# Patient Record
Sex: FEMALE | Race: BLACK OR AFRICAN AMERICAN | Employment: OTHER | URBAN - METROPOLITAN AREA
[De-identification: names, ages, dates, MRNs, and addresses within clinical notes are randomized per-mention and may not be internally consistent; named-entity substitution may affect disease eponyms.]

---

## 2017-01-09 ENCOUNTER — OFFICE VISIT (OUTPATIENT)
Dept: FAMILY MEDICINE | Facility: CLINIC | Age: 82
End: 2017-01-09
Payer: COMMERCIAL

## 2017-01-09 VITALS
RESPIRATION RATE: 20 BRPM | DIASTOLIC BLOOD PRESSURE: 67 MMHG | HEART RATE: 110 BPM | SYSTOLIC BLOOD PRESSURE: 154 MMHG | TEMPERATURE: 97.8 F | OXYGEN SATURATION: 97 %

## 2017-01-09 DIAGNOSIS — I10 HTN, GOAL BELOW 150/90: ICD-10-CM

## 2017-01-09 DIAGNOSIS — J45.40 MODERATE PERSISTENT ASTHMA WITHOUT COMPLICATION: Primary | ICD-10-CM

## 2017-01-09 PROCEDURE — 99214 OFFICE O/P EST MOD 30 MIN: CPT | Performed by: FAMILY MEDICINE

## 2017-01-09 ASSESSMENT — ASTHMA QUESTIONNAIRES
ACT_TOTALSCORE: 13
QUESTION_4 LAST FOUR WEEKS HOW OFTEN HAVE YOU USED YOUR RESCUE INHALER OR NEBULIZER MEDICATION (SUCH AS ALBUTEROL): ONE OR TWO TIMES PER DAY
QUESTION_2 LAST FOUR WEEKS HOW OFTEN HAVE YOU HAD SHORTNESS OF BREATH: MORE THAN ONCE A DAY
QUESTION_1 LAST FOUR WEEKS HOW MUCH OF THE TIME DID YOUR ASTHMA KEEP YOU FROM GETTING AS MUCH DONE AT WORK, SCHOOL OR AT HOME: A LITTLE OF THE TIME
QUESTION_3 LAST FOUR WEEKS HOW OFTEN DID YOUR ASTHMA SYMPTOMS (WHEEZING, COUGHING, SHORTNESS OF BREATH, CHEST TIGHTNESS OR PAIN) WAKE YOU UP AT NIGHT OR EARLIER THAN USUAL IN THE MORNING: ONCE A WEEK
QUESTION_5 LAST FOUR WEEKS HOW WOULD YOU RATE YOUR ASTHMA CONTROL: SOMEWHAT CONTROLLED

## 2017-01-09 NOTE — PROGRESS NOTES
SUBJECTIVE:                                                    Staci Watt is a 81 year old female who presents to clinic today for the following health issues:      Asthma Follow-Up    Was ACT completed today?    Yes    ACT Total Scores 1/9/2017   ACT TOTAL SCORE -   ASTHMA ER VISITS -   ASTHMA HOSPITALIZATIONS -   ACT TOTAL SCORE (Goal Greater than or Equal to 20) 13   In the past 12 months, how many times did you visit the emergency room for your asthma without being admitted to the hospital? 0   In the past 12 months, how many times were you hospitalized overnight because of your asthma? 0       Medication side effects: none    Medication Followup of Budesonide,  Ayazvana    Taking Medication as prescribed: Yes    Side Effects:  None    Medication Helping Symptoms:  yes     Asthma - Due to hardship medica was paying for her nebulizers. Over the last three months pt has been being billed. She has been taking her two nebulizer medications from twice to once daily. Nebulizers are Pulmicort and Brovana. She is also taking Flovent 250 mcg BID. Pt notes that her symptoms are well controlled.        Problem list and histories reviewed & adjusted, as indicated.  Additional history: as documented    Problem list, Medication list, Allergies, and Medical/Social/Surgical histories reviewed in [facility] and updated as appropriate.    ROS:  Constitutional, HEENT, cardiovascular, pulmonary, gi and gu systems are negative, except as otherwise noted.    OBJECTIVE:                                                    /67 mmHg  Pulse 110  Temp(Src) 97.8  F (36.6  C) (Oral)  Resp 20  Wt   SpO2 97%  There is no weight on file to calculate BMI.  GENERAL: healthy, alert and no distress  RESP: lungs clear to auscultation - no rales, rhonchi or wheezes  CV: regular rate and rhythm, normal S1 S2, no S3 or S4    Diagnostic Test Results:  none      ASSESSMENT/PLAN:                                                      1. Moderate  persistent asthma without complication  Pt is currently taking nebulizers are Pulmicort and Brovana. She is also taking Flovent 250 mcg BID. Pt notes that her symptoms are well controlled. ACT score 13.   As nebulizer are not covered, I will switch pt to Advair. We discussed discontinuing Pulmicort, Brovana and Flovent and starting Advair.   Fluticasone-salmeterol (ADVAIR) 500-50 MCG/DOSE diskus inhaler; Inhale 1 puff into the lungs 2 times daily  Dispense: 1 Inhaler; Refill: 1  Repeat ACT in one month.     2. HTN, goal below 150/90  B/P not at goal, I forgot to address it during the visit. Continue to monitor.     Destin Wolf MD  [facility] [place]

## 2017-01-09 NOTE — NURSING NOTE
"Chief Complaint   Patient presents with     Asthma     Recheck Medication     nebulizer       Initial /67 mmHg  Pulse 110  Temp(Src) 97.8  F (36.6  C) (Oral)  Resp 20  Wt   SpO2 97% Estimated body mass index is 32.61 kg/(m^2) as calculated from the following:    Height as of 10/11/16: 5' 2\" (1.575 m).    Weight as of 3/18/16: 178 lb 5 oz (80.882 kg).  BP completed using cuff size: christiano Carballo CMA      "

## 2017-01-10 ASSESSMENT — ASTHMA QUESTIONNAIRES: ACT_TOTALSCORE: 13

## 2017-01-26 DIAGNOSIS — E78.5 HYPERLIPIDEMIA LDL GOAL <100: ICD-10-CM

## 2017-01-26 DIAGNOSIS — E55.9 VITAMIN D DEFICIENCY: Primary | ICD-10-CM

## 2017-01-26 RX ORDER — ATORVASTATIN CALCIUM 80 MG/1
40 TABLET, FILM COATED ORAL DAILY
Qty: 45 TABLET | Refills: 3 | Status: SHIPPED | OUTPATIENT
Start: 2017-01-26 | End: 2017-05-02

## 2017-01-26 NOTE — TELEPHONE ENCOUNTER
"Routing refill request to provider for review/approval because:  Rx on file states \"no print out\" from October 2016. Please review accuracy of Rx and send new order to pharmacy. Thank you.       Maranda Geiger, Pharm.D.  on behalf of [facility] Pharmacist   [email or web address]                "

## 2017-01-26 NOTE — TELEPHONE ENCOUNTER
Lipitor 80mg     Last Written Prescription Date: 10/11/16  Last Fill Quantity: 45, # refills: 3  Last Office Visit with G, P or [facility] prescribing provider: 1/9/17       CHOL      181   10/11/2016  HDL       97   10/11/2016  LDL       67   10/11/2016  TRIG       86   10/11/2016  CHOLHDLRATIO      1.9   8/31/2015      Michelle Dennison CPhT  [facility]    On behalf of [facility]

## 2017-03-02 ENCOUNTER — OFFICE VISIT (OUTPATIENT)
Dept: ORTHOPEDICS | Facility: CLINIC | Age: 82
End: 2017-03-02

## 2017-03-02 VITALS — HEIGHT: 61 IN

## 2017-03-02 DIAGNOSIS — M25.561 RIGHT KNEE PAIN, UNSPECIFIED CHRONICITY: Primary | ICD-10-CM

## 2017-03-02 DIAGNOSIS — M17.0 PRIMARY OSTEOARTHRITIS OF BOTH KNEES: Primary | ICD-10-CM

## 2017-03-02 RX ORDER — TRIAMCINOLONE ACETONIDE 40 MG/ML
40 INJECTION, SUSPENSION INTRA-ARTICULAR; INTRAMUSCULAR ONCE
Qty: 2 ML | Refills: 0 | OUTPATIENT
Start: 2017-03-02 | End: 2017-03-02

## 2017-03-02 NOTE — NURSING NOTE
[address] [address]  Dept: [phone]  ______________________________________________________________________________    Patient: Staci Watt   : 1935   MRN: [id number]   2017    INVASIVE PROCEDURE SAFETY CHECKLIST    Date: 3/2/2017   Procedure: Bilateral knee CSI with Kenalog   Patient Name: Staci Watt  MRN: [id number]  YOB: 1935    Action: Complete sections as appropriate. Any discrepancy results in a HARD COPY until resolved.     PRE PROCEDURE:  Patient ID verified with 2 identifiers (name and  or MRN): Yes  Procedure and site verified with patient/designee (when able): Yes  Accurate consent documentation in medical record: Yes  H&P (or appropriate assessment) documented in medical record: Yes  H&P must be up to 20 days prior to procedure and updates within 24 hours of procedure as applicable: NA  Relevant diagnostic and radiology test results appropriately labeled and displayed as applicable: Yes  Procedure site(s) marked with provider initials: NA    TIMEOUT:  Time-Out performed immediately prior to starting procedure, including verbal and active participation of all team members addressing the following:Yes  * Correct patient identify  * Confirmed that the correct side and site are marked  * An accurate procedure consent form  * Agreement on the procedure to be done  * Correct patient position  * Relevant images and results are properly labeled and appropriately displayed  * The need to administer antibiotics or fluids for irrigation purposes during the procedure as applicable   * Safety precautions based on patient history or medication use    DURING PROCEDURE: Verification of correct person, site, and procedures any time the responsibility for care of the patient is transferred to another member of the care team.

## 2017-03-02 NOTE — MR AVS SNAPSHOT
"              After Visit Summary   3/2/2017    Staci Watt    MRN: [id number]           Patient Information     Date Of Birth          1935        Visit Information        Provider Department      3/2/2017 4:00 PM Diego Gupta MD [facility]        Today's Diagnoses     Primary osteoarthritis of both knees    -  1       Follow-ups after your visit        Who to contact     Please call your clinic at [phone] to:    Ask questions about your health    Make or cancel appointments    Discuss your medicines    Learn about your test results    Speak to your doctor   If you have compliments or concerns about an experience at your clinic, or if you wish to file a complaint, please contact [facility] Patient Relations at [phone] or email us at [email or web address]         Additional Information About Your Visit        MyChart Information     [employer] is an electronic gateway that provides easy, online access to your medical records. With [employer], you can request a clinic appointment, read your test results, renew a prescription or communicate with your care team.     To sign up for [employer] visit the website at [email or web address]   You will be asked to enter the access code listed below, as well as some personal information. Please follow the directions to create your username and password.     Your access code is: [email or web address]  Expires: 2017 11:17 AM     Your access code will  in 90 days. If you need help or a new code, please contact your [facility] or call [phone] for assistance.        Care EveryWhere ID     This is your Care EveryWhere ID. This could be used by other organizations to access your [place] medical records  [id number]        Your Vitals Were     Height                   5' 1\" (1.549 m)            Blood Pressure from Last 3 Encounters:   17 154/67   10/11/16 142/70 "   03/18/16 147/74    Weight from Last 3 Encounters:   03/18/16 178 lb 5 oz (80.9 kg)   02/12/16 177 lb (80.3 kg)   01/07/16 179 lb (81.2 kg)              We Performed the Following     Large Joint/Bursa injection and/or drainage - Bilateral (Shoulder, Knee) [20773] [50 Mod]     TRIAMCINOLONE ACET INJ NOS          Today's Medication Changes          These changes are accurate as of: 3/2/17 11:59 PM.  If you have any questions, ask your nurse or doctor.               Start taking these medicines.        Dose/Directions    triamcinolone acetonide 40 MG/ML injection   Commonly known as:  KENALOG   Used for:  Primary osteoarthritis of both knees   Started by:  Diego Gupta MD        Dose:  40 mg   1 mL (40 mg) by INTRA-ARTICULAR route once for 1 dose   Quantity:  2 mL   Refills:  0         These medicines have changed or have updated prescriptions.        Dose/Directions    * vitamin D 2000 UNITS tablet   This may have changed:  additional instructions   Used for:  Vitamin D deficiency   Changed by:  Heidi Velasquez MD        Dose:  2000 Units   Take 2,000 Units by mouth daily   Quantity:  100 tablet   Refills:  3       * vitamin D 2000 UNITS tablet   This may have changed:  Another medication with the same name was changed. Make sure you understand how and when to take each.   Used for:  Vitamin D deficiency   Changed by:  Heidi Velasquez MD        Dose:  2000 Units   Take 2,000 Units by mouth daily   Quantity:  100 tablet   Refills:  3       * Notice:  This list has 2 medication(s) that are the same as other medications prescribed for you. Read the directions carefully, and ask your doctor or other care provider to review them with you.      Stop taking these medicines if you haven't already. Please contact your care team if you have questions.     nebulizer nebulization   Stopped by:  Diego Gupta MD                Where to get your medicines      Some of these will need a paper  prescription and others can be bought over the counter.  Ask your nurse if you have questions.     You don't need a prescription for these medications     triamcinolone acetonide 40 MG/ML injection                Primary Care Provider Office Phone # Fax #    Heidi Velasquez [phone]       [facility] [address]  [facility] [address]        Thank you!     Thank you for choosing [facility]  for your care. Our goal is always to provide you with excellent care. Hearing back from our patients is one way we can continue to improve our services. Please take a few minutes to complete the written survey that you may receive in the mail after your visit with us. Thank you!             Your Updated Medication List - Protect others around you: Learn how to safely use, store and throw away your medicines at www.disposemymeds.org.          This list is accurate as of: 3/2/17 11:59 PM.  Always use your most recent med list.                   Brand Name Dispense Instructions for use    acetaminophen 500 MG tablet    TYLENOL     Take 500-1,000 mg by mouth every 6 hours as needed.       albuterol 108 (90 BASE) MCG/ACT Inhaler    albuterol    3 Inhaler    Inhale 2 puffs into the lungs every 4 hours as needed for shortness of breath / dyspnea or wheezing       ASPIRIN CHILDRENS 81 MG chewable tablet   Generic drug:  aspirin     0    1 TABLET DAILY       atorvastatin 80 MG tablet    LIPITOR    45 tablet    Take 0.5 tablets (40 mg) by mouth daily For high cholesterol.       B-12 1000 MCG Tbcr     100 tablet    Take 1,000 mcg by mouth daily       clonazePAM 0.5 MG tablet    klonoPIN    4 tablet    Take 1 tablet 30 minutes prior to knee injection, do not drive while taking this medication.       * ferrous gluconate 324 (38 FE) MG tablet    FERGON    60 tablet    Take 1 tablet by mouth 2 times daily.       * ferrous gluconate 324 (38 FE) MG tablet    FERGON    100 tablet    Take 1  tablet (324 mg) by mouth daily (with breakfast)       FLUoxetine 20 MG capsule    PROzac    90 capsule    Take 1 capsule (20 mg) by mouth daily       fluticasone-salmeterol 500-50 MCG/DOSE diskus inhaler    ADVAIR    1 Inhaler    Inhale 1 puff into the lungs 2 times daily       hydrochlorothiazide 50 MG tablet    HYDRODIURIL    90 tablet    Take 1 tablet (50 mg) by mouth daily       lisinopril 40 MG tablet    PRINIVIL/ZESTRIL    90 tablet    Take 1 tablet (40 mg) by mouth daily       senna-docusate 8.6-50 MG per tablet    DANIEL-COLACE    100 tablet    Take 2 tablets by mouth daily       triamcinolone acetonide 40 MG/ML injection    KENALOG    2 mL    1 mL (40 mg) by INTRA-ARTICULAR route once for 1 dose       * vitamin D 2000 UNITS tablet     100 tablet    Take 2,000 Units by mouth daily       * vitamin D 2000 UNITS tablet     100 tablet    Take 2,000 Units by mouth daily       * Notice:  This list has 4 medication(s) that are the same as other medications prescribed for you. Read the directions carefully, and ask your doctor or other care provider to review them with you.
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
## 2017-05-02 ENCOUNTER — OFFICE VISIT (OUTPATIENT)
Dept: FAMILY MEDICINE | Facility: CLINIC | Age: 82
End: 2017-05-02
Payer: COMMERCIAL

## 2017-05-02 VITALS
OXYGEN SATURATION: 99 % | SYSTOLIC BLOOD PRESSURE: 128 MMHG | TEMPERATURE: 98.8 F | RESPIRATION RATE: 18 BRPM | HEART RATE: 116 BPM | DIASTOLIC BLOOD PRESSURE: 64 MMHG

## 2017-05-02 DIAGNOSIS — E55.9 VITAMIN D DEFICIENCY: ICD-10-CM

## 2017-05-02 DIAGNOSIS — M17.0 PRIMARY OSTEOARTHRITIS OF BOTH KNEES: Primary | ICD-10-CM

## 2017-05-02 DIAGNOSIS — Z13.820 SCREENING FOR OSTEOPOROSIS: ICD-10-CM

## 2017-05-02 DIAGNOSIS — D64.9 ANEMIA, UNSPECIFIED TYPE: ICD-10-CM

## 2017-05-02 DIAGNOSIS — Z23 NEED FOR TDAP VACCINATION: ICD-10-CM

## 2017-05-02 DIAGNOSIS — J45.40 MODERATE PERSISTENT ASTHMA WITHOUT COMPLICATION: ICD-10-CM

## 2017-05-02 DIAGNOSIS — E78.5 HYPERLIPIDEMIA LDL GOAL <100: ICD-10-CM

## 2017-05-02 DIAGNOSIS — F33.1 MAJOR DEPRESSIVE DISORDER, RECURRENT EPISODE, MODERATE (H): ICD-10-CM

## 2017-05-02 DIAGNOSIS — D50.9 IRON DEFICIENCY ANEMIA, UNSPECIFIED IRON DEFICIENCY ANEMIA TYPE: ICD-10-CM

## 2017-05-02 DIAGNOSIS — N18.30 CKD (CHRONIC KIDNEY DISEASE) STAGE 3, GFR 30-59 ML/MIN (H): ICD-10-CM

## 2017-05-02 DIAGNOSIS — I10 HTN, GOAL BELOW 150/90: ICD-10-CM

## 2017-05-02 LAB
BASOPHILS # BLD AUTO: 0 10E9/L (ref 0–0.2)
BASOPHILS NFR BLD AUTO: 0.3 %
DIFFERENTIAL METHOD BLD: ABNORMAL
EOSINOPHIL # BLD AUTO: 0.1 10E9/L (ref 0–0.7)
EOSINOPHIL NFR BLD AUTO: 1.3 %
ERYTHROCYTE [DISTWIDTH] IN BLOOD BY AUTOMATED COUNT: 13.8 % (ref 10–15)
HCT VFR BLD AUTO: 34.2 % (ref 35–47)
HGB BLD-MCNC: 10.9 G/DL (ref 11.7–15.7)
LYMPHOCYTES # BLD AUTO: 2.7 10E9/L (ref 0.8–5.3)
LYMPHOCYTES NFR BLD AUTO: 42.2 %
MCH RBC QN AUTO: 31.1 PG (ref 26.5–33)
MCHC RBC AUTO-ENTMCNC: 31.9 G/DL (ref 31.5–36.5)
MCV RBC AUTO: 97 FL (ref 78–100)
MONOCYTES # BLD AUTO: 0.5 10E9/L (ref 0–1.3)
MONOCYTES NFR BLD AUTO: 7.9 %
NEUTROPHILS # BLD AUTO: 3.1 10E9/L (ref 1.6–8.3)
NEUTROPHILS NFR BLD AUTO: 48.3 %
PLATELET # BLD AUTO: 368 10E9/L (ref 150–450)
RBC # BLD AUTO: 3.51 10E12/L (ref 3.8–5.2)
WBC # BLD AUTO: 6.3 10E9/L (ref 4–11)

## 2017-05-02 PROCEDURE — 90715 TDAP VACCINE 7 YRS/> IM: CPT | Performed by: FAMILY MEDICINE

## 2017-05-02 PROCEDURE — 83550 IRON BINDING TEST: CPT | Performed by: FAMILY MEDICINE

## 2017-05-02 PROCEDURE — 80053 COMPREHEN METABOLIC PANEL: CPT | Performed by: FAMILY MEDICINE

## 2017-05-02 PROCEDURE — 83540 ASSAY OF IRON: CPT | Performed by: FAMILY MEDICINE

## 2017-05-02 PROCEDURE — 80061 LIPID PANEL: CPT | Performed by: FAMILY MEDICINE

## 2017-05-02 PROCEDURE — 82043 UR ALBUMIN QUANTITATIVE: CPT | Performed by: FAMILY MEDICINE

## 2017-05-02 PROCEDURE — 85025 COMPLETE CBC W/AUTO DIFF WBC: CPT | Performed by: FAMILY MEDICINE

## 2017-05-02 PROCEDURE — 36415 COLL VENOUS BLD VENIPUNCTURE: CPT | Performed by: FAMILY MEDICINE

## 2017-05-02 PROCEDURE — 90471 IMMUNIZATION ADMIN: CPT | Performed by: FAMILY MEDICINE

## 2017-05-02 PROCEDURE — 82728 ASSAY OF FERRITIN: CPT | Performed by: FAMILY MEDICINE

## 2017-05-02 PROCEDURE — 99214 OFFICE O/P EST MOD 30 MIN: CPT | Mod: 25 | Performed by: FAMILY MEDICINE

## 2017-05-02 PROCEDURE — 82306 VITAMIN D 25 HYDROXY: CPT | Performed by: FAMILY MEDICINE

## 2017-05-02 RX ORDER — HYDROCHLOROTHIAZIDE 50 MG/1
50 TABLET ORAL DAILY
Qty: 90 TABLET | Refills: 3 | Status: SHIPPED | OUTPATIENT
Start: 2017-05-02 | End: 2018-05-14

## 2017-05-02 RX ORDER — LISINOPRIL 40 MG/1
40 TABLET ORAL DAILY
Qty: 90 TABLET | Refills: 3 | Status: SHIPPED | OUTPATIENT
Start: 2017-05-02 | End: 2018-05-14

## 2017-05-02 RX ORDER — ATORVASTATIN CALCIUM 80 MG/1
40 TABLET, FILM COATED ORAL DAILY
Qty: 45 TABLET | Refills: 3 | Status: SHIPPED | OUTPATIENT
Start: 2017-05-02 | End: 2018-05-14

## 2017-05-02 NOTE — NURSING NOTE
"Chief Complaint   Patient presents with     RECHECK     Depression, Asthma, Hyperlipidemia and CKD       Initial /64 (BP Location: Right arm, Patient Position: Chair, Cuff Size: Adult Large)  Pulse 116  Temp 98.8  F (37.1  C) (Oral)  Resp 18  SpO2 99% Estimated body mass index is 32.61 kg/(m^2) as calculated from the following:    Height as of 3/18/16: 5' 2\" (1.575 m).    Weight as of 3/18/16: 178 lb 5 oz (80.9 kg).  Medication Reconciliation: toma Ramos CMA      "

## 2017-05-02 NOTE — LETTER
My Depression Action Plan  Name: Staci Watt   Date of Birth 1935  Date: 5/2/2017    My doctor: Heidi Velasquez   My clinic: [address] [address] [phone]          GREEN    ZONE   Good Control    What it looks like:     Things are going generally well. You have normal up s and down s. You may even feel depressed from time to time, but bad moods usually last less than a day.   What you need to do:  1. Continue to care for yourself (see self care plan)  2. Check your depression survival kit and update it as needed  3. Follow your physician s recommendations including any medication.  4. Do not stop taking medication unless you consult with your physician first.           YELLOW         ZONE Getting Worse    What it looks like:     Depression is starting to interfere with your life.     It may be hard to get out of bed; you may be starting to isolate yourself from others.    Symptoms of depression are starting to last most all day and this has happened for several days.     You may have suicidal thoughts but they are not constant.   What you need to do:     1. Call your care team, your response to treatment will improve if you keep your care team informed of your progress. Yellow periods are signs an adjustment may need to be made.     2. Continue your self-care, even if you have to fake it!    3. Talk to someone in your support network    4. Open up your depression survival kit           RED    ZONE Medical Alert - Get Help    What it looks like:     Depression is seriously interfering with your life.     You may experience these or other symptoms: You can t get out of bed most days, can t work or engage in other necessary activities, you have trouble taking care of basic hygiene, or basic responsibilities, thoughts of suicide or death that will not go away, self-injurious behavior.     What you need to do:  1. Call your care  team and request a same-day appointment. If they are not available (weekends or after hours) call your local crisis line, emergency room or 911.      Electronically signed by: Gill Lua, May 2, 2017    Depression Self Care Plan / Survival Kit    Self-Care for Depression  Here s the deal. Your body and mind are really not as separate as most people think.  What you do and think affects how you feel and how you feel influences what you do and think. This means if you do things that people who feel good do, it will help you feel better.  Sometimes this is all it takes.  There is also a place for medication and therapy depending on how severe your depression is, so be sure to consult with your medical provider and/ or Behavioral Health Consultant if your symptoms are worsening or not improving.     In order to better manage my stress, I will:    Exercise  Get some form of exercise, every day. This will help reduce pain and release endorphins, the  feel good  chemicals in your brain. This is almost as good as taking antidepressants!  This is not the same as joining a gym and then never going! (they count on that by the way ) It can be as simple as just going for a walk or doing some gardening, anything that will get you moving.      Hygiene   Maintain good hygiene (Get out of bed in the morning, Make your bed, Brush your teeth, Take a shower, and Get dressed like you were going to work, even if you are unemployed).  If your clothes don't fit try to get ones that do.    Diet  I will strive to eat foods that are good for me, drink plenty of water, and avoid excessive sugar, caffeine, alcohol, and other mood-altering substances.  Some foods that are helpful in depression are: complex carbohydrates, B vitamins, flaxseed, fish or fish oil, fresh fruits and vegetables.    Psychotherapy  I agree to participate in Individual Therapy (if recommended).    Medication  If prescribed medications, I agree to take them.  Missing  doses can result in serious side effects.  I understand that drinking alcohol, or other illicit drug use, may cause potential side effects.  I will not stop my medication abruptly without first discussing it with my provider.    Staying Connected With Others  I will stay in touch with my friends, family members, and my primary care provider/team.    Use your imagination  Be creative.  We all have a creative side; it doesn t matter if it s oil painting, sand castles, or mud pies! This will also kick up the endorphins.    Witness Beauty  (AKA stop and smell the roses) Take a look outside, even in mid-winter. Notice colors, textures. Watch the squirrels and birds.     Service to others  Be of service to others.  There is always someone else in need.  By helping others we can  get out of ourselves  and remember the really important things.  This also provides opportunities for practicing all the other parts of the program.    Humor  Laugh and be silly!  Adjust your TV habits for less news and crime-drama and more comedy.    Control your stress  Try breathing deep, massage therapy, biofeedback, and meditation. Find time to relax each day.     My support system    Clinic Contact:  Phone number:    Contact 1:  Phone number:    Contact 2:  Phone number:    Nondenominational/:  Phone number:    Therapist:  Phone number:    Local crisis center:    Phone number:    Other community support:  Phone number:

## 2017-05-02 NOTE — LETTER
[facility]   [address] [place]   [address]  [phone]    May 19, 2017      Staci KATIE Shukri  [address] [facility] [address]          Dear [place]!  It was a pleasure to see you in clinic!  Thank you for getting labs done. Everything looks pretty good. You do not have an iron deficiency or a vitamin D Deficiency.    You do still continue to have low hemoglobin (anemia)., which has been present for 5 years, and has not changed a lot.  You could consider getting a colonoscopy since one cause of persistent anemia is colon cancer.  I'll go ahead and put a referral in, but please come in to discuss this with me if you have any questions.    Please call [facility]: Adult Colorectal Surgery   [phone] to discuss getting this done.    Everything else looks great. Your microalbumen is normal, which is great news. This means you do not have protein in the urine, and that your kidneys are currently functioning well. Keeping blood pressure and blood fats low is the best way to preserve your kidney function over your lifetime. The testing of your blood sugar, kidney function, liver function and electrolytes was normal. Good news, your LDL (or bad cholesterol) is at goal. Your medication is working well. Please continue to take your cholesterol lowering medication.    If you have any questions, please contact the clinic or schedule an appointment with me, thank you!      Results for orders placed or performed in visit on 05/02/17   Comprehensive metabolic panel   Result Value Ref Range    Sodium 140 133 - 144 mmol/L    Potassium 4.0 3.4 - 5.3 mmol/L    Chloride 106 94 - 109 mmol/L    Carbon Dioxide 26 20 - 32 mmol/L    Anion Gap 8 3 - 14 mmol/L    Glucose 84 70 - 99 mg/dL    Urea Nitrogen 12 7 - 30 mg/dL    Creatinine 0.91 0.52 - 1.04 mg/dL    GFR Estimate 59 (L) >60 mL/min/1.7m2    GFR Estimate If Black 72 >60 mL/min/1.7m2    Calcium 9.5 8.5 - 10.1 mg/dL     Bilirubin Total 0.4 0.2 - 1.3 mg/dL    Albumin 4.0 3.4 - 5.0 g/dL    Protein Total 8.0 6.8 - 8.8 g/dL    Alkaline Phosphatase 78 40 - 150 U/L    ALT 17 0 - 50 U/L    AST 12 0 - 45 U/L   Lipid panel reflex to direct LDL   Result Value Ref Range    Cholesterol 205 (H) <200 mg/dL    Triglycerides 79 <150 mg/dL    HDL Cholesterol 117 >49 mg/dL    LDL Cholesterol Calculated 72 <100 mg/dL    Non HDL Cholesterol 88 <130 mg/dL   Albumin Random Urine Quantitative   Result Value Ref Range    Creatinine Urine 107 mg/dL    Albumin Urine mg/L 10 mg/L    Albumin Urine mg/g Cr 9.05 0 - 25 mg/g Cr   CBC with platelets differential   Result Value Ref Range    WBC 6.3 4.0 - 11.0 10e9/L    RBC Count 3.51 (L) 3.8 - 5.2 10e12/L    Hemoglobin 10.9 (L) 11.7 - 15.7 g/dL    Hematocrit 34.2 (L) 35.0 - 47.0 %    MCV 97 78 - 100 fl    MCH 31.1 26.5 - 33.0 pg    MCHC 31.9 31.5 - 36.5 g/dL    RDW 13.8 10.0 - 15.0 %    Platelet Count 368 150 - 450 10e9/L    Diff Method Automated Method     % Neutrophils 48.3 %    % Lymphocytes 42.2 %    % Monocytes 7.9 %    % Eosinophils 1.3 %    % Basophils 0.3 %    Absolute Neutrophil 3.1 1.6 - 8.3 10e9/L    Absolute Lymphocytes 2.7 0.8 - 5.3 10e9/L    Absolute Monocytes 0.5 0.0 - 1.3 10e9/L    Absolute Eosinophils 0.1 0.0 - 0.7 10e9/L    Absolute Basophils 0.0 0.0 - 0.2 10e9/L   Ferritin   Result Value Ref Range    Ferritin 136 8 - 252 ng/mL   Vitamin D Deficiency   Result Value Ref Range    Vitamin D Deficiency screening 40 20 - 75 ug/L   Iron and iron binding capacity   Result Value Ref Range    Iron 50 35 - 180 ug/dL    Iron Binding Cap 336 240 - 430 ug/dL    Iron Saturation Index 15 15 - 46 %         Sincerely,    Heidi Velasquez MD/nr

## 2017-05-02 NOTE — PROGRESS NOTES
SUBJECTIVE:                                                    Staci Watt is a 81 year old female who presents to clinic today for the following health issues:  Bilateral knee pain  She reports severe bilateral knee pain, left worse than right, for more than 15 years, but getting acutely worse. She can't really walk at all and has significant trouble with stairs. She does have stairs at her house, with upstairs bedroom, and very tall sanya bed that's difficult for her to get in and out of. She recently had a knee injection, but it only worked for a week. She is slowly beginning to consider a knee replacement. She really doesn't want to spend any time in a tcu.    Hyperlipidemia Follow-Up      Rate your low fat/cholesterol diet?: good    Taking statin?  Yes, possible muscle aches from statin    Other lipid medications/supplements?:  No     Hypertension Follow-up       Outpatient blood pressures are not being checked.    Low Salt Diet: no added salt  BP Readings from Last 3 Encounters:   05/02/17 128/64   01/09/17 154/67   10/11/16 142/70           Depression Followup    Status since last visit: Stable      See PHQ-9 for current symptoms.  Other associated symptoms: None    Complicating factors:   Significant life event:  Yes-  Personal, family issues   Current substance abuse:  None  Anxiety or Panic symptoms:  No    PHQ-9  English PHQ-9   Any Language          Asthma Follow-Up    Was ACT completed today?    Yes    ACT Total Scores 5/2/2017   ACT TOTAL SCORE (Goal Greater than or Equal to 20) 23   In the past 12 months, how many times did you visit the emergency room for your asthma without being admitted to the hospital? 0   In the past 12 months, how many times were you hospitalized overnight because of your asthma? 0       Recent asthma triggers that patient is dealing with: None      Chronic Kidney Disease Follow-up      Current NSAID use?  Yes:    and ibuprofen (Motrin)  Frequency: daily      Amount of  exercise or physical activity: up and down stairs at home    Problems taking medications regularly: No    Medication side effects: muscle aches    Diet: low salt and low fat/cholesterol    HIstory of vitamin D def  Will recheck today.    Problem list and histories reviewed & adjusted, as indicated.  Additional history: as documented    Patient Active Problem List   Diagnosis     Allergic rhinitis     Moderate Persistent Asthma     Gout     Hyperlipidemia LDL goal <100     CKD (chronic kidney disease) stage 3, GFR 30-59 ml/min     Obese     OA (osteoarthritis) of knee     Vitamin D deficiency     Microalbuminuria     Ganglion cyst     HTN, goal below 150/90     Major depressive disorder, recurrent episode, moderate (H)     Moderate persistent asthma without complication     Primary osteoarthritis of both knees     Iron deficiency anemia, unspecified iron deficiency anemia type     Past Surgical History:   Procedure Laterality Date     C NONSPECIFIC PROCEDURE      (B) detatched retina     C NONSPECIFIC PROCEDURE      LASIK surgery     C NONSPECIFIC PROCEDURE      NISSA/BSO     C NONSPECIFIC PROCEDURE      Hernia     C NONSPECIFIC PROCEDURE      Tonsillectomy     C NONSPECIFIC PROCEDURE      Arthroscopic knee surgery       Social History   Substance Use Topics     Smoking status: Passive Smoke Exposure - Never Smoker     Smokeless tobacco: Never Used      Comment: [facility] [employer]     Alcohol use Yes      Comment: has a drink every night before she goes to bed     Family History   Problem Relation Age of Onset     Hypertension Mother      Respiratory Mother      Breast Cancer Maternal Aunt      Respiratory Maternal Aunt      Respiratory Maternal Aunt          Current Outpatient Prescriptions   Medication Sig Dispense Refill     atorvastatin (LIPITOR) 80 MG tablet Take 0.5 tablets (40 mg) by mouth daily For high cholesterol. 45 tablet 3     lisinopril (PRINIVIL/ZESTRIL) 40 MG tablet Take 1 tablet (40  mg) by mouth daily 90 tablet 3     FLUoxetine (PROZAC) 20 MG capsule Take 1 capsule (20 mg) by mouth daily Please profile 90 capsule 3     hydrochlorothiazide (HYDRODIURIL) 50 MG tablet Take 1 tablet (50 mg) by mouth daily 90 tablet 3     fluticasone-salmeterol (ADVAIR DISKUS) 500-50 MCG/DOSE diskus inhaler Inhale 1 puff into the lungs 2 times daily 3 Inhaler 3     senna-docusate (DANIEL-COLACE) 8.6-50 MG per tablet Take 2 tablets by mouth daily 100 tablet 3     Cyanocobalamin (B-12) 1000 MCG TBCR Take 1,000 mcg by mouth daily 100 tablet 1     Cholecalciferol (VITAMIN D) 2000 UNITS tablet Take 2,000 Units by mouth daily 100 tablet 3     ferrous gluconate (FERGON) 324 (38 FE) MG tablet Take 1 tablet (324 mg) by mouth daily (with breakfast) 100 tablet 3     albuterol (ALBUTEROL) 108 (90 BASE) MCG/ACT inhaler Inhale 2 puffs into the lungs every 4 hours as needed for shortness of breath / dyspnea or wheezing 3 Inhaler 3     ASPIRIN CHILDRENS 81 MG OR CHEW 1 TABLET DAILY 0 11     acetaminophen (TYLENOL) 500 MG tablet Take 500-1,000 mg by mouth every 6 hours as needed Reported on 5/2/2017       Allergies   Allergen Reactions     Contrast Dye      Seasonal Allergies      Recent Labs   Lab Test  05/02/17   1533  10/11/16   1510   01/07/16   1620   03/05/13   1500  04/24/12   1408   03/07/11   1402  06/03/10   0354   05/28/09   0906   A1C   --    --    --    --    --    --    --    --   5.7  5.6   --   5.6   LDL  72  67   --   65   < >  80  85   < >  148*  86   < >  134*   HDL  117  97   --   90   < >  77  84   < >  61  69   < >  83   TRIG  79  86   --   75   < >  79  83   < >  87  63   < >  73   ALT  17  21   --   23   < >  35  7   < >  16   --    < >  <6   CR  0.91  0.93   < >  0.89   < >  0.83  0.89   < >  1.01  1.20*   < >  1.01   GFRESTIMATED  59*  58*   < >  61   < >  67  62   < >  54*  44*   < >  54*   GFRESTBLACK  72  70   < >  74   < >  81  75   < >  65  53*   < >  65   POTASSIUM  4.0  3.9   < >  4.5   < >  4.2  4.4    < >  4.3  4.4   < >  4.1   TSH   --    --    --    --    --   1.31  1.29   --   0.94   --    < >  1.61    < > = values in this interval not displayed.      BP Readings from Last 3 Encounters:   05/02/17 128/64   01/09/17 154/67   10/11/16 142/70    Wt Readings from Last 3 Encounters:   03/18/16 178 lb 5 oz (80.9 kg)   02/12/16 177 lb (80.3 kg)   01/07/16 179 lb (81.2 kg)                    Reviewed and updated as needed this visit by clinical staff  Tobacco  Allergies  Meds  Med Hx  Surg Hx  Fam Hx  Soc Hx      Reviewed and updated as needed this visit by Provider         ROS:  Constitutional, HEENT, cardiovascular, pulmonary, GI, , musculoskeletal, neuro, skin, endocrine and psych systems are negative, except as otherwise noted.    OBJECTIVE:                                                    /64 (BP Location: Right arm, Patient Position: Chair, Cuff Size: Adult Large)  Pulse 116  Temp 98.8  F (37.1  C) (Oral)  Resp 18  SpO2 99%  There is no height or weight on file to calculate BMI.   Morbidly obese.  GENERAL: healthy, alert and no distress  EYES: Eyes grossly normal to inspection, PERRL and conjunctivae and sclerae normal  NECK: no adenopathy, no asymmetry, masses, or scars and thyroid normal to palpation  RESP: lungs clear to auscultation - no rales, rhonchi or wheezes  CV: regular rate and rhythm, normal S1 S2, no S3 or S4, no murmur, click or rub, no peripheral edema and peripheral pulses strong  ABDOMEN: soft,s nontender, no hepatosplenomegaly, no masses and bowel sounds normal  MS: significant crepitus of both knees with flexion/extension. Difficult rising from seated, from slow, antalgic gait, needs significant assistance ascending and descending exam table stop. No effusion or joint pain noted of joint line on palpation.  NEURO: sensory exam grossly normal, mentation intact, cranial nerves 2-12 intact and DTR's normal and symmetric (biceps, patellar)  PSYCH: mentation appears normal,  judgement and insight intact, appearance well groomed and appears somewhat sad    Diagnostic Test Results:  Results for orders placed or performed in visit on 05/02/17   Comprehensive metabolic panel   Result Value Ref Range    Sodium 140 133 - 144 mmol/L    Potassium 4.0 3.4 - 5.3 mmol/L    Chloride 106 94 - 109 mmol/L    Carbon Dioxide 26 20 - 32 mmol/L    Anion Gap 8 3 - 14 mmol/L    Glucose 84 70 - 99 mg/dL    Urea Nitrogen 12 7 - 30 mg/dL    Creatinine 0.91 0.52 - 1.04 mg/dL    GFR Estimate 59 (L) >60 mL/min/1.7m2    GFR Estimate If Black 72 >60 mL/min/1.7m2    Calcium 9.5 8.5 - 10.1 mg/dL    Bilirubin Total 0.4 0.2 - 1.3 mg/dL    Albumin 4.0 3.4 - 5.0 g/dL    Protein Total 8.0 6.8 - 8.8 g/dL    Alkaline Phosphatase 78 40 - 150 U/L    ALT 17 0 - 50 U/L    AST 12 0 - 45 U/L   Lipid panel reflex to direct LDL   Result Value Ref Range    Cholesterol 205 (H) <200 mg/dL    Triglycerides 79 <150 mg/dL    HDL Cholesterol 117 >49 mg/dL    LDL Cholesterol Calculated 72 <100 mg/dL    Non HDL Cholesterol 88 <130 mg/dL   Albumin Random Urine Quantitative   Result Value Ref Range    Creatinine Urine 107 mg/dL    Albumin Urine mg/L 10 mg/L    Albumin Urine mg/g Cr 9.05 0 - 25 mg/g Cr   CBC with platelets differential   Result Value Ref Range    WBC 6.3 4.0 - 11.0 10e9/L    RBC Count 3.51 (L) 3.8 - 5.2 10e12/L    Hemoglobin 10.9 (L) 11.7 - 15.7 g/dL    Hematocrit 34.2 (L) 35.0 - 47.0 %    MCV 97 78 - 100 fl    MCH 31.1 26.5 - 33.0 pg    MCHC 31.9 31.5 - 36.5 g/dL    RDW 13.8 10.0 - 15.0 %    Platelet Count 368 150 - 450 10e9/L    Diff Method Automated Method     % Neutrophils 48.3 %    % Lymphocytes 42.2 %    % Monocytes 7.9 %    % Eosinophils 1.3 %    % Basophils 0.3 %    Absolute Neutrophil 3.1 1.6 - 8.3 10e9/L    Absolute Lymphocytes 2.7 0.8 - 5.3 10e9/L    Absolute Monocytes 0.5 0.0 - 1.3 10e9/L    Absolute Eosinophils 0.1 0.0 - 0.7 10e9/L    Absolute Basophils 0.0 0.0 - 0.2 10e9/L   Ferritin   Result Value Ref Range     Ferritin 136 8 - 252 ng/mL   Vitamin D Deficiency   Result Value Ref Range    Vitamin D Deficiency screening 40 20 - 75 ug/L   Iron and iron binding capacity   Result Value Ref Range    Iron 50 35 - 180 ug/dL    Iron Binding Cap 336 240 - 430 ug/dL    Iron Saturation Index 15 15 - 46 %        ASSESSMENT/PLAN:                                                    1. Primary osteoarthritis of both knees  Severe, significantly limiting activity, encouraged follow up with ortho, I do feel she would benefit from surgery    2. Hyperlipidemia LDL goal <100  LDL Cholesterol Calculated   Date Value Ref Range Status   05/02/2017 72 <100 mg/dL Final     Comment:     Desirable:       <100 mg/dl   ] at goal, The current medical regimen is effective;  continue present plan and medications.   - Lipid panel reflex to direct LDL  - atorvastatin (LIPITOR) 80 MG tablet; Take 0.5 tablets (40 mg) by mouth daily For high cholesterol.  Dispense: 45 tablet; Refill: 3    3. HTN, goal below 150/90  BP Readings from Last 3 Encounters:   05/02/17 128/64   01/09/17 154/67   10/11/16 142/70    at goal, The current medical regimen is effective;  continue present plan and medications.   - lisinopril (PRINIVIL/ZESTRIL) 40 MG tablet; Take 1 tablet (40 mg) by mouth daily  Dispense: 90 tablet; Refill: 3  - hydrochlorothiazide (HYDRODIURIL) 50 MG tablet; Take 1 tablet (50 mg) by mouth daily  Dispense: 90 tablet; Refill: 3    4. CKD (chronic kidney disease) stage 3, GFR 30-59 ml/min  .  stable  GFR Estimate If Black   Date Value Ref Range Status   05/02/2017 72 >60 mL/min/1.7m2 Final     Comment:      GFR Calc   10/11/2016 70 >60 mL/min/1.7m2 Final     Comment:      GFR Calc   02/12/2016 69 >60 mL/min/1.7m2 Final     Comment:      GFR Calc      - Comprehensive metabolic panel  - Albumin Random Urine Quantitative    5. Moderate persistent asthma without complication  At goal  - Asthma Action Plan (AAP)   -  fluticasone-salmeterol (ADVAIR DISKUS) 500-50 MCG/DOSE diskus inhaler; Inhale 1 puff into the lungs 2 times daily  Dispense: 3 Inhaler; Refill: 3    6. Iron deficiency anemia, unspecified iron deficiency anemia type  Hemoglobin   Date Value Ref Range Status   05/02/2017 10.9 (L) 11.7 - 15.7 g/dL Final   10/11/2016 11.0 (L) 11.7 - 15.7 g/dL Final   08/31/2015 11.2 (L) 11.7 - 15.7 g/dL Final   04/22/2015 11.5 (L) 11.7 - 15.7 g/dL Final   based on today's labs, persistent anemia without iron deficiency.  Chronic disease vs CKD vs possible GI malignancy, will discuss with patient further workup  - CBC with platelets differential  - Ferritin  - Iron and iron binding capacity    7. Major depressive disorder, recurrent episode, moderate (H)  PHQ-9 SCORE 1/7/2016 8/2/2016 5/2/2017   Total Score - - -   Total Score 4 3 4    stable, at goal, although patient's affect depressed appearing, possibly related to chronic knee pain  - DEPRESSION ACTION PLAN (DAP)  - FLUoxetine (PROZAC) 20 MG capsule; Take 1 capsule (20 mg) by mouth daily Please profile  Dispense: 90 capsule; Refill: 3    8. Vitamin D deficiency  Normal, will remove from diagnosis list  - Vitamin D Deficiency    9. Screening for osteoporosis  Consider dexa scan    10. Need for Tdap vaccination  Done today  - TDAP VACCINE (ADACEL)  - ADMIN 1st VACCINE    Heidi Velasquez MD  [facility] [place]

## 2017-05-02 NOTE — MR AVS SNAPSHOT
After Visit Summary   5/2/2017    Miss Staci Watt    MRN: [id number]           Patient Information     Date Of Birth          1935        Visit Information        Provider Department      5/2/2017 2:20 PM Heidi Velasquez MD [facility]        Today's Diagnoses     Primary osteoarthritis of both knees    -  1    Vitamin D deficiency        CKD (chronic kidney disease) stage 3, GFR 30-59 ml/min        Moderate persistent asthma without complication        Hyperlipidemia LDL goal <100        Iron deficiency anemia, unspecified iron deficiency anemia type        Major depressive disorder, recurrent episode, moderate (H)        Symptomatic menopausal or female climacteric states        Screening for osteoporosis        Essential hypertension with goal blood pressure less than 140/90        HTN, goal below 150/90          Care Instructions    It is time for your dexa!  Please call our clinic to schedule this test. You do not need to fast prior to the test, although it is recommended that you NOT take calcium on the day of the test.    If you have any questions, please contact the clinic or schedule an appointment with me, thank you!     [facility]  [address] [place] [address]     Phone: [phone]  Fax: [phone]                Follow-ups after your visit        Who to contact     If you have questions or need follow up information about today's clinic visit or your schedule please contact [facility] directly at [phone].  Normal or non-critical lab and imaging results will be communicated to you by MyChart, letter or phone within 4 business days after the clinic has received the results. If you do not hear from us within 7 days, please contact the clinic through MyChart or phone. If you have a critical or abnormal lab result, we will notify you by phone as soon as possible.  Submit refill requests through [employer] or call your  "pharmacy and they will forward the refill request to us. Please allow 3 business days for your refill to be completed.          Additional Information About Your Visit        [employer] Information     [employer] lets you send messages to your doctor, view your test results, renew your prescriptions, schedule appointments and more. To sign up, go to [email or web address] . Click on \"Log in\" on the left side of the screen, which will take you to the Welcome page. Then click on \"Sign up Now\" on the right side of the page.     You will be asked to enter the access code listed below, as well as some personal information. Please follow the directions to create your username and password.     Your access code is: [email or web address]  Expires: 2017 12:17 PM     Your access code will  in 90 days. If you need help or a new code, please call your [facility] clinic or [phone].        Care EveryWhere ID     This is your Care EveryWhere ID. This could be used by other organizations to access your [facility] medical records  [id number]        Your Vitals Were     Pulse Temperature Respirations Pulse Oximetry          116 98.8  F (37.1  C) (Oral) 18 99%         Blood Pressure from Last 3 Encounters:   17 128/64   17 154/67   10/11/16 142/70    Weight from Last 3 Encounters:   16 178 lb 5 oz (80.9 kg)   16 177 lb (80.3 kg)   16 179 lb (81.2 kg)              We Performed the Following     Albumin Random Urine Quantitative     Asthma Action Plan (AAP)     CBC with platelets differential     Comprehensive metabolic panel     DEPRESSION ACTION PLAN (DAP)     Ferritin     Iron and iron binding capacity     Lipid panel reflex to direct LDL     Vitamin D Deficiency          Today's Medication Changes          These changes are accurate as of: 17  3:12 PM.  If you have any questions, ask your nurse or doctor.               These medicines have changed or have updated prescriptions.        Dose/Directions "    ferrous gluconate 324 (38 FE) MG tablet   Commonly known as:  FERGON   This may have changed:  Another medication with the same name was removed. Continue taking this medication, and follow the directions you see here.   Used for:  Iron deficiency anemia, unspecified iron deficiency anemia type   Changed by:  Heidi Velasquez MD        Dose:  324 mg   Take 1 tablet (324 mg) by mouth daily (with breakfast)   Quantity:  100 tablet   Refills:  3       FLUoxetine 20 MG capsule   Commonly known as:  PROzac   This may have changed:  additional instructions   Used for:  Major depressive disorder, recurrent episode, moderate (H)   Changed by:  Heidi Velasquez MD        Dose:  20 mg   Take 1 capsule (20 mg) by mouth daily Please profile   Quantity:  90 capsule   Refills:  3       fluticasone-salmeterol 500-50 MCG/DOSE diskus inhaler   Commonly known as:  ADVAIR DISKUS   This may have changed:  See the new instructions.   Used for:  Moderate persistent asthma without complication   Changed by:  Heidi Velasquez MD        Dose:  1 puff   Inhale 1 puff into the lungs 2 times daily   Quantity:  3 Inhaler   Refills:  3            Where to get your medicines      These medications were sent to [facility] [address]  [address] [facility] [address]     Phone:  [phone]     atorvastatin 80 MG tablet    FLUoxetine 20 MG capsule    fluticasone-salmeterol 500-50 MCG/DOSE diskus inhaler    hydrochlorothiazide 50 MG tablet    lisinopril 40 MG tablet                Primary Care Provider Office Phone # Fax #    Heidi Velasquez [phone]       [facility] [address]  [facility] [address]        Thank you!     Thank you for choosing [facility]  for your care. Our goal is always to provide you with excellent care. Hearing back from our patients is one way we can continue to improve our services. Please take a  few minutes to complete the written survey that you may receive in the mail after your visit with us. Thank you!             Your Updated Medication List - Protect others around you: Learn how to safely use, store and throw away your medicines at www.disposemymeds.org.          This list is accurate as of: 5/2/17  3:12 PM.  Always use your most recent med list.                   Brand Name Dispense Instructions for use    acetaminophen 500 MG tablet    TYLENOL     Take 500-1,000 mg by mouth every 6 hours as needed Reported on 5/2/2017       albuterol 108 (90 BASE) MCG/ACT Inhaler    albuterol    3 Inhaler    Inhale 2 puffs into the lungs every 4 hours as needed for shortness of breath / dyspnea or wheezing       ASPIRIN CHILDRENS 81 MG chewable tablet   Generic drug:  aspirin     0    1 TABLET DAILY       atorvastatin 80 MG tablet    LIPITOR    45 tablet    Take 0.5 tablets (40 mg) by mouth daily For high cholesterol.       B-12 1000 MCG Tbcr     100 tablet    Take 1,000 mcg by mouth daily       ferrous gluconate 324 (38 FE) MG tablet    FERGON    100 tablet    Take 1 tablet (324 mg) by mouth daily (with breakfast)       FLUoxetine 20 MG capsule    PROzac    90 capsule    Take 1 capsule (20 mg) by mouth daily Please profile       fluticasone-salmeterol 500-50 MCG/DOSE diskus inhaler    ADVAIR DISKUS    3 Inhaler    Inhale 1 puff into the lungs 2 times daily       hydrochlorothiazide 50 MG tablet    HYDRODIURIL    90 tablet    Take 1 tablet (50 mg) by mouth daily       lisinopril 40 MG tablet    PRINIVIL/ZESTRIL    90 tablet    Take 1 tablet (40 mg) by mouth daily       senna-docusate 8.6-50 MG per tablet    DANIEL-COLACE    100 tablet    Take 2 tablets by mouth daily       vitamin D 2000 UNITS tablet     100 tablet    Take 2,000 Units by mouth daily

## 2017-05-02 NOTE — NURSING NOTE
Screening Questionnaire for Adult Immunization    Are you sick today?   No   Do you have allergies to medications, food, a vaccine component or latex?   Yes-contrast dye   Have you ever had a serious reaction after receiving a vaccination?   No   Do you have a long-term health problem with heart disease, lung disease, asthma, kidney disease, metabolic disease (e.g. diabetes), anemia, or other blood disorder?   Yes   Do you have cancer, leukemia, HIV/AIDS, or any other immune system problem?   No   In the past 3 months, have you taken medications that affect  your immune system, such as prednisone, other steroids, or anticancer drugs; drugs for the treatment of rheumatoid arthritis, Crohn s disease, or psoriasis; or have you had radiation treatments?   No   Have you had a seizure, or a brain or other nervous system problem?   No   During the past year, have you received a transfusion of blood or blood     products, or been given immune (gamma) globulin or antiviral drug?   No   For women: Are you pregnant or is there a chance you could become        pregnant during the next month?   No   Have you received any vaccinations in the past 4 weeks?   No     Immunization questionnaire was positive for at least one answer.  Notified Dr. Velasquez.      MNVFC doesn't apply on this patient    Per orders of Dr. Velasquez, injection of Tdap given by Yue Ramos. Patient instructed to remain in clinic for 20 minutes afterwards, and to report any adverse reaction to me immediately.       Screening performed by Yue Ramos on 5/2/2017 at 3:23 PM.

## 2017-05-03 PROBLEM — D64.9 ANEMIA, UNSPECIFIED TYPE: Status: ACTIVE | Noted: 2017-05-03

## 2017-05-03 PROBLEM — D50.9 IRON DEFICIENCY ANEMIA, UNSPECIFIED IRON DEFICIENCY ANEMIA TYPE: Status: RESOLVED | Noted: 2017-05-02 | Resolved: 2017-05-03

## 2017-05-03 LAB
ALBUMIN SERPL-MCNC: 4 G/DL (ref 3.4–5)
ALP SERPL-CCNC: 78 U/L (ref 40–150)
ALT SERPL W P-5'-P-CCNC: 17 U/L (ref 0–50)
ANION GAP SERPL CALCULATED.3IONS-SCNC: 8 MMOL/L (ref 3–14)
AST SERPL W P-5'-P-CCNC: 12 U/L (ref 0–45)
BILIRUB SERPL-MCNC: 0.4 MG/DL (ref 0.2–1.3)
BUN SERPL-MCNC: 12 MG/DL (ref 7–30)
CALCIUM SERPL-MCNC: 9.5 MG/DL (ref 8.5–10.1)
CHLORIDE SERPL-SCNC: 106 MMOL/L (ref 94–109)
CHOLEST SERPL-MCNC: 205 MG/DL
CO2 SERPL-SCNC: 26 MMOL/L (ref 20–32)
CREAT SERPL-MCNC: 0.91 MG/DL (ref 0.52–1.04)
CREAT UR-MCNC: 107 MG/DL
DEPRECATED CALCIDIOL+CALCIFEROL SERPL-MC: 40 UG/L (ref 20–75)
FERRITIN SERPL-MCNC: 136 NG/ML (ref 8–252)
GFR SERPL CREATININE-BSD FRML MDRD: 59 ML/MIN/1.7M2
GLUCOSE SERPL-MCNC: 84 MG/DL (ref 70–99)
HDLC SERPL-MCNC: 117 MG/DL
IRON SATN MFR SERPL: 15 % (ref 15–46)
IRON SERPL-MCNC: 50 UG/DL (ref 35–180)
LDLC SERPL CALC-MCNC: 72 MG/DL
MICROALBUMIN UR-MCNC: 10 MG/L
MICROALBUMIN/CREAT UR: 9.05 MG/G CR (ref 0–25)
NONHDLC SERPL-MCNC: 88 MG/DL
POTASSIUM SERPL-SCNC: 4 MMOL/L (ref 3.4–5.3)
PROT SERPL-MCNC: 8 G/DL (ref 6.8–8.8)
SODIUM SERPL-SCNC: 140 MMOL/L (ref 133–144)
TIBC SERPL-MCNC: 336 UG/DL (ref 240–430)
TRIGL SERPL-MCNC: 79 MG/DL

## 2017-05-03 ASSESSMENT — ASTHMA QUESTIONNAIRES: ACT_TOTALSCORE: 23

## 2017-05-03 ASSESSMENT — PATIENT HEALTH QUESTIONNAIRE - PHQ9: SUM OF ALL RESPONSES TO PHQ QUESTIONS 1-9: 4

## 2017-05-15 ENCOUNTER — RADIANT APPOINTMENT (OUTPATIENT)
Dept: GENERAL RADIOLOGY | Facility: CLINIC | Age: 82
End: 2017-05-15
Attending: FAMILY MEDICINE
Payer: COMMERCIAL

## 2017-05-15 ENCOUNTER — OFFICE VISIT (OUTPATIENT)
Dept: FAMILY MEDICINE | Facility: CLINIC | Age: 82
End: 2017-05-15
Payer: COMMERCIAL

## 2017-05-15 VITALS
TEMPERATURE: 98.2 F | HEART RATE: 99 BPM | OXYGEN SATURATION: 98 % | SYSTOLIC BLOOD PRESSURE: 136 MMHG | RESPIRATION RATE: 12 BRPM | DIASTOLIC BLOOD PRESSURE: 70 MMHG

## 2017-05-15 DIAGNOSIS — M79.89 PAIN AND SWELLING OF TOE OF LEFT FOOT: Primary | ICD-10-CM

## 2017-05-15 DIAGNOSIS — M79.89 PAIN AND SWELLING OF TOE OF LEFT FOOT: ICD-10-CM

## 2017-05-15 DIAGNOSIS — M79.675 PAIN AND SWELLING OF TOE OF LEFT FOOT: Primary | ICD-10-CM

## 2017-05-15 DIAGNOSIS — M79.675 PAIN AND SWELLING OF TOE OF LEFT FOOT: ICD-10-CM

## 2017-05-15 PROCEDURE — 36415 COLL VENOUS BLD VENIPUNCTURE: CPT | Performed by: FAMILY MEDICINE

## 2017-05-15 PROCEDURE — 73630 X-RAY EXAM OF FOOT: CPT | Mod: LT

## 2017-05-15 PROCEDURE — 84550 ASSAY OF BLOOD/URIC ACID: CPT | Performed by: FAMILY MEDICINE

## 2017-05-15 PROCEDURE — 99214 OFFICE O/P EST MOD 30 MIN: CPT | Performed by: FAMILY MEDICINE

## 2017-05-15 NOTE — NURSING NOTE
"Chief Complaint   Patient presents with     Musculoskeletal Problem     left foot swollen        Initial /70 (BP Location: Left arm, Patient Position: Chair, Cuff Size: Adult Regular)  Pulse 99  Temp 98.2  F (36.8  C) (Tympanic)  Resp 12  SpO2 98% Estimated body mass index is 32.61 kg/(m^2) as calculated from the following:    Height as of 3/18/16: 5' 2\" (1.575 m).    Weight as of 3/18/16: 178 lb 5 oz (80.9 kg).  Medication Reconciliation: complete       Kayden Sol MA      "

## 2017-05-15 NOTE — MR AVS SNAPSHOT
"              After Visit Summary   5/15/2017    Miss Staci Watt    MRN: [id number]           Patient Information     Date Of Birth          1935        Visit Information        Provider Department      5/15/2017 11:20 AM Destin Wolf MD [facility]        Today's Diagnoses     Pain and swelling of toe of left foot    -  1      Care Instructions    Please call [phone] to schedule imaging.         Follow-ups after your visit        Future tests that were ordered for you today     Open Future Orders        Priority Expected Expires Ordered    US Lower Extremity Venous Duplex Left Routine  5/15/2018 5/15/2017            Who to contact     If you have questions or need follow up information about today's clinic visit or your schedule please contact [facility] directly at [phone].  Normal or non-critical lab and imaging results will be communicated to you by MyChart, letter or phone within 4 business days after the clinic has received the results. If you do not hear from us within 7 days, please contact the clinic through MyChart or phone. If you have a critical or abnormal lab result, we will notify you by phone as soon as possible.  Submit refill requests through [employer] or call your pharmacy and they will forward the refill request to us. Please allow 3 business days for your refill to be completed.          Additional Information About Your Visit        ODINhart Information     [employer] lets you send messages to your doctor, view your test results, renew your prescriptions, schedule appointments and more. To sign up, go to [email or web address] . Click on \"Log in\" on the left side of the screen, which will take you to the Welcome page. Then click on \"Sign up Now\" on the right side of the page.     You will be asked to enter the access code listed below, as well as some personal information. Please follow the directions to create your username and password.   "   Your access code is: [email or web address]  Expires: 2017 12:17 PM     Your access code will  in 90 days. If you need help or a new code, please call your [facility] clinic or [phone].        Care EveryWhere ID     This is your Care EveryWhere ID. This could be used by other organizations to access your [facility] medical records  [id number]        Your Vitals Were     Pulse Temperature Respirations Pulse Oximetry          99 98.2  F (36.8  C) (Tympanic) 12 98%         Blood Pressure from Last 3 Encounters:   05/15/17 136/70   17 128/64   17 154/67    Weight from Last 3 Encounters:   16 178 lb 5 oz (80.9 kg)   16 177 lb (80.3 kg)   16 179 lb (81.2 kg)              We Performed the Following     Uric acid        Primary Care Provider Office Phone # Fax #    Heidi Velasquez [phone]       [facility] [address] [facility] [address]        Thank you!     Thank you for choosing [facility]  for your care. Our goal is always to provide you with excellent care. Hearing back from our patients is one way we can continue to improve our services. Please take a few minutes to complete the written survey that you may receive in the mail after your visit with us. Thank you!             Your Updated Medication List - Protect others around you: Learn how to safely use, store and throw away your medicines at www.disposemymeds.org.          This list is accurate as of: 5/15/17 12:21 PM.  Always use your most recent med list.                   Brand Name Dispense Instructions for use    acetaminophen 500 MG tablet    TYLENOL     Take 500-1,000 mg by mouth every 6 hours as needed Reported on 2017       albuterol 108 (90 BASE) MCG/ACT Inhaler    albuterol    3 Inhaler    Inhale 2 puffs into the lungs every 4 hours as needed for shortness of breath / dyspnea or wheezing       ASPIRIN CHILDRENS 81 MG chewable tablet   Generic drug:  aspirin      0    1 TABLET DAILY       atorvastatin 80 MG tablet    LIPITOR    45 tablet    Take 0.5 tablets (40 mg) by mouth daily For high cholesterol.       B-12 1000 MCG Tbcr     100 tablet    Take 1,000 mcg by mouth daily       ferrous gluconate 324 (38 FE) MG tablet    FERGON    100 tablet    Take 1 tablet (324 mg) by mouth daily (with breakfast)       FLUoxetine 20 MG capsule    PROzac    90 capsule    Take 1 capsule (20 mg) by mouth daily Please profile       fluticasone-salmeterol 500-50 MCG/DOSE diskus inhaler    ADVAIR DISKUS    3 Inhaler    Inhale 1 puff into the lungs 2 times daily       hydrochlorothiazide 50 MG tablet    HYDRODIURIL    90 tablet    Take 1 tablet (50 mg) by mouth daily       lisinopril 40 MG tablet    PRINIVIL/ZESTRIL    90 tablet    Take 1 tablet (40 mg) by mouth daily       senna-docusate 8.6-50 MG per tablet    DANIEL-COLACE    100 tablet    Take 2 tablets by mouth daily       vitamin D 2000 UNITS tablet     100 tablet    Take 2,000 Units by mouth daily

## 2017-05-15 NOTE — PROGRESS NOTES
SUBJECTIVE:                                                    Staci Watt is a 81 year old female who presents to clinic today for the following health issues:      Left foot swelling for one week. Swelling has mildly improved. A/s with calf swelling. Pain present when she is walking. No recent trauma. No leg warmth/refdness, chest pain or shortness of breath. No history of DVT.     Problem list and histories reviewed & adjusted, as indicated.  Additional history: as documented    Labs reviewed in EPIC    Reviewed and updated as needed this visit by clinical staff  Tobacco  Allergies  Med Hx  Surg Hx  Fam Hx  Soc Hx      Reviewed and updated as needed this visit by Provider         ROS:  Constitutional, HEENT, cardiovascular, pulmonary, gi and gu systems are negative, except as otherwise noted.    OBJECTIVE:                                                    /70 (BP Location: Left arm, Patient Position: Chair, Cuff Size: Adult Regular)  Pulse 99  Temp 98.2  F (36.8  C) (Tympanic)  Resp 12  SpO2 98%  There is no height or weight on file to calculate BMI.  GENERAL: healthy, alert and no distress  EYES: Eyes grossly normal to inspection  HENT: nose and mouth without ulcers or lesions  MS: left lower extremity with 1+ edema, left foot with 2-3 + edema   SKIN: no suspicious lesions or rashes    Diagnostic Test Results:  See below      ASSESSMENT/PLAN:                                                      ## Pain and swelling of toe of left foot  - D/d include fracture vs gout vs DVT   - per my view xray was normal  - pending uric acid and ultrasound; will tx as indicated   - XR Foot Left G/E 3 Views; Future  - Uric acid  - US Lower Extremity Venous Duplex Left; Future  - recommended to elevate foot above heart level while at home       Destin Wolf MD  [facility]

## 2017-05-16 LAB — URATE SERPL-MCNC: 7.6 MG/DL (ref 2.6–6)

## 2017-05-17 ENCOUNTER — TELEPHONE (OUTPATIENT)
Dept: FAMILY MEDICINE | Facility: CLINIC | Age: 82
End: 2017-05-17

## 2017-05-17 DIAGNOSIS — M10.9 ACUTE GOUT OF LEFT FOOT, UNSPECIFIED CAUSE: Primary | ICD-10-CM

## 2017-05-17 RX ORDER — INDOMETHACIN 25 MG/1
25-50 CAPSULE ORAL
Qty: 42 CAPSULE | Refills: 0 | Status: ON HOLD | OUTPATIENT
Start: 2017-05-17 | End: 2019-01-05

## 2017-05-17 NOTE — TELEPHONE ENCOUNTER
Called patient and informed her of message per below from Dr. Wolf.    Patient verbalized understanding and in agreement with plan.    Per patient:  1. No pain but swelling present.  Will take indomethacin and will pick it up tomorrow  2. Can she take ibuprofen?    Writer recommended patient avoid all NSAIDs while taking indomethacin, but could take Tylenol PRN.    Dr. Wolf informed of no foot pain, but edema present.    JUNG PalN, RN

## 2017-05-17 NOTE — TELEPHONE ENCOUNTER
RN, can you please let pt know that foot pain was likely due to gout. Her uric acid was elevated. Xray showed previous surgery or fracture. As she has no injury, I'll talk to the radiologist about if it is a current fracture or old fracture. US DVT was normal. I have prescribed Indomethacin 25-50 mg TID with meals, she can continue it until 2 days after pain subsides. Please take it with food as it upsets your stomach. Do not take Advil or Aleve while taking Indomethacin.   DM

## 2017-05-18 NOTE — PROGRESS NOTES
Hello!  It was a pleasure to see you in clinic!  Thank you for getting labs done. Everything looks pretty good. You do not have an iron deficiency or a vitamin D Deficiency.    You do still continue to have low hemoglobin (anemia)., which has been present for 5 years, and has not changed a lot.  You could consider getting a colonoscopy since one cause of persistent anemia is colon cancer.  I'll go ahead and put a referral in, but please come in to discuss this with me if you have any questions.    Please call [facility]: Adult Colorectal Surgery   [phone] to discuss getting this done.    Everything else looks great. Your microalbumen is normal, which is great news. This means you do not have protein in the urine, and that your kidneys are currently functioning well. Keeping blood pressure and blood fats low is the best way to preserve your kidney function over your lifetime. The testing of your blood sugar, kidney function, liver function and electrolytes was normal. Good news, your LDL (or bad cholesterol) is at goal. Your medication is working well. Please continue to take your cholesterol lowering medication.    If you have any questions, please contact the clinic or schedule an appointment with me, thank you!    Sincerely,  Dr. Heidi Velasquez MD  5/18/2017

## 2017-06-05 ENCOUNTER — OFFICE VISIT (OUTPATIENT)
Dept: FAMILY MEDICINE | Facility: CLINIC | Age: 82
End: 2017-06-05
Payer: COMMERCIAL

## 2017-06-05 VITALS
OXYGEN SATURATION: 99 % | RESPIRATION RATE: 16 BRPM | SYSTOLIC BLOOD PRESSURE: 155 MMHG | HEART RATE: 103 BPM | DIASTOLIC BLOOD PRESSURE: 74 MMHG | TEMPERATURE: 97.6 F

## 2017-06-05 DIAGNOSIS — M17.0 OSTEOARTHRITIS OF BOTH KNEES, UNSPECIFIED OSTEOARTHRITIS TYPE: Primary | ICD-10-CM

## 2017-06-05 PROCEDURE — 99207 ZZC CDG-PROCEDURE CHARGE ONLY: CPT | Performed by: FAMILY MEDICINE

## 2017-06-05 PROCEDURE — 20610 DRAIN/INJ JOINT/BURSA W/O US: CPT | Mod: LT | Performed by: FAMILY MEDICINE

## 2017-06-05 RX ORDER — LIDOCAINE HYDROCHLORIDE 10 MG/ML
4 INJECTION, SOLUTION INFILTRATION; PERINEURAL ONCE
Qty: 4 ML | Refills: 0 | OUTPATIENT
Start: 2017-06-05 | End: 2017-06-05

## 2017-06-05 NOTE — NURSING NOTE
"Chief Complaint   Patient presents with     Imm/Inj     knee steriod injection       Initial /74 (BP Location: Right arm, Patient Position: Chair, Cuff Size: Adult Large)  Pulse 103  Temp 97.6  F (36.4  C) (Tympanic)  Resp 16  SpO2 99% Estimated body mass index is 32.61 kg/(m^2) as calculated from the following:    Height as of 3/18/16: 5' 2\" (1.575 m).    Weight as of 3/18/16: 178 lb 5 oz (80.9 kg).  Medication Reconciliation: complete     Kayden Sol MA        "

## 2017-06-05 NOTE — MR AVS SNAPSHOT
"              After Visit Summary   2017    Staci Watt    MRN: [id number]           Patient Information     Date Of Birth          1935        Visit Information        Provider Department      2017 4:00 PM Destin Wolf MD [facility]        Today's Diagnoses     Osteoarthritis of both knees, unspecified osteoarthritis type    -  1       Follow-ups after your visit        Who to contact     If you have questions or need follow up information about today's clinic visit or your schedule please contact [facility] directly at [phone].  Normal or non-critical lab and imaging results will be communicated to you by [employer], letter or phone within 4 business days after the clinic has received the results. If you do not hear from us within 7 days, please contact the clinic through [employer] or phone. If you have a critical or abnormal lab result, we will notify you by phone as soon as possible.  Submit refill requests through [employer] or call your pharmacy and they will forward the refill request to us. Please allow 3 business days for your refill to be completed.          Additional Information About Your Visit        MyChart Information     [employer] lets you send messages to your doctor, view your test results, renew your prescriptions, schedule appointments and more. To sign up, go to [email or web address] . Click on \"Log in\" on the left side of the screen, which will take you to the Welcome page. Then click on \"Sign up Now\" on the right side of the page.     You will be asked to enter the access code listed below, as well as some personal information. Please follow the directions to create your username and password.     Your access code is: [email or web address]  Expires: 9/3/2017  5:55 PM     Your access code will  in 90 days. If you need help or a new code, please call your [facility] or [phone].        Care EveryWhere ID     This is your Care " EveryWhere ID. This could be used by other organizations to access your [facility] medical records  [id number]        Your Vitals Were     Pulse Temperature Respirations Pulse Oximetry          103 97.6  F (36.4  C) (Tympanic) 16 99%         Blood Pressure from Last 3 Encounters:   06/05/17 155/74   05/15/17 136/70   05/02/17 128/64    Weight from Last 3 Encounters:   03/18/16 178 lb 5 oz (80.9 kg)   02/12/16 177 lb (80.3 kg)   01/07/16 179 lb (81.2 kg)              We Performed the Following     Kenalog 40 MG  []     Kenalog 40 MG  []     Large Joint/Bursa injection and/or drainage (Shoulder, Knee)          Today's Medication Changes          These changes are accurate as of: 6/5/17  5:55 PM.  If you have any questions, ask your nurse or doctor.               Start taking these medicines.        Dose/Directions    * lidocaine 1 % injection   Used for:  Osteoarthritis of both knees, unspecified osteoarthritis type   Started by:  Destin Wolf MD        Dose:  4 mL   4 mLs by INTRA-ARTICULAR route once for 1 dose   Quantity:  4 mL   Refills:  0       * lidocaine 1 % injection   Used for:  Osteoarthritis of both knees, unspecified osteoarthritis type   Started by:  Destin Wolf MD        Dose:  4 mL   4 mLs by INTRA-ARTICULAR route once for 1 dose   Quantity:  4 mL   Refills:  0       * Notice:  This list has 2 medication(s) that are the same as other medications prescribed for you. Read the directions carefully, and ask your doctor or other care provider to review them with you.         Where to get your medicines      Some of these will need a paper prescription and others can be bought over the counter.  Ask your nurse if you have questions.     You don't need a prescription for these medications     lidocaine 1 % injection    lidocaine 1 % injection                Primary Care Provider Office Phone # Fax #    Heidi Velasquez [phone]       [facility]  CLINIC [address]  [facility] [address]        Thank you!     Thank you for choosing [facility]  for your care. Our goal is always to provide you with excellent care. Hearing back from our patients is one way we can continue to improve our services. Please take a few minutes to complete the written survey that you may receive in the mail after your visit with us. Thank you!             Your Updated Medication List - Protect others around you: Learn how to safely use, store and throw away your medicines at www.disposemymeds.org.          This list is accurate as of: 6/5/17  5:55 PM.  Always use your most recent med list.                   Brand Name Dispense Instructions for use    acetaminophen 500 MG tablet    TYLENOL     Take 500-1,000 mg by mouth every 6 hours as needed Reported on 5/2/2017       albuterol 108 (90 BASE) MCG/ACT Inhaler    albuterol    3 Inhaler    Inhale 2 puffs into the lungs every 4 hours as needed for shortness of breath / dyspnea or wheezing       ASPIRIN CHILDRENS 81 MG chewable tablet   Generic drug:  aspirin     0    1 TABLET DAILY       atorvastatin 80 MG tablet    LIPITOR    45 tablet    Take 0.5 tablets (40 mg) by mouth daily For high cholesterol.       B-12 1000 MCG Tbcr     100 tablet    Take 1,000 mcg by mouth daily       ferrous gluconate 324 (38 FE) MG tablet    FERGON    100 tablet    Take 1 tablet (324 mg) by mouth daily (with breakfast)       FLUoxetine 20 MG capsule    PROzac    90 capsule    Take 1 capsule (20 mg) by mouth daily Please profile       fluticasone-salmeterol 500-50 MCG/DOSE diskus inhaler    ADVAIR DISKUS    3 Inhaler    Inhale 1 puff into the lungs 2 times daily       hydrochlorothiazide 50 MG tablet    HYDRODIURIL    90 tablet    Take 1 tablet (50 mg) by mouth daily       indomethacin 25 MG capsule    INDOCIN    42 capsule    Take 1-2 capsules (25-50 mg) by mouth 3 times daily (with meals)       * lidocaine 1 % injection     4 mL    4 mLs by  INTRA-ARTICULAR route once for 1 dose       * lidocaine 1 % injection     4 mL    4 mLs by INTRA-ARTICULAR route once for 1 dose       lisinopril 40 MG tablet    PRINIVIL/ZESTRIL    90 tablet    Take 1 tablet (40 mg) by mouth daily       senna-docusate 8.6-50 MG per tablet    DANIEL-COLACE    100 tablet    Take 2 tablets by mouth daily       vitamin D 2000 UNITS tablet     100 tablet    Take 2,000 Units by mouth daily       * Notice:  This list has 2 medication(s) that are the same as other medications prescribed for you. Read the directions carefully, and ask your doctor or other care provider to review them with you.

## 2017-06-05 NOTE — PROGRESS NOTES
Pt is here for bilateral knee injection. After previous injection, she had pain relief for one month.     Pt would like injection today. We discussed complications with current procedure including the risks of infection of the joint, bleeding in joint, pain flare up, and not being effective.     RT knee corticosteroid injection  After risks, benefits and complications of steroid injection were discussed with the patient he elected to proceed. Using sterile technique, the area was first prepped with alcohol swab and betadinex 3. Topical ethyl chloride was used as local. A 22 gauge, 1 1/2 inch needle was used to inject 40 mg kenalong(1ml) and 4cc of 1% lidocaine each into the knee joint using an anterolmedial joint line approach on the RT side. Pt. tolerated the procedure well without complications. Post injection instructions given.     Left knee corticosteroid injection  After risks, benefits and complications of steroid injection were discussed with the patient he elected to proceed. Using sterile technique, the area was first prepped with alcohol swab and betadinex 3. Topical ethyl chloride was used as local. A 22 gauge, 1 1/2 inch needle was used to inject 40 mg kenalong(1ml) and 4cc of 1% lidocaine each into the knee joint using an anteromedial joint line approach on the Left side. Pt. tolerated the procedure well without complications. Post injection instructions given.     NDC # 6346-5252-95      After visit, pts B/P was noted to be mildly elevated. Will have her f/u for MA blood pressure check.    Dr. Wolf

## 2017-06-06 ENCOUNTER — TELEPHONE (OUTPATIENT)
Dept: FAMILY MEDICINE | Facility: CLINIC | Age: 82
End: 2017-06-06

## 2017-06-06 NOTE — TELEPHONE ENCOUNTER
Called pt to schedule MA only visit for BP check per Dr. Wolf and pt stated she will have to call back to schedule.    Kayden Sol MA

## 2017-06-23 ENCOUNTER — ALLIED HEALTH/NURSE VISIT (OUTPATIENT)
Dept: NURSING | Facility: CLINIC | Age: 82
End: 2017-06-23
Payer: COMMERCIAL

## 2017-06-23 VITALS — SYSTOLIC BLOOD PRESSURE: 137 MMHG | OXYGEN SATURATION: 98 % | DIASTOLIC BLOOD PRESSURE: 76 MMHG | HEART RATE: 102 BPM

## 2017-06-23 DIAGNOSIS — I10 HTN, GOAL BELOW 150/90: ICD-10-CM

## 2017-06-23 DIAGNOSIS — F33.1 MAJOR DEPRESSIVE DISORDER, RECURRENT EPISODE, MODERATE (H): Primary | ICD-10-CM

## 2017-06-23 PROCEDURE — 99207 ZZC NO CHARGE NURSE ONLY: CPT

## 2017-07-11 ENCOUNTER — OFFICE VISIT (OUTPATIENT)
Dept: FAMILY MEDICINE | Facility: CLINIC | Age: 82
End: 2017-07-11
Payer: COMMERCIAL

## 2017-07-11 VITALS
HEART RATE: 86 BPM | SYSTOLIC BLOOD PRESSURE: 138 MMHG | TEMPERATURE: 98.5 F | DIASTOLIC BLOOD PRESSURE: 64 MMHG | OXYGEN SATURATION: 98 %

## 2017-07-11 DIAGNOSIS — N18.30 CKD (CHRONIC KIDNEY DISEASE) STAGE 3, GFR 30-59 ML/MIN (H): ICD-10-CM

## 2017-07-11 DIAGNOSIS — D64.9 ANEMIA, UNSPECIFIED TYPE: Primary | ICD-10-CM

## 2017-07-11 DIAGNOSIS — E78.5 HYPERLIPIDEMIA LDL GOAL <100: ICD-10-CM

## 2017-07-11 DIAGNOSIS — I10 HTN, GOAL BELOW 150/90: ICD-10-CM

## 2017-07-11 LAB
BASOPHILS # BLD AUTO: 0 10E9/L (ref 0–0.2)
BASOPHILS NFR BLD AUTO: 0.3 %
DIFFERENTIAL METHOD BLD: ABNORMAL
EOSINOPHIL # BLD AUTO: 0.1 10E9/L (ref 0–0.7)
EOSINOPHIL NFR BLD AUTO: 1 %
ERYTHROCYTE [DISTWIDTH] IN BLOOD BY AUTOMATED COUNT: 13.9 % (ref 10–15)
HCT VFR BLD AUTO: 36.2 % (ref 35–47)
HGB BLD-MCNC: 11.8 G/DL (ref 11.7–15.7)
LYMPHOCYTES # BLD AUTO: 3 10E9/L (ref 0.8–5.3)
LYMPHOCYTES NFR BLD AUTO: 49.1 %
MCH RBC QN AUTO: 31.9 PG (ref 26.5–33)
MCHC RBC AUTO-ENTMCNC: 32.6 G/DL (ref 31.5–36.5)
MCV RBC AUTO: 98 FL (ref 78–100)
MONOCYTES # BLD AUTO: 0.4 10E9/L (ref 0–1.3)
MONOCYTES NFR BLD AUTO: 7 %
NEUTROPHILS # BLD AUTO: 2.6 10E9/L (ref 1.6–8.3)
NEUTROPHILS NFR BLD AUTO: 42.6 %
PLATELET # BLD AUTO: 378 10E9/L (ref 150–450)
RBC # BLD AUTO: 3.7 10E12/L (ref 3.8–5.2)
RETICS # AUTO: 39.1 10E9/L (ref 25–95)
RETICS/RBC NFR AUTO: 1 % (ref 0.5–2)
WBC # BLD AUTO: 6.1 10E9/L (ref 4–11)

## 2017-07-11 PROCEDURE — 85045 AUTOMATED RETICULOCYTE COUNT: CPT | Performed by: FAMILY MEDICINE

## 2017-07-11 PROCEDURE — 99214 OFFICE O/P EST MOD 30 MIN: CPT | Performed by: FAMILY MEDICINE

## 2017-07-11 PROCEDURE — 36415 COLL VENOUS BLD VENIPUNCTURE: CPT | Performed by: FAMILY MEDICINE

## 2017-07-11 PROCEDURE — 85025 COMPLETE CBC W/AUTO DIFF WBC: CPT | Performed by: FAMILY MEDICINE

## 2017-07-11 PROCEDURE — 85060 BLOOD SMEAR INTERPRETATION: CPT | Performed by: FAMILY MEDICINE

## 2017-07-11 NOTE — MR AVS SNAPSHOT
"              After Visit Summary   2017    Staci Watt    MRN: [id number]           Patient Information     Date Of Birth          1935        Visit Information        Provider Department      2017 2:40 PM Heidi Velasquez MD [facility]        Today's Diagnoses     Anemia, unspecified type    -  1       Follow-ups after your visit        Who to contact     If you have questions or need follow up information about today's clinic visit or your schedule please contact [facility] directly at [phone].  Normal or non-critical lab and imaging results will be communicated to you by [employer], letter or phone within 4 business days after the clinic has received the results. If you do not hear from us within 7 days, please contact the clinic through [employer] or phone. If you have a critical or abnormal lab result, we will notify you by phone as soon as possible.  Submit refill requests through [employer] or call your pharmacy and they will forward the refill request to us. Please allow 3 business days for your refill to be completed.          Additional Information About Your Visit        MyChart Information     [employer] lets you send messages to your doctor, view your test results, renew your prescriptions, schedule appointments and more. To sign up, go to [email or web address] . Click on \"Log in\" on the left side of the screen, which will take you to the Welcome page. Then click on \"Sign up Now\" on the right side of the page.     You will be asked to enter the access code listed below, as well as some personal information. Please follow the directions to create your username and password.     Your access code is: [email or web address]  Expires: 9/3/2017  5:55 PM     Your access code will  in 90 days. If you need help or a new code, please call your [facility] or [phone].        Care EveryWhere ID     This is your Care EveryWhere ID. This could be used by other " organizations to access your [place] medical records  [id number]        Your Vitals Were     Pulse Temperature Pulse Oximetry             86 98.5  F (36.9  C) (Tympanic) 98%          Blood Pressure from Last 3 Encounters:   07/11/17 138/64   06/23/17 137/76   06/05/17 155/74    Weight from Last 3 Encounters:   03/18/16 178 lb 5 oz (80.9 kg)   02/12/16 177 lb (80.3 kg)   01/07/16 179 lb (81.2 kg)              We Performed the Following     Blood Morphology Pathologist Review     CBC with platelets differential     Reticulocyte Count        Primary Care Provider Office Phone # Fax #    Heidi Velasquez [phone]       [facility] [address]  [facility] [address]        Equal Access to Services     MIKE SIFUENTES : Hadii jordy douglas hadasho Soomaali, waaxda luqadaha, qaybta kaalmada adeegyada, waxguevara smith . So [facility] [phone].    ATENCIÓN: Si habla español, tiene a corrales disposición servicios gratuitos de asistencia lingüística. Liam al [phone].    We comply with applicable federal civil rights laws and [place] laws. We do not discriminate on the basis of race, color, national origin, age, disability sex, sexual orientation or gender identity.            Thank you!     Thank you for choosing [facility]  for your care. Our goal is always to provide you with excellent care. Hearing back from our patients is one way we can continue to improve our services. Please take a few minutes to complete the written survey that you may receive in the mail after your visit with us. Thank you!             Your Updated Medication List - Protect others around you: Learn how to safely use, store and throw away your medicines at www.disposemymeds.org.          This list is accurate as of: 7/11/17  3:25 PM.  Always use your most recent med list.                   Brand Name Dispense Instructions for use Diagnosis    acetaminophen 500 MG tablet     TYLENOL     Take 500-1,000 mg by mouth every 6 hours as needed Reported on 5/2/2017    OA (osteoarthritis) of knee       albuterol 108 (90 BASE) MCG/ACT Inhaler    albuterol    3 Inhaler    Inhale 2 puffs into the lungs every 4 hours as needed for shortness of breath / dyspnea or wheezing    Moderate persistent asthma       ASPIRIN CHILDRENS 81 MG chewable tablet   Generic drug:  aspirin     0    1 TABLET DAILY    Type II or unspecified type diabetes mellitus without mention of complication, not stated as uncontrolled       atorvastatin 80 MG tablet    LIPITOR    45 tablet    Take 0.5 tablets (40 mg) by mouth daily For high cholesterol.    Hyperlipidemia LDL goal <100       B-12 1000 MCG Tbcr     100 tablet    Take 1,000 mcg by mouth daily    Iron deficiency anemia, unspecified iron deficiency anemia type       ferrous gluconate 324 (38 FE) MG tablet    FERGON    100 tablet    Take 1 tablet (324 mg) by mouth daily (with breakfast)    Iron deficiency anemia, unspecified iron deficiency anemia type       FLUoxetine 20 MG capsule    PROzac    90 capsule    Take 1 capsule (20 mg) by mouth daily Please profile    Major depressive disorder, recurrent episode, moderate (H)       fluticasone-salmeterol 500-50 MCG/DOSE diskus inhaler    ADVAIR DISKUS    3 Inhaler    Inhale 1 puff into the lungs 2 times daily    Moderate persistent asthma without complication       hydrochlorothiazide 50 MG tablet    HYDRODIURIL    90 tablet    Take 1 tablet (50 mg) by mouth daily    HTN, goal below 150/90       indomethacin 25 MG capsule    INDOCIN    42 capsule    Take 1-2 capsules (25-50 mg) by mouth 3 times daily (with meals)    Acute gout of left foot, unspecified cause       lisinopril 40 MG tablet    PRINIVIL/ZESTRIL    90 tablet    Take 1 tablet (40 mg) by mouth daily    HTN, goal below 150/90       senna-docusate 8.6-50 MG per tablet    DANIEL-COLACE    100 tablet    Take 2 tablets by mouth daily    Slow transit constipation        vitamin D 2000 UNITS tablet     100 tablet    Take 2,000 Units by mouth daily    Vitamin D deficiency

## 2017-07-11 NOTE — PROGRESS NOTES
SUBJECTIVE:                                                    Staci Watt is a 82 year old female who presents to clinic today for the following health issues:      Hyperlipidemia Follow-Up      Rate your low fat/cholesterol diet?: good    Taking statin?  Yes, no muscle aches from statin    Other lipid medications/supplements?:  none    Hypertension Follow-up      Outpatient blood pressures are not being checked.    Low Salt Diet: low salt    Chronic Kidney Disease Follow-up      Current NSAID use?  Yes:   ibuprofen (Motrin)  Frequency: Once a day      Amount of exercise or physical activity: Tries to get some walking in but isn't able to get a lot in    Problems taking medications regularly: No    Medication side effects: none    Diet: regular (no restrictions)    Anemia  She has been taking B12 and iron for several years for anemia.  She known celiac disease, history of gastric bypass or other known causes of B12 deficiency identified. No blood loss noted (no blood in stools, coughing up blood, or other issues). She feels she eats a regular diet, including meat and other sources of iron. She was noted to have low iron saturation with normal iron, ferritin and Hg levels on 4.2.2010 while being evaluated for fatigue, and started on iron at that time. She does not take a PPI.      Problem list and histories reviewed & adjusted, as indicated.  Additional history: as documented    Patient Active Problem List   Diagnosis     Allergic rhinitis     Moderate Persistent Asthma     Gout     Hyperlipidemia LDL goal <100     CKD (chronic kidney disease) stage 3, GFR 30-59 ml/min     Obese     OA (osteoarthritis) of knee     Microalbuminuria     Ganglion cyst     HTN, goal below 150/90     Major depressive disorder, recurrent episode, moderate (H)     Moderate persistent asthma without complication     Primary osteoarthritis of both knees     Anemia, unspecified type     Past Surgical History:   Procedure Laterality  Date     C NONSPECIFIC PROCEDURE      (B) detatched retina     C NONSPECIFIC PROCEDURE      LASIK surgery     C NONSPECIFIC PROCEDURE      NISSA/BSO     C NONSPECIFIC PROCEDURE      Hernia     C NONSPECIFIC PROCEDURE      Tonsillectomy     C NONSPECIFIC PROCEDURE      Arthroscopic knee surgery       Social History   Substance Use Topics     Smoking status: Passive Smoke Exposure - Never Smoker     Smokeless tobacco: Never Used      Comment: Once in awhile when goes to casino.     Alcohol use Yes      Comment: has a drink every night before she goes to bed     Family History   Problem Relation Age of Onset     Hypertension Mother      Respiratory Mother      Breast Cancer Maternal Aunt      Respiratory Maternal Aunt      Respiratory Maternal Aunt          Allergies   Allergen Reactions     Contrast Dye      Seasonal Allergies      BP Readings from Last 3 Encounters:   07/11/17 138/64   06/23/17 137/76   06/05/17 155/74    Wt Readings from Last 3 Encounters:   03/18/16 178 lb 5 oz (80.9 kg)   02/12/16 177 lb (80.3 kg)   01/07/16 179 lb (81.2 kg)                    Reviewed and updated as needed this visit by clinical staff  Tobacco  Allergies  Meds  Problems  Med Hx  Surg Hx  Fam Hx  Soc Hx        Reviewed and updated as needed this visit by Provider  Tobacco  Problems         ROS:  Constitutional, HEENT, cardiovascular, pulmonary, GI, , musculoskeletal, neuro, skin, endocrine and psych systems are negative, except as otherwise noted.    OBJECTIVE:     /64  Pulse 86  Temp 98.5  F (36.9  C) (Tympanic)  SpO2 98%  There is no height or weight on file to calculate BMI.  GENERAL: healthy, alert and no distress  NECK: no adenopathy, no asymmetry, masses, or scars and thyroid normal to palpation  RESP: lungs clear to auscultation - no rales, rhonchi or wheezes  CV: regular rate and rhythm, normal S1 S2, no S3 or S4, no murmur, click or rub, no peripheral edema and peripheral pulses strong  ABDOMEN: soft,  nontender, no hepatosplenomegaly, no masses and bowel sounds normal  MS: no gross musculoskeletal defects noted, no edema  SKIN: no suspicious lesions or rashes and no unusual pallor noted  NEURO: Normal strength and tone, mentation intact and speech normal  PSYCH: mentation appears normal, affect normal/bright    ASSESSMENT/PLAN:   ASSESSMENT / PLAN:  (D64.9) Anemia, unspecified type  (primary encounter diagnosis)  Comment: screen for iron and vitamin B deficiencies, treat as indicated, will recommend stopping supplements that aren't necessary  Plan: CBC with platelets differential, Reticulocyte         Count, Blood Morphology Pathologist Review            (I10) HTN, goal below 150/90  Comment:   BP Readings from Last 3 Encounters:   07/11/17 138/64   06/23/17 137/76   06/05/17 155/74      Plan: at goal    (E78.5) Hyperlipidemia LDL goal <100  Comment:   LDL Cholesterol Calculated   Date Value Ref Range Status   05/02/2017 72 <100 mg/dL Final     Comment:     Desirable:       <100 mg/dl   ]   Plan: at goal    (N18.3) CKD (chronic kidney disease) stage 3, GFR 30-59 ml/min  Comment:   stable  GFR Estimate If Black   Date Value Ref Range Status   05/02/2017 72 >60 mL/min/1.7m2 Final     Comment:      GFR Calc   10/11/2016 70 >60 mL/min/1.7m2 Final     Comment:      GFR Calc   02/12/2016 69 >60 mL/min/1.7m2 Final     Comment:      GFR Calc       Plan: no changes    Health Maintenance Due   Topic Date Due     DEXA SCAN SCREENING (SYSTEM ASSIGNED)  07/04/2001     ADVANCE DIRECTIVE PLANNING Q5 YRS  01/06/2017          There are no Patient Instructions on file for this visit.      Dr. Heidi Velasquez MD  7/11/2017

## 2017-07-11 NOTE — NURSING NOTE
"Chief Complaint   Patient presents with     Hypertension     Hyperlipidemia     Chronic Disease Management       Initial /82  Pulse 86  Temp 98.5  F (36.9  C) (Tympanic)  SpO2 98% Estimated body mass index is 32.61 kg/(m^2) as calculated from the following:    Height as of 3/18/16: 5' 2\" (1.575 m).    Weight as of 3/18/16: 178 lb 5 oz (80.9 kg).  Medication Reconciliation: complete     Gill Lua MA    "

## 2017-07-12 LAB — COPATH REPORT: NORMAL

## 2017-07-13 NOTE — PROGRESS NOTES
Hello!  It was a pleasure to see you in clinic!  Next time I promise to have update pictures of Cathy!    Thank you for getting labs done. Great news, you are not currently anemic, and your further blood tests all show normal red blood cells. I think it's reasonable to keep taking the B12 supplement if it's working for you.    If you have any questions, please contact the clinic or schedule an appointment with me, thank you!    Sincerely,  Dr. Heidi Velasquez MD  7/12/2017

## 2017-09-13 ENCOUNTER — OFFICE VISIT (OUTPATIENT)
Dept: FAMILY MEDICINE | Facility: CLINIC | Age: 82
End: 2017-09-13
Payer: COMMERCIAL

## 2017-09-13 VITALS
SYSTOLIC BLOOD PRESSURE: 139 MMHG | HEART RATE: 88 BPM | RESPIRATION RATE: 16 BRPM | DIASTOLIC BLOOD PRESSURE: 73 MMHG | OXYGEN SATURATION: 97 %

## 2017-09-13 DIAGNOSIS — M17.0 PRIMARY OSTEOARTHRITIS OF BOTH KNEES: Primary | ICD-10-CM

## 2017-09-13 DIAGNOSIS — J45.40 MODERATE PERSISTENT ASTHMA WITHOUT COMPLICATION: ICD-10-CM

## 2017-09-13 PROCEDURE — 20610 DRAIN/INJ JOINT/BURSA W/O US: CPT | Mod: 50 | Performed by: FAMILY MEDICINE

## 2017-09-13 RX ORDER — LIDOCAINE HYDROCHLORIDE 10 MG/ML
4 INJECTION, SOLUTION INFILTRATION; PERINEURAL ONCE
Qty: 4 ML | Refills: 0 | OUTPATIENT
Start: 2017-09-13 | End: 2017-09-13

## 2017-09-13 NOTE — PROGRESS NOTES
Pt is here for bilateral knee injection.      Pt would like injection today. We discussed complications with current procedure including the risks of infection of the joint, bleeding in joint, pain flare up, and not being effective.      RT knee corticosteroid injection  After risks, benefits and complications of steroid injection were discussed with the patient he elected to proceed. Using sterile technique, the area was first prepped with alcohol swab and betadinex 3. Topical ethyl chloride was used as local. A 22 gauge, 1 1/2 inch needle was used to inject 40 mg kenalong(1ml) and 4cc of 1% lidocaine each into the knee joint using an anterolmedial joint line approach on the RT side. Pt. tolerated the procedure well without complications. Post injection instructions given.      Left knee corticosteroid injection  After risks, benefits and complications of steroid injection were discussed with the patient he elected to proceed. Using sterile technique, the area was first prepped with alcohol swab and betadinex 3. Topical ethyl chloride was used as local. A 22 gauge, 1 1/2 inch needle was used to inject 40 mg kenalong(1ml) and 4cc of 1% lidocaine each into the knee joint using an anteromedial joint line approach on the Left side. Pt. tolerated the procedure well without complications. Post injection instructions given.      NDC # 3674-3254-81           Dr. Wolf

## 2017-09-13 NOTE — MR AVS SNAPSHOT
"              After Visit Summary   9/13/2017    Staci Watt    MRN: [id number]           Patient Information     Date Of Birth          1935        Visit Information        Provider Department      9/13/2017 11:20 AM Destin Wolf MD [facility]        Today's Diagnoses     Primary osteoarthritis of both knees    -  1    Moderate persistent asthma without complication           Follow-ups after your visit        Your next 10 appointments already scheduled     Sep 19, 2017  1:40 PM CDT   SHORT with Destin Wolf MD   [facility]    [address] [place] [id number] [phone]              Who to contact     If you have questions or need follow up information about today's clinic visit or your schedule please contact [facility] directly at [phone].  Normal or non-critical lab and imaging results will be communicated to you by MyChart, letter or phone within 4 business days after the clinic has received the results. If you do not hear from us within 7 days, please contact the clinic through MyChart or phone. If you have a critical or abnormal lab result, we will notify you by phone as soon as possible.  Submit refill requests through [employer] or call your pharmacy and they will forward the refill request to us. Please allow 3 business days for your refill to be completed.          Additional Information About Your Visit        MyChart Information     [employer] lets you send messages to your doctor, view your test results, renew your prescriptions, schedule appointments and more. To sign up, go to [email or web address] . Click on \"Log in\" on the left side of the screen, which will take you to the Welcome page. Then click on \"Sign up Now\" on the right side of the page.     You will be asked to enter the access code listed below, as well as some personal information. Please follow the directions to " create your username and password.     Your access code is: [email or web address]  Expires: 2017  1:24 PM     Your access code will  in 90 days. If you need help or a new code, please call your [place] clinic or [phone].        Care EveryWhere ID     This is your Care EveryWhere ID. This could be used by other organizations to access your [place] medical records  [id number]        Your Vitals Were     Pulse Respirations Pulse Oximetry             88 16 97%          Blood Pressure from Last 3 Encounters:   17 139/73   17 138/64   17 137/76    Weight from Last 3 Encounters:   16 178 lb 5 oz (80.9 kg)   16 177 lb (80.3 kg)   16 179 lb (81.2 kg)              We Performed the Following     Kenalog 40 MG  []     Kenalog 40 MG  []     Large Joint/Bursa injection and/or drainage (Shoulder, Knee)          Today's Medication Changes          These changes are accurate as of: 17  1:24 PM.  If you have any questions, ask your nurse or doctor.               Start taking these medicines.        Dose/Directions    * lidocaine 1 % injection   Used for:  Primary osteoarthritis of both knees   Started by:  Destin Wolf MD        Dose:  4 mL   4 mLs by INTRA-ARTICULAR route once for 1 dose   Quantity:  4 mL   Refills:  0       * lidocaine 1 % injection   Used for:  Primary osteoarthritis of both knees   Started by:  Destin Wolf MD        Dose:  4 mL   4 mLs by INTRA-ARTICULAR route once for 1 dose   Quantity:  4 mL   Refills:  0       * Notice:  This list has 2 medication(s) that are the same as other medications prescribed for you. Read the directions carefully, and ask your doctor or other care provider to review them with you.         Where to get your medicines      These medications were sent to [facility] [place], [place] - [address]  [address], [facility] [address]     Phone:  [phone]     fluticasone-salmeterol  500-50 MCG/DOSE diskus inhaler         Some of these will need a paper prescription and others can be bought over the counter.  Ask your nurse if you have questions.     You don't need a prescription for these medications     lidocaine 1 % injection    lidocaine 1 % injection                Primary Care Provider Office Phone # Fax #    Heidi Velasquez [phone]       [address]  [facility] [address]        Equal Access to Services     MIKE SIFUENTES : Hadii aad ku hadasho Soomaali, waaxda luqadaha, qaybta kaalmada adeegyada, waxay idiin hayaan adeeg kharash ladustin . So [facility] [phone].    ATENCIÓN: Si dante olivarez, tiene a corrales disposición servicios gratuitos de asistencia lingüística. Llame al [phone].    We comply with applicable federal civil rights laws and [place] laws. We do not discriminate on the basis of race, color, national origin, age, disability sex, sexual orientation or gender identity.            Thank you!     Thank you for choosing [facility]  for your care. Our goal is always to provide you with excellent care. Hearing back from our patients is one way we can continue to improve our services. Please take a few minutes to complete the written survey that you may receive in the mail after your visit with us. Thank you!             Your Updated Medication List - Protect others around you: Learn how to safely use, store and throw away your medicines at www.disposemymeds.org.          This list is accurate as of: 9/13/17  1:24 PM.  Always use your most recent med list.                   Brand Name Dispense Instructions for use Diagnosis    acetaminophen 500 MG tablet    TYLENOL     Take 500-1,000 mg by mouth every 6 hours as needed Reported on 5/2/2017    OA (osteoarthritis) of knee       albuterol 108 (90 BASE) MCG/ACT Inhaler    PROAIR HFA    3 Inhaler    Inhale 2 puffs into the lungs every 4 hours as needed for shortness of breath / dyspnea or  wheezing    Moderate persistent asthma       ASPIRIN CHILDRENS 81 MG chewable tablet   Generic drug:  aspirin     0    1 TABLET DAILY    Type II or unspecified type diabetes mellitus without mention of complication, not stated as uncontrolled       atorvastatin 80 MG tablet    LIPITOR    45 tablet    Take 0.5 tablets (40 mg) by mouth daily For high cholesterol.    Hyperlipidemia LDL goal <100       B-12 1000 MCG Tbcr     100 tablet    Take 1,000 mcg by mouth daily    Iron deficiency anemia, unspecified iron deficiency anemia type       ferrous gluconate 324 (38 FE) MG tablet    FERGON    100 tablet    Take 1 tablet (324 mg) by mouth daily (with breakfast)    Iron deficiency anemia, unspecified iron deficiency anemia type       FLUoxetine 20 MG capsule    PROzac    90 capsule    Take 1 capsule (20 mg) by mouth daily Please profile    Major depressive disorder, recurrent episode, moderate (H)       fluticasone-salmeterol 500-50 MCG/DOSE diskus inhaler    ADVAIR DISKUS    3 Inhaler    Inhale 1 puff into the lungs 2 times daily    Moderate persistent asthma without complication       hydrochlorothiazide 50 MG tablet    HYDRODIURIL    90 tablet    Take 1 tablet (50 mg) by mouth daily    HTN, goal below 150/90       indomethacin 25 MG capsule    INDOCIN    42 capsule    Take 1-2 capsules (25-50 mg) by mouth 3 times daily (with meals)    Acute gout of left foot, unspecified cause       * lidocaine 1 % injection     4 mL    4 mLs by INTRA-ARTICULAR route once for 1 dose    Primary osteoarthritis of both knees       * lidocaine 1 % injection     4 mL    4 mLs by INTRA-ARTICULAR route once for 1 dose    Primary osteoarthritis of both knees       lisinopril 40 MG tablet    PRINIVIL/ZESTRIL    90 tablet    Take 1 tablet (40 mg) by mouth daily    HTN, goal below 150/90       senna-docusate 8.6-50 MG per tablet    DANIEL-COLACE    100 tablet    Take 2 tablets by mouth daily    Slow transit constipation       vitamin D 2000 UNITS  tablet     100 tablet    Take 2,000 Units by mouth daily    Vitamin D deficiency       * Notice:  This list has 2 medication(s) that are the same as other medications prescribed for you. Read the directions carefully, and ask your doctor or other care provider to review them with you.

## 2017-09-13 NOTE — NURSING NOTE
"Chief Complaint   Patient presents with     Other     Knee Injection       Initial /73 (BP Location: Left arm, Patient Position: Chair, Cuff Size: Adult Regular)  Pulse 88  Resp 16  SpO2 97% Estimated body mass index is 32.61 kg/(m^2) as calculated from the following:    Height as of 3/18/16: 5' 2\" (1.575 m).    Weight as of 3/18/16: 178 lb 5 oz (80.9 kg).  Medication Reconciliation: complete     Rachael Mix MA      "

## 2017-09-19 ENCOUNTER — OFFICE VISIT (OUTPATIENT)
Dept: FAMILY MEDICINE | Facility: CLINIC | Age: 82
End: 2017-09-19
Payer: COMMERCIAL

## 2017-09-19 VITALS
HEART RATE: 99 BPM | RESPIRATION RATE: 20 BRPM | DIASTOLIC BLOOD PRESSURE: 63 MMHG | SYSTOLIC BLOOD PRESSURE: 148 MMHG | OXYGEN SATURATION: 98 %

## 2017-09-19 DIAGNOSIS — F33.1 MAJOR DEPRESSIVE DISORDER, RECURRENT EPISODE, MODERATE (H): ICD-10-CM

## 2017-09-19 DIAGNOSIS — I10 HTN, GOAL BELOW 150/90: ICD-10-CM

## 2017-09-19 DIAGNOSIS — Z78.0 ASYMPTOMATIC POSTMENOPAUSAL STATUS: ICD-10-CM

## 2017-09-19 DIAGNOSIS — Z23 NEED FOR PROPHYLACTIC VACCINATION AND INOCULATION AGAINST INFLUENZA: ICD-10-CM

## 2017-09-19 DIAGNOSIS — R53.83 OTHER FATIGUE: ICD-10-CM

## 2017-09-19 DIAGNOSIS — N18.30 CKD (CHRONIC KIDNEY DISEASE) STAGE 3, GFR 30-59 ML/MIN (H): ICD-10-CM

## 2017-09-19 DIAGNOSIS — M19.90 OSTEOARTHRITIS, UNSPECIFIED OSTEOARTHRITIS TYPE, UNSPECIFIED SITE: ICD-10-CM

## 2017-09-19 PROCEDURE — 99214 OFFICE O/P EST MOD 30 MIN: CPT | Mod: 25 | Performed by: FAMILY MEDICINE

## 2017-09-19 PROCEDURE — 80048 BASIC METABOLIC PNL TOTAL CA: CPT | Performed by: FAMILY MEDICINE

## 2017-09-19 PROCEDURE — 90688 IIV4 VACCINE SPLT 0.5 ML IM: CPT | Performed by: FAMILY MEDICINE

## 2017-09-19 PROCEDURE — G0008 ADMIN INFLUENZA VIRUS VAC: HCPCS | Performed by: FAMILY MEDICINE

## 2017-09-19 PROCEDURE — 36415 COLL VENOUS BLD VENIPUNCTURE: CPT | Performed by: FAMILY MEDICINE

## 2017-09-19 RX ORDER — TRAMADOL HYDROCHLORIDE 50 MG/1
25-50 TABLET ORAL 2 TIMES DAILY PRN
Qty: 28 TABLET | Refills: 0 | Status: SHIPPED | OUTPATIENT
Start: 2017-09-19 | End: 2018-06-01

## 2017-09-19 ASSESSMENT — PATIENT HEALTH QUESTIONNAIRE - PHQ9: SUM OF ALL RESPONSES TO PHQ QUESTIONS 1-9: 6

## 2017-09-19 NOTE — MR AVS SNAPSHOT
"              After Visit Summary   9/19/2017    Staci Watt    MRN: [id number]           Patient Information     Date Of Birth          1935        Visit Information        Provider Department      9/19/2017 1:40 PM Destin Wolf MD [facility] [place]        Today's Diagnoses     Osteoarthritis, unspecified osteoarthritis type, unspecified site    -  1      Care Instructions    Arthritis   Take Tylenol 1, 000 mg every 12 hours   If you need to please take Tramadol 25 to 50 mg every 12 hours, please do not take with alcohol  Follow up with Dr. Velasquez on 10/03/2017 at 1:20 pm           Follow-ups after your visit        Who to contact     If you have questions or need follow up information about today's clinic visit or your schedule please contact [facility] directly at [phone].  Normal or non-critical lab and imaging results will be communicated to you by Microlaunchershart, letter or phone within 4 business days after the clinic has received the results. If you do not hear from us within 7 days, please contact the clinic through Microlaunchershart or phone. If you have a critical or abnormal lab result, we will notify you by phone as soon as possible.  Submit refill requests through Outski or call your pharmacy and they will forward the refill request to us. Please allow 3 business days for your refill to be completed.          Additional Information About Your Visit        MyChart Information     Outski lets you send messages to your doctor, view your test results, renew your prescriptions, schedule appointments and more. To sign up, go to [email or web address] . Click on \"Log in\" on the left side of the screen, which will take you to the Welcome page. Then click on \"Sign up Now\" on the right side of the page.     You will be asked to enter the access code listed below, as well as some personal information. Please follow the directions to create your username and password.     Your " access code is: [email or web address]  Expires: 2017  1:24 PM     Your access code will  in 90 days. If you need help or a new code, please call your [facility] clinic or [phone].        Care EveryWhere ID     This is your Care EveryWhere ID. This could be used by other organizations to access your [facility] medical records  [id number]        Your Vitals Were     Pulse Respirations Pulse Oximetry             99 20 98%          Blood Pressure from Last 3 Encounters:   17 159/71   17 139/73   17 138/64    Weight from Last 3 Encounters:   16 178 lb 5 oz (80.9 kg)   16 177 lb (80.3 kg)   16 179 lb (81.2 kg)              Today, you had the following     No orders found for display         Today's Medication Changes          These changes are accurate as of: 17  2:09 PM.  If you have any questions, ask your nurse or doctor.               Start taking these medicines.        Dose/Directions    traMADol 50 MG tablet   Commonly known as:  ULTRAM   Used for:  Osteoarthritis, unspecified osteoarthritis type, unspecified site   Started by:  Destin Wolf MD        Dose:  25-50 mg   Take 0.5-1 tablets (25-50 mg) by mouth 2 times daily as needed for pain maximum 2 tablet(s) per day   Quantity:  28 tablet   Refills:  0            Where to get your medicines      Some of these will need a paper prescription and others can be bought over the counter.  Ask your nurse if you have questions.     Bring a paper prescription for each of these medications     traMADol 50 MG tablet                Primary Care Provider Office Phone # Fax #    Heidi Velasquez [phone] [address] [facility] [address]        Equal Access to Services     JOI SIFUENTES : Pamela Starr, kurtis manuel, bob sanchez. So [facility] [phone].    ATENCIÓN: Si habla español, tiene a corrales disposición servicios  maggie de asistencia lingüística. Liam morales [phone].    We comply with applicable federal civil rights laws and [place] laws. We do not discriminate on the basis of race, color, national origin, age, disability sex, sexual orientation or gender identity.            Thank you!     Thank you for choosing [facility][note 3]  for your care. Our goal is always to provide you with excellent care. Hearing back from our patients is one way we can continue to improve our services. Please take a few minutes to complete the written survey that you may receive in the mail after your visit with us. Thank you!             Your Updated Medication List - Protect others around you: Learn how to safely use, store and throw away your medicines at www.disposemymeds.org.          This list is accurate as of: 9/19/17  2:09 PM.  Always use your most recent med list.                   Brand Name Dispense Instructions for use Diagnosis    acetaminophen 500 MG tablet    TYLENOL     Take 500-1,000 mg by mouth every 6 hours as needed Reported on 5/2/2017    OA (osteoarthritis) of knee       albuterol 108 (90 BASE) MCG/ACT Inhaler    PROAIR HFA    3 Inhaler    Inhale 2 puffs into the lungs every 4 hours as needed for shortness of breath / dyspnea or wheezing    Moderate persistent asthma       ASPIRIN CHILDRENS 81 MG chewable tablet   Generic drug:  aspirin     0    1 TABLET DAILY    Type II or unspecified type diabetes mellitus without mention of complication, not stated as uncontrolled       atorvastatin 80 MG tablet    LIPITOR    45 tablet    Take 0.5 tablets (40 mg) by mouth daily For high cholesterol.    Hyperlipidemia LDL goal <100       B-12 1000 MCG Tbcr     100 tablet    Take 1,000 mcg by mouth daily    Iron deficiency anemia, unspecified iron deficiency anemia type       ferrous gluconate 324 (38 FE) MG tablet    FERGON    100 tablet    Take 1 tablet (324 mg) by mouth daily (with breakfast)    Iron deficiency anemia,  unspecified iron deficiency anemia type       FLUoxetine 20 MG capsule    PROzac    90 capsule    Take 1 capsule (20 mg) by mouth daily Please profile    Major depressive disorder, recurrent episode, moderate (H)       fluticasone-salmeterol 500-50 MCG/DOSE diskus inhaler    ADVAIR DISKUS    3 Inhaler    Inhale 1 puff into the lungs 2 times daily    Moderate persistent asthma without complication       hydrochlorothiazide 50 MG tablet    HYDRODIURIL    90 tablet    Take 1 tablet (50 mg) by mouth daily    HTN, goal below 150/90       indomethacin 25 MG capsule    INDOCIN    42 capsule    Take 1-2 capsules (25-50 mg) by mouth 3 times daily (with meals)    Acute gout of left foot, unspecified cause       lisinopril 40 MG tablet    PRINIVIL/ZESTRIL    90 tablet    Take 1 tablet (40 mg) by mouth daily    HTN, goal below 150/90       senna-docusate 8.6-50 MG per tablet    DANIEL-COLACE    100 tablet    Take 2 tablets by mouth daily    Slow transit constipation       traMADol 50 MG tablet    ULTRAM    28 tablet    Take 0.5-1 tablets (25-50 mg) by mouth 2 times daily as needed for pain maximum 2 tablet(s) per day    Osteoarthritis, unspecified osteoarthritis type, unspecified site       vitamin D 2000 UNITS tablet     100 tablet    Take 2,000 Units by mouth daily    Vitamin D deficiency

## 2017-09-19 NOTE — PATIENT INSTRUCTIONS
Arthritis   Take Tylenol 1, 000 mg every 12 hours   If you need to please take Tramadol 25 to 50 mg every 12 hours, please do not take with alcohol  Follow up with Dr. Velasquez on 10/03/2017 at 1:20 pm

## 2017-09-19 NOTE — LETTER
September 26, 2017      Staci Watt  [address]  [place] [facility] [address]        Dear ,    We are writing to inform you of your test results.    Here are your results for your recent labs. Your kidney functions have mildly decreased and I would recommend rechecking your lab during your upcoming appointment. Please continue to stay hydrated. Please continue the current medication dose. Please call or message me if you have questions or concerns.     Resulted Orders   Basic metabolic panel   Result Value Ref Range    Sodium 138 133 - 144 mmol/L    Potassium 4.1 3.4 - 5.3 mmol/L    Chloride 106 94 - 109 mmol/L    Carbon Dioxide 24 20 - 32 mmol/L    Anion Gap 8 3 - 14 mmol/L    Glucose 81 70 - 99 mg/dL      Comment:      Non Fasting    Urea Nitrogen 28 7 - 30 mg/dL    Creatinine 1.23 (H) 0.52 - 1.04 mg/dL    GFR Estimate 42 (L) >60 mL/min/1.7m2      Comment:      Non  GFR Calc    GFR Estimate If Black 51 (L) >60 mL/min/1.7m2      Comment:       GFR Calc    Calcium 9.4 8.5 - 10.1 mg/dL       If you have any questions or concerns, please call the clinic at the number listed above.     Sincerely,  Destin Wolf MD/nr

## 2017-09-19 NOTE — PROGRESS NOTES
SUBJECTIVE:   Staci Watt is a 82 year old female who presents to clinic today for the following health issues:    Arthritis - Pt is having pain in the shoulders, back, back and ankles. She is using Ibuprofen 800 mg BID PRN, which doesn't control the pain. She tried extra strength tylenol twice which didn't work. Now that knee pain has improved, she is walking in the house. Pt is able to go up and down the stairs. Pt tried icy hot which didn't work.   Bilateral knee pain - Feels good since the last visit.   Depression - Worsening due to recent stressors. She prays a lot.   Fatigue - Pt sleeps around 6 hours/night. She wakes up once or twice to go to the bathroom, takes her longer to fall back asleep. Pt doesn't take naps during the day. Unsure if she snores. Pt is not on strict diet. No hematuria or hematochezia. She is taking iron supplement once daily. She also takes vitamin D supplement and vitamin B12.      Problem list and histories reviewed & adjusted, as indicated.  Additional history: as documented    Labs reviewed in EPIC    Reviewed and updated as needed this visit by clinical staffTobacco  Allergies  Meds  Med Hx  Surg Hx  Fam Hx  Soc Hx      Reviewed and updated as needed this visit by Provider         ROS:  Constitutional, HEENT, cardiovascular, pulmonary, gi and gu systems are negative, except as otherwise noted.      OBJECTIVE:   /63  Pulse 99  Resp 20  SpO2 98%  There is no height or weight on file to calculate BMI.  GENERAL: healthy, alert and no distress  EYES: Eyes grossly normal to inspection  HENT: nose and mouth without ulcers or lesions  PSYCH: mentation appears normal, affect normal/bright    Diagnostic Test Results:  none     ASSESSMENT/PLAN:     1. Osteoarthritis, unspecified osteoarthritis type, unspecified site  - Bilateral knee arthritis improved after knee injection. Pt continues to have diffuse arthritis pain and we discussed transitioning from Prozac to Cymbalta  to better help control the pain. Pt is hesitant but would like to do trial of Tramadol to help with pain. Discussed side effects.   - traMADol (ULTRAM) 50 MG tablet; Take 0.5-1 tablets (25-50 mg) by mouth 2 times daily as needed for pain maximum 2 tablet(s) per day  Dispense: 28 tablet; Refill: 0; do not drive or take it with alcohol  - Follow up with Dr. Velasquez on 10/03/17    2. Major depressive disorder, recurrent episode, moderate (H)  PHQ-9 SCORE 8/2/2016 5/2/2017 9/19/2017   Total Score - - -   Total Score 3 4 6   - no change, continue to monitor     3. CKD (chronic kidney disease) stage 3, GFR 30-59 ml/min  - recommended to use Ibuprofen PRN due to CKD  - Basic metabolic panel    4. HTN, goal below 150/90  - Basic metabolic panel    5. Asymptomatic postmenopausal status  - DX Hip/Pelvis/Spine; Future    6. Other fatigue  - likely due to chronic pain and frequent awakenings at night due to urination, for now will do trial of Tramadol to help with pain, continue to monitor and will require further work up if not improving     7. Need for prophylactic vaccination and inoculation against influenza  - FLU VACCINE, 3 YRS +, IM (QUADRIVALENT W/PRESERVATIVES/MULTI-DOSE) [43354]  - Vaccine Administration, Initial [80189]            Destin Wolf MD  [facility]

## 2017-09-19 NOTE — NURSING NOTE
"Chief Complaint   Patient presents with     RECHECK     Knee Injection       Initial /71 (BP Location: Right arm, Patient Position: Chair, Cuff Size: Adult Regular)  Pulse 99  Resp 20  SpO2 98% Estimated body mass index is 32.61 kg/(m^2) as calculated from the following:    Height as of 3/18/16: 5' 2\" (1.575 m).    Weight as of 3/18/16: 178 lb 5 oz (80.9 kg).  Medication Reconciliation: complete     Rachael Mix MA      "

## 2017-09-19 NOTE — PROGRESS NOTES
Injectable Influenza Immunization Documentation    1.  Is the person to be vaccinated sick today?   No    2. Does the person to be vaccinated have an allergy to a component   of the vaccine?   No    3. Has the person to be vaccinated ever had a serious reaction   to influenza vaccine in the past?   No    4. Has the person to be vaccinated ever had Guillain-Barré syndrome?   No    Form completed by Rachael Mix MA

## 2017-09-20 LAB
ANION GAP SERPL CALCULATED.3IONS-SCNC: 8 MMOL/L (ref 3–14)
BUN SERPL-MCNC: 28 MG/DL (ref 7–30)
CALCIUM SERPL-MCNC: 9.4 MG/DL (ref 8.5–10.1)
CHLORIDE SERPL-SCNC: 106 MMOL/L (ref 94–109)
CO2 SERPL-SCNC: 24 MMOL/L (ref 20–32)
CREAT SERPL-MCNC: 1.23 MG/DL (ref 0.52–1.04)
GFR SERPL CREATININE-BSD FRML MDRD: 42 ML/MIN/1.7M2
GLUCOSE SERPL-MCNC: 81 MG/DL (ref 70–99)
POTASSIUM SERPL-SCNC: 4.1 MMOL/L (ref 3.4–5.3)
SODIUM SERPL-SCNC: 138 MMOL/L (ref 133–144)

## 2017-09-26 NOTE — PROGRESS NOTES
Please send the letter to the patient with the following.         Here are your results for your recent labs. Your kidney functions have mildly decreased and I would recommend rechecking your lab during your upcoming appointment. Please continue to stay hydrated. Please continue the current medication dose. Please call or message me if you have questions or concerns.

## 2017-10-03 ENCOUNTER — OFFICE VISIT (OUTPATIENT)
Dept: FAMILY MEDICINE | Facility: CLINIC | Age: 82
End: 2017-10-03
Payer: COMMERCIAL

## 2017-10-03 VITALS
HEART RATE: 97 BPM | RESPIRATION RATE: 14 BRPM | DIASTOLIC BLOOD PRESSURE: 67 MMHG | SYSTOLIC BLOOD PRESSURE: 140 MMHG | TEMPERATURE: 97.8 F | OXYGEN SATURATION: 97 %

## 2017-10-03 DIAGNOSIS — M75.102 ROTATOR CUFF SYNDROME, LEFT: Primary | ICD-10-CM

## 2017-10-03 PROCEDURE — 99213 OFFICE O/P EST LOW 20 MIN: CPT | Performed by: FAMILY MEDICINE

## 2017-10-03 NOTE — MR AVS SNAPSHOT
After Visit Summary   10/3/2017    Staci Watt    MRN: [id number]           Patient Information     Date Of Birth          1935        Visit Information        Provider Department      10/3/2017 1:20 PM Heidi Velasquez MD [facility]        Today's Diagnoses     Rotator cuff syndrome, left    -  1      Care Instructions        Understanding Rotator Cuff Tendonitis    Tendons are tough tissues that connect muscles to bone. A group of 4 muscles and their tendons form a  cuff  around the head of the upper arm bone. This is called the rotator cuff. It connects the upper arm to the shoulder blade. It gives the shoulder joint stability and strength.  If tendons are injured or strained, they may become irritated and swollen (inflamed). This is called tendonitis. Rotator cuff tendonitis may cause shoulder pain and loss of function.  What causes rotator cuff tendonitis?  Tendonitis results when the rotator cuff tendons are injured or overworked. The most common cause of injury is repetitive overhead activities. These can be work-related activities such as reaching, pushing, or lifting. Or they can be sports-related activities such as throwing, swimming, or lifting weights.  Symptoms of rotator cuff tendonitis  Pain on the side of the upper arm is the most common symptom. Pain may get worse with overhead movements or when you raise the arm above shoulder level. It may also hurt to lie on the shoulder at night.  Treatment for rotator cuff tendonitis  Treatment may include the following:    Active rest. This lets the rotator cuff heal. Active rest means using your arm and shoulder, but avoiding activities that cause pain, such as reaching overhead or sleeping on the shoulder.    Cold packs. Putting ice packs on the shoulder helps reduce swelling and relieve pain.    Pain medicines. Prescription or over-the-counter pain medicines can help relieve pain and swelling.    Arm and  shoulder exercises. These help keep the shoulder joint mobile as it heals. They also help improve the strength of muscles around the joint.  Possible complications  It might be tempting to stop using your shoulder completely to avoid pain. But doing so may lead to a condition called  frozen shoulder.  To help prevent this, following instructions you are given for active rest and for doing exercises to help your shoulder heal.  When to call your healthcare provider  Call your healthcare provider right away if you have any of these:    Fever of 100.4 F (38 C) or higher, or as directed    Symptoms that don t get better, or get worse    New symptoms   Date Last Reviewed: 3/10/2016    3247-5099 [employer]. [address] [place] [address], [place] [address]. All rights reserved. This information is not intended as a substitute for professional medical care. Always follow your healthcare professional's instructions.        Exercises at Your Workstation: Shoulders  Tight shoulders? Aching back? A few easy moves can help your shoulders and back feel better. Take a few minutes during your day to do these exercises, right at your desk. They'll loosen up your muscles, keep you more alert, and make a big difference in how you work and feel.  Breathe deeply as you do your exercises. Inhale through your nose, and exhale through your mouth.        Shoulder raise Back press   For your shoulders  Warm-up    Drop your head gently to your chest. While breathing in, slowly roll your head up to your left shoulder. While breathing out, slowly roll your head back to center. Repeat to the right.    Repeat 3 times on each side.  Shoulder raise    Slowly raise your shoulders toward your ears. Hold for a few seconds.    Slowly bring your shoulders down and relax.    Repeat 3 times.  Back press    Put your hands up, forearms raised.    Push your arms back, squeezing your shoulder blades. Hold for a few seconds, then relax.    Repeat 3  times.  If you feel pain while doing these stretching exercises, please stop and consult your doctor.   Date Last Reviewed: 12/28/2015 2000-2017 [employer]. [address] [place] [address], [place] [address]. All rights reserved. This information is not intended as a substitute for professional medical care. Always follow your healthcare professional's instructions.                Follow-ups after your visit        Additional Services     [facility] PT, HAND, AND CHIROPRACTIC REFERRAL       **This order will print in the [facility] Scheduling Office**    Physical Therapy, Hand Therapy and Chiropractic Care are available through:    *[facility]  *[facility]  *[facility]    Call one number to schedule at any of the above locations: [phone].    Your provider has referred you to: Physical Therapy at [facility] or [facility]    Indication/Reason for Referral: left shoulder pain  Onset of Illness:   Therapy Orders: Evaluate and Treat  Special Programs:   Special Request:     Nicole Rivera      Additional Comments for the Therapist or Chiropractor:     Please be aware that coverage of these services is subject to the terms and limitations of your health insurance plan.  Call member services at your health plan with any benefit or coverage questions.      Please bring the following to your appointment:    *Your personal calendar for scheduling future appointments  *Comfortable clothing                  Who to contact     If you have questions or need follow up information about today's clinic visit or your schedule please contact [facility] directly at [phone].  Normal or non-critical lab and imaging results will be communicated to you by MyChart, letter or phone within 4 business days after the clinic has received the results. If you do not hear from us within 7 days, please contact the clinic through MyChart or phone. If you have a critical or abnormal lab  "result, we will notify you by phone as soon as possible.  Submit refill requests through [employer] or call your pharmacy and they will forward the refill request to us. Please allow 3 business days for your refill to be completed.          Additional Information About Your Visit        [employer] Information     [employer] lets you send messages to your doctor, view your test results, renew your prescriptions, schedule appointments and more. To sign up, go to [email or web address] . Click on \"Log in\" on the left side of the screen, which will take you to the Welcome page. Then click on \"Sign up Now\" on the right side of the page.     You will be asked to enter the access code listed below, as well as some personal information. Please follow the directions to create your username and password.     Your access code is: [email or web address]  Expires: 2017  1:24 PM     Your access code will  in 90 days. If you need help or a new code, please call your [place] clinic or [phone].        Care EveryWhere ID     This is your Care EveryWhere ID. This could be used by other organizations to access your [place] medical records  [id number]        Your Vitals Were     Pulse Temperature Respirations Pulse Oximetry          97 97.8  F (36.6  C) (Oral) 14 97%         Blood Pressure from Last 3 Encounters:   10/03/17 140/67   17 148/63   17 139/73    Weight from Last 3 Encounters:   16 178 lb 5 oz (80.9 kg)   16 177 lb (80.3 kg)   16 179 lb (81.2 kg)              We Performed the Following     [facility] PT, HAND, AND CHIROPRACTIC REFERRAL        Primary Care Provider Office Phone # Fax #    Heidi Velasquez [phone] [address] [facility] [address]        Equal Access to Services     MIKE SIFUENTES : Pamela Starr, wajayjay manuel, qaybta bob mcdaniels . [facility] [phone].    ATENCIÓN: Si dante olivarez, " tiene a corrales disposición servicios gratuitos de asistencia lingüística. Liam morales [phone].    We comply with applicable federal civil rights laws and [place] laws. We do not discriminate on the basis of race, color, national origin, age, disability, sex, sexual orientation, or gender identity.            Thank you!     Thank you for choosing [facility]  for your care. Our goal is always to provide you with excellent care. Hearing back from our patients is one way we can continue to improve our services. Please take a few minutes to complete the written survey that you may receive in the mail after your visit with us. Thank you!             Your Updated Medication List - Protect others around you: Learn how to safely use, store and throw away your medicines at www.disposemymeds.org.          This list is accurate as of: 10/3/17  1:49 PM.  Always use your most recent med list.                   Brand Name Dispense Instructions for use Diagnosis    acetaminophen 500 MG tablet    TYLENOL     Take 500-1,000 mg by mouth every 6 hours as needed Reported on 5/2/2017    OA (osteoarthritis) of knee       albuterol 108 (90 BASE) MCG/ACT Inhaler    PROAIR HFA    3 Inhaler    Inhale 2 puffs into the lungs every 4 hours as needed for shortness of breath / dyspnea or wheezing    Moderate persistent asthma       ASPIRIN CHILDRENS 81 MG chewable tablet   Generic drug:  aspirin     0    1 TABLET DAILY    Type II or unspecified type diabetes mellitus without mention of complication, not stated as uncontrolled       atorvastatin 80 MG tablet    LIPITOR    45 tablet    Take 0.5 tablets (40 mg) by mouth daily For high cholesterol.    Hyperlipidemia LDL goal <100       B-12 1000 MCG Tbcr     100 tablet    Take 1,000 mcg by mouth daily    Iron deficiency anemia, unspecified iron deficiency anemia type       ferrous gluconate 324 (38 FE) MG tablet    FERGON    100 tablet    Take 1 tablet (324 mg) by mouth daily (with  breakfast)    Iron deficiency anemia, unspecified iron deficiency anemia type       FLUoxetine 20 MG capsule    PROzac    90 capsule    Take 1 capsule (20 mg) by mouth daily Please profile    Major depressive disorder, recurrent episode, moderate (H)       fluticasone-salmeterol 500-50 MCG/DOSE diskus inhaler    ADVAIR DISKUS    3 Inhaler    Inhale 1 puff into the lungs 2 times daily    Moderate persistent asthma without complication       hydrochlorothiazide 50 MG tablet    HYDRODIURIL    90 tablet    Take 1 tablet (50 mg) by mouth daily    HTN, goal below 150/90       indomethacin 25 MG capsule    INDOCIN    42 capsule    Take 1-2 capsules (25-50 mg) by mouth 3 times daily (with meals)    Acute gout of left foot, unspecified cause       lisinopril 40 MG tablet    PRINIVIL/ZESTRIL    90 tablet    Take 1 tablet (40 mg) by mouth daily    HTN, goal below 150/90       senna-docusate 8.6-50 MG per tablet    DANIEL-COLACE    100 tablet    Take 2 tablets by mouth daily    Slow transit constipation       traMADol 50 MG tablet    ULTRAM    28 tablet    Take 0.5-1 tablets (25-50 mg) by mouth 2 times daily as needed for pain maximum 2 tablet(s) per day    Osteoarthritis, unspecified osteoarthritis type, unspecified site       vitamin D 2000 UNITS tablet     100 tablet    Take 2,000 Units by mouth daily    Vitamin D deficiency

## 2017-10-03 NOTE — PATIENT INSTRUCTIONS
Understanding Rotator Cuff Tendonitis    Tendons are tough tissues that connect muscles to bone. A group of 4 muscles and their tendons form a  cuff  around the head of the upper arm bone. This is called the rotator cuff. It connects the upper arm to the shoulder blade. It gives the shoulder joint stability and strength.  If tendons are injured or strained, they may become irritated and swollen (inflamed). This is called tendonitis. Rotator cuff tendonitis may cause shoulder pain and loss of function.  What causes rotator cuff tendonitis?  Tendonitis results when the rotator cuff tendons are injured or overworked. The most common cause of injury is repetitive overhead activities. These can be work-related activities such as reaching, pushing, or lifting. Or they can be sports-related activities such as throwing, swimming, or lifting weights.  Symptoms of rotator cuff tendonitis  Pain on the side of the upper arm is the most common symptom. Pain may get worse with overhead movements or when you raise the arm above shoulder level. It may also hurt to lie on the shoulder at night.  Treatment for rotator cuff tendonitis  Treatment may include the following:    Active rest. This lets the rotator cuff heal. Active rest means using your arm and shoulder, but avoiding activities that cause pain, such as reaching overhead or sleeping on the shoulder.    Cold packs. Putting ice packs on the shoulder helps reduce swelling and relieve pain.    Pain medicines. Prescription or over-the-counter pain medicines can help relieve pain and swelling.    Arm and shoulder exercises. These help keep the shoulder joint mobile as it heals. They also help improve the strength of muscles around the joint.  Possible complications  It might be tempting to stop using your shoulder completely to avoid pain. But doing so may lead to a condition called  frozen shoulder.  To help prevent this, following instructions you are given for active rest  and for doing exercises to help your shoulder heal.  When to call your healthcare provider  Call your healthcare provider right away if you have any of these:    Fever of 100.4 F (38 C) or higher, or as directed    Symptoms that don t get better, or get worse    New symptoms   Date Last Reviewed: 3/10/2016    [id number] [employer]. [address] [place] [address]. All rights reserved. This information is not intended as a substitute for professional medical care. Always follow your healthcare professional's instructions.        Exercises at Your Workstation: Shoulders  Tight shoulders? Aching back? A few easy moves can help your shoulders and back feel better. Take a few minutes during your day to do these exercises, right at your desk. They'll loosen up your muscles, keep you more alert, and make a big difference in how you work and feel.  Breathe deeply as you do your exercises. Inhale through your nose, and exhale through your mouth.        Shoulder raise Back press   For your shoulders  Warm-up    Drop your head gently to your chest. While breathing in, slowly roll your head up to your left shoulder. While breathing out, slowly roll your head back to center. Repeat to the right.    Repeat 3 times on each side.  Shoulder raise    Slowly raise your shoulders toward your ears. Hold for a few seconds.    Slowly bring your shoulders down and relax.    Repeat 3 times.  Back press    Put your hands up, forearms raised.    Push your arms back, squeezing your shoulder blades. Hold for a few seconds, then relax.    Repeat 3 times.  If you feel pain while doing these stretching exercises, please stop and consult your doctor.   Date Last Reviewed: 12/28/2015 2000-2017 [employer]. [address] [place] [address]. All rights reserved. This information is not intended as a substitute for professional medical care. Always follow your healthcare professional's  instructions.

## 2017-10-03 NOTE — NURSING NOTE
"Chief Complaint   Patient presents with     RECHECK       Initial /67  Pulse 97  Temp 97.8  F (36.6  C) (Oral)  Resp 14  SpO2 97% Estimated body mass index is 32.61 kg/(m^2) as calculated from the following:    Height as of 3/18/16: 5' 2\" (1.575 m).    Weight as of 3/18/16: 178 lb 5 oz (80.9 kg).  Medication Reconciliation: complete     Gill Lua MA    "

## 2017-10-03 NOTE — PROGRESS NOTES
SUBJECTIVE:   Staci Watt is a 82 year old female who presents to clinic today for the following health issues:      Pt comes in clinic today for a follow up and to discuss left shoulder pain.

## 2017-10-05 ASSESSMENT — ENCOUNTER SYMPTOMS
CONSTITUTIONAL NEGATIVE: 1
RESPIRATORY NEGATIVE: 1
FATIGUE: 0
CARDIOVASCULAR NEGATIVE: 1

## 2017-10-05 NOTE — PROGRESS NOTES
Musculoskeletal Problem   This is a chronic (left shoulder pain) problem. The current episode started more than 1 month ago. The problem occurs constantly. The problem has been gradually worsening. Pertinent negatives include no fatigue or rash. The symptoms are aggravated by exertion. She has tried acetaminophen and rest for the symptoms. The treatment provided mild relief.         Review of Systems   Constitutional: Negative.  Negative for fatigue.   Respiratory: Negative.    Cardiovascular: Negative.    Skin: Negative for rash.       /67  Pulse 97  Temp 97.8  F (36.6  C) (Oral)  Resp 14  SpO2 97%   Physical Exam   Constitutional: She is well-developed, well-nourished, and in no distress.   Pulmonary/Chest: No respiratory distress.   Musculoskeletal:        Left shoulder: She exhibits decreased range of motion and pain. She exhibits no bony tenderness, no deformity, no spasm, normal pulse and normal strength.   Pain with abduction > 90 degrees and with internal rotation left shoulder. Diffuse shoulder pain noted on palpation.   Neurological: She is alert.   Skin: Skin is warm and dry.   Psychiatric: Mood and affect normal.   Nursing note and vitals reviewed.      ASSESSMENT / PLAN:  (M75.102) Rotator cuff syndrome, left  (primary encounter diagnosis)  Comment: start with home cares, physical therapy, see orders  If symptoms don't improve, refer to Ortho  Plan: [facility] PT, HAND, AND CHIROPRACTIC REFERRAL

## 2018-01-02 ENCOUNTER — OFFICE VISIT (OUTPATIENT)
Dept: FAMILY MEDICINE | Facility: CLINIC | Age: 83
End: 2018-01-02
Payer: COMMERCIAL

## 2018-01-02 ENCOUNTER — RADIANT APPOINTMENT (OUTPATIENT)
Dept: BONE DENSITY | Facility: CLINIC | Age: 83
End: 2018-01-02
Attending: FAMILY MEDICINE
Payer: COMMERCIAL

## 2018-01-02 VITALS
RESPIRATION RATE: 18 BRPM | TEMPERATURE: 97.9 F | DIASTOLIC BLOOD PRESSURE: 61 MMHG | SYSTOLIC BLOOD PRESSURE: 149 MMHG | HEART RATE: 97 BPM | OXYGEN SATURATION: 100 %

## 2018-01-02 DIAGNOSIS — Z78.0 ASYMPTOMATIC POSTMENOPAUSAL STATUS: ICD-10-CM

## 2018-01-02 DIAGNOSIS — N18.30 CKD (CHRONIC KIDNEY DISEASE) STAGE 3, GFR 30-59 ML/MIN (H): ICD-10-CM

## 2018-01-02 DIAGNOSIS — M17.0 PRIMARY OSTEOARTHRITIS OF BOTH KNEES: ICD-10-CM

## 2018-01-02 DIAGNOSIS — E78.5 HYPERLIPIDEMIA LDL GOAL <100: ICD-10-CM

## 2018-01-02 PROCEDURE — 80061 LIPID PANEL: CPT | Performed by: FAMILY MEDICINE

## 2018-01-02 PROCEDURE — 77085 DXA BONE DENSITY AXL VRT FX: CPT | Performed by: INTERNAL MEDICINE

## 2018-01-02 PROCEDURE — 20610 DRAIN/INJ JOINT/BURSA W/O US: CPT | Mod: 50 | Performed by: FAMILY MEDICINE

## 2018-01-02 PROCEDURE — 36415 COLL VENOUS BLD VENIPUNCTURE: CPT | Performed by: FAMILY MEDICINE

## 2018-01-02 PROCEDURE — 80048 BASIC METABOLIC PNL TOTAL CA: CPT | Performed by: FAMILY MEDICINE

## 2018-01-02 RX ORDER — LIDOCAINE HYDROCHLORIDE 10 MG/ML
4 INJECTION, SOLUTION INFILTRATION; PERINEURAL ONCE
Qty: 4 ML | Refills: 0 | OUTPATIENT
Start: 2018-01-02 | End: 2018-01-02

## 2018-01-02 ASSESSMENT — ASTHMA QUESTIONNAIRES
QUESTION_1 LAST FOUR WEEKS HOW MUCH OF THE TIME DID YOUR ASTHMA KEEP YOU FROM GETTING AS MUCH DONE AT WORK, SCHOOL OR AT HOME: NONE OF THE TIME
ACT_TOTALSCORE: 25
QUESTION_5 LAST FOUR WEEKS HOW WOULD YOU RATE YOUR ASTHMA CONTROL: COMPLETELY CONTROLLED
QUESTION_2 LAST FOUR WEEKS HOW OFTEN HAVE YOU HAD SHORTNESS OF BREATH: NOT AT ALL
QUESTION_3 LAST FOUR WEEKS HOW OFTEN DID YOUR ASTHMA SYMPTOMS (WHEEZING, COUGHING, SHORTNESS OF BREATH, CHEST TIGHTNESS OR PAIN) WAKE YOU UP AT NIGHT OR EARLIER THAN USUAL IN THE MORNING: NOT AT ALL
QUESTION_4 LAST FOUR WEEKS HOW OFTEN HAVE YOU USED YOUR RESCUE INHALER OR NEBULIZER MEDICATION (SUCH AS ALBUTEROL): NOT AT ALL

## 2018-01-02 NOTE — MR AVS SNAPSHOT
"              After Visit Summary   1/2/2018    Staci Watt    MRN: [id number]           Patient Information     Date Of Birth          1935        Visit Information        Provider Department      1/2/2018 1:20 PM Destin Wolf MD [facility]        Today's Diagnoses     Primary osteoarthritis of both knees        Hyperlipidemia LDL goal <100        CKD (chronic kidney disease) stage 3, GFR 30-59 ml/min           Follow-ups after your visit        Your next 10 appointments already scheduled     Jan 02, 2018  2:30 PM CST   DX HIP/PELVIS/SPINE with HWDX1   [facility] ([facility]    [address] [place] [id number] [phone]           Please do not take any of the following 24 hours prior to the day of your exam: vitamins, calcium tablets, antacids.  If possible, please wear clothes without metal (snaps, zippers). A sweatsuit works well.              Who to contact     If you have questions or need follow up information about today's clinic visit or your schedule please contact [facility] directly at [phone].  Normal or non-critical lab and imaging results will be communicated to you by MyChart, letter or phone within 4 business days after the clinic has received the results. If you do not hear from us within 7 days, please contact the clinic through [employer] or phone. If you have a critical or abnormal lab result, we will notify you by phone as soon as possible.  Submit refill requests through [employer] or call your pharmacy and they will forward the refill request to us. Please allow 3 business days for your refill to be completed.          Additional Information About Your Visit        MyChart Information     [employer] lets you send messages to your doctor, view your test results, renew your prescriptions, schedule appointments and more. To sign up, go to [email or web address] . Click on \"Log in\" on the left side " "of the screen, which will take you to the Welcome page. Then click on \"Sign up Now\" on the right side of the page.     You will be asked to enter the access code listed below, as well as some personal information. Please follow the directions to create your username and password.     Your access code is: [email or web address]  Expires: 2018  2:27 PM     Your access code will  in 90 days. If you need help or a new code, please call your [facility] clinic or [phone].        Care EveryWhere ID     This is your Care EveryWhere ID. This could be used by other organizations to access your [facility] medical records  [id number]        Your Vitals Were     Pulse Temperature Respirations Pulse Oximetry          97 97.9  F (36.6  C) (Tympanic) 18 100%         Blood Pressure from Last 3 Encounters:   18 149/61   10/03/17 140/67   17 148/63    Weight from Last 3 Encounters:   16 178 lb 5 oz (80.9 kg)   16 177 lb (80.3 kg)   16 179 lb (81.2 kg)              We Performed the Following     Basic metabolic panel     Kenalog 40 MG  []     Kenalog 40 MG  []     Large Joint/Bursa injection and/or drainage (Shoulder, Knee)     Lipid Profile (Chol, Trig, HDL, LDL calc)          Today's Medication Changes          These changes are accurate as of: 18  2:27 PM.  If you have any questions, ask your nurse or doctor.               Start taking these medicines.        Dose/Directions    * lidocaine 1 % injection   Used for:  Primary osteoarthritis of both knees   Started by:  Destin Wolf MD        Dose:  4 mL   4 mLs by INTRA-ARTICULAR route once for 1 dose   Quantity:  4 mL   Refills:  0       * lidocaine 1 % injection   Used for:  Primary osteoarthritis of both knees   Started by:  Destin Wolf MD        Dose:  4 mL   4 mLs by INTRA-ARTICULAR route once for 1 dose   Quantity:  4 mL   Refills:  0       * Notice:  This list has 2 medication(s) that are the same as other " medications prescribed for you. Read the directions carefully, and ask your doctor or other care provider to review them with you.         Where to get your medicines      Some of these will need a paper prescription and others can be bought over the counter.  Ask your nurse if you have questions.     You don't need a prescription for these medications     lidocaine 1 % injection    lidocaine 1 % injection                Primary Care Provider Office Phone # Fax #    Heidi Velasquez [phone]       [address] [facility] [address]        Equal Access to Services     MIKE SIFUENTES : Hadii aad ku hadasho Soomaali, waaxda luqadaha, qaybta kaalmada adeegyada, waxay tarain haywaleskan basilio smith . So [facility] [phone].    ATENCIÓN: Si habla español, tiene a corrales disposición servicios gratuitos de asistencia lingüística. Ll[facility] [place] [phone].    We comply with applicable federal civil rights laws and [place] laws. We do not discriminate on the basis of race, color, national origin, age, disability, sex, sexual orientation, or gender identity.            Thank you!     Thank you for choosing [facility]  for your care. Our goal is always to provide you with excellent care. Hearing back from our patients is one way we can continue to improve our services. Please take a few minutes to complete the written survey that you may receive in the mail after your visit with us. Thank you!             Your Updated Medication List - Protect others around you: Learn how to safely use, store and throw away your medicines at www.disposemymeds.org.          This list is accurate as of: 1/2/18  2:27 PM.  Always use your most recent med list.                   Brand Name Dispense Instructions for use Diagnosis    acetaminophen 500 MG tablet    TYLENOL     Take 500-1,000 mg by mouth every 6 hours as needed Reported on 5/2/2017    OA (osteoarthritis) of knee       albuterol 108 (90 BASE)  MCG/ACT Inhaler    PROAIR HFA    3 Inhaler    Inhale 2 puffs into the lungs every 4 hours as needed for shortness of breath / dyspnea or wheezing    Moderate persistent asthma       ASPIRIN CHILDRENS 81 MG chewable tablet   Generic drug:  aspirin     0    1 TABLET DAILY    Type II or unspecified type diabetes mellitus without mention of complication, not stated as uncontrolled       atorvastatin 80 MG tablet    LIPITOR    45 tablet    Take 0.5 tablets (40 mg) by mouth daily For high cholesterol.    Hyperlipidemia LDL goal <100       B-12 1000 MCG Tbcr     100 tablet    Take 1,000 mcg by mouth daily    Iron deficiency anemia, unspecified iron deficiency anemia type       ferrous gluconate 324 (38 FE) MG tablet    FERGON    100 tablet    Take 1 tablet (324 mg) by mouth daily (with breakfast)    Iron deficiency anemia, unspecified iron deficiency anemia type       FLUoxetine 20 MG capsule    PROzac    90 capsule    Take 1 capsule (20 mg) by mouth daily Please profile    Major depressive disorder, recurrent episode, moderate (H)       fluticasone-salmeterol 500-50 MCG/DOSE diskus inhaler    ADVAIR DISKUS    3 Inhaler    Inhale 1 puff into the lungs 2 times daily    Moderate persistent asthma without complication       hydrochlorothiazide 50 MG tablet    HYDRODIURIL    90 tablet    Take 1 tablet (50 mg) by mouth daily    HTN, goal below 150/90       indomethacin 25 MG capsule    INDOCIN    42 capsule    Take 1-2 capsules (25-50 mg) by mouth 3 times daily (with meals)    Acute gout of left foot, unspecified cause       * lidocaine 1 % injection     4 mL    4 mLs by INTRA-ARTICULAR route once for 1 dose    Primary osteoarthritis of both knees       * lidocaine 1 % injection     4 mL    4 mLs by INTRA-ARTICULAR route once for 1 dose    Primary osteoarthritis of both knees       lisinopril 40 MG tablet    PRINIVIL/ZESTRIL    90 tablet    Take 1 tablet (40 mg) by mouth daily    HTN, goal below 150/90       senna-docusate  8.6-50 MG per tablet    DANIEL-COLACE    100 tablet    Take 2 tablets by mouth daily    Slow transit constipation       traMADol 50 MG tablet    ULTRAM    28 tablet    Take 0.5-1 tablets (25-50 mg) by mouth 2 times daily as needed for pain maximum 2 tablet(s) per day    Osteoarthritis, unspecified osteoarthritis type, unspecified site       vitamin D 2000 UNITS tablet     100 tablet    Take 2,000 Units by mouth daily    Vitamin D deficiency       * Notice:  This list has 2 medication(s) that are the same as other medications prescribed for you. Read the directions carefully, and ask your doctor or other care provider to review them with you.

## 2018-01-02 NOTE — PROGRESS NOTES
SUBJECTIVE:   Staci Watt is a 82 year old female who presents to clinic today for the following health issues:    Pt is here for bilateral knee injection.           RT knee corticosteroid injection  After risks, benefits and complications of steroid injection were discussed with the patient he elected to proceed. Using sterile technique, the area was first prepped with alcohol swab and betadinex 3. A 22 gauge, 1 1/2 inch needle was used to inject 40 mg kenalong(1ml) and 4cc of 1% lidocaine each into the knee joint using an anterolmedial joint line approach on the RT side. Pt. tolerated the procedure well without complications. Post injection instructions given.       Left knee corticosteroid injection  After risks, benefits and complications of steroid injection were discussed with the patient he elected to proceed. Using sterile technique, the area was first prepped with alcohol swab and betadinex 3. A 22 gauge, 1 1/2 inch needle was used to inject 40 mg kenalong(1ml) and 4cc of 1% lidocaine each into the knee joint using an anteromedial joint line approach on the Left side. Pt. tolerated the procedure well without complications. Post injection instructions given.       NDC # 0869-6643-72              Dr. Wolf

## 2018-01-02 NOTE — LETTER
January 4, 2018      Staci Watt  [address] [facility] [address]        Dear ,    We are writing to inform you of your test results.    Here are your results for your recent labs. They are reassuring and continue the current medication dose. Please call or message me if you have questions or concerns.     Resulted Orders   Basic metabolic panel   Result Value Ref Range    Sodium 140 133 - 144 mmol/L    Potassium 3.5 3.4 - 5.3 mmol/L    Chloride 108 94 - 109 mmol/L    Carbon Dioxide 24 20 - 32 mmol/L    Anion Gap 8 3 - 14 mmol/L    Glucose 68 (L) 70 - 99 mg/dL      Comment:      Non Fasting    Urea Nitrogen 22 7 - 30 mg/dL    Creatinine 0.97 0.52 - 1.04 mg/dL    GFR Estimate 55 (L) >60 mL/min/1.7m2      Comment:      Non  GFR Calc    GFR Estimate If Black 66 >60 mL/min/1.7m2      Comment:       GFR Calc    Calcium 8.8 8.5 - 10.1 mg/dL   Lipid Profile (Chol, Trig, HDL, LDL calc)   Result Value Ref Range    Cholesterol 192 <200 mg/dL    Triglycerides 96 <150 mg/dL      Comment:      Non Fasting    HDL Cholesterol 101 >49 mg/dL    LDL Cholesterol Calculated 72 <100 mg/dL      Comment:      Desirable:       <100 mg/dl    Non HDL Cholesterol 91 <130 mg/dL       If you have any questions or concerns, please call the clinic at the number listed above.       Sincerely,        Destin Wolf MD/nr

## 2018-01-02 NOTE — LETTER
January 10, 2018      [facility] Watt  [address] [facility] [address] [address]        Dear ,    We are writing to inform you of your test results.    Thank you for getting your Dexa scan done!  It shows that you have osteopenia, a condition less advanced that osteoporosis.  You should be getting 1500 mg of calcium daily between diet and supplements, and 800 international units of Vitamin D between fortified dairy products and supplements daily. If you are not getting this in your diet, please start taking a supplement.    Weight bearing exercises 2-3 times per week (lifting weights, even walking!) can help you maintain bone mass as well, and works better than medication!    If you have any questions, please contact the clinic or schedule an appointment with me, thank you!       Resulted Orders   DX Hip/Pelvis/Spine w Lat Fraction Anum    Narrative    BONE DENSITOMETRY  [facility]  [address] [place] [id number]  2018      PATIENT: Staci Watt  CHART: [id number]   :  1935  AGE:  82 year old  SEX:  female   REFERRING PROVIDER:  Destin Wolf MD        PROCEDURE:  Bone density scanning was performed using DXA technology of   the lumbar spine and hip.  Scanning was performed on a Lunar Prodigy   scanner.  Reporting is completed in the form of a T-score.  The T-score   represents the standard deviation from peak bone mass based on a young   healthy adult.     REFERENCE T-SCORES:       Normal                -1.0 and greater                                 Osteopenia         Between -1.0 and -2.5                                             Osteoporosis     -2.5 and less                                       RISK FACTORS:  Post-menopausal, Height loss of 2 inches   CURRENT TREATMENT:  Vitamin D     FINDINGS:               Lumbar Spine (L1-L4)      T-score:  -0.2     Significant   degenerative and/or osteosclerotic changes are present, falsely improving   result.  "               Left Femoral Neck            T-score:  -1.9               Right Femoral Neck          T-score:  -1.7                             Lumbar (L1-L4) BMD: 1.168            Previous:   1.092                                              Total Hip Mean BMD: 0.896            Previous:   0.951     Comparison is made to another DXA performed on the same Lunar Prodigy    machine on 2/24/2004.      LATERAL VERTEBRAL ASSESSMENT  Procedure:  Vertebral fracture assessment was performed in the lateral   decubitus position using a Lunar Prodigy  densitometer.  Indications for VFA: T-score of -1.0 or worse, age (female>69) and height   loss > 1.5\"  Confounding factors for VFA: Arthritis/degenerative disc disease and rib   shadows.  The LVA scan is interpretable only from approximately T12 to L3.    VFA Findings: Using the semi-quantitative analysis of Haley there was   evidence of no spinal deformity  VFA Impression: Staci Watt has no vertebral fractures identified on   this limited VFA.  Further evaluation may be warranted due to presence of   confounding factors     IMPRESSION  Osteopenia (low bone mass)  Recommendations include ensuring adequate daily Calcium and Vitamin D   intake    Compared to previous bone densitometry performed on this patient, there is   the suggestion of a possible trend towards improvement of the lumbar   spine, and a possible trend towards worsening of the (total) hip.    Current NOF guidelines recommend treatment for patients with the   following:  - Prior hip or vertebral fracture  - T-score -2.5 or below  - A 10 year risk of any major osteoporotic fracture >20% or 10 year risk   of hip fracture >3%, as calculated using the FRAX calculator   (www.shef.ac.uk/FRAX).      This patient's risks with the use of FRAX (based on available information)   are 6.8 % for major osteoporotic fracture and 1.9 % for hip fracture.     Based on these guidelines, treatment (in addition to calcium and " vitamin   D) is not recommended for this patient.  While this is meant as an aid to   clinical decision-making, clinical judgment must still be used.     Raman Ramos MD         If you have any questions or concerns, please call the clinic at the number listed above.       Sincerely,    Destin Wolf MD/nr

## 2018-01-03 LAB
ANION GAP SERPL CALCULATED.3IONS-SCNC: 8 MMOL/L (ref 3–14)
BUN SERPL-MCNC: 22 MG/DL (ref 7–30)
CALCIUM SERPL-MCNC: 8.8 MG/DL (ref 8.5–10.1)
CHLORIDE SERPL-SCNC: 108 MMOL/L (ref 94–109)
CHOLEST SERPL-MCNC: 192 MG/DL
CO2 SERPL-SCNC: 24 MMOL/L (ref 20–32)
CREAT SERPL-MCNC: 0.97 MG/DL (ref 0.52–1.04)
GFR SERPL CREATININE-BSD FRML MDRD: 55 ML/MIN/1.7M2
GLUCOSE SERPL-MCNC: 68 MG/DL (ref 70–99)
HDLC SERPL-MCNC: 101 MG/DL
LDLC SERPL CALC-MCNC: 72 MG/DL
NONHDLC SERPL-MCNC: 91 MG/DL
POTASSIUM SERPL-SCNC: 3.5 MMOL/L (ref 3.4–5.3)
SODIUM SERPL-SCNC: 140 MMOL/L (ref 133–144)
TRIGL SERPL-MCNC: 96 MG/DL

## 2018-01-03 ASSESSMENT — ASTHMA QUESTIONNAIRES: ACT_TOTALSCORE: 25

## 2018-01-03 NOTE — PROGRESS NOTES
Please send the letter to the patient with the following.         Here are your results for your recent labs. They are reassuring and continue the current medication dose. Please call or message me if you have questions or concerns.

## 2018-01-09 NOTE — PROGRESS NOTES
Please send the letter to the patient with the following.     Thank you for getting your Dexa scan done!  It shows that you have osteopenia, a condition less advanced that osteoporosis.  You should be getting 1500 mg of calcium daily between diet and supplements, and 800 international units of Vitamin D between fortified dairy products and supplements daily. If you are not getting this in your diet, please start taking a supplement.    Weight bearing exercises 2-3 times per week (lifting weights, even walking!) can help you maintain bone mass as well, and works better than medication!    If you have any questions, please contact the clinic or schedule an appointment with me, thank you!    Sincerely,  Destin Wolf MD

## 2018-04-02 ENCOUNTER — OFFICE VISIT (OUTPATIENT)
Dept: FAMILY MEDICINE | Facility: CLINIC | Age: 83
End: 2018-04-02
Payer: COMMERCIAL

## 2018-04-02 VITALS
RESPIRATION RATE: 12 BRPM | SYSTOLIC BLOOD PRESSURE: 120 MMHG | OXYGEN SATURATION: 100 % | TEMPERATURE: 98.2 F | HEART RATE: 105 BPM | DIASTOLIC BLOOD PRESSURE: 58 MMHG

## 2018-04-02 DIAGNOSIS — M17.0 PRIMARY OSTEOARTHRITIS OF BOTH KNEES: Primary | ICD-10-CM

## 2018-04-02 DIAGNOSIS — J45.40 MODERATE PERSISTENT ASTHMA WITHOUT COMPLICATION: ICD-10-CM

## 2018-04-02 PROCEDURE — 99207 ZZC CDG-PROCEDURE CHARGE ONLY: CPT | Performed by: FAMILY MEDICINE

## 2018-04-02 PROCEDURE — 20610 DRAIN/INJ JOINT/BURSA W/O US: CPT | Mod: 50 | Performed by: FAMILY MEDICINE

## 2018-04-02 RX ORDER — ALBUTEROL SULFATE 90 UG/1
2 AEROSOL, METERED RESPIRATORY (INHALATION) EVERY 4 HOURS PRN
Qty: 3 INHALER | Refills: 3 | Status: SHIPPED | OUTPATIENT
Start: 2018-04-02 | End: 2019-01-03

## 2018-04-02 NOTE — MR AVS SNAPSHOT
"              After Visit Summary   2018    Staci Watt    MRN: [id number]           Patient Information     Date Of Birth          1935        Visit Information        Provider Department      2018 3:40 PM Destin Wolf MD [facility]        Today's Diagnoses     Primary osteoarthritis of both knees    -  1    Moderate persistent asthma without complication           Follow-ups after your visit        Who to contact     If you have questions or need follow up information about today's clinic visit or your schedule please contact [facility] directly at [phone].  Normal or non-critical lab and imaging results will be communicated to you by MyChart, letter or phone within 4 business days after the clinic has received the results. If you do not hear from us within 7 days, please contact the clinic through [facility] or phone. If you have a critical or abnormal lab result, we will notify you by phone as soon as possible.  Submit refill requests through [employer] or call your pharmacy and they will forward the refill request to us. Please allow 3 business days for your refill to be completed.          Additional Information About Your Visit        MyChart Information     [employer] lets you send messages to your doctor, view your test results, renew your prescriptions, schedule appointments and more. To sign up, go to [email or web address] . Click on \"Log in\" on the left side of the screen, which will take you to the Welcome page. Then click on \"Sign up Now\" on the right side of the page.     You will be asked to enter the access code listed below, as well as some personal information. Please follow the directions to create your username and password.     Your access code is: [email or web address]  Expires: 2018  6:48 PM     Your access code will  in 90 days. If you need help or a new code, please call your [facility] or [phone].        Care EveryWhere ID  "    This is your Care EveryWhere ID. This could be used by other organizations to access your [facility] medical records  [id number]        Your Vitals Were     Pulse Temperature Respirations Pulse Oximetry          105 98.2  F (36.8  C) (Oral) 12 100%         Blood Pressure from Last 3 Encounters:   04/02/18 120/58   01/02/18 149/61   10/03/17 140/67    Weight from Last 3 Encounters:   03/18/16 178 lb 5 oz (80.9 kg)   02/12/16 177 lb (80.3 kg)   01/07/16 179 lb (81.2 kg)              We Performed the Following     Kenalog 40 MG  []     Kenalog 40 MG  []          Today's Medication Changes          These changes are accurate as of 4/2/18 11:59 PM.  If you have any questions, ask your nurse or doctor.               Start taking these medicines.        Dose/Directions    * triamcinolone acetonide 40 MG/ML injection   Commonly known as:  KENALOG   Used for:  Primary osteoarthritis of both knees   Started by:  Destin Wolf MD        Dose:  40 mg   1 mL (40 mg) by INTRA-ARTICULAR route once for 1 dose   Quantity:  1 mL   Refills:  0       * triamcinolone acetonide 40 MG/ML injection   Commonly known as:  KENALOG   Used for:  Primary osteoarthritis of both knees   Started by:  Destin Wolf MD        Dose:  40 mg   1 mL (40 mg) by INTRA-ARTICULAR route once for 1 dose   Quantity:  1 mL   Refills:  0       * Notice:  This list has 2 medication(s) that are the same as other medications prescribed for you. Read the directions carefully, and ask your doctor or other care provider to review them with you.         Where to get your medicines      These medications were sent to [facility] [place], [place] - [address]  [address], [facility] [address]     Phone:  [phone]     albuterol 108 (90 Base) MCG/ACT Inhaler         Some of these will need a paper prescription and others can be bought over the counter.  Ask your nurse if you have questions.     You don't need a  prescription for these medications     triamcinolone acetonide 40 MG/ML injection    triamcinolone acetonide 40 MG/ML injection                Primary Care Provider Office Phone # Fax #    Heidi Velasquez [phone] [address] [facility] [address]        Equal Access to Services     MIKE SIFUENTES : Hadii aad ku hadasho Soomaali, waaxda luqadaha, qaybta kaalmada adeegyada, waxay tarain haywaleskan adeavery phillamcy schneider. So [facility] [phone].    ATENCIÓN: Si habla español, tiene a corrales disposición servicios gratuitos de asistencia lingüística. [facility] [phone].    We comply with applicable federal civil rights laws and [place] laws. We do not discriminate on the basis of race, color, national origin, age, disability, sex, sexual orientation, or gender identity.            Thank you!     Thank you for choosing [facility]  for your care. Our goal is always to provide you with excellent care. Hearing back from our patients is one way we can continue to improve our services. Please take a few minutes to complete the written survey that you may receive in the mail after your visit with us. Thank you!             Your Updated Medication List - Protect others around you: Learn how to safely use, store and throw away your medicines at www.disposemymeds.org.          This list is accurate as of 4/2/18 11:59 PM.  Always use your most recent med list.                   Brand Name Dispense Instructions for use Diagnosis    acetaminophen 500 MG tablet    TYLENOL     Take 500-1,000 mg by mouth every 6 hours as needed Reported on 5/2/2017    OA (osteoarthritis) of knee       albuterol 108 (90 Base) MCG/ACT Inhaler    PROAIR HFA    3 Inhaler    Inhale 2 puffs into the lungs every 4 hours as needed for shortness of breath / dyspnea or wheezing    Moderate persistent asthma without complication       ASPIRIN CHILDRENS 81 MG chewable tablet   Generic drug:  aspirin     0    1 TABLET  DAILY    Type II or unspecified type diabetes mellitus without mention of complication, not stated as uncontrolled       atorvastatin 80 MG tablet    LIPITOR    45 tablet    Take 0.5 tablets (40 mg) by mouth daily For high cholesterol.    Hyperlipidemia LDL goal <100       B-12 1000 MCG Tbcr     100 tablet    Take 1,000 mcg by mouth daily    Iron deficiency anemia, unspecified iron deficiency anemia type       ferrous gluconate 324 (38 Fe) MG tablet    FERGON    100 tablet    Take 1 tablet (324 mg) by mouth daily (with breakfast)    Iron deficiency anemia, unspecified iron deficiency anemia type       FLUoxetine 20 MG capsule    PROzac    90 capsule    Take 1 capsule (20 mg) by mouth daily Please profile    Major depressive disorder, recurrent episode, moderate (H)       fluticasone-salmeterol 500-50 MCG/DOSE diskus inhaler    ADVAIR DISKUS    3 Inhaler    Inhale 1 puff into the lungs 2 times daily    Moderate persistent asthma without complication       hydrochlorothiazide 50 MG tablet    HYDRODIURIL    90 tablet    Take 1 tablet (50 mg) by mouth daily    HTN, goal below 150/90       indomethacin 25 MG capsule    INDOCIN    42 capsule    Take 1-2 capsules (25-50 mg) by mouth 3 times daily (with meals)    Acute gout of left foot, unspecified cause       lisinopril 40 MG tablet    PRINIVIL/ZESTRIL    90 tablet    Take 1 tablet (40 mg) by mouth daily    HTN, goal below 150/90       senna-docusate 8.6-50 MG per tablet    DANIEL-COLACE    100 tablet    Take 2 tablets by mouth daily    Slow transit constipation       traMADol 50 MG tablet    ULTRAM    28 tablet    Take 0.5-1 tablets (25-50 mg) by mouth 2 times daily as needed for pain maximum 2 tablet(s) per day    Osteoarthritis, unspecified osteoarthritis type, unspecified site       * triamcinolone acetonide 40 MG/ML injection    KENALOG    1 mL    1 mL (40 mg) by INTRA-ARTICULAR route once for 1 dose    Primary osteoarthritis of both knees       * triamcinolone  acetonide 40 MG/ML injection    KENALOG    1 mL    1 mL (40 mg) by INTRA-ARTICULAR route once for 1 dose    Primary osteoarthritis of both knees       vitamin D 2000 units tablet     100 tablet    Take 2,000 Units by mouth daily    Vitamin D deficiency       * Notice:  This list has 2 medication(s) that are the same as other medications prescribed for you. Read the directions carefully, and ask your doctor or other care provider to review them with you.

## 2018-04-02 NOTE — PROGRESS NOTES
SUBJECTIVE:   Staci Watt is a 82 year old female who presents to clinic today for the following health issues:     Pt is here for bilateral knee injection.   Synvisc not covered by insurance.        RT knee corticosteroid injection  After risks, benefits and complications of steroid injection were discussed with the patient he elected to proceed. Using sterile technique, the area was first prepped with alcohol swab and betadinex 3. A 22 gauge, 1 1/2 inch needle was used to inject 40 mg kenalong(1ml) and 4cc of 1% lidocaine each into the knee joint using an anterolmedial joint line approach on the RT side. Pt. tolerated the procedure well without complications. Post injection instructions given.       Left knee corticosteroid injection  After risks, benefits and complications of steroid injection were discussed with the patient he elected to proceed. Using sterile technique, the area was first prepped with alcohol swab and betadinex 3. A 22 gauge, 1 1/2 inch needle was used to inject 40 mg kenalong(1ml) and 4cc of 1% lidocaine each into the knee joint using an anteromedial joint line approach on the Left side. Pt. tolerated the procedure well without complications. Post injection instructions given.       NDC # 7049-6868-58              Dr. Wolf

## 2018-04-27 RX ORDER — TRIAMCINOLONE ACETONIDE 40 MG/ML
40 INJECTION, SUSPENSION INTRA-ARTICULAR; INTRAMUSCULAR ONCE
Qty: 1 ML | Refills: 0 | OUTPATIENT
Start: 2018-04-27 | End: 2018-04-27

## 2018-05-14 ENCOUNTER — OFFICE VISIT (OUTPATIENT)
Dept: FAMILY MEDICINE | Facility: CLINIC | Age: 83
End: 2018-05-14
Payer: COMMERCIAL

## 2018-05-14 VITALS
DIASTOLIC BLOOD PRESSURE: 66 MMHG | HEART RATE: 107 BPM | OXYGEN SATURATION: 98 % | TEMPERATURE: 98 F | RESPIRATION RATE: 16 BRPM | SYSTOLIC BLOOD PRESSURE: 132 MMHG

## 2018-05-14 DIAGNOSIS — R53.83 OTHER FATIGUE: Primary | ICD-10-CM

## 2018-05-14 DIAGNOSIS — I10 HTN, GOAL BELOW 150/90: ICD-10-CM

## 2018-05-14 DIAGNOSIS — F33.1 MAJOR DEPRESSIVE DISORDER, RECURRENT EPISODE, MODERATE (H): ICD-10-CM

## 2018-05-14 DIAGNOSIS — R80.9 MICROALBUMINURIA: ICD-10-CM

## 2018-05-14 DIAGNOSIS — J45.40 MODERATE PERSISTENT ASTHMA WITHOUT COMPLICATION: ICD-10-CM

## 2018-05-14 DIAGNOSIS — E78.5 HYPERLIPIDEMIA LDL GOAL <100: ICD-10-CM

## 2018-05-14 LAB
BASOPHILS # BLD AUTO: 0 10E9/L (ref 0–0.2)
BASOPHILS NFR BLD AUTO: 0.3 %
CRP SERPL-MCNC: 53 MG/L (ref 0–8)
DIFFERENTIAL METHOD BLD: ABNORMAL
EOSINOPHIL # BLD AUTO: 0.1 10E9/L (ref 0–0.7)
EOSINOPHIL NFR BLD AUTO: 0.9 %
ERYTHROCYTE [DISTWIDTH] IN BLOOD BY AUTOMATED COUNT: 13.4 % (ref 10–15)
ERYTHROCYTE [SEDIMENTATION RATE] IN BLOOD BY WESTERGREN METHOD: 36 MM/H (ref 0–30)
HCT VFR BLD AUTO: 36.3 % (ref 35–47)
HGB BLD-MCNC: 11.9 G/DL (ref 11.7–15.7)
LYMPHOCYTES # BLD AUTO: 2.3 10E9/L (ref 0.8–5.3)
LYMPHOCYTES NFR BLD AUTO: 32.8 %
MCH RBC QN AUTO: 32.4 PG (ref 26.5–33)
MCHC RBC AUTO-ENTMCNC: 32.8 G/DL (ref 31.5–36.5)
MCV RBC AUTO: 99 FL (ref 78–100)
MONOCYTES # BLD AUTO: 0.5 10E9/L (ref 0–1.3)
MONOCYTES NFR BLD AUTO: 7.2 %
NEUTROPHILS # BLD AUTO: 4.1 10E9/L (ref 1.6–8.3)
NEUTROPHILS NFR BLD AUTO: 58.8 %
PLATELET # BLD AUTO: 391 10E9/L (ref 150–450)
RBC # BLD AUTO: 3.67 10E12/L (ref 3.8–5.2)
WBC # BLD AUTO: 6.9 10E9/L (ref 4–11)

## 2018-05-14 PROCEDURE — 80053 COMPREHEN METABOLIC PANEL: CPT | Performed by: FAMILY MEDICINE

## 2018-05-14 PROCEDURE — 86140 C-REACTIVE PROTEIN: CPT | Performed by: FAMILY MEDICINE

## 2018-05-14 PROCEDURE — 36415 COLL VENOUS BLD VENIPUNCTURE: CPT | Performed by: FAMILY MEDICINE

## 2018-05-14 PROCEDURE — 85652 RBC SED RATE AUTOMATED: CPT | Performed by: FAMILY MEDICINE

## 2018-05-14 PROCEDURE — 85025 COMPLETE CBC W/AUTO DIFF WBC: CPT | Performed by: FAMILY MEDICINE

## 2018-05-14 PROCEDURE — 84443 ASSAY THYROID STIM HORMONE: CPT | Performed by: FAMILY MEDICINE

## 2018-05-14 PROCEDURE — 82043 UR ALBUMIN QUANTITATIVE: CPT | Performed by: FAMILY MEDICINE

## 2018-05-14 PROCEDURE — 99214 OFFICE O/P EST MOD 30 MIN: CPT | Performed by: FAMILY MEDICINE

## 2018-05-14 PROCEDURE — 82306 VITAMIN D 25 HYDROXY: CPT | Performed by: FAMILY MEDICINE

## 2018-05-14 RX ORDER — HYDROCHLOROTHIAZIDE 50 MG/1
TABLET ORAL
Qty: 90 TABLET | Refills: 3 | Status: CANCELLED | OUTPATIENT
Start: 2018-05-14

## 2018-05-14 RX ORDER — ATORVASTATIN CALCIUM 80 MG/1
TABLET, FILM COATED ORAL
Qty: 45 TABLET | Refills: 3 | Status: CANCELLED | OUTPATIENT
Start: 2018-05-14

## 2018-05-14 RX ORDER — ATORVASTATIN CALCIUM 80 MG/1
40 TABLET, FILM COATED ORAL DAILY
Qty: 45 TABLET | Refills: 3 | Status: SHIPPED | OUTPATIENT
Start: 2018-05-14 | End: 2019-01-01

## 2018-05-14 RX ORDER — LISINOPRIL 40 MG/1
40 TABLET ORAL DAILY
Qty: 90 TABLET | Refills: 3 | Status: ON HOLD | OUTPATIENT
Start: 2018-05-14 | End: 2019-01-01

## 2018-05-14 RX ORDER — HYDROCHLOROTHIAZIDE 50 MG/1
50 TABLET ORAL DAILY
Qty: 90 TABLET | Refills: 3 | Status: ON HOLD | OUTPATIENT
Start: 2018-05-14 | End: 2019-01-05

## 2018-05-14 RX ORDER — LISINOPRIL 40 MG/1
TABLET ORAL
Qty: 90 TABLET | Refills: 3 | Status: CANCELLED | OUTPATIENT
Start: 2018-05-14

## 2018-05-14 ASSESSMENT — ANXIETY QUESTIONNAIRES
IF YOU CHECKED OFF ANY PROBLEMS ON THIS QUESTIONNAIRE, HOW DIFFICULT HAVE THESE PROBLEMS MADE IT FOR YOU TO DO YOUR WORK, TAKE CARE OF THINGS AT HOME, OR GET ALONG WITH OTHER PEOPLE: NOT DIFFICULT AT ALL
7. FEELING AFRAID AS IF SOMETHING AWFUL MIGHT HAPPEN: NOT AT ALL
3. WORRYING TOO MUCH ABOUT DIFFERENT THINGS: NEARLY EVERY DAY
6. BECOMING EASILY ANNOYED OR IRRITABLE: NOT AT ALL
2. NOT BEING ABLE TO STOP OR CONTROL WORRYING: NEARLY EVERY DAY
4. TROUBLE RELAXING: NOT AT ALL
5. BEING SO RESTLESS THAT IT IS HARD TO SIT STILL: NOT AT ALL
1. FEELING NERVOUS, ANXIOUS, OR ON EDGE: NOT AT ALL
GAD7 TOTAL SCORE: 6

## 2018-05-14 NOTE — PROGRESS NOTES
SUBJECTIVE:   Staci Watt is a 82 year old female who presents to clinic today for the following health issues:    Knees are doing great. It took over one week for the pain to improve.   Pt has been more tired around two weeks ago. She can walk to the living room (~ 30 steps) and get tired. She feels that her whole body is shutting down. She has had more family stress but usually that doesn't affect her like this. She still takes her vitamin B12, vitamin D and ferrous gluconate.   She has occasional headaches. She has diffuse joint pain 2/2 arthritis. She is sleeping for around 6 hours. She is unsure if she snores at night.  No chest pain, shortness of breath, fever, chills, constipation, diarrhea or night sweats. Normal appetite.   On Thursday she went to the [employer] and was very tired when she came back.   Previously she was able to do her errands.   No recent tick bite.     Problem list and histories reviewed & adjusted, as indicated.  Additional history: as documented    Labs reviewed in EPIC    Reviewed and updated as needed this visit by clinical staff  Tobacco  Allergies  Med Hx  Surg Hx  Fam Hx  Soc Hx      Reviewed and updated as needed this visit by Provider         ROS:  Constitutional, HEENT, cardiovascular, pulmonary, gi and gu systems are negative, except as otherwise noted.    OBJECTIVE:     /66 (BP Location: Left arm, Patient Position: Sitting, Cuff Size: Adult Regular)  Pulse 107  Temp 98  F (36.7  C) (Oral)  Resp 16  SpO2 98%  There is no height or weight on file to calculate BMI.  GENERAL: healthy, alert and no distress  EYES: Eyes grossly normal to inspection  HENT:  nose and mouth without ulcers or lesions  RESP: lungs clear to auscultation - no rales, rhonchi or wheezes  CV: regular rate and rhythm, normal S1 S2  MS: no edema  PSYCH: normal mood     Diagnostic Test Results:  Results for orders placed or performed in visit on 05/14/18 (from the past 24 hour(s))   CBC with  platelets and differential   Result Value Ref Range    WBC 6.9 4.0 - 11.0 10e9/L    RBC Count 3.67 (L) 3.8 - 5.2 10e12/L    Hemoglobin 11.9 11.7 - 15.7 g/dL    Hematocrit 36.3 35.0 - 47.0 %    MCV 99 78 - 100 fl    MCH 32.4 26.5 - 33.0 pg    MCHC 32.8 31.5 - 36.5 g/dL    RDW 13.4 10.0 - 15.0 %    Platelet Count 391 150 - 450 10e9/L    Diff Method Automated Method     % Neutrophils 58.8 %    % Lymphocytes 32.8 %    % Monocytes 7.2 %    % Eosinophils 0.9 %    % Basophils 0.3 %    Absolute Neutrophil 4.1 1.6 - 8.3 10e9/L    Absolute Lymphocytes 2.3 0.8 - 5.3 10e9/L    Absolute Monocytes 0.5 0.0 - 1.3 10e9/L    Absolute Eosinophils 0.1 0.0 - 0.7 10e9/L    Absolute Basophils 0.0 0.0 - 0.2 10e9/L   ESR: Erythrocyte sedimentation rate   Result Value Ref Range    Sed Rate 36 (H) 0 - 30 mm/h       ASSESSMENT/PLAN:     1. Other fatigue  - d/d include anemia vs avitaminosis d vs hypothyroid; unlikely due to MILDRED vs worsening MDD as symptoms are stable vs hypotension as B/P's are stable vs new onset diabetes as previous blood sugars are stable vs malignancy vs deconditioning   - pt does skip meals and usually eats one or two meals/day in order to loose weight, I advised her to eat three meals/day and she can decrease the portions and drinking at least 6 cups of water/day  - advised to continue to stay active and she can take a daily multivitamin instead of the three separate vitamins  - if below is normal then I'll touch base with pt in two weeks for a follow up  - CBC with platelets and differential  - Comprehensive metabolic panel (BMP + Alb, Alk Phos, ALT, AST, Total. Bili, TP)  - ESR: Erythrocyte sedimentation rate  - CRP, inflammation  - Vitamin D Deficiency  - TSH with free T4 reflex  - JUST IN CASE    2. Major depressive disorder, recurrent episode, moderate (H)  PHQ-9 SCORE 5/2/2017 9/19/2017 5/14/2018   Total Score - - -   Total Score 4 6 2       3. Microalbuminuria  - Albumin Random Urine Quantitative with Creat  Ratio    4. Hyperlipidemia LDL goal <100  - atorvastatin (LIPITOR) 80 MG tablet; Take 0.5 tablets (40 mg) by mouth daily For high cholesterol.  Dispense: 45 tablet; Refill: 3    5. HTN, goal below 150/90  - hydrochlorothiazide (HYDRODIURIL) 50 MG tablet; Take 1 tablet (50 mg) by mouth daily  Dispense: 90 tablet; Refill: 3  - lisinopril (PRINIVIL/ZESTRIL) 40 MG tablet; Take 1 tablet (40 mg) by mouth daily  Dispense: 90 tablet; Refill: 3    6. Moderate persistent asthma without complication  - stable.  ACT Total Scores 3/9/2017 5/2/2017 1/2/2018   ACT TOTAL SCORE - - -   ASTHMA ER VISITS - - -   ASTHMA HOSPITALIZATIONS - - -   ACT TOTAL SCORE (Goal Greater than or Equal to 20) 23 23 25   In the past 12 months, how many times did you visit the emergency room for your asthma without being admitted to the hospital? 0 0 0   In the past 12 months, how many times were you hospitalized overnight because of your asthma? 0 0 0           Destin Wolf MD  [facility]

## 2018-05-14 NOTE — LETTER
My Depression Action Plan  Name: Staci Watt   Date of Birth 1935  Date: 5/14/2018    My doctor: Heidi Velasquez   My clinic: [address] [address] [phone]          GREEN    ZONE   Good Control    What it looks like:     Things are going generally well. You have normal up s and down s. You may even feel depressed from time to time, but bad moods usually last less than a day.   What you need to do:  1. Continue to care for yourself (see self care plan)  2. Check your depression survival kit and update it as needed  3. Follow your physician s recommendations including any medication.  4. Do not stop taking medication unless you consult with your physician first.           YELLOW         ZONE Getting Worse    What it looks like:     Depression is starting to interfere with your life.     It may be hard to get out of bed; you may be starting to isolate yourself from others.    Symptoms of depression are starting to last most all day and this has happened for several days.     You may have suicidal thoughts but they are not constant.   What you need to do:     1. Call your care team, your response to treatment will improve if you keep your care team informed of your progress. Yellow periods are signs an adjustment may need to be made.     2. Continue your self-care, even if you have to fake it!    3. Talk to someone in your support network    4. Open up your depression survival kit           RED    ZONE Medical Alert - Get Help    What it looks like:     Depression is seriously interfering with your life.     You may experience these or other symptoms: You can t get out of bed most days, can t work or engage in other necessary activities, you have trouble taking care of basic hygiene, or basic responsibilities, thoughts of suicide or death that will not go away, self-injurious behavior.     What you need to do:  1. Call your care  team and request a same-day appointment. If they are not available (weekends or after hours) call your local crisis line, emergency room or 911.            Depression Self Care Plan / Survival Kit    Self-Care for Depression  Here s the deal. Your body and mind are really not as separate as most people think.  What you do and think affects how you feel and how you feel influences what you do and think. This means if you do things that people who feel good do, it will help you feel better.  Sometimes this is all it takes.  There is also a place for medication and therapy depending on how severe your depression is, so be sure to consult with your medical provider and/ or Behavioral Health Consultant if your symptoms are worsening or not improving.     In order to better manage my stress, I will:    Exercise  Get some form of exercise, every day. This will help reduce pain and release endorphins, the  feel good  chemicals in your brain. This is almost as good as taking antidepressants!  This is not the same as joining a gym and then never going! (they count on that by the way ) It can be as simple as just going for a walk or doing some gardening, anything that will get you moving.      Hygiene   Maintain good hygiene (Get out of bed in the morning, Make your bed, Brush your teeth, Take a shower, and Get dressed like you were going to work, even if you are unemployed).  If your clothes don't fit try to get ones that do.    Diet  I will strive to eat foods that are good for me, drink plenty of water, and avoid excessive sugar, caffeine, alcohol, and other mood-altering substances.  Some foods that are helpful in depression are: complex carbohydrates, B vitamins, flaxseed, fish or fish oil, fresh fruits and vegetables.    Psychotherapy  I agree to participate in Individual Therapy (if recommended).    Medication  If prescribed medications, I agree to take them.  Missing doses can result in serious side effects.  I  understand that drinking alcohol, or other illicit drug use, may cause potential side effects.  I will not stop my medication abruptly without first discussing it with my provider.    Staying Connected With Others  I will stay in touch with my friends, family members, and my primary care provider/team.    Use your imagination  Be creative.  We all have a creative side; it doesn t matter if it s oil painting, sand castles, or mud pies! This will also kick up the endorphins.    Witness Beauty  (AKA stop and smell the roses) Take a look outside, even in mid-winter. Notice colors, textures. Watch the squirrels and birds.     Service to others  Be of service to others.  There is always someone else in need.  By helping others we can  get out of ourselves  and remember the really important things.  This also provides opportunities for practicing all the other parts of the program.    Humor  Laugh and be silly!  Adjust your TV habits for less news and crime-drama and more comedy.    Control your stress  Try breathing deep, massage therapy, biofeedback, and meditation. Find time to relax each day.     My support system    Clinic Contact:  Phone number:    Contact 1:  Phone number:    Contact 2:  Phone number:    [employer]/:  Phone number:    Therapist:  Phone number:    Local crisis center:    Phone number:    Other community support:  Phone number:

## 2018-05-14 NOTE — NURSING NOTE
/66 (BP Location: Left arm, Patient Position: Sitting, Cuff Size: Adult Regular)  Pulse 107  Temp 98  F (36.7  C) (Oral)  Resp 16  SpO2 98%      Kayden Sol MA

## 2018-05-14 NOTE — MR AVS SNAPSHOT
"              After Visit Summary   5/14/2018    Staci Watt    MRN: [id number]           Patient Information     Date Of Birth          1935        Visit Information        Provider Department      5/14/2018 4:00 PM Destin Wolf MD [facility]        Today's Diagnoses     Other fatigue    -  1    Major depressive disorder, recurrent episode, moderate (H)        Microalbuminuria        Hyperlipidemia LDL goal <100        HTN, goal below 150/90        Moderate persistent asthma without complication           Follow-ups after your visit        Who to contact     If you have questions or need follow up information about today's clinic visit or your schedule please contact [facility] directly at [phone].  Normal or non-critical lab and imaging results will be communicated to you by MyChart, letter or phone within 4 business days after the clinic has received the results. If you do not hear from us within 7 days, please contact the clinic through MyChart or phone. If you have a critical or abnormal lab result, we will notify you by phone as soon as possible.  Submit refill requests through [employer] or call your pharmacy and they will forward the refill request to us. Please allow 3 business days for your refill to be completed.          Additional Information About Your Visit        MyChart Information     [employer] lets you send messages to your doctor, view your test results, renew your prescriptions, schedule appointments and more. To sign up, go to [email or web address] . Click on \"Log in\" on the left side of the screen, which will take you to the Welcome page. Then click on \"Sign up Now\" on the right side of the page.     You will be asked to enter the access code listed below, as well as some personal information. Please follow the directions to create your username and password.     Your access code is: [email or web address]  Expires: 7/26/2018  6:48 PM     Your access " code will  in 90 days. If you need help or a new code, please call your [place] clinic or [phone].        Care EveryWhere ID     This is your Care EveryWhere ID. This could be used by other organizations to access your [place] medical records  [id number]        Your Vitals Were     Pulse Temperature Respirations Pulse Oximetry          107 98  F (36.7  C) (Oral) 16 98%         Blood Pressure from Last 3 Encounters:   18 132/66   18 120/58   18 149/61    Weight from Last 3 Encounters:   16 178 lb 5 oz (80.9 kg)   16 177 lb (80.3 kg)   16 179 lb (81.2 kg)              We Performed the Following     Albumin Random Urine Quantitative with Creat Ratio     Asthma Action Plan (AAP)     CBC with platelets and differential     Comprehensive metabolic panel (BMP + Alb, Alk Phos, ALT, AST, Total. Bili, TP)     CRP, inflammation     DEPRESSION ACTION PLAN (DAP)     ESR: Erythrocyte sedimentation rate     JUST IN CASE     TSH with free T4 reflex     Vitamin D Deficiency          Where to get your medicines      These medications were sent to [facility] [place], [place] - [address]  [address], [facility] [address]     Phone:  [phone]     atorvastatin 80 MG tablet    hydrochlorothiazide 50 MG tablet    lisinopril 40 MG tablet          Primary Care Provider Office Phone # Fax #    Heidi Velasquez [phone]       [address]  [facility] [address]        Equal Access to Services     MIKE SIFUENTES : Hadii aad ku hadasho Soomaali, waaxda luqadaha, qaybta kaalmada adeegyada, bob schneider. So [facility] [phone].    ATENCIÓN: Si habla español, tiene a corrales disposición servicios gratuitos de asistencia lingüística. Llame al [phone].    We comply with applicable federal civil rights laws and [place] laws. We do not discriminate on the basis of race, color, national origin, age, disability, sex,  sexual orientation, or gender identity.            Thank you!     Thank you for choosing [facility]  for your care. Our goal is always to provide you with excellent care. Hearing back from our patients is one way we can continue to improve our services. Please take a few minutes to complete the written survey that you may receive in the mail after your visit with us. Thank you!             Your Updated Medication List - Protect others around you: Learn how to safely use, store and throw away your medicines at www.disposemymeds.org.          This list is accurate as of 5/14/18  5:29 PM.  Always use your most recent med list.                   Brand Name Dispense Instructions for use Diagnosis    acetaminophen 500 MG tablet    TYLENOL     Take 500-1,000 mg by mouth every 6 hours as needed Reported on 5/2/2017    OA (osteoarthritis) of knee       albuterol 108 (90 Base) MCG/ACT Inhaler    PROAIR HFA    3 Inhaler    Inhale 2 puffs into the lungs every 4 hours as needed for shortness of breath / dyspnea or wheezing    Moderate persistent asthma without complication       ASPIRIN CHILDRENS 81 MG chewable tablet   Generic drug:  aspirin     0    1 TABLET DAILY    Type II or unspecified type diabetes mellitus without mention of complication, not stated as uncontrolled       atorvastatin 80 MG tablet    LIPITOR    45 tablet    Take 0.5 tablets (40 mg) by mouth daily For high cholesterol.    Hyperlipidemia LDL goal <100       B-12 1000 MCG Tbcr     100 tablet    Take 1,000 mcg by mouth daily    Iron deficiency anemia, unspecified iron deficiency anemia type       ferrous gluconate 324 (38 Fe) MG tablet    FERGON    100 tablet    Take 1 tablet (324 mg) by mouth daily (with breakfast)    Iron deficiency anemia, unspecified iron deficiency anemia type       FLUoxetine 20 MG capsule    PROzac    90 capsule    Take 1 capsule (20 mg) by mouth daily Please profile    Major depressive disorder, recurrent episode,  moderate (H)       fluticasone-salmeterol 500-50 MCG/DOSE diskus inhaler    ADVAIR DISKUS    3 Inhaler    Inhale 1 puff into the lungs 2 times daily    Moderate persistent asthma without complication       hydrochlorothiazide 50 MG tablet    HYDRODIURIL    90 tablet    Take 1 tablet (50 mg) by mouth daily    HTN, goal below 150/90       indomethacin 25 MG capsule    INDOCIN    42 capsule    Take 1-2 capsules (25-50 mg) by mouth 3 times daily (with meals)    Acute gout of left foot, unspecified cause       lisinopril 40 MG tablet    PRINIVIL/ZESTRIL    90 tablet    Take 1 tablet (40 mg) by mouth daily    HTN, goal below 150/90       senna-docusate 8.6-50 MG per tablet    DANIEL-COLACE    100 tablet    Take 2 tablets by mouth daily    Slow transit constipation       traMADol 50 MG tablet    ULTRAM    28 tablet    Take 0.5-1 tablets (25-50 mg) by mouth 2 times daily as needed for pain maximum 2 tablet(s) per day    Osteoarthritis, unspecified osteoarthritis type, unspecified site       vitamin D 2000 units tablet     100 tablet    Take 2,000 Units by mouth daily    Vitamin D deficiency

## 2018-05-14 NOTE — LETTER
My Asthma Action Plan  Name: Staci Watt   YOB: 1935  Date: 5/14/2018   My doctor: Destin Wolf MD   My clinic: [facility]        My Control Medicine: fluticasone-salmeterol (ADVAIR DISKUS) 500-50 MCG/DOSE diskus inhaler  My Rescue Medicine: albuterol (PROAIR HFA) 108 (90 BASE) MCG/ACT Inhaler   My Asthma Severity: moderate persistent              GREEN ZONE   Good Control    I feel good    No cough or wheeze    Can work, sleep and play without asthma symptoms       Take your asthma control medicine every day.     1. If exercise triggers your asthma, take your rescue medication    15 minutes before exercise or sports, and    During exercise if you have asthma symptoms  2. Spacer to use with inhaler: If you have a spacer, make sure to use it with your inhaler             YELLOW ZONE Getting Worse  I have ANY of these:    I do not feel good    Cough or wheeze    Chest feels tight    Wake up at night   1. Keep taking your Green Zone medications  2. Start taking your rescue medicine:    every 20 minutes for up to 1 hour. Then every 4 hours for 24-48 hours.  3. If you stay in the Yellow Zone for more than 12-24 hours, contact your doctor.  4. If you do not return to the Green Zone in 12-24 hours or you get worse, start taking your oral steroid medicine if prescribed by your provider.           RED ZONE Medical Alert - Get Help  I have ANY of these:    I feel awful    Medicine is not helping    Breathing getting harder    Trouble walking or talking    Nose opens wide to breathe       1. Take your rescue medicine NOW  2. If your provider has prescribed an oral steroid medicine, start taking it NOW  3. Call your doctor NOW  4. If you are still in the Red Zone after 20 minutes and you have not reached your doctor:    Take your rescue medicine again and    Call 911 or go to the emergency room right away    See your regular doctor within 2 weeks of an Emergency Room or Urgent Care  visit for follow-up treatment.          Annual Reminders:  Meet with Asthma Educator,  Flu Shot in the Fall, consider Pneumonia Vaccination for patients with asthma (aged 19 and older).    Pharmacy:    [facility] [place], [place] - [address]  [facility] [place], [place] - [address]  [facility] [address] [place], [place] - [address] AT [facility] [address]  [place], [place]                      Asthma Triggers  How To Control Things That Make Your Asthma Worse    Triggers are things that make your asthma worse.  Look at the list below to help you find your triggers and what you can do about them.  You can help prevent asthma flare-ups by staying away from your triggers.      Trigger                                                          What you can do   Cigarette Smoke  Tobacco smoke can make asthma worse. Do not allow smoking in your home, car or around you.  Be sure no one smokes at a child s day care or school.  If you smoke, ask your health care provider for ways to help you quit.  Ask family members to quit too.  Ask your health care provider for a referral to Quit Plan to help you quit smoking, or call 5-663-175-PLAN.     Colds, Flu, Bronchitis  These are common triggers of asthma. Wash your hands often.  Don t touch your eyes, nose or mouth.  Get a flu shot every year.     Dust Mites  These are tiny bugs that live in cloth or carpet. They are too small to see. Wash sheets and blankets in hot water every week.   Encase pillows and mattress in dust mite proof covers.  Avoid having carpet if you can. If you have carpet, vacuum weekly.   Use a dust mask and HEPA vacuum.   Pollen and Outdoor Mold  Some people are allergic to trees, grass, or weed pollen, or molds. Try to keep your windows closed.  Limit time out doors when pollen count is high.   Ask you health care provider about taking medicine during allergy  season.     Animal Dander  Some people are allergic to skin flakes, urine or saliva from pets with fur or feathers. Keep pets with fur or feathers out of your home.    If you can t keep the pet outdoors, then keep the pet out of your bedroom.  Keep the bedroom door closed.  Keep pets off cloth furniture and away from stuffed toys.     Mice, Rats, and Cockroaches  Some people are allergic to the waste from these pests.   Cover food and garbage.  Clean up spills and food crumbs.  Store grease in the refrigerator.   Keep food out of the bedroom.   Indoor Mold  This can be a trigger if your home has high moisture. Fix leaking faucets, pipes, or other sources of water.   Clean moldy surfaces.  Dehumidify basement if it is damp and smelly.   Smoke, Strong Odors, and Sprays  These can reduce air quality. Stay away from strong odors and sprays, such as perfume, powder, hair spray, paints, smoke incense, paint, cleaning products, candles and new carpet.   Exercise or Sports  Some people with asthma have this trigger. Be active!  Ask your doctor about taking medicine before sports or exercise to prevent symptoms.    Warm up for 5-10 minutes before and after sports or exercise.     Other Triggers of Asthma  Cold air:  Cover your nose and mouth with a scarf.  Sometimes laughing or crying can be a trigger.  Some medicines and food can trigger asthma.

## 2018-05-15 LAB
ALBUMIN SERPL-MCNC: 3.9 G/DL (ref 3.4–5)
ALP SERPL-CCNC: 67 U/L (ref 40–150)
ALT SERPL W P-5'-P-CCNC: 21 U/L (ref 0–50)
ANION GAP SERPL CALCULATED.3IONS-SCNC: 6 MMOL/L (ref 3–14)
AST SERPL W P-5'-P-CCNC: 16 U/L (ref 0–45)
BILIRUB SERPL-MCNC: 0.4 MG/DL (ref 0.2–1.3)
BUN SERPL-MCNC: 26 MG/DL (ref 7–30)
CALCIUM SERPL-MCNC: 10 MG/DL (ref 8.5–10.1)
CHLORIDE SERPL-SCNC: 110 MMOL/L (ref 94–109)
CO2 SERPL-SCNC: 23 MMOL/L (ref 20–32)
CREAT SERPL-MCNC: 1.12 MG/DL (ref 0.52–1.04)
CREAT UR-MCNC: 173 MG/DL
GFR SERPL CREATININE-BSD FRML MDRD: 47 ML/MIN/1.7M2
GLUCOSE SERPL-MCNC: 90 MG/DL (ref 70–99)
MICROALBUMIN UR-MCNC: 10 MG/L
MICROALBUMIN/CREAT UR: 5.72 MG/G CR (ref 0–25)
POTASSIUM SERPL-SCNC: 3.5 MMOL/L (ref 3.4–5.3)
PROT SERPL-MCNC: 8.3 G/DL (ref 6.8–8.8)
SODIUM SERPL-SCNC: 139 MMOL/L (ref 133–144)
TSH SERPL DL<=0.005 MIU/L-ACNC: 1.19 MU/L (ref 0.4–4)

## 2018-05-15 ASSESSMENT — ANXIETY QUESTIONNAIRES: GAD7 TOTAL SCORE: 6

## 2018-05-15 ASSESSMENT — PATIENT HEALTH QUESTIONNAIRE - PHQ9: SUM OF ALL RESPONSES TO PHQ QUESTIONS 1-9: 2

## 2018-05-16 LAB — DEPRECATED CALCIDIOL+CALCIFEROL SERPL-MC: 44 UG/L (ref 20–75)

## 2018-05-22 ENCOUNTER — TELEPHONE (OUTPATIENT)
Dept: FAMILY MEDICINE | Facility: CLINIC | Age: 83
End: 2018-05-22

## 2018-05-22 NOTE — TELEPHONE ENCOUNTER
RN, can you please inform pt of labs. Her inflammatory markers were mildly elevated and kidney functions mildly reduced. Otherwise her thyroid, vitamin D and hemoglobin were normal. I would recommend to continue to stay hydrated to help with decreased kidney functions. Also eating her three meals/day. The inflammatory markers are non-specific as I haven't found a cause of her fatigue. For now I would continue to monitor and I'll touch base with pt next week.   Thanks!  DM

## 2018-05-22 NOTE — TELEPHONE ENCOUNTER
"Patient stopped Ferrous Gluconate, Vitamin D and vitamin B12 after seeing you  States she feels better already    \"I don't now when I've ever felt better\"    Was told to take a multivitamin but is wondering if you have a recommendation for which one.  Oralia Persaud RN    "

## 2018-06-01 ENCOUNTER — OFFICE VISIT (OUTPATIENT)
Dept: FAMILY MEDICINE | Facility: CLINIC | Age: 83
End: 2018-06-01
Payer: COMMERCIAL

## 2018-06-01 VITALS — OXYGEN SATURATION: 98 % | DIASTOLIC BLOOD PRESSURE: 63 MMHG | SYSTOLIC BLOOD PRESSURE: 130 MMHG | HEART RATE: 94 BPM

## 2018-06-01 DIAGNOSIS — M17.0 PRIMARY OSTEOARTHRITIS OF BOTH KNEES: Primary | ICD-10-CM

## 2018-06-01 PROCEDURE — 99213 OFFICE O/P EST LOW 20 MIN: CPT | Performed by: FAMILY MEDICINE

## 2018-06-01 RX ORDER — TRAMADOL HYDROCHLORIDE 50 MG/1
25-50 TABLET ORAL
Qty: 10 TABLET | Refills: 0 | Status: SHIPPED | OUTPATIENT
Start: 2018-06-01 | End: 2018-12-28

## 2018-06-01 RX ORDER — MULTIPLE VITAMINS W/ MINERALS TAB 9MG-400MCG
1 TAB ORAL DAILY
Qty: 100 TABLET | Refills: 3 | Status: ON HOLD | COMMUNITY
Start: 2018-06-01 | End: 2019-01-04

## 2018-06-01 NOTE — PATIENT INSTRUCTIONS
Tylenol 1, 000 mg every 8 hours, maximum daily dose is 3, 000 mg   Tramadol 1/2 to 1 tablet once daily at night

## 2018-06-01 NOTE — MR AVS SNAPSHOT
After Visit Summary   6/1/2018    Staci Watt    MRN: [id number]           Patient Information     Date Of Birth          1935        Visit Information        Provider Department      6/1/2018 3:20 PM Destin Wolf MD [facility]        Today's Diagnoses     Primary osteoarthritis of both knees    -  1      Care Instructions    Tylenol 1, 000 mg every 8 hours, maximum daily dose is 3, 000 mg   Tramadol 1/2 to 1 tablet once daily at night             Follow-ups after your visit        Additional Services     ORTHO  REFERRAL       [facility] is referring you to the Orthopedic  Services at [facility].       The  Representative will assist you in the coordination of your Orthopedic and Musculoskeletal Care as prescribed by your physician.    The  Representative will call you within 1 business day to help schedule your appointment, or you may contact the  Representative at:    All areas ~ [phone]     Type of Referral : Surgical / Specialist       Timeframe requested: Routine    Coverage of these services is subject to the terms and limitations of your health insurance plan.  Please call member services at your health plan with any benefit or coverage questions.      If X-rays, CT or MRI's have been performed, please contact the facility where they were done to arrange for , prior to your scheduled appointment.  Please bring this referral request to your appointment and present it to your specialist.                  Who to contact     If you have questions or need follow up information about today's clinic visit or your schedule please contact [facility] directly at [phone].  Normal or non-critical lab and imaging results will be communicated to you by MyChart, letter or phone within 4 business days after the clinic has received the results. If you do not hear  "from us within 7 days, please contact the clinic through [employer] or phone. If you have a critical or abnormal lab result, we will notify you by phone as soon as possible.  Submit refill requests through [employer] or call your pharmacy and they will forward the refill request to us. Please allow 3 business days for your refill to be completed.          Additional Information About Your Visit        [employer] Information     [employer] lets you send messages to your doctor, view your test results, renew your prescriptions, schedule appointments and more. To sign up, go to [email or web address] . Click on \"Log in\" on the left side of the screen, which will take you to the Welcome page. Then click on \"Sign up Now\" on the right side of the page.     You will be asked to enter the access code listed below, as well as some personal information. Please follow the directions to create your username and password.     Your access code is: [email or web address]  Expires: 2018  6:48 PM     Your access code will  in 90 days. If you need help or a new code, please call your [place] clinic or [phone].        Care EveryWhere ID     This is your Care EveryWhere ID. This could be used by other organizations to access your [place] medical records  [id number]         Blood Pressure from Last 3 Encounters:   18 132/66   18 120/58   18 149/61    Weight from Last 3 Encounters:   16 178 lb 5 oz (80.9 kg)   16 177 lb (80.3 kg)   16 179 lb (81.2 kg)              We Performed the Following     ORTHO  REFERRAL          Today's Medication Changes          These changes are accurate as of 18  3:51 PM.  If you have any questions, ask your nurse or doctor.               These medicines have changed or have updated prescriptions.        Dose/Directions    traMADol 50 MG tablet   Commonly known as:  ULTRAM   This may have changed:    - when to take this  - additional instructions   Used for:  " Primary osteoarthritis of both knees        Dose:  25-50 mg   Take 0.5-1 tablets (25-50 mg) by mouth nightly as needed for pain maximum 1 tablet(s) per day   Quantity:  10 tablet   Refills:  0            Where to get your medicines      Some of these will need a paper prescription and others can be bought over the counter.  Ask your nurse if you have questions.     Bring a paper prescription for each of these medications     traMADol 50 MG tablet               Information about OPIOIDS     PRESCRIPTION OPIOIDS: WHAT YOU NEED TO KNOW   You have a prescription for an opioid (narcotic) pain medicine. Opioids can cause addiction. If you have a history of chemical dependency of any type, you are at a higher risk of becoming addicted to opioids. Only take this medicine after all other options have been tried. Take it for as short a time and as few doses as possible.     Do not:    Drive. If you drive while taking these medicines, you could be arrested for driving under the influence (DUI).    Operate heavy machinery    Do any other dangerous activities while taking these medicines.     Drink any alcohol while taking these medicines.      Take with any other medicines that contain acetaminophen. Read all labels carefully. Look for the word  acetaminophen  or  Tylenol.  Ask your pharmacist if you have questions or are unsure.    Store your pills in a secure place, locked if possible. We will not replace any lost or stolen medicine. If you don t finish your medicine, please throw away (dispose) as directed by your pharmacist. The [place] Pollution Control Agency has more information about safe disposal: https://www.pca.[facility].[place].us/living-green/managing-unwanted-medications    All opioids tend to cause constipation. Drink plenty of water and eat foods that have a lot of fiber, such as fruits, vegetables, prune juice, apple juice and high-fiber cereal. Take a laxative (Miralax, milk of magnesia, Colace, Senna) if you don t  move your bowels at least every other day.          Primary Care Provider Office Phone # Fax #    Heidi Velasquez [phone] [address] [facility] [address]        Equal Access to Services     MIKE SIFUENTES : Hadii jordy ku hadpollyo Soomaali, waaxda luqadaha, qaybta kaalmada adeegyada, bob fordn basilio driscoll laIlanaashwini schneider. So [facility] [phone].    ATENCIÓN: Si habla español, tiene a corrales disposición servicios gratuitos de asistencia lingüística. LlSelect [facility], [place] [phone].    We comply with applicable federal civil rights laws and [place] laws. We do not discriminate on the basis of race, color, national origin, age, disability, sex, sexual orientation, or gender identity.            Thank you!     Thank you for choosing [facility]  for your care. Our goal is always to provide you with excellent care. Hearing back from our patients is one way we can continue to improve our services. Please take a few minutes to complete the written survey that you may receive in the mail after your visit with us. Thank you!             Your Updated Medication List - Protect others around you: Learn how to safely use, store and throw away your medicines at www.disposemymeds.org.          This list is accurate as of 6/1/18  3:51 PM.  Always use your most recent med list.                   Brand Name Dispense Instructions for use Diagnosis    acetaminophen 500 MG tablet    TYLENOL     Take 500-1,000 mg by mouth every 6 hours as needed Reported on 5/2/2017    OA (osteoarthritis) of knee       albuterol 108 (90 Base) MCG/ACT Inhaler    PROAIR HFA    3 Inhaler    Inhale 2 puffs into the lungs every 4 hours as needed for shortness of breath / dyspnea or wheezing    Moderate persistent asthma without complication       ASPIRIN CHILDRENS 81 MG chewable tablet   Generic drug:  aspirin     0    1 TABLET DAILY    Type II or unspecified type diabetes mellitus without mention of complication, not  stated as uncontrolled       atorvastatin 80 MG tablet    LIPITOR    45 tablet    Take 0.5 tablets (40 mg) by mouth daily For high cholesterol.    Hyperlipidemia LDL goal <100       B-12 1000 MCG Tbcr     100 tablet    Take 1,000 mcg by mouth daily    Iron deficiency anemia, unspecified iron deficiency anemia type       ferrous gluconate 324 (38 Fe) MG tablet    FERGON    100 tablet    Take 1 tablet (324 mg) by mouth daily (with breakfast)    Iron deficiency anemia, unspecified iron deficiency anemia type       FLUoxetine 20 MG capsule    PROzac    90 capsule    Take 1 capsule (20 mg) by mouth daily Please profile    Major depressive disorder, recurrent episode, moderate (H)       fluticasone-salmeterol 500-50 MCG/DOSE diskus inhaler    ADVAIR DISKUS    3 Inhaler    Inhale 1 puff into the lungs 2 times daily    Moderate persistent asthma without complication       hydrochlorothiazide 50 MG tablet    HYDRODIURIL    90 tablet    Take 1 tablet (50 mg) by mouth daily    HTN, goal below 150/90       indomethacin 25 MG capsule    INDOCIN    42 capsule    Take 1-2 capsules (25-50 mg) by mouth 3 times daily (with meals)    Acute gout of left foot, unspecified cause       lisinopril 40 MG tablet    PRINIVIL/ZESTRIL    90 tablet    Take 1 tablet (40 mg) by mouth daily    HTN, goal below 150/90       senna-docusate 8.6-50 MG per tablet    DANIEL-COLACE    100 tablet    Take 2 tablets by mouth daily    Slow transit constipation       traMADol 50 MG tablet    ULTRAM    10 tablet    Take 0.5-1 tablets (25-50 mg) by mouth nightly as needed for pain maximum 1 tablet(s) per day    Primary osteoarthritis of both knees       vitamin D 2000 units tablet     100 tablet    Take 2,000 Units by mouth daily    Vitamin D deficiency

## 2018-06-18 DIAGNOSIS — F33.1 MAJOR DEPRESSIVE DISORDER, RECURRENT EPISODE, MODERATE (H): ICD-10-CM

## 2018-06-18 NOTE — TELEPHONE ENCOUNTER
"Requested Prescriptions   Pending Prescriptions Disp Refills     FLUoxetine (PROZAC) 20 MG capsule [Pharmacy Med Name: FLUOXETINE HCL 20MG CAPS]  Last Written Prescription Date:  5/2/2017  Last Fill Quantity: 90 CAPSULE,  # refills: 3   Last Office Visit: 6/1/2018   Future Office Visit:      90 capsule 3     Sig: TAKE ONE CAPSULE BY MOUTH EVERY DAY    SSRIs Protocol Passed    6/18/2018 11:24 AM       Passed - PHQ-9 score less than 5 in past 6 months    Please review last PHQ-9 score.   PHQ-9 SCORE 5/2/2017 9/19/2017 5/14/2018   Total Score - - -   Total Score 4 6 2     AMARJIT-7 SCORE 1/7/2016 8/2/2016 5/14/2018   Total Score 3 3 6          Passed - Patient is age 18 or older       Passed - No active pregnancy on record       Passed - No positive pregnancy test in last 12 months       Passed - Recent (6 mo) or future (30 days) visit within the authorizing provider's specialty    Patient had office visit in the last 6 months or has a visit in the next 30 days with authorizing provider or within the authorizing provider's specialty.  See \"Patient Info\" tab in inbasket, or \"Choose Columns\" in Meds & Orders section of the refill encounter.              "

## 2018-06-21 NOTE — TELEPHONE ENCOUNTER
Pt of meds now     Please sign if ok. Alert comes up as she is on indomethacin     Chelsey Higginbotham RN, BSN

## 2018-07-09 ENCOUNTER — OFFICE VISIT (OUTPATIENT)
Dept: FAMILY MEDICINE | Facility: CLINIC | Age: 83
End: 2018-07-09
Payer: COMMERCIAL

## 2018-07-09 VITALS
DIASTOLIC BLOOD PRESSURE: 66 MMHG | RESPIRATION RATE: 20 BRPM | HEART RATE: 102 BPM | OXYGEN SATURATION: 97 % | TEMPERATURE: 97.6 F | SYSTOLIC BLOOD PRESSURE: 136 MMHG

## 2018-07-09 DIAGNOSIS — M17.0 PRIMARY OSTEOARTHRITIS OF BOTH KNEES: Primary | ICD-10-CM

## 2018-07-09 DIAGNOSIS — J45.40 MODERATE PERSISTENT ASTHMA WITHOUT COMPLICATION: ICD-10-CM

## 2018-07-09 PROCEDURE — 99207 ZZC FOR CODING REVIEW: CPT | Performed by: FAMILY MEDICINE

## 2018-07-09 PROCEDURE — 20610 DRAIN/INJ JOINT/BURSA W/O US: CPT | Mod: 50 | Performed by: FAMILY MEDICINE

## 2018-07-09 RX ORDER — LIDOCAINE HYDROCHLORIDE 10 MG/ML
8 INJECTION, SOLUTION INFILTRATION; PERINEURAL ONCE
Qty: 8 ML | Refills: 0 | OUTPATIENT
Start: 2018-07-09 | End: 2018-07-09

## 2018-07-09 RX ORDER — IBUPROFEN 800 MG/1
800 TABLET, FILM COATED ORAL 2 TIMES DAILY PRN
Qty: 60 TABLET | Refills: 5 | Status: SHIPPED | OUTPATIENT
Start: 2018-07-09 | End: 2019-01-03

## 2018-07-09 RX ORDER — TRIAMCINOLONE ACETONIDE 40 MG/ML
40 INJECTION, SUSPENSION INTRA-ARTICULAR; INTRAMUSCULAR ONCE
Qty: 2 ML | Refills: 0 | OUTPATIENT
Start: 2018-07-09 | End: 2018-07-09

## 2018-07-09 NOTE — MR AVS SNAPSHOT
After Visit Summary   7/9/2018    Staci Watt    MRN: [id number]           Patient Information     Date Of Birth          1935        Visit Information        Provider Department      7/9/2018 2:40 PM Destin Wolf MD [facility] [place]        Today's Diagnoses     Post-traumatic osteoarthritis of both knees    -  1    Moderate persistent asthma without complication           Follow-ups after your visit        Your next 10 appointments already scheduled     Aug 01, 2018 11:00 AM CDT   (Arrive by 10:45 AM)   NEW KNEE with Diego Hi MD   [facility] [place])    [address] [place] [address]  4th [facility] [address] [phone]              Who to contact     If you have questions or need follow up information about today's clinic visit or your schedule please contact [facility] directly at [phone].  Normal or non-critical lab and imaging results will be communicated to you by MyChart, letter or phone within 4 business days after the clinic has received the results. If you do not hear from us within 7 days, please contact the clinic through MyChart or phone. If you have a critical or abnormal lab result, we will notify you by phone as soon as possible.  Submit refill requests through [employer] or call your pharmacy and they will forward the refill request to us. Please allow 3 business days for your refill to be completed.          Additional Information About Your Visit        Care EveryWhere ID     This is your Care EveryWhere ID. This could be used by other organizations to access your [place] medical records  [id number]        Your Vitals Were     Pulse Temperature Respirations Pulse Oximetry          102 97.6  F (36.4  C) (Tympanic) 20 97%         Blood Pressure from Last 3 Encounters:   07/09/18 136/66   06/01/18 130/63   05/14/18 132/66    Weight from Last 3 Encounters:   03/18/16 178 lb 5 oz  (80.9 kg)   02/12/16 177 lb (80.3 kg)   01/07/16 179 lb (81.2 kg)              Today, you had the following     No orders found for display         Today's Medication Changes          These changes are accurate as of 7/9/18  3:16 PM.  If you have any questions, ask your nurse or doctor.               Start taking these medicines.        Dose/Directions    ibuprofen 800 MG tablet   Commonly known as:  ADVIL/MOTRIN   Used for:  Post-traumatic osteoarthritis of both knees   Started by:  Destin Wolf MD        Dose:  800 mg   Take 1 tablet (800 mg) by mouth 2 times daily as needed for moderate pain   Quantity:  60 tablet   Refills:  5            Where to get your medicines      These medications were sent to [facility] [address]  [address] [facility] [address]     Phone:  [phone]     fluticasone-salmeterol 500-50 MCG/DOSE diskus inhaler    ibuprofen 800 MG tablet                Primary Care Provider Office Phone # Fax #    Heidi Velasquez [phone]       [address]  [facility] [address]        Equal Access to Services     [facility]: Hadii jordy tano Sovictoriano, waaxda luqadaha, qaybta kaalmada adeaveryyada, bob schneider. So [facility] [phone].    ATENCIÓN: Si habla español, tiene a corrales disposición servicios gratgloriaos de asistencia lingüística. Llame al [phone].    We comply with applicable federal civil rights laws and [place] laws. We do not discriminate on the basis of race, color, national origin, age, disability, sex, sexual orientation, or gender identity.            Thank you!     Thank you for choosing [facility]  for your care. Our goal is always to provide you with excellent care. Hearing back from our patients is one way we can continue to improve our services. Please take a few minutes to complete the written survey that you may receive in the mail after your visit  with us. Thank you!             Your Updated Medication List - Protect others around you: Learn how to safely use, store and throw away your medicines at www.disposemymeds.org.          This list is accurate as of 7/9/18  3:16 PM.  Always use your most recent med list.                   Brand Name Dispense Instructions for use Diagnosis    acetaminophen 500 MG tablet    TYLENOL     Take 500-1,000 mg by mouth every 6 hours as needed Reported on 5/2/2017    OA (osteoarthritis) of knee       albuterol 108 (90 Base) MCG/ACT Inhaler    PROAIR HFA    3 Inhaler    Inhale 2 puffs into the lungs every 4 hours as needed for shortness of breath / dyspnea or wheezing    Moderate persistent asthma without complication       ASPIRIN CHILDRENS 81 MG chewable tablet   Generic drug:  aspirin     0    1 TABLET DAILY    Type II or unspecified type diabetes mellitus without mention of complication, not stated as uncontrolled       atorvastatin 80 MG tablet    LIPITOR    45 tablet    Take 0.5 tablets (40 mg) by mouth daily For high cholesterol.    Hyperlipidemia LDL goal <100       FLUoxetine 20 MG capsule    PROzac    90 capsule    TAKE ONE CAPSULE BY MOUTH EVERY DAY    Major depressive disorder, recurrent episode, moderate (H)       fluticasone-salmeterol 500-50 MCG/DOSE diskus inhaler    ADVAIR DISKUS    3 Inhaler    Inhale 1 puff into the lungs 2 times daily    Moderate persistent asthma without complication       hydrochlorothiazide 50 MG tablet    HYDRODIURIL    90 tablet    Take 1 tablet (50 mg) by mouth daily    HTN, goal below 150/90       ibuprofen 800 MG tablet    ADVIL/MOTRIN    60 tablet    Take 1 tablet (800 mg) by mouth 2 times daily as needed for moderate pain    Post-traumatic osteoarthritis of both knees       indomethacin 25 MG capsule    INDOCIN    42 capsule    Take 1-2 capsules (25-50 mg) by mouth 3 times daily (with meals)    Acute gout of left foot, unspecified cause       lisinopril 40 MG tablet     PRINIVIL/ZESTRIL    90 tablet    Take 1 tablet (40 mg) by mouth daily    HTN, goal below 150/90       Multi-vitamin Tabs tablet     100 tablet    Take 1 tablet by mouth daily        senna-docusate 8.6-50 MG per tablet    DANIEL-COLACE    100 tablet    Take 2 tablets by mouth daily    Slow transit constipation       traMADol 50 MG tablet    ULTRAM    10 tablet    Take 0.5-1 tablets (25-50 mg) by mouth nightly as needed for pain maximum 1 tablet(s) per day    Primary osteoarthritis of both knees

## 2018-07-09 NOTE — PROGRESS NOTES
SUBJECTIVE:   Staci Watt is a 82 year old female who presents to clinic today for the following health issues:      Pt is here for bilateral knee injection.   Synvisc not previously covered by insurance.    Pain has gotten worse and now using a walker.   Tylenol not helpful. She is taking Ibuprofen 800 mg BID PRN.   She hasn't started tramadol as she is concerned about addicting nature.       RT knee corticosteroid injection  After risks, benefits and complications of steroid injection were discussed with the patient he elected to proceed. Using sterile technique, the area was first prepped with alcohol swab and betadinex 3. A 22 gauge, 1 1/2 inch needle was used to inject 40 mg kenalong(1ml) and 4cc of 1% lidocaine each into the knee joint using an anterolmedial joint line approach on the RT side. Pt. tolerated the procedure well without complications. Post injection instructions given.       Left knee corticosteroid injection  After risks, benefits and complications of steroid injection were discussed with the patient he elected to proceed. Using sterile technique, the area was first prepped with alcohol swab and betadinex 3. A 22 gauge, 1 1/2 inch needle was used to inject 40 mg kenalong(1ml) and 4cc of 1% lidocaine each into the knee joint using an anteromedial joint line approach on the Left side. Pt. tolerated the procedure well without complications. Post injection instructions given.       NDC # 0249-4377-87    Advised to take Ibuprofen 800 mg BID and Tylenol 1000 mg in the afternoon. Pt does have CKD III and Ibuprofen relieves the pain, I advised to check BMP Q3 months.             Dr. Wolf

## 2018-07-10 ASSESSMENT — ASTHMA QUESTIONNAIRES: ACT_TOTALSCORE: 22

## 2018-07-17 DIAGNOSIS — M17.0 PRIMARY OSTEOARTHRITIS OF BOTH KNEES: Primary | ICD-10-CM

## 2018-07-26 ENCOUNTER — PRE VISIT (OUTPATIENT)
Dept: ORTHOPEDICS | Facility: CLINIC | Age: 83
End: 2018-07-26

## 2018-07-27 NOTE — TELEPHONE ENCOUNTER
FUTURE VISIT INFORMATION      FUTURE VISIT INFORMATION:    Date: 8/1    Time: 11:00    Location: [facility]  REFERRAL INFORMATION:    Referring provider:      Referring providers clinic:  [facility]    Reason for visit/diagnosis  Bilat knee oa    RECORDS REQUESTED FROM:       Clinic name Comments Records Status Imaging Status                                         RECORDS STATUS      All records internal

## 2018-07-31 NOTE — PROGRESS NOTES
MARLEN Small is a 83 year old  female  who presents - knee arthritis   Pt continues to have knee pain. Injections are helping but wearing off. No recent falls.   Insurance doesn't cover Synvisc.     Problem list and histories reviewed & adjusted, as indicated.  Additional history: as documented    Problem list, Medication list, Allergies, and Medical/Social/Surgical histories reviewed in [facility] and updated as appropriate.    ROS:  Constitutional, HEENT, cardiovascular, pulmonary, gi and gu systems are negative, except as otherwise noted.             Physical Exam:     Vitals:    06/01/18 1607   BP: 130/63   Pulse: 94   SpO2: 98%     There is no height or weight on file to calculate BMI.  Vitals were reviewed and were normal  GENERAL: healthy, alert, well nourished, well hydrated, no distress  EYES: Eyes grossly normal to inspection  HENT: Nose- normal; Mouth- no ulcers, no lesions    Assessment and Plan       1. Primary osteoarthritis of both knees  - we discussed doing trial of low dose tramadol and seeing if that helps with the symptoms, we discussed s/e; referred to ortho for further evaluation   - traMADol (ULTRAM) 50 MG tablet; Take 0.5-1 tablets (25-50 mg) by mouth nightly as needed for pain maximum 1 tablet(s) per day  Dispense: 10 tablet; Refill: 0  - ORTHO  REFERRAL    Destin Wolf MD

## 2018-08-01 ENCOUNTER — OFFICE VISIT (OUTPATIENT)
Dept: ORTHOPEDICS | Facility: CLINIC | Age: 83
End: 2018-08-01
Payer: COMMERCIAL

## 2018-08-01 ENCOUNTER — RADIANT APPOINTMENT (OUTPATIENT)
Dept: GENERAL RADIOLOGY | Facility: CLINIC | Age: 83
End: 2018-08-01
Attending: ORTHOPAEDIC SURGERY
Payer: COMMERCIAL

## 2018-08-01 VITALS — WEIGHT: 175 LBS | BODY MASS INDEX: 33.04 KG/M2 | HEIGHT: 61 IN

## 2018-08-01 DIAGNOSIS — M17.0 PRIMARY OSTEOARTHRITIS OF BOTH KNEES: ICD-10-CM

## 2018-08-01 DIAGNOSIS — M17.11 PRIMARY OSTEOARTHRITIS OF RIGHT KNEE: Primary | ICD-10-CM

## 2018-08-01 ASSESSMENT — ENCOUNTER SYMPTOMS
DEPRESSION: 1
DECREASED CONCENTRATION: 0
INSOMNIA: 1
PANIC: 0
NERVOUS/ANXIOUS: 1

## 2018-08-01 ASSESSMENT — KOOS JR
HOW SEVERE IS YOUR KNEE STIFFNESS AFTER FIRST WAKING IN MORNING: 1
HOW SEVERE IS YOUR KNEE STIFFNESS AFTER FIRST WAKING IN MORNING: SEVERE

## 2018-08-01 ASSESSMENT — ACTIVITIES OF DAILY LIVING (ADL): FUNCTION,_DAILY_LIVING_SCORE: 8.82

## 2018-08-01 NOTE — NURSING NOTE
"Reason For Visit:   Chief Complaint   Patient presents with     Consult     Bilateral knee osteoarthritis. States the Right one hurts the worst       Primary MD: Heidi Velasquez          Ht 1.549 m (5' 1\")  Wt 79.4 kg (175 lb)  BMI 33.07 kg/m2    Pain Assessment  Patient Currently in Pain: Denies (pain only when walking)    Current Outpatient Prescriptions   Medication     acetaminophen (TYLENOL) 500 MG tablet     albuterol (PROAIR HFA) 108 (90 BASE) MCG/ACT Inhaler     ASPIRIN CHILDRENS 81 MG OR CHEW     atorvastatin (LIPITOR) 80 MG tablet     FLUoxetine (PROZAC) 20 MG capsule     fluticasone-salmeterol (ADVAIR DISKUS) 500-50 MCG/DOSE diskus inhaler     hydrochlorothiazide (HYDRODIURIL) 50 MG tablet     ibuprofen (ADVIL/MOTRIN) 800 MG tablet     indomethacin (INDOCIN) 25 MG capsule     lisinopril (PRINIVIL/ZESTRIL) 40 MG tablet     multivitamin, therapeutic with minerals (MULTI-VITAMIN) TABS tablet     senna-docusate (DANIEL-COLACE) 8.6-50 MG per tablet     traMADol (ULTRAM) 50 MG tablet     No current facility-administered medications for this visit.           Allergies   Allergen Reactions     Contrast Dye      Seasonal Allergies            Lissett Otero LPN  "

## 2018-08-01 NOTE — MR AVS SNAPSHOT
"              After Visit Summary   8/1/2018    Staci Watt    MRN: [id number]           Patient Information     Date Of Birth          1935        Visit Information        Provider Department      8/1/2018 11:00 AM Diego Hi MD [facility]        Today's Diagnoses     Primary osteoarthritis of right knee    -  1       Follow-ups after your visit        Additional Services     PHYSICAL THERAPY REFERRAL (External-Prints)       Physical Therapy Referral                  Who to contact     Please call your clinic at [phone] to:    Ask questions about your health    Make or cancel appointments    Discuss your medicines    Learn about your test results    Speak to your doctor            Additional Information About Your Visit        Care EveryWhere ID     This is your Care EveryWhere ID. This could be used by other organizations to access your [place] medical records  [id number]        Your Vitals Were     Height BMI (Body Mass Index)                1.549 m (5' 1\") 33.07 kg/m2           Blood Pressure from Last 3 Encounters:   07/09/18 136/66   06/01/18 130/63   05/14/18 132/66    Weight from Last 3 Encounters:   08/01/18 79.4 kg (175 lb)   03/18/16 80.9 kg (178 lb 5 oz)   02/12/16 80.3 kg (177 lb)              We Performed the Following     PHYSICAL THERAPY REFERRAL (External-Prints)        Primary Care Provider Office Phone # Fax #    Heidi Velasquez [phone] [address]  [facility] [address]        Equal Access to Services     [facility]: Hadii jordy douglas hadasho Sovictoriano, waaxda luqadaha, qaybta kaalmada adeaveryyada, bob smith . So [facility] [phone].    ATENCIÓN: Si habla español, tiene a corrales disposición servicios gratuitos de asistencia lingüística. Llame al [phone].    We comply with applicable federal civil rights laws and [place] laws. We do not discriminate on the basis of race, color, national " origin, age, disability, sex, sexual orientation, or gender identity.            Thank you!     Thank you for choosing [facility]  for your care. Our goal is always to provide you with excellent care. Hearing back from our patients is one way we can continue to improve our services. Please take a few minutes to complete the written survey that you may receive in the mail after your visit with us. Thank you!             Your Updated Medication List - Protect others around you: Learn how to safely use, store and throw away your medicines at www.disposemymeds.org.          This list is accurate as of 8/1/18 11:28 AM.  Always use your most recent med list.                   Brand Name Dispense Instructions for use Diagnosis    acetaminophen 500 MG tablet    TYLENOL     Take 500-1,000 mg by mouth every 6 hours as needed Reported on 5/2/2017    OA (osteoarthritis) of knee       albuterol 108 (90 Base) MCG/ACT Inhaler    PROAIR HFA    3 Inhaler    Inhale 2 puffs into the lungs every 4 hours as needed for shortness of breath / dyspnea or wheezing    Moderate persistent asthma without complication       ASPIRIN CHILDRENS 81 MG chewable tablet   Generic drug:  aspirin     0    1 TABLET DAILY    Type II or unspecified type diabetes mellitus without mention of complication, not stated as uncontrolled       atorvastatin 80 MG tablet    LIPITOR    45 tablet    Take 0.5 tablets (40 mg) by mouth daily For high cholesterol.    Hyperlipidemia LDL goal <100       FLUoxetine 20 MG capsule    PROzac    90 capsule    TAKE ONE CAPSULE BY MOUTH EVERY DAY    Major depressive disorder, recurrent episode, moderate (H)       fluticasone-salmeterol 500-50 MCG/DOSE diskus inhaler    ADVAIR DISKUS    3 Inhaler    Inhale 1 puff into the lungs 2 times daily    Moderate persistent asthma without complication       hydrochlorothiazide 50 MG tablet    HYDRODIURIL    90 tablet    Take 1 tablet (50 mg) by mouth daily    HTN, goal below  150/90       ibuprofen 800 MG tablet    ADVIL/MOTRIN    60 tablet    Take 1 tablet (800 mg) by mouth 2 times daily as needed for moderate pain    Primary osteoarthritis of both knees       indomethacin 25 MG capsule    INDOCIN    42 capsule    Take 1-2 capsules (25-50 mg) by mouth 3 times daily (with meals)    Acute gout of left foot, unspecified cause       lisinopril 40 MG tablet    PRINIVIL/ZESTRIL    90 tablet    Take 1 tablet (40 mg) by mouth daily    HTN, goal below 150/90       Multi-vitamin Tabs tablet     100 tablet    Take 1 tablet by mouth daily        senna-docusate 8.6-50 MG per tablet    DANIEL-COLACE    100 tablet    Take 2 tablets by mouth daily    Slow transit constipation       traMADol 50 MG tablet    ULTRAM    10 tablet    Take 0.5-1 tablets (25-50 mg) by mouth nightly as needed for pain maximum 1 tablet(s) per day    Primary osteoarthritis of both knees
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
## 2018-08-07 ENCOUNTER — THERAPY VISIT (OUTPATIENT)
Dept: PHYSICAL THERAPY | Facility: CLINIC | Age: 83
End: 2018-08-07
Payer: COMMERCIAL

## 2018-08-07 DIAGNOSIS — M25.562 BILATERAL KNEE PAIN: Primary | ICD-10-CM

## 2018-08-07 DIAGNOSIS — M25.561 BILATERAL KNEE PAIN: Primary | ICD-10-CM

## 2018-08-07 PROCEDURE — 97110 THERAPEUTIC EXERCISES: CPT | Mod: GP | Performed by: PHYSICAL THERAPIST

## 2018-08-07 PROCEDURE — 97161 PT EVAL LOW COMPLEX 20 MIN: CPT | Mod: GP | Performed by: PHYSICAL THERAPIST

## 2018-08-07 ASSESSMENT — ACTIVITIES OF DAILY LIVING (ADL)
STAND: ACTIVITY IS NOT DIFFICULT
RAW_SCORE: 53
GO DOWN STAIRS: ACTIVITY IS NOT DIFFICULT
WEAKNESS: I HAVE THE SYMPTOM BUT IT DOES NOT AFFECT MY ACTIVITY
GIVING WAY, BUCKLING OR SHIFTING OF KNEE: THE SYMPTOM AFFECTS MY ACTIVITY MODERATELY
STIFFNESS: I DO NOT HAVE THE SYMPTOM
SWELLING: THE SYMPTOM AFFECTS MY ACTIVITY SLIGHTLY
AS_A_RESULT_OF_YOUR_KNEE_INJURY,_HOW_WOULD_YOU_RATE_YOUR_CURRENT_LEVEL_OF_DAILY_ACTIVITY?: NEARLY NORMAL
WALK: ACTIVITY IS NOT DIFFICULT
RISE FROM A CHAIR: ACTIVITY IS MINIMALLY DIFFICULT
GO UP STAIRS: ACTIVITY IS NOT DIFFICULT
SQUAT: ACTIVITY IS VERY DIFFICULT
LIMPING: I HAVE THE SYMPTOM BUT IT DOES NOT AFFECT MY ACTIVITY
KNEE_ACTIVITY_OF_DAILY_LIVING_SCORE: 75.71
HOW_WOULD_YOU_RATE_THE_CURRENT_FUNCTION_OF_YOUR_KNEE_DURING_YOUR_USUAL_DAILY_ACTIVITIES_ON_A_SCALE_FROM_0_TO_100_WITH_100_BEING_YOUR_LEVEL_OF_KNEE_FUNCTION_PRIOR_TO_YOUR_INJURY_AND_0_BEING_THE_INABILITY_TO_PERFORM_ANY_OF_YOUR_USUAL_DAILY_ACTIVITIES?: 70
SIT WITH YOUR KNEE BENT: ACTIVITY IS NOT DIFFICULT
KNEE_ACTIVITY_OF_DAILY_LIVING_SUM: 53
PAIN: I HAVE THE SYMPTOM BUT IT DOES NOT AFFECT MY ACTIVITY
KNEEL ON THE FRONT OF YOUR KNEE: ACTIVITY IS VERY DIFFICULT
HOW_WOULD_YOU_RATE_THE_OVERALL_FUNCTION_OF_YOUR_KNEE_DURING_YOUR_USUAL_DAILY_ACTIVITIES?: NEARLY NORMAL

## 2018-08-07 NOTE — MR AVS SNAPSHOT
After Visit Summary   8/7/2018    Staci Watt    MRN: [id number]           Patient Information     Date Of Birth          1935        Visit Information        Provider Department      8/7/2018 5:20 PM Gurwinder Owens, PT [facility]        Today's Diagnoses     Bilateral knee pain    -  1       Follow-ups after your visit        Who to contact     If you have questions or need follow up information about today's clinic visit or your schedule please contact [facility] directly at [phone].  Normal or non-critical lab and imaging results will be communicated to you by MyChart, letter or phone within 4 business days after the clinic has received the results. If you do not hear from us within 7 days, please contact the clinic through MyChart or phone. If you have a critical or abnormal lab result, we will notify you by phone as soon as possible.  Submit refill requests through [employer] or call your pharmacy and they will forward the refill request to us. Please allow 3 business days for your refill to be completed.          Additional Information About Your Visit        Care EveryWhere ID     This is your Care EveryWhere ID. This could be used by other organizations to access your [place] medical records  [id number]         Blood Pressure from Last 3 Encounters:   07/09/18 136/66   06/01/18 130/63   05/14/18 132/66    Weight from Last 3 Encounters:   08/01/18 79.4 kg (175 lb)   03/18/16 80.9 kg (178 lb 5 oz)   02/12/16 80.3 kg (177 lb)              We Performed the Following     HC PT EVAL, LOW COMPLEXITY     NYLA INITIAL EVAL REPORT     THERAPEUTIC EXERCISES        Primary Care Provider Office Phone # Fax #    Heidi Velasquez [phone] [address] [place] [id number]        Equal Access to Services     MIKE SIFUENTES AH: kurtis Guthrie qaybta kaalmada adeegyada,  bob fordxuan haji'aan ah. So [facility] [phone].    ATENCIÓN: Si dante olivarez, tiene a corrales disposición servicios gratuitos de asistencia lingüística. Liam morales [phone].    We comply with applicable federal civil rights laws and [place] laws. We do not discriminate on the basis of race, color, national origin, age, disability, sex, sexual orientation, or gender identity.            Thank you!     Thank you for choosing [facility]  for your care. Our goal is always to provide you with excellent care. Hearing back from our patients is one way we can continue to improve our services. Please take a few minutes to complete the written survey that you may receive in the mail after your visit with us. Thank you!             Your Updated Medication List - Protect others around you: Learn how to safely use, store and throw away your medicines at www.disposemymeds.org.          This list is accurate as of 8/7/18  6:23 PM.  Always use your most recent med list.                   Brand Name Dispense Instructions for use Diagnosis    acetaminophen 500 MG tablet    TYLENOL     Take 500-1,000 mg by mouth every 6 hours as needed Reported on 5/2/2017    OA (osteoarthritis) of knee       albuterol 108 (90 Base) MCG/ACT Inhaler    PROAIR HFA    3 Inhaler    Inhale 2 puffs into the lungs every 4 hours as needed for shortness of breath / dyspnea or wheezing    Moderate persistent asthma without complication       ASPIRIN CHILDRENS 81 MG chewable tablet   Generic drug:  aspirin     0    1 TABLET DAILY    Type II or unspecified type diabetes mellitus without mention of complication, not stated as uncontrolled       atorvastatin 80 MG tablet    LIPITOR    45 tablet    Take 0.5 tablets (40 mg) by mouth daily For high cholesterol.    Hyperlipidemia LDL goal <100       FLUoxetine 20 MG capsule    PROzac    90 capsule    TAKE ONE CAPSULE BY MOUTH EVERY DAY    Major depressive disorder,  recurrent episode, moderate (H)       fluticasone-salmeterol 500-50 MCG/DOSE diskus inhaler    ADVAIR DISKUS    3 Inhaler    Inhale 1 puff into the lungs 2 times daily    Moderate persistent asthma without complication       hydrochlorothiazide 50 MG tablet    HYDRODIURIL    90 tablet    Take 1 tablet (50 mg) by mouth daily    HTN, goal below 150/90       ibuprofen 800 MG tablet    ADVIL/MOTRIN    60 tablet    Take 1 tablet (800 mg) by mouth 2 times daily as needed for moderate pain    Primary osteoarthritis of both knees       indomethacin 25 MG capsule    INDOCIN    42 capsule    Take 1-2 capsules (25-50 mg) by mouth 3 times daily (with meals)    Acute gout of left foot, unspecified cause       lisinopril 40 MG tablet    PRINIVIL/ZESTRIL    90 tablet    Take 1 tablet (40 mg) by mouth daily    HTN, goal below 150/90       Multi-vitamin Tabs tablet     100 tablet    Take 1 tablet by mouth daily        senna-docusate 8.6-50 MG per tablet    DANIEL-COLACE    100 tablet    Take 2 tablets by mouth daily    Slow transit constipation       traMADol 50 MG tablet    ULTRAM    10 tablet    Take 0.5-1 tablets (25-50 mg) by mouth nightly as needed for pain maximum 1 tablet(s) per day    Primary osteoarthritis of both knees

## 2018-08-14 ENCOUNTER — THERAPY VISIT (OUTPATIENT)
Dept: PHYSICAL THERAPY | Facility: CLINIC | Age: 83
End: 2018-08-14
Payer: COMMERCIAL

## 2018-08-14 DIAGNOSIS — M25.562 BILATERAL KNEE PAIN: ICD-10-CM

## 2018-08-14 DIAGNOSIS — M25.561 BILATERAL KNEE PAIN: ICD-10-CM

## 2018-08-14 PROCEDURE — 97110 THERAPEUTIC EXERCISES: CPT | Mod: GP | Performed by: PHYSICAL THERAPIST

## 2018-08-14 PROCEDURE — 97140 MANUAL THERAPY 1/> REGIONS: CPT | Mod: GP | Performed by: PHYSICAL THERAPIST

## 2018-08-14 NOTE — MR AVS SNAPSHOT
After Visit Summary   8/14/2018    Staci Watt    MRN: [id number]           Patient Information     Date Of Birth          1935        Visit Information        Provider Department      8/14/2018 12:40 PM Gurwinder Owens PT [facility]        Today's Diagnoses     Bilateral knee pain           Follow-ups after your visit        Your next 10 appointments already scheduled     Aug 21, 2018  1:20 PM CDT   [facility] Extremity with Gurwinder Owens PT   [facility]    [address] [place] [id number] [phone]              Who to contact     If you have questions or need follow up information about today's clinic visit or your schedule please contact [place] [facility] directly at [phone].  Normal or non-critical lab and imaging results will be communicated to you by MyChart, letter or phone within 4 business days after the clinic has received the results. If you do not hear from us within 7 days, please contact the clinic through MyChart or phone. If you have a critical or abnormal lab result, we will notify you by phone as soon as possible.  Submit refill requests through [employer] or call your pharmacy and they will forward the refill request to us. Please allow 3 business days for your refill to be completed.          Additional Information About Your Visit        Care EveryWhere ID     This is your Care EveryWhere ID. This could be used by other organizations to access your [facility] medical records  [id number]         Blood Pressure from Last 3 Encounters:   07/09/18 136/66   06/01/18 130/63   05/14/18 132/66    Weight from Last 3 Encounters:   08/01/18 79.4 kg (175 lb)   03/18/16 80.9 kg (178 lb 5 oz)   02/12/16 80.3 kg (177 lb)              We Performed the Following     MANUAL THER TECH,1+REGIONS,EA 15 MIN     THERAPEUTIC EXERCISES        Primary Care Provider Office Phone # Fap  #    Heidi Velasquez [phone]       [address]  [facility] [address]        Equal Access to Services     MIKE SIFUENTES : Hadchaz jordy douglas davonte Lopezali, kurtis krisjohn, ramana kamargie ford, bob cortesxuan jennie. So [facility] [phone].    ATENCIÓN: Si habla español, tiene a corrales disposición servicios gratuitos de asistencia lingüística. Llame al [phone].    We comply with applicable federal civil rights laws and [place] laws. We do not discriminate on the basis of race, color, national origin, age, disability, sex, sexual orientation, or gender identity.            Thank you!     Thank you for choosing [facility]  for your care. Our goal is always to provide you with excellent care. Hearing back from our patients is one way we can continue to improve our services. Please take a few minutes to complete the written survey that you may receive in the mail after your visit with us. Thank you!             Your Updated Medication List - Protect others around you: Learn how to safely use, store and throw away your medicines at www.disposemymeds.org.          This list is accurate as of 8/14/18  3:10 PM.  Always use your most recent med list.                   Brand Name Dispense Instructions for use Diagnosis    acetaminophen 500 MG tablet    TYLENOL     Take 500-1,000 mg by mouth every 6 hours as needed Reported on 5/2/2017    OA (osteoarthritis) of knee       albuterol 108 (90 Base) MCG/ACT inhaler    PROAIR HFA    3 Inhaler    Inhale 2 puffs into the lungs every 4 hours as needed for shortness of breath / dyspnea or wheezing    Moderate persistent asthma without complication       ASPIRIN CHILDRENS 81 MG chewable tablet   Generic drug:  aspirin     0    1 TABLET DAILY    Type II or unspecified type diabetes mellitus without mention of complication, not stated as uncontrolled       atorvastatin 80 MG tablet    LIPITOR    45 tablet     Take 0.5 tablets (40 mg) by mouth daily For high cholesterol.    Hyperlipidemia LDL goal <100       FLUoxetine 20 MG capsule    PROzac    90 capsule    TAKE ONE CAPSULE BY MOUTH EVERY DAY    Major depressive disorder, recurrent episode, moderate (H)       fluticasone-salmeterol 500-50 MCG/DOSE diskus inhaler    ADVAIR DISKUS    3 Inhaler    Inhale 1 puff into the lungs 2 times daily    Moderate persistent asthma without complication       hydrochlorothiazide 50 MG tablet    HYDRODIURIL    90 tablet    Take 1 tablet (50 mg) by mouth daily    HTN, goal below 150/90       ibuprofen 800 MG tablet    ADVIL/MOTRIN    60 tablet    Take 1 tablet (800 mg) by mouth 2 times daily as needed for moderate pain    Primary osteoarthritis of both knees       indomethacin 25 MG capsule    INDOCIN    42 capsule    Take 1-2 capsules (25-50 mg) by mouth 3 times daily (with meals)    Acute gout of left foot, unspecified cause       lisinopril 40 MG tablet    PRINIVIL/ZESTRIL    90 tablet    Take 1 tablet (40 mg) by mouth daily    HTN, goal below 150/90       Multi-vitamin Tabs tablet     100 tablet    Take 1 tablet by mouth daily        senna-docusate 8.6-50 MG per tablet    DANIEL-COLACE    100 tablet    Take 2 tablets by mouth daily    Slow transit constipation       traMADol 50 MG tablet    ULTRAM    10 tablet    Take 0.5-1 tablets (25-50 mg) by mouth nightly as needed for pain maximum 1 tablet(s) per day    Primary osteoarthritis of both knees

## 2018-10-02 ENCOUNTER — TELEPHONE (OUTPATIENT)
Dept: FAMILY MEDICINE | Facility: CLINIC | Age: 83
End: 2018-10-02

## 2018-10-02 NOTE — TELEPHONE ENCOUNTER
Called to reschedule patient appointment. Pt to early for injection.   Nel Harris on 10/2/2018 at 11:18 AM

## 2018-10-09 ENCOUNTER — OFFICE VISIT (OUTPATIENT)
Dept: FAMILY MEDICINE | Facility: CLINIC | Age: 83
End: 2018-10-09
Payer: COMMERCIAL

## 2018-10-09 VITALS — HEART RATE: 96 BPM | DIASTOLIC BLOOD PRESSURE: 73 MMHG | OXYGEN SATURATION: 98 % | SYSTOLIC BLOOD PRESSURE: 141 MMHG

## 2018-10-09 DIAGNOSIS — Z23 NEED FOR PROPHYLACTIC VACCINATION AND INOCULATION AGAINST INFLUENZA: ICD-10-CM

## 2018-10-09 DIAGNOSIS — M17.0 PRIMARY OSTEOARTHRITIS OF BOTH KNEES: ICD-10-CM

## 2018-10-09 PROCEDURE — 90662 IIV NO PRSV INCREASED AG IM: CPT | Performed by: FAMILY MEDICINE

## 2018-10-09 PROCEDURE — 99207 ZZC FOR CODING REVIEW: CPT | Performed by: FAMILY MEDICINE

## 2018-10-09 PROCEDURE — G0008 ADMIN INFLUENZA VIRUS VAC: HCPCS | Performed by: FAMILY MEDICINE

## 2018-10-09 PROCEDURE — 20610 DRAIN/INJ JOINT/BURSA W/O US: CPT | Mod: 50 | Performed by: FAMILY MEDICINE

## 2018-10-09 NOTE — PROGRESS NOTES

## 2018-10-09 NOTE — PROGRESS NOTES
SUBJECTIVE:   Staci Watt is a 82 year old female who presents to clinic today for the following health issues:      Pt is here for bilateral knee injection and influenza vaccine.       RT knee corticosteroid injection  After risks, benefits and complications of steroid injection were discussed with the patient he elected to proceed. Using sterile technique, the area was first prepped with alcohol swab and betadinex 3. A 22 gauge, 1 1/2 inch needle was used to inject 40 mg kenalong(1ml) and 4cc of 1% lidocaine each into the knee joint using an anterolmedial joint line approach on the RT side. Pt. tolerated the procedure well without complications. Post injection instructions given.       Left knee corticosteroid injection  After risks, benefits and complications of steroid injection were discussed with the patient he elected to proceed. Using sterile technique, the area was first prepped with alcohol swab and betadinex 3. A 22 gauge, 1 1/2 inch needle was used to inject 40 mg kenalong(1ml) and 4cc of 1% lidocaine each into the knee joint using an anteromedial joint line approach on the Left side. Pt. tolerated the procedure well without complications. Post injection instructions given.       NDC # 8503-5672-01             Dr. Wolf

## 2018-10-09 NOTE — MR AVS SNAPSHOT
After Visit Summary   10/9/2018    Staci Watt    MRN: [id number]           Patient Information     Date Of Birth          1935        Visit Information        Provider Department      10/9/2018 4:00 PM Destin Wolf MD [facility]        Today's Diagnoses     Primary osteoarthritis of both knees        Need for prophylactic vaccination and inoculation against influenza           Follow-ups after your visit        Your next 10 appointments already scheduled     Nov 05, 2018  5:20 PM CST   Office Visit with Claudia Rodriguez MD   [facility] ([facility])    [address] [place] [address] [phone]           Bring a current list of meds and any records pertaining to this visit. For Physicals, please bring immunization records and any forms needing to be filled out. Please arrive 10 minutes early to complete paperwork.              Who to contact     If you have questions or need follow up information about today's clinic visit or your schedule please contact [facility] directly at [phone].  Normal or non-critical lab and imaging results will be communicated to you by MyChart, letter or phone within 4 business days after the clinic has received the results. If you do not hear from us within 7 days, please contact the clinic through MyChart or phone. If you have a critical or abnormal lab result, we will notify you by phone as soon as possible.  Submit refill requests through [employer] or call your pharmacy and they will forward the refill request to us. Please allow 3 business days for your refill to be completed.          Additional Information About Your Visit        Care EveryWhere ID     This is your Care EveryWhere ID. This could be used by other organizations to access your [place] medical records  [id number]        Your Vitals Were     Pulse Pulse Oximetry                96 98%           Blood  Pressure from Last 3 Encounters:   10/09/18 141/73   07/09/18 136/66   06/01/18 130/63    Weight from Last 3 Encounters:   08/01/18 175 lb (79.4 kg)   03/18/16 178 lb 5 oz (80.9 kg)   02/12/16 177 lb (80.3 kg)              We Performed the Following     FLU VACCINE, INCREASED ANTIGEN, PRESV FREE, AGE 65+ [91175]     Kenalog 40 MG  []     Kenalog 40 MG  []     Large Joint/Bursa injection and/or drainage (Shoulder, Knee)     Large Joint/Bursa injection and/or drainage (Shoulder, Knee)     Vaccine Administration, Initial [88513]          Today's Medication Changes          These changes are accurate as of 10/9/18 11:59 PM.  If you have any questions, ask your nurse or doctor.               Start taking these medicines.        Dose/Directions    * triamcinolone acetonide 40 MG/ML injection   Commonly known as:  KENALOG   Used for:  Primary osteoarthritis of both knees   Started by:  Destin Wolf MD        Dose:  40 mg   1 mL (40 mg) by INTRA-ARTICULAR route once for 1 dose   Quantity:  1 mL   Refills:  0       * triamcinolone acetonide 40 MG/ML injection   Commonly known as:  KENALOG   Used for:  Primary osteoarthritis of both knees   Started by:  Destin Wolf MD        Dose:  40 mg   1 mL (40 mg) by INTRA-ARTICULAR route once for 1 dose   Quantity:  1 mL   Refills:  0       * Notice:  This list has 2 medication(s) that are the same as other medications prescribed for you. Read the directions carefully, and ask your doctor or other care provider to review them with you.         Where to get your medicines      Some of these will need a paper prescription and others can be bought over the counter.  Ask your nurse if you have questions.     You don't need a prescription for these medications     triamcinolone acetonide 40 MG/ML injection    triamcinolone acetonide 40 MG/ML injection                Primary Care Provider Office Phone # Fax #    Heidi Velasquez [phone]        [address]  [facility] [address]        Equal Access to Services     NABORJOI MARIA : Hadii jordy douglas davonte Lopezali, wawillyda krisnelyha, ramana karysvitlanamanuel watsonananthmanuel, waxguevara dimas lilianaxuan richmond phillmacy schneider. So [facility] [phone].    ATENCIÓN: Si habla español, tiene a corrales disposición servicios gratuitos de asistencia lingüística. Llame al [phone].    We comply with applicable federal civil rights laws and [place] laws. We do not discriminate on the basis of race, color, national origin, age, disability, sex, sexual orientation, or gender identity.            Thank you!     Thank you for choosing [facility]  for your care. Our goal is always to provide you with excellent care. Hearing back from our patients is one way we can continue to improve our services. Please take a few minutes to complete the written survey that you may receive in the mail after your visit with us. Thank you!             Your Updated Medication List - Protect others around you: Learn how to safely use, store and throw away your medicines at www.disposemymeds.org.          This list is accurate as of 10/9/18 11:59 PM.  Always use your most recent med list.                   Brand Name Dispense Instructions for use Diagnosis    acetaminophen 500 MG tablet    TYLENOL     Take 500-1,000 mg by mouth every 6 hours as needed Reported on 5/2/2017    OA (osteoarthritis) of knee       albuterol 108 (90 Base) MCG/ACT inhaler    PROAIR HFA    3 Inhaler    Inhale 2 puffs into the lungs every 4 hours as needed for shortness of breath / dyspnea or wheezing    Moderate persistent asthma without complication       ASPIRIN CHILDRENS 81 MG chewable tablet   Generic drug:  aspirin     0    1 TABLET DAILY    Type II or unspecified type diabetes mellitus without mention of complication, not stated as uncontrolled       atorvastatin 80 MG tablet    LIPITOR    45 tablet    Take 0.5 tablets (40 mg) by mouth daily For high cholesterol.     Hyperlipidemia LDL goal <100       FLUoxetine 20 MG capsule    PROzac    90 capsule    TAKE ONE CAPSULE BY MOUTH EVERY DAY    Major depressive disorder, recurrent episode, moderate (H)       fluticasone-salmeterol 500-50 MCG/DOSE diskus inhaler    ADVAIR DISKUS    3 Inhaler    Inhale 1 puff into the lungs 2 times daily    Moderate persistent asthma without complication       hydrochlorothiazide 50 MG tablet    HYDRODIURIL    90 tablet    Take 1 tablet (50 mg) by mouth daily    HTN, goal below 150/90       ibuprofen 800 MG tablet    ADVIL/MOTRIN    60 tablet    Take 1 tablet (800 mg) by mouth 2 times daily as needed for moderate pain    Primary osteoarthritis of both knees       indomethacin 25 MG capsule    INDOCIN    42 capsule    Take 1-2 capsules (25-50 mg) by mouth 3 times daily (with meals)    Acute gout of left foot, unspecified cause       lisinopril 40 MG tablet    PRINIVIL/ZESTRIL    90 tablet    Take 1 tablet (40 mg) by mouth daily    HTN, goal below 150/90       Multi-vitamin Tabs tablet     100 tablet    Take 1 tablet by mouth daily        senna-docusate 8.6-50 MG per tablet    DANIEL-COLACE    100 tablet    Take 2 tablets by mouth daily    Slow transit constipation       traMADol 50 MG tablet    ULTRAM    10 tablet    Take 0.5-1 tablets (25-50 mg) by mouth nightly as needed for pain maximum 1 tablet(s) per day    Primary osteoarthritis of both knees       * triamcinolone acetonide 40 MG/ML injection    KENALOG    1 mL    1 mL (40 mg) by INTRA-ARTICULAR route once for 1 dose    Primary osteoarthritis of both knees       * triamcinolone acetonide 40 MG/ML injection    KENALOG    1 mL    1 mL (40 mg) by INTRA-ARTICULAR route once for 1 dose    Primary osteoarthritis of both knees       * Notice:  This list has 2 medication(s) that are the same as other medications prescribed for you. Read the directions carefully, and ask your doctor or other care provider to review them with you.

## 2018-10-29 RX ORDER — TRIAMCINOLONE ACETONIDE 40 MG/ML
40 INJECTION, SUSPENSION INTRA-ARTICULAR; INTRAMUSCULAR ONCE
Qty: 1 ML | Refills: 0 | OUTPATIENT
Start: 2018-10-29 | End: 2019-01-01

## 2018-11-23 ENCOUNTER — TELEPHONE (OUTPATIENT)
Dept: ORTHOPEDICS | Facility: CLINIC | Age: 83
End: 2018-11-23

## 2018-11-23 NOTE — TELEPHONE ENCOUNTER
[facility] Call Center    Phone Message    May a detailed message be left on voicemail: yes    Reason for Call: Other: Wants to schedule knee surgery with Dr. Hi      Action Taken: Message routed to:  [facility] ([facility] Orthopedics

## 2018-11-26 NOTE — TELEPHONE ENCOUNTER
Returned patient's phone call. Informed patient that per Dr. Hi's note, if she wishes to proceed with surgery, a return clinic visit is advised for surgical discussion, preop teaching, etc. Patient was offered a return appointment on December 19 at 11:45; patient accepted the appointment.

## 2018-12-19 ENCOUNTER — OFFICE VISIT (OUTPATIENT)
Dept: ORTHOPEDICS | Facility: CLINIC | Age: 83
End: 2018-12-19
Payer: COMMERCIAL

## 2018-12-19 DIAGNOSIS — M17.11 PRIMARY LOCALIZED OSTEOARTHRITIS OF RIGHT KNEE: Primary | ICD-10-CM

## 2018-12-19 ASSESSMENT — KOOS JR
RISING FROM SITTING: EXTREME
HOW SEVERE IS YOUR KNEE STIFFNESS AFTER FIRST WAKING IN MORNING: EXTREME
STANDING UPRIGHT: EXTREME
TWISING OR PIVOTING ON KNEE: EXTREME
HOW SEVERE IS YOUR KNEE STIFFNESS AFTER FIRST WAKING IN MORNING: 1
BENDING TO THE FLOOR TO PICK UP OBJECT: EXTREME
KOOS JR SCORING: 0
GOING UP OR DOWN STAIRS: EXTREME
WHAT AMOUNT OF KNEE PAIN HAVE YOU EXPERIENCED THE LAST WEEK DURING THE FOLLOWING ACTIVITIES: 1
STRAIGHTENING KNEE FULLY: EXTREME

## 2018-12-19 NOTE — NURSING NOTE
Teaching Flowsheet   Relevant Diagnosis: Right knee osteoarthritis  Teaching Topic: Right total knee arthroplasty.    Patient has a strong support system; she is accompanied today by two family members (son, daughter). Patient's health history is positive for HTN, asthma. Allergies to IODINE according to patient.      Person(s) involved in teaching:   Patient, Son and Daughter     Motivation Level:  Asks Questions: Yes  Eager to Learn: Yes  Cooperative: Yes  Receptive (willing/able to accept information): Yes  Any cultural factors/Spiritism beliefs that may influence understanding or compliance? No     Patient and Family demonstrates understanding of the following:  Reason for the appointment, diagnosis and treatment plan: Yes  Knowledge of proper use of medications and conditions for which they are ordered (with special attention to potential side effects or drug interactions): Yes  Which situations necessitate calling provider and whom to contact: Yes     Teaching Concerns Addressed: Patient understands she will need a preoperative H&P within 30 days of date of surgery.     Proper use and care of assistive devices. (medical equip, care aids, etc.): Yes  Nutritional needs and diet plan: NA  Pain management techniques: Yes  Wound Care: Yes  How and/when to access community resources: Yes     Instructional Materials Used/Given: Preoperative teaching packet, surgical soap, total joint class booklet and flyer, dental card.

## 2018-12-19 NOTE — NURSING NOTE
Reason For Visit:   Chief Complaint   Patient presents with     RECHECK     Surgical discussion for right knee.        Primary MD: Heidi Velasquez      Pain Assessment  Patient Currently in Pain: Denies(No pain when sitting. )    Current Outpatient Medications   Medication     acetaminophen (TYLENOL) 500 MG tablet     albuterol (PROAIR HFA) 108 (90 BASE) MCG/ACT Inhaler     ASPIRIN CHILDRENS 81 MG OR CHEW     atorvastatin (LIPITOR) 80 MG tablet     FLUoxetine (PROZAC) 20 MG capsule     fluticasone-salmeterol (ADVAIR DISKUS) 500-50 MCG/DOSE diskus inhaler     hydrochlorothiazide (HYDRODIURIL) 50 MG tablet     ibuprofen (ADVIL/MOTRIN) 800 MG tablet     indomethacin (INDOCIN) 25 MG capsule     lisinopril (PRINIVIL/ZESTRIL) 40 MG tablet     multivitamin, therapeutic with minerals (MULTI-VITAMIN) TABS tablet     senna-docusate (DANIEL-COLACE) 8.6-50 MG per tablet     traMADol (ULTRAM) 50 MG tablet     No current facility-administered medications for this visit.           Allergies   Allergen Reactions     Contrast Dye      Seasonal Allergies            Lissett Otero LPN
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
## 2018-12-24 ENCOUNTER — OFFICE VISIT (OUTPATIENT)
Dept: FAMILY MEDICINE | Facility: CLINIC | Age: 83
End: 2018-12-24
Payer: COMMERCIAL

## 2018-12-24 VITALS
HEART RATE: 96 BPM | SYSTOLIC BLOOD PRESSURE: 134 MMHG | DIASTOLIC BLOOD PRESSURE: 67 MMHG | OXYGEN SATURATION: 96 % | TEMPERATURE: 98.1 F

## 2018-12-24 DIAGNOSIS — M17.0 PRIMARY OSTEOARTHRITIS OF BOTH KNEES: ICD-10-CM

## 2018-12-24 DIAGNOSIS — M17.12 PRIMARY OSTEOARTHRITIS OF LEFT KNEE: ICD-10-CM

## 2018-12-24 DIAGNOSIS — N18.30 CKD (CHRONIC KIDNEY DISEASE) STAGE 3, GFR 30-59 ML/MIN (H): ICD-10-CM

## 2018-12-24 DIAGNOSIS — I10 ESSENTIAL HYPERTENSION: Primary | ICD-10-CM

## 2018-12-24 DIAGNOSIS — F33.1 MAJOR DEPRESSIVE DISORDER, RECURRENT EPISODE, MODERATE (H): ICD-10-CM

## 2018-12-24 DIAGNOSIS — M25.512 CHRONIC LEFT SHOULDER PAIN: ICD-10-CM

## 2018-12-24 DIAGNOSIS — E78.5 HYPERLIPIDEMIA, UNSPECIFIED HYPERLIPIDEMIA TYPE: ICD-10-CM

## 2018-12-24 DIAGNOSIS — G89.29 CHRONIC LEFT SHOULDER PAIN: ICD-10-CM

## 2018-12-24 PROCEDURE — 80053 COMPREHEN METABOLIC PANEL: CPT | Performed by: FAMILY MEDICINE

## 2018-12-24 PROCEDURE — 99214 OFFICE O/P EST MOD 30 MIN: CPT | Performed by: FAMILY MEDICINE

## 2018-12-24 PROCEDURE — 80061 LIPID PANEL: CPT | Performed by: FAMILY MEDICINE

## 2018-12-24 PROCEDURE — 36415 COLL VENOUS BLD VENIPUNCTURE: CPT | Performed by: FAMILY MEDICINE

## 2018-12-24 ASSESSMENT — PATIENT HEALTH QUESTIONNAIRE - PHQ9: SUM OF ALL RESPONSES TO PHQ QUESTIONS 1-9: 9

## 2018-12-24 NOTE — PROGRESS NOTES
SUBJECTIVE:   Staci Watt is a 83 year old female who presents to clinic today for the following health issues:    Due to worsening pain - She has been having difficulty with left shoulder and pain. She is taking ibuprofen 800 mg BID, which isn't helpful. Tylenol wasn't also helpful. She also uses heating pad for the knees. She is unable to do PT exercises due to pain.   She has also had a decreased appetite.   Mood worse due to pain.   She only takes indomethacin for gout.     Problem list and histories reviewed & adjusted, as indicated.  Additional history: as documented    Labs reviewed in EPIC    Reviewed and updated as needed this visit by clinical staff  Tobacco  Allergies  Meds  Med Hx  Surg Hx  Fam Hx  Soc Hx      Reviewed and updated as needed this visit by Provider         ROS:  Constitutional, HEENT, cardiovascular, pulmonary, gi and gu systems are negative, except as otherwise noted.    OBJECTIVE:     /67   Pulse 96   Temp 98.1  F (36.7  C) (Oral)   SpO2 96%   There is no height or weight on file to calculate BMI.  GENERAL: healthy, alert and no distress  EYES: Eyes grossly normal to inspection  HENT: , nose and mouth without ulcers or lesions  PSYCH: mood slightly depressed     Diagnostic Test Results:  none     ASSESSMENT/PLAN:       1. Major depressive disorder, recurrent episode, moderate (H)  PHQ-9 SCORE 9/19/2017 5/14/2018 12/24/2018   PHQ-9 Total Score - - -   PHQ-9 Total Score 6 2 9   - mood slightly worse due to pain  - pt declined medication adjustment   - continue to monitor   - FLUoxetine (PROZAC) 20 MG capsule; Take 1 capsule (20 mg) by mouth daily  Dispense: 90 capsule; Refill: 1    2. Essential hypertension  - Comprehensive metabolic panel (BMP + Alb, Alk Phos, ALT, AST, Total. Bili, TP)    3. CKD (chronic kidney disease) stage 3, GFR 30-59 ml/min (H)  - Comprehensive metabolic panel (BMP + Alb, Alk Phos, ALT, AST, Total. Bili, TP)    4. Hyperlipidemia, unspecified  hyperlipidemia type  - Lipid Profile (Chol, Trig, HDL, LDL calc)    5. Primary osteoarthritis of left knee  - pt scheduled on 01/09/2018 for injection   - advised to use tramadol 25-50 mg at night as needed for pain and limit ibuprofen use during the day     6. Chronic left shoulder pain  - pt declined injection       Addendum - due to worsening kidney functions, advised to hold ibuprofen and recheck kidney functions on 01/09/2019. Advised to stay hydrated.       Destin Wolf MD  [facility]

## 2018-12-25 LAB
ALBUMIN SERPL-MCNC: 3.3 G/DL (ref 3.4–5)
ALP SERPL-CCNC: 66 U/L (ref 40–150)
ALT SERPL W P-5'-P-CCNC: 14 U/L (ref 0–50)
ANION GAP SERPL CALCULATED.3IONS-SCNC: 10 MMOL/L (ref 3–14)
AST SERPL W P-5'-P-CCNC: 11 U/L (ref 0–45)
BILIRUB SERPL-MCNC: 0.3 MG/DL (ref 0.2–1.3)
BUN SERPL-MCNC: 15 MG/DL (ref 7–30)
CALCIUM SERPL-MCNC: 9.6 MG/DL (ref 8.5–10.1)
CHLORIDE SERPL-SCNC: 105 MMOL/L (ref 94–109)
CHOLEST SERPL-MCNC: 169 MG/DL
CO2 SERPL-SCNC: 23 MMOL/L (ref 20–32)
CREAT SERPL-MCNC: 1.29 MG/DL (ref 0.52–1.04)
GFR SERPL CREATININE-BSD FRML MDRD: 38 ML/MIN/{1.73_M2}
GLUCOSE SERPL-MCNC: 97 MG/DL (ref 70–99)
HDLC SERPL-MCNC: 77 MG/DL
LDLC SERPL CALC-MCNC: 76 MG/DL
NONHDLC SERPL-MCNC: 92 MG/DL
POTASSIUM SERPL-SCNC: 3.7 MMOL/L (ref 3.4–5.3)
PROT SERPL-MCNC: 8 G/DL (ref 6.8–8.8)
SODIUM SERPL-SCNC: 138 MMOL/L (ref 133–144)
TRIGL SERPL-MCNC: 78 MG/DL

## 2018-12-28 RX ORDER — TRAMADOL HYDROCHLORIDE 50 MG/1
25-50 TABLET ORAL
Qty: 14 TABLET | Refills: 0 | Status: SHIPPED | OUTPATIENT
Start: 2018-12-28 | End: 2019-01-09

## 2019-01-01 ENCOUNTER — TELEPHONE (OUTPATIENT)
Dept: FAMILY MEDICINE | Facility: CLINIC | Age: 84
End: 2019-01-01

## 2019-01-01 ENCOUNTER — ANESTHESIA EVENT (OUTPATIENT)
Dept: SURGERY | Facility: CLINIC | Age: 84
DRG: 470 | End: 2019-01-01
Payer: COMMERCIAL

## 2019-01-01 ENCOUNTER — PATIENT OUTREACH (OUTPATIENT)
Dept: CARE COORDINATION | Facility: CLINIC | Age: 84
End: 2019-01-01

## 2019-01-01 ENCOUNTER — ALLIED HEALTH/NURSE VISIT (OUTPATIENT)
Dept: NURSING | Facility: CLINIC | Age: 84
End: 2019-01-01
Payer: COMMERCIAL

## 2019-01-01 ENCOUNTER — ONCOLOGY VISIT (OUTPATIENT)
Dept: ONCOLOGY | Facility: CLINIC | Age: 84
End: 2019-01-01
Attending: INTERNAL MEDICINE
Payer: COMMERCIAL

## 2019-01-01 ENCOUNTER — NURSING HOME VISIT (OUTPATIENT)
Dept: GERIATRICS | Facility: CLINIC | Age: 84
End: 2019-01-01
Payer: COMMERCIAL

## 2019-01-01 ENCOUNTER — APPOINTMENT (OUTPATIENT)
Dept: LAB | Facility: CLINIC | Age: 84
End: 2019-01-01
Attending: INTERNAL MEDICINE
Payer: COMMERCIAL

## 2019-01-01 ENCOUNTER — TRANSFERRED RECORDS (OUTPATIENT)
Dept: HEALTH INFORMATION MANAGEMENT | Facility: CLINIC | Age: 84
End: 2019-01-01

## 2019-01-01 ENCOUNTER — HEALTH MAINTENANCE LETTER (OUTPATIENT)
Age: 84
End: 2019-01-01

## 2019-01-01 ENCOUNTER — ANESTHESIA EVENT (OUTPATIENT)
Dept: SURGERY | Facility: CLINIC | Age: 84
End: 2019-01-01

## 2019-01-01 ENCOUNTER — MEDICAL CORRESPONDENCE (OUTPATIENT)
Dept: HEALTH INFORMATION MANAGEMENT | Facility: CLINIC | Age: 84
End: 2019-01-01

## 2019-01-01 ENCOUNTER — ANCILLARY PROCEDURE (OUTPATIENT)
Dept: GENERAL RADIOLOGY | Facility: CLINIC | Age: 84
End: 2019-01-01
Attending: ORTHOPAEDIC SURGERY
Payer: COMMERCIAL

## 2019-01-01 ENCOUNTER — TELEPHONE (OUTPATIENT)
Dept: ORTHOPEDICS | Facility: CLINIC | Age: 84
End: 2019-01-01

## 2019-01-01 ENCOUNTER — APPOINTMENT (OUTPATIENT)
Dept: PHYSICAL THERAPY | Facility: CLINIC | Age: 84
DRG: 470 | End: 2019-01-01
Attending: ORTHOPAEDIC SURGERY
Payer: COMMERCIAL

## 2019-01-01 ENCOUNTER — OFFICE VISIT (OUTPATIENT)
Dept: URGENT CARE | Facility: URGENT CARE | Age: 84
End: 2019-01-01
Payer: COMMERCIAL

## 2019-01-01 ENCOUNTER — TELEPHONE (OUTPATIENT)
Dept: TRANSPLANT | Facility: CLINIC | Age: 84
End: 2019-01-01

## 2019-01-01 ENCOUNTER — PRE VISIT (OUTPATIENT)
Dept: ONCOLOGY | Facility: CLINIC | Age: 84
End: 2019-01-01

## 2019-01-01 ENCOUNTER — OFFICE VISIT (OUTPATIENT)
Dept: SURGERY | Facility: CLINIC | Age: 84
End: 2019-01-01
Payer: COMMERCIAL

## 2019-01-01 ENCOUNTER — OFFICE VISIT (OUTPATIENT)
Dept: FAMILY MEDICINE | Facility: CLINIC | Age: 84
End: 2019-01-01
Payer: COMMERCIAL

## 2019-01-01 ENCOUNTER — OFFICE VISIT (OUTPATIENT)
Dept: ORTHOPEDICS | Facility: CLINIC | Age: 84
End: 2019-01-01
Payer: COMMERCIAL

## 2019-01-01 ENCOUNTER — ONCOLOGY VISIT (OUTPATIENT)
Dept: ONCOLOGY | Facility: CLINIC | Age: 84
End: 2019-01-01
Attending: THORACIC SURGERY (CARDIOTHORACIC VASCULAR SURGERY)
Payer: COMMERCIAL

## 2019-01-01 ENCOUNTER — DISCHARGE SUMMARY NURSING HOME (OUTPATIENT)
Dept: GERIATRICS | Facility: CLINIC | Age: 84
End: 2019-01-01
Payer: COMMERCIAL

## 2019-01-01 ENCOUNTER — RECORDS - HEALTHEAST (OUTPATIENT)
Dept: LAB | Facility: CLINIC | Age: 84
End: 2019-01-01

## 2019-01-01 ENCOUNTER — ANESTHESIA (OUTPATIENT)
Dept: SURGERY | Facility: CLINIC | Age: 84
DRG: 470 | End: 2019-01-01
Payer: COMMERCIAL

## 2019-01-01 ENCOUNTER — INFUSION THERAPY VISIT (OUTPATIENT)
Dept: INFUSION THERAPY | Facility: CLINIC | Age: 84
End: 2019-01-01
Attending: INTERNAL MEDICINE
Payer: COMMERCIAL

## 2019-01-01 ENCOUNTER — PRE VISIT (OUTPATIENT)
Dept: SURGERY | Facility: CLINIC | Age: 84
End: 2019-01-01

## 2019-01-01 ENCOUNTER — APPOINTMENT (OUTPATIENT)
Dept: OCCUPATIONAL THERAPY | Facility: CLINIC | Age: 84
DRG: 470 | End: 2019-01-01
Attending: ORTHOPAEDIC SURGERY
Payer: COMMERCIAL

## 2019-01-01 ENCOUNTER — APPOINTMENT (OUTPATIENT)
Dept: GENERAL RADIOLOGY | Facility: CLINIC | Age: 84
DRG: 470 | End: 2019-01-01
Attending: ORTHOPAEDIC SURGERY
Payer: COMMERCIAL

## 2019-01-01 ENCOUNTER — HOSPITAL ENCOUNTER (INPATIENT)
Facility: CLINIC | Age: 84
LOS: 3 days | Discharge: NURSING, REHAB OR LONG-TERM CARE | DRG: 470 | End: 2019-05-10
Attending: ORTHOPAEDIC SURGERY | Admitting: ORTHOPAEDIC SURGERY
Payer: COMMERCIAL

## 2019-01-01 ENCOUNTER — DOCUMENTATION ONLY (OUTPATIENT)
Dept: FAMILY MEDICINE | Facility: CLINIC | Age: 84
End: 2019-01-01

## 2019-01-01 ENCOUNTER — ANCILLARY PROCEDURE (OUTPATIENT)
Dept: ULTRASOUND IMAGING | Facility: CLINIC | Age: 84
End: 2019-01-01
Attending: INTERNAL MEDICINE
Payer: COMMERCIAL

## 2019-01-01 ENCOUNTER — TELEPHONE (OUTPATIENT)
Dept: SURGERY | Facility: CLINIC | Age: 84
End: 2019-01-01

## 2019-01-01 VITALS
HEART RATE: 104 BPM | OXYGEN SATURATION: 99 % | DIASTOLIC BLOOD PRESSURE: 73 MMHG | TEMPERATURE: 98.6 F | BODY MASS INDEX: 29.15 KG/M2 | WEIGHT: 158.4 LBS | SYSTOLIC BLOOD PRESSURE: 141 MMHG | RESPIRATION RATE: 18 BRPM | HEIGHT: 62 IN

## 2019-01-01 VITALS
TEMPERATURE: 99.1 F | OXYGEN SATURATION: 98 % | HEIGHT: 62 IN | HEART RATE: 113 BPM | RESPIRATION RATE: 20 BRPM | DIASTOLIC BLOOD PRESSURE: 80 MMHG | SYSTOLIC BLOOD PRESSURE: 145 MMHG | BODY MASS INDEX: 28.89 KG/M2

## 2019-01-01 VITALS
SYSTOLIC BLOOD PRESSURE: 130 MMHG | RESPIRATION RATE: 16 BRPM | TEMPERATURE: 98.4 F | HEART RATE: 89 BPM | DIASTOLIC BLOOD PRESSURE: 73 MMHG

## 2019-01-01 VITALS
HEART RATE: 96 BPM | TEMPERATURE: 97.9 F | DIASTOLIC BLOOD PRESSURE: 61 MMHG | RESPIRATION RATE: 16 BRPM | SYSTOLIC BLOOD PRESSURE: 134 MMHG | OXYGEN SATURATION: 100 %

## 2019-01-01 VITALS
RESPIRATION RATE: 16 BRPM | DIASTOLIC BLOOD PRESSURE: 63 MMHG | SYSTOLIC BLOOD PRESSURE: 126 MMHG | HEART RATE: 101 BPM | WEIGHT: 157.5 LBS | BODY MASS INDEX: 29.18 KG/M2 | TEMPERATURE: 97.9 F

## 2019-01-01 VITALS
RESPIRATION RATE: 16 BRPM | HEART RATE: 92 BPM | SYSTOLIC BLOOD PRESSURE: 159 MMHG | WEIGHT: 147.1 LBS | TEMPERATURE: 98.6 F | OXYGEN SATURATION: 99 % | DIASTOLIC BLOOD PRESSURE: 70 MMHG | BODY MASS INDEX: 27.26 KG/M2

## 2019-01-01 VITALS — HEART RATE: 98 BPM | OXYGEN SATURATION: 100 % | SYSTOLIC BLOOD PRESSURE: 126 MMHG | DIASTOLIC BLOOD PRESSURE: 62 MMHG

## 2019-01-01 VITALS
TEMPERATURE: 97.3 F | HEIGHT: 62 IN | BODY MASS INDEX: 28.89 KG/M2 | DIASTOLIC BLOOD PRESSURE: 54 MMHG | WEIGHT: 156.97 LBS | SYSTOLIC BLOOD PRESSURE: 121 MMHG | RESPIRATION RATE: 16 BRPM | HEART RATE: 104 BPM | OXYGEN SATURATION: 100 %

## 2019-01-01 VITALS
DIASTOLIC BLOOD PRESSURE: 66 MMHG | BODY MASS INDEX: 27.79 KG/M2 | SYSTOLIC BLOOD PRESSURE: 135 MMHG | OXYGEN SATURATION: 96 % | TEMPERATURE: 98.3 F | RESPIRATION RATE: 12 BRPM | WEIGHT: 150 LBS | HEART RATE: 103 BPM

## 2019-01-01 VITALS
BODY MASS INDEX: 30 KG/M2 | OXYGEN SATURATION: 100 % | WEIGHT: 163 LBS | HEIGHT: 62 IN | HEART RATE: 102 BPM | TEMPERATURE: 98.9 F | DIASTOLIC BLOOD PRESSURE: 70 MMHG | RESPIRATION RATE: 16 BRPM | SYSTOLIC BLOOD PRESSURE: 140 MMHG

## 2019-01-01 VITALS
HEART RATE: 115 BPM | BODY MASS INDEX: 28.89 KG/M2 | DIASTOLIC BLOOD PRESSURE: 72 MMHG | WEIGHT: 155.9 LBS | TEMPERATURE: 99.1 F | OXYGEN SATURATION: 100 % | SYSTOLIC BLOOD PRESSURE: 145 MMHG

## 2019-01-01 VITALS
DIASTOLIC BLOOD PRESSURE: 61 MMHG | TEMPERATURE: 98.3 F | RESPIRATION RATE: 16 BRPM | HEART RATE: 96 BPM | OXYGEN SATURATION: 99 % | SYSTOLIC BLOOD PRESSURE: 113 MMHG

## 2019-01-01 VITALS
HEART RATE: 101 BPM | RESPIRATION RATE: 16 BRPM | DIASTOLIC BLOOD PRESSURE: 76 MMHG | OXYGEN SATURATION: 99 % | TEMPERATURE: 98.5 F | SYSTOLIC BLOOD PRESSURE: 178 MMHG

## 2019-01-01 VITALS
HEART RATE: 93 BPM | DIASTOLIC BLOOD PRESSURE: 71 MMHG | SYSTOLIC BLOOD PRESSURE: 137 MMHG | OXYGEN SATURATION: 98 % | HEIGHT: 62 IN | TEMPERATURE: 97.7 F | BODY MASS INDEX: 30 KG/M2 | WEIGHT: 163 LBS

## 2019-01-01 VITALS
RESPIRATION RATE: 16 BRPM | SYSTOLIC BLOOD PRESSURE: 122 MMHG | HEART RATE: 106 BPM | OXYGEN SATURATION: 96 % | DIASTOLIC BLOOD PRESSURE: 67 MMHG | BODY MASS INDEX: 29.17 KG/M2 | WEIGHT: 159.5 LBS | TEMPERATURE: 98.6 F

## 2019-01-01 VITALS — HEART RATE: 105 BPM | SYSTOLIC BLOOD PRESSURE: 144 MMHG | DIASTOLIC BLOOD PRESSURE: 73 MMHG | OXYGEN SATURATION: 98 %

## 2019-01-01 DIAGNOSIS — M17.11 PRIMARY OSTEOARTHRITIS OF RIGHT KNEE: ICD-10-CM

## 2019-01-01 DIAGNOSIS — Z01.818 PREOP EXAMINATION: ICD-10-CM

## 2019-01-01 DIAGNOSIS — G89.29 CHRONIC PAIN OF BOTH KNEES: ICD-10-CM

## 2019-01-01 DIAGNOSIS — E83.52 HYPERCALCEMIA: ICD-10-CM

## 2019-01-01 DIAGNOSIS — I10 HYPERTENSION, UNSPECIFIED TYPE: Primary | ICD-10-CM

## 2019-01-01 DIAGNOSIS — D47.2 MGUS (MONOCLONAL GAMMOPATHY OF UNKNOWN SIGNIFICANCE): ICD-10-CM

## 2019-01-01 DIAGNOSIS — K21.9 GASTROESOPHAGEAL REFLUX DISEASE WITHOUT ESOPHAGITIS: Primary | ICD-10-CM

## 2019-01-01 DIAGNOSIS — Z98.890 STATUS POST KNEE SURGERY: Primary | ICD-10-CM

## 2019-01-01 DIAGNOSIS — Z01.818 PREOP EXAMINATION: Primary | ICD-10-CM

## 2019-01-01 DIAGNOSIS — D64.9 ANEMIA, UNSPECIFIED TYPE: Primary | ICD-10-CM

## 2019-01-01 DIAGNOSIS — M25.562 CHRONIC PAIN OF BOTH KNEES: ICD-10-CM

## 2019-01-01 DIAGNOSIS — Z09 HOSPITAL DISCHARGE FOLLOW-UP: Primary | ICD-10-CM

## 2019-01-01 DIAGNOSIS — E87.1 HYPONATREMIA: ICD-10-CM

## 2019-01-01 DIAGNOSIS — J45.40 MODERATE PERSISTENT ASTHMA WITHOUT COMPLICATION: ICD-10-CM

## 2019-01-01 DIAGNOSIS — Z79.1 NSAID LONG-TERM USE: ICD-10-CM

## 2019-01-01 DIAGNOSIS — K21.9 GASTROESOPHAGEAL REFLUX DISEASE WITHOUT ESOPHAGITIS: ICD-10-CM

## 2019-01-01 DIAGNOSIS — N18.30 CKD (CHRONIC KIDNEY DISEASE) STAGE 3, GFR 30-59 ML/MIN (H): ICD-10-CM

## 2019-01-01 DIAGNOSIS — I10 HTN, GOAL BELOW 150/90: ICD-10-CM

## 2019-01-01 DIAGNOSIS — M17.11 PRIMARY LOCALIZED OSTEOARTHRITIS OF RIGHT KNEE: Primary | ICD-10-CM

## 2019-01-01 DIAGNOSIS — D62 ANEMIA DUE TO BLOOD LOSS, ACUTE: Primary | ICD-10-CM

## 2019-01-01 DIAGNOSIS — Z01.30 BP CHECK: Primary | ICD-10-CM

## 2019-01-01 DIAGNOSIS — D64.9 ANEMIA, UNSPECIFIED TYPE: ICD-10-CM

## 2019-01-01 DIAGNOSIS — R63.4 WEIGHT LOSS: ICD-10-CM

## 2019-01-01 DIAGNOSIS — Z98.890 STATUS POST KNEE SURGERY: ICD-10-CM

## 2019-01-01 DIAGNOSIS — F33.1 MAJOR DEPRESSIVE DISORDER, RECURRENT EPISODE, MODERATE (H): ICD-10-CM

## 2019-01-01 DIAGNOSIS — M25.561 CHRONIC PAIN OF BOTH KNEES: ICD-10-CM

## 2019-01-01 DIAGNOSIS — R05.9 COUGH: Primary | ICD-10-CM

## 2019-01-01 DIAGNOSIS — Z51.81 MEDICATION MONITORING ENCOUNTER: ICD-10-CM

## 2019-01-01 DIAGNOSIS — E66.9 OBESITY WITH SERIOUS COMORBIDITY, UNSPECIFIED CLASSIFICATION, UNSPECIFIED OBESITY TYPE: ICD-10-CM

## 2019-01-01 DIAGNOSIS — E87.1 HYPONATREMIA: Primary | ICD-10-CM

## 2019-01-01 DIAGNOSIS — R80.9 MICROALBUMINURIA: ICD-10-CM

## 2019-01-01 DIAGNOSIS — R79.9 ABNORMAL FINDING OF BLOOD CHEMISTRY: ICD-10-CM

## 2019-01-01 DIAGNOSIS — M17.0 PRIMARY OSTEOARTHRITIS OF BOTH KNEES: ICD-10-CM

## 2019-01-01 DIAGNOSIS — Z96.651 HISTORY OF TOTAL KNEE ARTHROPLASTY, RIGHT: ICD-10-CM

## 2019-01-01 DIAGNOSIS — M17.12 PRIMARY OSTEOARTHRITIS OF LEFT KNEE: ICD-10-CM

## 2019-01-01 DIAGNOSIS — D47.2 MGUS (MONOCLONAL GAMMOPATHY OF UNKNOWN SIGNIFICANCE): Primary | ICD-10-CM

## 2019-01-01 DIAGNOSIS — Z53.9 DIAGNOSIS NOT YET DEFINED: Primary | ICD-10-CM

## 2019-01-01 DIAGNOSIS — D53.9 ANEMIA ASSOCIATED WITH NUTRITIONAL DEFICIENCY: Primary | ICD-10-CM

## 2019-01-01 DIAGNOSIS — R53.81 DEBILITY: ICD-10-CM

## 2019-01-01 DIAGNOSIS — D62 ANEMIA DUE TO BLOOD LOSS, ACUTE: ICD-10-CM

## 2019-01-01 DIAGNOSIS — Z51.81 MEDICATION MONITORING ENCOUNTER: Primary | ICD-10-CM

## 2019-01-01 DIAGNOSIS — E78.5 HYPERLIPIDEMIA LDL GOAL <100: ICD-10-CM

## 2019-01-01 LAB
25(OH)D3 SERPL-MCNC: 46 NG/ML (ref 30–80)
ABO + RH BLD: NORMAL
ABO + RH BLD: NORMAL
ALBUMIN SERPL ELPH-MCNC: 3.6 G/DL (ref 3.7–5.1)
ALBUMIN SERPL ELPH-MCNC: 3.6 G/DL (ref 3.7–5.1)
ALBUMIN SERPL-MCNC: 3.1 G/DL (ref 3.4–5)
ALBUMIN SERPL-MCNC: 3.2 G/DL (ref 3.4–5)
ALBUMIN SERPL-MCNC: 3.5 G/DL (ref 3.4–5)
ALBUMIN SERPL-MCNC: 3.5 G/DL (ref 3.4–5)
ALP SERPL-CCNC: 122 U/L (ref 40–150)
ALP SERPL-CCNC: 71 U/L (ref 40–150)
ALP SERPL-CCNC: 76 U/L (ref 40–150)
ALP SERPL-CCNC: 99 U/L (ref 40–150)
ALPHA1 GLOB SERPL ELPH-MCNC: 0.6 G/DL (ref 0.2–0.4)
ALPHA1 GLOB SERPL ELPH-MCNC: 0.6 G/DL (ref 0.2–0.4)
ALPHA2 GLOB SERPL ELPH-MCNC: 0.9 G/DL (ref 0.5–0.9)
ALPHA2 GLOB SERPL ELPH-MCNC: 1.1 G/DL (ref 0.5–0.9)
ALT SERPL W P-5'-P-CCNC: 15 U/L (ref 0–50)
ALT SERPL W P-5'-P-CCNC: 16 U/L (ref 0–50)
ALT SERPL W P-5'-P-CCNC: 20 U/L (ref 0–50)
ALT SERPL W P-5'-P-CCNC: 23 U/L (ref 0–50)
ANION GAP SERPL CALCULATED.3IONS-SCNC: 11 MMOL/L (ref 3–14)
ANION GAP SERPL CALCULATED.3IONS-SCNC: 11 MMOL/L (ref 5–18)
ANION GAP SERPL CALCULATED.3IONS-SCNC: 11 MMOL/L (ref 5–18)
ANION GAP SERPL CALCULATED.3IONS-SCNC: 5 MMOL/L (ref 3–14)
ANION GAP SERPL CALCULATED.3IONS-SCNC: 5 MMOL/L (ref 3–14)
ANION GAP SERPL CALCULATED.3IONS-SCNC: 7 MMOL/L (ref 3–14)
ANION GAP SERPL CALCULATED.3IONS-SCNC: 8 MMOL/L (ref 3–14)
ANION GAP SERPL CALCULATED.3IONS-SCNC: 8 MMOL/L (ref 3–14)
ANION GAP SERPL CALCULATED.3IONS-SCNC: 8 MMOL/L (ref 5–18)
ANION GAP SERPL CALCULATED.3IONS-SCNC: 8 MMOL/L (ref 5–18)
ANION GAP SERPL CALCULATED.3IONS-SCNC: 9 MMOL/L (ref 3–14)
AST SERPL W P-5'-P-CCNC: 14 U/L (ref 0–45)
AST SERPL W P-5'-P-CCNC: 17 U/L (ref 0–45)
AST SERPL W P-5'-P-CCNC: 22 U/L (ref 0–45)
AST SERPL W P-5'-P-CCNC: 8 U/L (ref 0–45)
B-GLOBULIN SERPL ELPH-MCNC: 1 G/DL (ref 0.6–1)
B-GLOBULIN SERPL ELPH-MCNC: 1.1 G/DL (ref 0.6–1)
B2 MICROGLOB SERPL-MCNC: 3.1 MG/L
BASOPHILS # BLD AUTO: 0 10E9/L (ref 0–0.2)
BASOPHILS # BLD AUTO: 0 THOU/UL (ref 0–0.2)
BASOPHILS # BLD AUTO: 0.1 10E9/L (ref 0–0.2)
BASOPHILS NFR BLD AUTO: 0 % (ref 0–2)
BASOPHILS NFR BLD AUTO: 0.2 %
BASOPHILS NFR BLD AUTO: 0.3 %
BASOPHILS NFR BLD AUTO: 0.3 %
BASOPHILS NFR BLD AUTO: 0.5 %
BASOPHILS NFR BLD AUTO: 0.7 %
BASOPHILS NFR BLD AUTO: 0.7 %
BILIRUB DIRECT SERPL-MCNC: <0.1 MG/DL (ref 0–0.2)
BILIRUB SERPL-MCNC: 0.2 MG/DL (ref 0.2–1.3)
BILIRUB SERPL-MCNC: 0.3 MG/DL (ref 0.2–1.3)
BILIRUB SERPL-MCNC: 0.3 MG/DL (ref 0.2–1.3)
BILIRUB SERPL-MCNC: 0.4 MG/DL (ref 0.2–1.3)
BLD GP AB SCN SERPL QL: NORMAL
BLOOD BANK CMNT PATIENT-IMP: NORMAL
BLOOD BANK CMNT PATIENT-IMP: NORMAL
BUN SERPL-MCNC: 10 MG/DL (ref 7–30)
BUN SERPL-MCNC: 11 MG/DL (ref 8–28)
BUN SERPL-MCNC: 11 MG/DL (ref 8–28)
BUN SERPL-MCNC: 12 MG/DL (ref 7–30)
BUN SERPL-MCNC: 12 MG/DL (ref 7–30)
BUN SERPL-MCNC: 14 MG/DL (ref 7–30)
BUN SERPL-MCNC: 15 MG/DL (ref 7–30)
BUN SERPL-MCNC: 15 MG/DL (ref 7–30)
BUN SERPL-MCNC: 16 MG/DL (ref 7–30)
BUN SERPL-MCNC: 8 MG/DL (ref 7–30)
BUN SERPL-MCNC: 9 MG/DL (ref 8–28)
BUN SERPL-MCNC: 9 MG/DL (ref 8–28)
CA-I SERPL ISE-MCNC: 4.9 MG/DL (ref 4.4–5.2)
CALCIUM SERPL-MCNC: 10.1 MG/DL (ref 8.5–10.1)
CALCIUM SERPL-MCNC: 10.5 MG/DL (ref 8.5–10.1)
CALCIUM SERPL-MCNC: 7.9 MG/DL (ref 8.5–10.1)
CALCIUM SERPL-MCNC: 8.5 MG/DL (ref 8.5–10.1)
CALCIUM SERPL-MCNC: 9 MG/DL (ref 8.5–10.5)
CALCIUM SERPL-MCNC: 9 MG/DL (ref 8.5–10.5)
CALCIUM SERPL-MCNC: 9.1 MG/DL (ref 8.5–10.1)
CALCIUM SERPL-MCNC: 9.2 MG/DL (ref 8.5–10.5)
CALCIUM SERPL-MCNC: 9.2 MG/DL (ref 8.5–10.5)
CALCIUM SERPL-MCNC: 9.4 MG/DL (ref 8.5–10.1)
CALCIUM SERPL-MCNC: 9.6 MG/DL (ref 8.5–10.1)
CALCIUM SERPL-MCNC: 9.7 MG/DL (ref 8.5–10.1)
CALCIUM SERPL-MCNC: 9.8 MG/DL (ref 8.5–10.1)
CALCIUM SERPL-MCNC: 9.8 MG/DL (ref 8.5–10.1)
CHLORIDE BLD-SCNC: 107 MMOL/L (ref 98–107)
CHLORIDE BLD-SCNC: 109 MMOL/L (ref 98–107)
CHLORIDE SERPL-SCNC: 100 MMOL/L (ref 94–109)
CHLORIDE SERPL-SCNC: 103 MMOL/L (ref 94–109)
CHLORIDE SERPL-SCNC: 105 MMOL/L (ref 94–109)
CHLORIDE SERPL-SCNC: 106 MMOL/L (ref 94–109)
CHLORIDE SERPL-SCNC: 98 MMOL/L (ref 94–109)
CHLORIDE SERPL-SCNC: 98 MMOL/L (ref 94–109)
CHLORIDE SERPLBLD-SCNC: 107 MMOL/L (ref 98–107)
CHLORIDE SERPLBLD-SCNC: 109 MMOL/L (ref 98–107)
CO2 SERPL-SCNC: 21 MMOL/L (ref 22–31)
CO2 SERPL-SCNC: 21 MMOL/L (ref 22–31)
CO2 SERPL-SCNC: 22 MMOL/L (ref 20–32)
CO2 SERPL-SCNC: 22 MMOL/L (ref 20–32)
CO2 SERPL-SCNC: 22 MMOL/L (ref 22–31)
CO2 SERPL-SCNC: 22 MMOL/L (ref 22–31)
CO2 SERPL-SCNC: 23 MMOL/L (ref 20–32)
CO2 SERPL-SCNC: 23 MMOL/L (ref 20–32)
CO2 SERPL-SCNC: 24 MMOL/L (ref 20–32)
CO2 SERPL-SCNC: 25 MMOL/L (ref 20–32)
CO2 SERPL-SCNC: 26 MMOL/L (ref 20–32)
CO2 SERPL-SCNC: 26 MMOL/L (ref 20–32)
COPATH REPORT: NORMAL
CREAT SERPL-MCNC: 0.66 MG/DL (ref 0.52–1.04)
CREAT SERPL-MCNC: 0.71 MG/DL (ref 0.52–1.04)
CREAT SERPL-MCNC: 0.74 MG/DL (ref 0.6–1.1)
CREAT SERPL-MCNC: 0.74 MG/DL (ref 0.6–1.1)
CREAT SERPL-MCNC: 0.75 MG/DL (ref 0.52–1.04)
CREAT SERPL-MCNC: 0.75 MG/DL (ref 0.6–1.1)
CREAT SERPL-MCNC: 0.75 MG/DL (ref 0.6–1.1)
CREAT SERPL-MCNC: 0.76 MG/DL (ref 0.52–1.04)
CREAT SERPL-MCNC: 0.79 MG/DL (ref 0.52–1.04)
CREAT SERPL-MCNC: 0.84 MG/DL (ref 0.52–1.04)
CREAT SERPL-MCNC: 0.86 MG/DL (ref 0.52–1.04)
CREAT SERPL-MCNC: 0.86 MG/DL (ref 0.52–1.04)
CREAT SERPL-MCNC: 0.9 MG/DL (ref 0.52–1.04)
CREAT SERPL-MCNC: 0.94 MG/DL (ref 0.52–1.04)
CREAT UR-MCNC: 142 MG/DL
CRP SERPL-MCNC: 100 MG/L (ref 0–8)
DEPRECATED CALCIDIOL+CALCIFEROL SERPL-MC: 55 UG/L (ref 20–75)
DIFFERENTIAL METHOD BLD: ABNORMAL
DIFFERENTIAL: ABNORMAL
EOSINOPHIL # BLD AUTO: 0.1 10E9/L (ref 0–0.7)
EOSINOPHIL # BLD AUTO: 0.2 10E9/L (ref 0–0.7)
EOSINOPHIL # BLD AUTO: 0.2 10E9/L (ref 0–0.7)
EOSINOPHIL # BLD AUTO: 0.2 THOU/UL (ref 0–0.4)
EOSINOPHIL NFR BLD AUTO: 0.6 %
EOSINOPHIL NFR BLD AUTO: 1.1 %
EOSINOPHIL NFR BLD AUTO: 1.6 %
EOSINOPHIL NFR BLD AUTO: 1.7 %
EOSINOPHIL NFR BLD AUTO: 2.3 %
EOSINOPHIL NFR BLD AUTO: 2.9 %
EOSINOPHIL NFR BLD AUTO: 3 % (ref 0–6)
EPO SERPL-ACNC: 15 MU/ML (ref 4–27)
ERYTHROCYTE [DISTWIDTH] IN BLOOD BY AUTOMATED COUNT: 13.2 % (ref 10–15)
ERYTHROCYTE [DISTWIDTH] IN BLOOD BY AUTOMATED COUNT: 14.2 % (ref 10–15)
ERYTHROCYTE [DISTWIDTH] IN BLOOD BY AUTOMATED COUNT: 14.5 % (ref 10–15)
ERYTHROCYTE [DISTWIDTH] IN BLOOD BY AUTOMATED COUNT: 14.6 % (ref 10–15)
ERYTHROCYTE [DISTWIDTH] IN BLOOD BY AUTOMATED COUNT: 15 % (ref 10–15)
ERYTHROCYTE [DISTWIDTH] IN BLOOD BY AUTOMATED COUNT: 16.5 % (ref 10–15)
ERYTHROCYTE [DISTWIDTH] IN BLOOD BY AUTOMATED COUNT: 18 % (ref 11–14.5)
ERYTHROCYTE [DISTWIDTH] IN BLOOD BY AUTOMATED COUNT: 18 % (ref 11–14.5)
ERYTHROCYTE [DISTWIDTH] IN BLOOD BY AUTOMATED COUNT: 18.9 % (ref 10–15)
ERYTHROCYTE [DISTWIDTH] IN BLOOD BY AUTOMATED COUNT: 19.5 % (ref 11–14.5)
ERYTHROCYTE [DISTWIDTH] IN BLOOD BY AUTOMATED COUNT: 19.6 % (ref 11–14.5)
ERYTHROCYTE [SEDIMENTATION RATE] IN BLOOD BY WESTERGREN METHOD: 65 MM/H (ref 0–30)
ERYTHROCYTE [SEDIMENTATION RATE] IN BLOOD BY WESTERGREN METHOD: 89 MM/H (ref 0–30)
ERYTHROCYTE [SEDIMENTATION RATE] IN BLOOD BY WESTERGREN METHOD: 92 MM/H (ref 0–30)
FERRITIN SERPL-MCNC: 274 NG/ML (ref 8–252)
FERRITIN SERPL-MCNC: 307 NG/ML (ref 8–252)
FERRITIN SERPL-MCNC: 867 NG/ML (ref 8–252)
FERRITIN SERPL-MCNC: 942 NG/ML (ref 8–252)
FOLATE SERPL-MCNC: 39.2 NG/ML
GAMMA GLOB SERPL ELPH-MCNC: 1.1 G/DL (ref 0.7–1.6)
GAMMA GLOB SERPL ELPH-MCNC: 1.1 G/DL (ref 0.7–1.6)
GFR SERPL CREATININE-BSD FRML MDRD: 56 ML/MIN/{1.73_M2}
GFR SERPL CREATININE-BSD FRML MDRD: 59 ML/MIN/{1.73_M2}
GFR SERPL CREATININE-BSD FRML MDRD: 62 ML/MIN/{1.73_M2}
GFR SERPL CREATININE-BSD FRML MDRD: 63 ML/MIN/{1.73_M2}
GFR SERPL CREATININE-BSD FRML MDRD: 64 ML/MIN/{1.73_M2}
GFR SERPL CREATININE-BSD FRML MDRD: 69 ML/MIN/{1.73_M2}
GFR SERPL CREATININE-BSD FRML MDRD: 73 ML/MIN/{1.73_M2}
GFR SERPL CREATININE-BSD FRML MDRD: 74 ML/MIN/{1.73_M2}
GFR SERPL CREATININE-BSD FRML MDRD: 78 ML/MIN/{1.73_M2}
GFR SERPL CREATININE-BSD FRML MDRD: 82 ML/MIN/{1.73_M2}
GFR SERPL CREATININE-BSD FRML MDRD: >60 ML/MIN/1.73M2
GLUCOSE BLD-MCNC: 82 MG/DL (ref 70–125)
GLUCOSE BLD-MCNC: 83 MG/DL (ref 70–125)
GLUCOSE SERPL-MCNC: 101 MG/DL (ref 70–99)
GLUCOSE SERPL-MCNC: 126 MG/DL (ref 70–99)
GLUCOSE SERPL-MCNC: 79 MG/DL (ref 70–99)
GLUCOSE SERPL-MCNC: 81 MG/DL (ref 70–99)
GLUCOSE SERPL-MCNC: 82 MG/DL (ref 70–125)
GLUCOSE SERPL-MCNC: 83 MG/DL (ref 70–125)
GLUCOSE SERPL-MCNC: 84 MG/DL (ref 70–99)
GLUCOSE SERPL-MCNC: 84 MG/DL (ref 70–99)
GLUCOSE SERPL-MCNC: 89 MG/DL (ref 70–99)
GLUCOSE SERPL-MCNC: 90 MG/DL (ref 70–99)
GLUCOSE SERPL-MCNC: 96 MG/DL (ref 70–99)
GLUCOSE SERPL-MCNC: 97 MG/DL (ref 70–99)
GLUCOSE SERPL-MCNC: 97 MG/DL (ref 70–99)
HCT VFR BLD AUTO: 27.1 % (ref 35–47)
HCT VFR BLD AUTO: 27.1 % (ref 35–47)
HCT VFR BLD AUTO: 30 % (ref 35–47)
HCT VFR BLD AUTO: 30.2 % (ref 35–47)
HCT VFR BLD AUTO: 30.7 % (ref 35–47)
HCT VFR BLD AUTO: 31.4 % (ref 35–47)
HCT VFR BLD AUTO: 31.7 % (ref 35–47)
HCT VFR BLD AUTO: 31.9 % (ref 35–47)
HCT VFR BLD AUTO: 33.1 % (ref 35–47)
HEMOGLOBIN: 8.2 G/DL (ref 12–16)
HEMOGLOBIN: 9.4 G/DL (ref 12–16)
HGB BLD-MCNC: 10.1 G/DL (ref 11.7–15.7)
HGB BLD-MCNC: 10.2 G/DL (ref 11.7–15.7)
HGB BLD-MCNC: 10.3 G/DL (ref 11.7–15.7)
HGB BLD-MCNC: 10.5 G/DL (ref 11.7–15.7)
HGB BLD-MCNC: 8.2 G/DL (ref 12–16)
HGB BLD-MCNC: 8.3 G/DL (ref 11.7–15.7)
HGB BLD-MCNC: 8.4 G/DL (ref 11.7–15.7)
HGB BLD-MCNC: 9.4 G/DL (ref 12–16)
HGB BLD-MCNC: 9.5 G/DL (ref 11.7–15.7)
HGB BLD-MCNC: 9.6 G/DL (ref 11.7–15.7)
HGB BLD-MCNC: 9.7 G/DL (ref 11.7–15.7)
IGA SERPL-MCNC: 459 MG/DL (ref 70–380)
IGA SERPL-MCNC: 470 MG/DL (ref 70–380)
IGG SERPL-MCNC: 1030 MG/DL (ref 695–1620)
IGG SERPL-MCNC: 1160 MG/DL (ref 695–1620)
IGM SERPL-MCNC: 60 MG/DL (ref 60–265)
IGM SERPL-MCNC: 62 MG/DL (ref 60–265)
IMM GRANULOCYTES # BLD: 0 10E9/L (ref 0–0.4)
IMM GRANULOCYTES NFR BLD: 0.1 %
IMM GRANULOCYTES NFR BLD: 0.2 %
IMM GRANULOCYTES NFR BLD: 0.2 %
IRON SATN MFR SERPL: 10 % (ref 15–46)
IRON SATN MFR SERPL: 16 % (ref 15–46)
IRON SATN MFR SERPL: 6 % (ref 15–46)
IRON SERPL-MCNC: 15 UG/DL (ref 35–180)
IRON SERPL-MCNC: 22 UG/DL (ref 35–180)
IRON SERPL-MCNC: 30 UG/DL (ref 35–180)
KAPPA LC UR-MCNC: 2.1 MG/DL (ref 0.33–1.94)
KAPPA LC UR-MCNC: 4.31 MG/DL (ref 0.33–1.94)
KAPPA LC/LAMBDA SER: 1.38 {RATIO} (ref 0.26–1.65)
KAPPA LC/LAMBDA SER: 1.74 {RATIO} (ref 0.26–1.65)
LAMBDA LC SERPL-MCNC: 1.52 MG/DL (ref 0.57–2.63)
LAMBDA LC SERPL-MCNC: 2.48 MG/DL (ref 0.57–2.63)
LDH SERPL L TO P-CCNC: 149 U/L (ref 81–234)
LDH SERPL L TO P-CCNC: 182 U/L (ref 81–234)
LYMPHOCYTES # BLD AUTO: 1.7 10E9/L (ref 0.8–5.3)
LYMPHOCYTES # BLD AUTO: 2.2 10E9/L (ref 0.8–5.3)
LYMPHOCYTES # BLD AUTO: 2.3 10E9/L (ref 0.8–5.3)
LYMPHOCYTES # BLD AUTO: 2.3 10E9/L (ref 0.8–5.3)
LYMPHOCYTES # BLD AUTO: 2.4 10E9/L (ref 0.8–5.3)
LYMPHOCYTES # BLD AUTO: 2.4 THOU/UL (ref 0.8–4.4)
LYMPHOCYTES # BLD AUTO: 2.5 10E9/L (ref 0.8–5.3)
LYMPHOCYTES NFR BLD AUTO: 27.3 %
LYMPHOCYTES NFR BLD AUTO: 29.2 %
LYMPHOCYTES NFR BLD AUTO: 29.6 %
LYMPHOCYTES NFR BLD AUTO: 30.5 %
LYMPHOCYTES NFR BLD AUTO: 31 % (ref 20–40)
LYMPHOCYTES NFR BLD AUTO: 35.3 %
LYMPHOCYTES NFR BLD AUTO: 40.2 %
M PROTEIN SERPL ELPH-MCNC: 0.1 G/DL
M PROTEIN SERPL ELPH-MCNC: 0.2 G/DL
MAGNESIUM SERPL-MCNC: 1.6 MG/DL (ref 1.6–2.3)
MCH RBC QN AUTO: 27.2 PG (ref 26.5–33)
MCH RBC QN AUTO: 28.7 PG (ref 26.5–33)
MCH RBC QN AUTO: 28.9 PG (ref 26.5–33)
MCH RBC QN AUTO: 29.1 PG (ref 26.5–33)
MCH RBC QN AUTO: 29.4 PG (ref 27–34)
MCH RBC QN AUTO: 29.4 PG (ref 27–34)
MCH RBC QN AUTO: 29.9 PG (ref 27–34)
MCH RBC QN AUTO: 29.9 PG (ref 27–34)
MCH RBC QN AUTO: 30.1 PG (ref 26.5–33)
MCH RBC QN AUTO: 30.7 PG (ref 26.5–33)
MCH RBC QN AUTO: 31.6 PG (ref 26.5–33)
MCHC RBC AUTO-ENTMCNC: 29.9 G/DL (ref 32–36)
MCHC RBC AUTO-ENTMCNC: 29.9 G/DL (ref 32–36)
MCHC RBC AUTO-ENTMCNC: 30.3 G/DL (ref 32–36)
MCHC RBC AUTO-ENTMCNC: 30.3 G/DL (ref 32–36)
MCHC RBC AUTO-ENTMCNC: 30.4 G/DL (ref 31.5–36.5)
MCHC RBC AUTO-ENTMCNC: 31.7 G/DL (ref 31.5–36.5)
MCHC RBC AUTO-ENTMCNC: 31.7 G/DL (ref 31.5–36.5)
MCHC RBC AUTO-ENTMCNC: 31.8 G/DL (ref 31.5–36.5)
MCHC RBC AUTO-ENTMCNC: 31.9 G/DL (ref 31.5–36.5)
MCHC RBC AUTO-ENTMCNC: 32.8 G/DL (ref 31.5–36.5)
MCHC RBC AUTO-ENTMCNC: 33.2 G/DL (ref 31.5–36.5)
MCV RBC AUTO: 100 FL (ref 80–100)
MCV RBC AUTO: 100 FL (ref 80–100)
MCV RBC AUTO: 88 FL (ref 78–100)
MCV RBC AUTO: 88 FL (ref 78–100)
MCV RBC AUTO: 90 FL (ref 78–100)
MCV RBC AUTO: 91 FL (ref 78–100)
MCV RBC AUTO: 95 FL (ref 78–100)
MCV RBC AUTO: 97 FL (ref 78–100)
MCV RBC AUTO: 97 FL (ref 80–100)
MCV RBC AUTO: 97 FL (ref 80–100)
MCV RBC AUTO: 99 FL (ref 78–100)
MICROALBUMIN UR-MCNC: 9 MG/L
MICROALBUMIN/CREAT UR: 6.19 MG/G CR (ref 0–25)
MONOCYTES # BLD AUTO: 0.4 10E9/L (ref 0–1.3)
MONOCYTES # BLD AUTO: 0.5 10E9/L (ref 0–1.3)
MONOCYTES # BLD AUTO: 0.5 10E9/L (ref 0–1.3)
MONOCYTES # BLD AUTO: 0.6 THOU/UL (ref 0–0.9)
MONOCYTES # BLD AUTO: 0.7 10E9/L (ref 0–1.3)
MONOCYTES NFR BLD AUTO: 10.2 %
MONOCYTES NFR BLD AUTO: 5.5 %
MONOCYTES NFR BLD AUTO: 6 %
MONOCYTES NFR BLD AUTO: 6.5 %
MONOCYTES NFR BLD AUTO: 7 % (ref 2–10)
MONOCYTES NFR BLD AUTO: 7.1 %
MONOCYTES NFR BLD AUTO: 9.3 %
MRSA DNA SPEC QL NAA+PROBE: NEGATIVE
NEUTROPHILS # BLD AUTO: 2.8 10E9/L (ref 1.6–8.3)
NEUTROPHILS # BLD AUTO: 3.9 10E9/L (ref 1.6–8.3)
NEUTROPHILS # BLD AUTO: 4.1 10E9/L (ref 1.6–8.3)
NEUTROPHILS # BLD AUTO: 4.1 10E9/L (ref 1.6–8.3)
NEUTROPHILS # BLD AUTO: 4.5 THOU/UL (ref 2–7.7)
NEUTROPHILS # BLD AUTO: 4.6 10E9/L (ref 1.6–8.3)
NEUTROPHILS # BLD AUTO: 5.2 10E9/L (ref 1.6–8.3)
NEUTROPHILS NFR BLD AUTO: 48.1 %
NEUTROPHILS NFR BLD AUTO: 56.6 %
NEUTROPHILS NFR BLD AUTO: 57.3 %
NEUTROPHILS NFR BLD AUTO: 58 % (ref 50–70)
NEUTROPHILS NFR BLD AUTO: 60.3 %
NEUTROPHILS NFR BLD AUTO: 63.3 %
NEUTROPHILS NFR BLD AUTO: 64.3 %
NRBC # BLD AUTO: 0 10*3/UL
NRBC BLD AUTO-RTO: 0 /100
NT-PROBNP SERPL-MCNC: 309 PG/ML (ref 0–450)
PLATELET # BLD AUTO: 360 10E9/L (ref 150–450)
PLATELET # BLD AUTO: 371 THOU/UL (ref 140–440)
PLATELET # BLD AUTO: 371 THOU/UL (ref 140–440)
PLATELET # BLD AUTO: 404 10E9/L (ref 150–450)
PLATELET # BLD AUTO: 462 THOU/UL (ref 140–440)
PLATELET # BLD AUTO: 462 THOU/UL (ref 140–440)
PLATELET # BLD AUTO: 477 10E9/L (ref 150–450)
PLATELET # BLD AUTO: 486 10E9/L (ref 150–450)
PLATELET # BLD AUTO: 531 10E9/L (ref 150–450)
PLATELET # BLD AUTO: 584 10E9/L (ref 150–450)
PLATELET # BLD AUTO: 606 10E9/L (ref 150–450)
PMV BLD AUTO: 10 FL (ref 8.5–12.5)
PMV BLD AUTO: 9.9 FL (ref 8.5–12.5)
POTASSIUM BLD-SCNC: 3.7 MMOL/L (ref 3.5–5)
POTASSIUM BLD-SCNC: 4 MMOL/L (ref 3.5–5)
POTASSIUM SERPL-SCNC: 3.4 MMOL/L (ref 3.4–5.3)
POTASSIUM SERPL-SCNC: 3.4 MMOL/L (ref 3.4–5.3)
POTASSIUM SERPL-SCNC: 3.5 MMOL/L (ref 3.4–5.3)
POTASSIUM SERPL-SCNC: 3.6 MMOL/L (ref 3.4–5.3)
POTASSIUM SERPL-SCNC: 3.7 MMOL/L (ref 3.4–5.3)
POTASSIUM SERPL-SCNC: 3.7 MMOL/L (ref 3.4–5.3)
POTASSIUM SERPL-SCNC: 3.7 MMOL/L (ref 3.5–5)
POTASSIUM SERPL-SCNC: 3.8 MMOL/L (ref 3.4–5.3)
POTASSIUM SERPL-SCNC: 4 MMOL/L (ref 3.4–5.3)
POTASSIUM SERPL-SCNC: 4 MMOL/L (ref 3.5–5)
POTASSIUM SERPL-SCNC: 4.1 MMOL/L (ref 3.4–5.3)
POTASSIUM SERPL-SCNC: 4.1 MMOL/L (ref 3.4–5.3)
POTASSIUM SERPL-SCNC: 4.2 MMOL/L (ref 3.4–5.3)
PROT PATTERN SERPL ELPH-IMP: ABNORMAL
PROT PATTERN SERPL ELPH-IMP: ABNORMAL
PROT PATTERN SERPL IFE-IMP: ABNORMAL
PROT SERPL-MCNC: 7.4 G/DL (ref 6.8–8.8)
PROT SERPL-MCNC: 7.5 G/DL (ref 6.8–8.8)
PROT SERPL-MCNC: 8.1 G/DL (ref 6.8–8.8)
PROT SERPL-MCNC: 8.4 G/DL (ref 6.8–8.8)
PTH-INTACT SERPL-MCNC: 36 PG/ML (ref 18–80)
RBC # BLD AUTO: 2.79 MILL/UL (ref 3.8–5.4)
RBC # BLD AUTO: 2.79 MILL/UL (ref 3.8–5.4)
RBC # BLD AUTO: 3.14 MILL/UL (ref 3.8–5.4)
RBC # BLD AUTO: 3.14 MILL/UL (ref 3.8–5.4)
RBC # BLD AUTO: 3.19 10E12/L (ref 3.8–5.2)
RBC # BLD AUTO: 3.2 10E12/L (ref 3.8–5.2)
RBC # BLD AUTO: 3.31 10E12/L (ref 3.8–5.2)
RBC # BLD AUTO: 3.42 10E12/L (ref 3.8–5.2)
RBC # BLD AUTO: 3.5 10E12/L (ref 3.8–5.2)
RBC # BLD AUTO: 3.56 10E12/L (ref 3.8–5.2)
RBC # BLD AUTO: 3.56 10E12/L (ref 3.8–5.2)
RETICS # AUTO: 43.1 10E9/L (ref 25–95)
RETICS # AUTO: 48.6 10E9/L (ref 25–95)
RETICS # AUTO: 51.5 10E9/L (ref 25–95)
RETICS/RBC NFR AUTO: 1.2 % (ref 0.5–2)
RETICS/RBC NFR AUTO: 1.4 % (ref 0.5–2)
RETICS/RBC NFR AUTO: 1.6 % (ref 0.5–2)
SODIUM SERPL-SCNC: 131 MMOL/L (ref 133–144)
SODIUM SERPL-SCNC: 132 MMOL/L (ref 133–144)
SODIUM SERPL-SCNC: 133 MMOL/L (ref 133–144)
SODIUM SERPL-SCNC: 134 MMOL/L (ref 133–144)
SODIUM SERPL-SCNC: 136 MMOL/L (ref 133–144)
SODIUM SERPL-SCNC: 136 MMOL/L (ref 133–144)
SODIUM SERPL-SCNC: 137 MMOL/L (ref 133–144)
SODIUM SERPL-SCNC: 137 MMOL/L (ref 133–144)
SODIUM SERPL-SCNC: 138 MMOL/L (ref 133–144)
SODIUM SERPL-SCNC: 139 MMOL/L (ref 133–144)
SODIUM SERPL-SCNC: 139 MMOL/L (ref 136–145)
SPECIMEN EXP DATE BLD: NORMAL
SPECIMEN SOURCE: NORMAL
TIBC SERPL-MCNC: 184 UG/DL (ref 240–430)
TIBC SERPL-MCNC: 212 UG/DL (ref 240–430)
TIBC SERPL-MCNC: 261 UG/DL (ref 240–430)
TSH SERPL DL<=0.005 MIU/L-ACNC: 1.52 MU/L (ref 0.4–4)
VIT B12 SERPL-MCNC: 4033 PG/ML (ref 193–986)
VIT B12 SERPL-MCNC: >2000 PG/ML (ref 213–816)
WBC # BLD AUTO: 5.7 10E9/L (ref 4–11)
WBC # BLD AUTO: 6.1 10E9/L (ref 4–11)
WBC # BLD AUTO: 7.1 10E9/L (ref 4–11)
WBC # BLD AUTO: 7.3 10E9/L (ref 4–11)
WBC # BLD AUTO: 7.6 10E9/L (ref 4–11)
WBC # BLD AUTO: 7.8 10E9/L (ref 4–11)
WBC # BLD AUTO: 7.8 THOU/UL (ref 4–11)
WBC # BLD AUTO: 8.1 10E9/L (ref 4–11)
WBC # BLD AUTO: 8.6 THOU/UL (ref 4–11)
WBC: 7.8 THOU/UL (ref 4–11)
WBC: 8.6 THOU/UL (ref 4–11)

## 2019-01-01 PROCEDURE — 25000128 H RX IP 250 OP 636: Performed by: ANESTHESIOLOGY

## 2019-01-01 PROCEDURE — 36415 COLL VENOUS BLD VENIPUNCTURE: CPT | Performed by: INTERNAL MEDICINE

## 2019-01-01 PROCEDURE — 25000125 ZZHC RX 250: Performed by: ORTHOPAEDIC SURGERY

## 2019-01-01 PROCEDURE — C1776 JOINT DEVICE (IMPLANTABLE): HCPCS | Performed by: ORTHOPAEDIC SURGERY

## 2019-01-01 PROCEDURE — 36415 COLL VENOUS BLD VENIPUNCTURE: CPT | Performed by: FAMILY MEDICINE

## 2019-01-01 PROCEDURE — 99213 OFFICE O/P EST LOW 20 MIN: CPT | Performed by: FAMILY MEDICINE

## 2019-01-01 PROCEDURE — 85652 RBC SED RATE AUTOMATED: CPT | Performed by: INTERNAL MEDICINE

## 2019-01-01 PROCEDURE — 82330 ASSAY OF CALCIUM: CPT | Performed by: FAMILY MEDICINE

## 2019-01-01 PROCEDURE — 37000009 ZZH ANESTHESIA TECHNICAL FEE, EACH ADDTL 15 MIN: Performed by: ORTHOPAEDIC SURGERY

## 2019-01-01 PROCEDURE — 97165 OT EVAL LOW COMPLEX 30 MIN: CPT | Mod: GO | Performed by: OCCUPATIONAL THERAPIST

## 2019-01-01 PROCEDURE — 99213 OFFICE O/P EST LOW 20 MIN: CPT | Mod: ZP | Performed by: INTERNAL MEDICINE

## 2019-01-01 PROCEDURE — 87640 STAPH A DNA AMP PROBE: CPT | Performed by: CLINICAL NURSE SPECIALIST

## 2019-01-01 PROCEDURE — 25000128 H RX IP 250 OP 636: Performed by: ORTHOPAEDIC SURGERY

## 2019-01-01 PROCEDURE — 85018 HEMOGLOBIN: CPT | Performed by: INTERNAL MEDICINE

## 2019-01-01 PROCEDURE — 25000132 ZZH RX MED GY IP 250 OP 250 PS 637: Performed by: ORTHOPAEDIC SURGERY

## 2019-01-01 PROCEDURE — 97116 GAIT TRAINING THERAPY: CPT | Mod: GP | Performed by: PHYSICAL THERAPIST

## 2019-01-01 PROCEDURE — 00000402 ZZHCL STATISTIC TOTAL PROTEIN: Performed by: INTERNAL MEDICINE

## 2019-01-01 PROCEDURE — 71000015 ZZH RECOVERY PHASE 1 LEVEL 2 EA ADDTL HR: Performed by: ORTHOPAEDIC SURGERY

## 2019-01-01 PROCEDURE — 25800030 ZZH RX IP 258 OP 636: Performed by: ORTHOPAEDIC SURGERY

## 2019-01-01 PROCEDURE — 82728 ASSAY OF FERRITIN: CPT | Performed by: INTERNAL MEDICINE

## 2019-01-01 PROCEDURE — 99207 ZZC NO CHARGE NURSE ONLY: CPT

## 2019-01-01 PROCEDURE — 82947 ASSAY GLUCOSE BLOOD QUANT: CPT | Performed by: ANESTHESIOLOGY

## 2019-01-01 PROCEDURE — 80048 BASIC METABOLIC PNL TOTAL CA: CPT | Performed by: FAMILY MEDICINE

## 2019-01-01 PROCEDURE — 85045 AUTOMATED RETICULOCYTE COUNT: CPT | Performed by: INTERNAL MEDICINE

## 2019-01-01 PROCEDURE — 25000128 H RX IP 250 OP 636: Mod: ZF | Performed by: INTERNAL MEDICINE

## 2019-01-01 PROCEDURE — 80076 HEPATIC FUNCTION PANEL: CPT | Performed by: FAMILY MEDICINE

## 2019-01-01 PROCEDURE — 99232 SBSQ HOSP IP/OBS MODERATE 35: CPT | Performed by: INTERNAL MEDICINE

## 2019-01-01 PROCEDURE — 99207 ZZC CDG-MDM COMPONENT: MEETS LOW - DOWN CODED: CPT | Performed by: NURSE PRACTITIONER

## 2019-01-01 PROCEDURE — 83883 ASSAY NEPHELOMETRY NOT SPEC: CPT | Performed by: INTERNAL MEDICINE

## 2019-01-01 PROCEDURE — 82784 ASSAY IGA/IGD/IGG/IGM EACH: CPT | Performed by: INTERNAL MEDICINE

## 2019-01-01 PROCEDURE — 84165 PROTEIN E-PHORESIS SERUM: CPT | Performed by: INTERNAL MEDICINE

## 2019-01-01 PROCEDURE — 82746 ASSAY OF FOLIC ACID SERUM: CPT | Performed by: CLINICAL NURSE SPECIALIST

## 2019-01-01 PROCEDURE — 25000132 ZZH RX MED GY IP 250 OP 250 PS 637: Performed by: STUDENT IN AN ORGANIZED HEALTH CARE EDUCATION/TRAINING PROGRAM

## 2019-01-01 PROCEDURE — 25800030 ZZH RX IP 258 OP 636: Performed by: INTERNAL MEDICINE

## 2019-01-01 PROCEDURE — 27210794 ZZH OR GENERAL SUPPLY STERILE: Performed by: ORTHOPAEDIC SURGERY

## 2019-01-01 PROCEDURE — 86334 IMMUNOFIX E-PHORESIS SERUM: CPT | Performed by: INTERNAL MEDICINE

## 2019-01-01 PROCEDURE — 83540 ASSAY OF IRON: CPT | Performed by: INTERNAL MEDICINE

## 2019-01-01 PROCEDURE — 25000125 ZZHC RX 250

## 2019-01-01 PROCEDURE — 83550 IRON BINDING TEST: CPT | Performed by: INTERNAL MEDICINE

## 2019-01-01 PROCEDURE — 85025 COMPLETE CBC W/AUTO DIFF WBC: CPT | Performed by: FAMILY MEDICINE

## 2019-01-01 PROCEDURE — 36415 COLL VENOUS BLD VENIPUNCTURE: CPT

## 2019-01-01 PROCEDURE — 12000001 ZZH R&B MED SURG/OB UMMC

## 2019-01-01 PROCEDURE — 84132 ASSAY OF SERUM POTASSIUM: CPT | Performed by: ANESTHESIOLOGY

## 2019-01-01 PROCEDURE — 86140 C-REACTIVE PROTEIN: CPT | Performed by: INTERNAL MEDICINE

## 2019-01-01 PROCEDURE — 85018 HEMOGLOBIN: CPT | Performed by: ORTHOPAEDIC SURGERY

## 2019-01-01 PROCEDURE — 25000132 ZZH RX MED GY IP 250 OP 250 PS 637: Performed by: INTERNAL MEDICINE

## 2019-01-01 PROCEDURE — G0180 MD CERTIFICATION HHA PATIENT: HCPCS | Performed by: FAMILY MEDICINE

## 2019-01-01 PROCEDURE — 97110 THERAPEUTIC EXERCISES: CPT | Mod: GP | Performed by: PHYSICAL THERAPIST

## 2019-01-01 PROCEDURE — 99215 OFFICE O/P EST HI 40 MIN: CPT | Mod: ZP | Performed by: INTERNAL MEDICINE

## 2019-01-01 PROCEDURE — 82232 ASSAY OF BETA-2 PROTEIN: CPT | Performed by: INTERNAL MEDICINE

## 2019-01-01 PROCEDURE — 36000062 ZZH SURGERY LEVEL 4 1ST 30 MIN - UMMC: Performed by: ORTHOPAEDIC SURGERY

## 2019-01-01 PROCEDURE — 71000014 ZZH RECOVERY PHASE 1 LEVEL 2 FIRST HR: Performed by: ORTHOPAEDIC SURGERY

## 2019-01-01 PROCEDURE — 83970 ASSAY OF PARATHORMONE: CPT | Performed by: FAMILY MEDICINE

## 2019-01-01 PROCEDURE — 83735 ASSAY OF MAGNESIUM: CPT | Performed by: FAMILY MEDICINE

## 2019-01-01 PROCEDURE — 85060 BLOOD SMEAR INTERPRETATION: CPT | Performed by: FAMILY MEDICINE

## 2019-01-01 PROCEDURE — 99214 OFFICE O/P EST MOD 30 MIN: CPT | Performed by: FAMILY MEDICINE

## 2019-01-01 PROCEDURE — 85025 COMPLETE CBC W/AUTO DIFF WBC: CPT | Performed by: INTERNAL MEDICINE

## 2019-01-01 PROCEDURE — 25800030 ZZH RX IP 258 OP 636: Mod: ZF | Performed by: INTERNAL MEDICINE

## 2019-01-01 PROCEDURE — 80048 BASIC METABOLIC PNL TOTAL CA: CPT | Performed by: INTERNAL MEDICINE

## 2019-01-01 PROCEDURE — C1713 ANCHOR/SCREW BN/BN,TIS/BN: HCPCS | Performed by: ORTHOPAEDIC SURGERY

## 2019-01-01 PROCEDURE — 84443 ASSAY THYROID STIM HORMONE: CPT | Performed by: FAMILY MEDICINE

## 2019-01-01 PROCEDURE — 80053 COMPREHEN METABOLIC PANEL: CPT | Performed by: INTERNAL MEDICINE

## 2019-01-01 PROCEDURE — 99309 SBSQ NF CARE MODERATE MDM 30: CPT | Performed by: NURSE PRACTITIONER

## 2019-01-01 PROCEDURE — 82043 UR ALBUMIN QUANTITATIVE: CPT | Performed by: FAMILY MEDICINE

## 2019-01-01 PROCEDURE — 97535 SELF CARE MNGMENT TRAINING: CPT | Mod: GO | Performed by: OCCUPATIONAL THERAPIST

## 2019-01-01 PROCEDURE — 25800025 ZZH RX 258: Performed by: ORTHOPAEDIC SURGERY

## 2019-01-01 PROCEDURE — G0463 HOSPITAL OUTPT CLINIC VISIT: HCPCS | Mod: ZF

## 2019-01-01 PROCEDURE — 96374 THER/PROPH/DIAG INJ IV PUSH: CPT

## 2019-01-01 PROCEDURE — 97161 PT EVAL LOW COMPLEX 20 MIN: CPT | Mod: GP | Performed by: PHYSICAL THERAPIST

## 2019-01-01 PROCEDURE — 99207 ZZC MOONLIGHTING INDICATOR: CPT | Performed by: INTERNAL MEDICINE

## 2019-01-01 PROCEDURE — 36000064 ZZH SURGERY LEVEL 4 EA 15 ADDTL MIN - UMMC: Performed by: ORTHOPAEDIC SURGERY

## 2019-01-01 PROCEDURE — 36415 COLL VENOUS BLD VENIPUNCTURE: CPT | Performed by: ORTHOPAEDIC SURGERY

## 2019-01-01 PROCEDURE — 25000128 H RX IP 250 OP 636: Performed by: NURSE ANESTHETIST, CERTIFIED REGISTERED

## 2019-01-01 PROCEDURE — 25000128 H RX IP 250 OP 636

## 2019-01-01 PROCEDURE — 82668 ASSAY OF ERYTHROPOIETIN: CPT | Performed by: INTERNAL MEDICINE

## 2019-01-01 PROCEDURE — 83615 LACTATE (LD) (LDH) ENZYME: CPT | Performed by: INTERNAL MEDICINE

## 2019-01-01 PROCEDURE — 99205 OFFICE O/P NEW HI 60 MIN: CPT | Mod: ZP | Performed by: INTERNAL MEDICINE

## 2019-01-01 PROCEDURE — 40000170 ZZH STATISTIC PRE-PROCEDURE ASSESSMENT II: Performed by: ORTHOPAEDIC SURGERY

## 2019-01-01 PROCEDURE — 99316 NF DSCHRG MGMT 30 MIN+: CPT | Performed by: NURSE PRACTITIONER

## 2019-01-01 PROCEDURE — 82306 VITAMIN D 25 HYDROXY: CPT | Performed by: FAMILY MEDICINE

## 2019-01-01 PROCEDURE — 0SRC0J9 REPLACEMENT OF RIGHT KNEE JOINT WITH SYNTHETIC SUBSTITUTE, CEMENTED, OPEN APPROACH: ICD-10-PCS | Performed by: ORTHOPAEDIC SURGERY

## 2019-01-01 PROCEDURE — 85045 AUTOMATED RETICULOCYTE COUNT: CPT | Performed by: FAMILY MEDICINE

## 2019-01-01 PROCEDURE — 25000128 H RX IP 250 OP 636: Performed by: INTERNAL MEDICINE

## 2019-01-01 PROCEDURE — 27810169 ZZH OR IMPLANT GENERAL: Performed by: ORTHOPAEDIC SURGERY

## 2019-01-01 PROCEDURE — 40000985 XR KNEE PORT RT 1/2 VW: Mod: RT

## 2019-01-01 PROCEDURE — 37000008 ZZH ANESTHESIA TECHNICAL FEE, 1ST 30 MIN: Performed by: ORTHOPAEDIC SURGERY

## 2019-01-01 PROCEDURE — 87641 MR-STAPH DNA AMP PROBE: CPT | Performed by: CLINICAL NURSE SPECIALIST

## 2019-01-01 PROCEDURE — 80053 COMPREHEN METABOLIC PANEL: CPT | Performed by: FAMILY MEDICINE

## 2019-01-01 PROCEDURE — 25800030 ZZH RX IP 258 OP 636: Performed by: NURSE ANESTHETIST, CERTIFIED REGISTERED

## 2019-01-01 PROCEDURE — 25800030 ZZH RX IP 258 OP 636

## 2019-01-01 PROCEDURE — 97530 THERAPEUTIC ACTIVITIES: CPT | Mod: GP | Performed by: PHYSICAL THERAPIST

## 2019-01-01 PROCEDURE — 36415 COLL VENOUS BLD VENIPUNCTURE: CPT | Performed by: ANESTHESIOLOGY

## 2019-01-01 DEVICE — IMPLANTABLE DEVICE: Type: IMPLANTABLE DEVICE | Site: KNEE | Status: FUNCTIONAL

## 2019-01-01 DEVICE — IMP PATELLA ZIM KNEE ALL POLLY 32MM 42-5400-000-32: Type: IMPLANTABLE DEVICE | Site: KNEE | Status: FUNCTIONAL

## 2019-01-01 DEVICE — BONE CEMENT SIMPLEX W/TOBRAMYCIN 6197-9-001: Type: IMPLANTABLE DEVICE | Site: KNEE | Status: FUNCTIONAL

## 2019-01-01 DEVICE — IMP TIBIAL ZIM PSN NP STM 5DEG SZ DR 42-5320-067-02: Type: IMPLANTABLE DEVICE | Site: KNEE | Status: FUNCTIONAL

## 2019-01-01 RX ORDER — ACETAMINOPHEN 325 MG/1
975 TABLET ORAL EVERY 8 HOURS
Status: COMPLETED | OUTPATIENT
Start: 2019-01-01 | End: 2019-01-01

## 2019-01-01 RX ORDER — NALOXONE HYDROCHLORIDE 0.4 MG/ML
.1-.4 INJECTION, SOLUTION INTRAMUSCULAR; INTRAVENOUS; SUBCUTANEOUS
Status: DISCONTINUED | OUTPATIENT
Start: 2019-01-01 | End: 2019-01-01

## 2019-01-01 RX ORDER — OXYCODONE HYDROCHLORIDE 5 MG/1
5 TABLET ORAL EVERY 4 HOURS PRN
Qty: 90 TABLET | Refills: 0 | Status: SHIPPED | OUTPATIENT
Start: 2019-01-01 | End: 2019-01-01

## 2019-01-01 RX ORDER — DEXAMETHASONE SODIUM PHOSPHATE 4 MG/ML
4 INJECTION, SOLUTION INTRA-ARTICULAR; INTRALESIONAL; INTRAMUSCULAR; INTRAVENOUS; SOFT TISSUE
Status: DISCONTINUED | OUTPATIENT
Start: 2019-01-01 | End: 2019-01-01

## 2019-01-01 RX ORDER — ONDANSETRON 2 MG/ML
4 INJECTION INTRAMUSCULAR; INTRAVENOUS EVERY 30 MIN PRN
Status: DISCONTINUED | OUTPATIENT
Start: 2019-01-01 | End: 2019-01-01

## 2019-01-01 RX ORDER — POLYETHYLENE GLYCOL 3350 17 G/17G
1 POWDER, FOR SOLUTION ORAL DAILY
Qty: 500 G | Refills: 0 | Status: SHIPPED | OUTPATIENT
Start: 2019-01-01 | End: 2019-01-01

## 2019-01-01 RX ORDER — FENTANYL CITRATE 50 UG/ML
INJECTION, SOLUTION INTRAMUSCULAR; INTRAVENOUS PRN
Status: DISCONTINUED | OUTPATIENT
Start: 2019-01-01 | End: 2019-01-01

## 2019-01-01 RX ORDER — CHOLECALCIFEROL (VITAMIN D3) 50 MCG
1 TABLET ORAL DAILY
Qty: 90 TABLET | Refills: 0 | Status: SHIPPED | OUTPATIENT
Start: 2019-01-01

## 2019-01-01 RX ORDER — ALBUTEROL SULFATE 90 UG/1
2 AEROSOL, METERED RESPIRATORY (INHALATION) EVERY 4 HOURS PRN
Status: DISCONTINUED | OUTPATIENT
Start: 2019-01-01 | End: 2019-01-01 | Stop reason: HOSPADM

## 2019-01-01 RX ORDER — NALOXONE HYDROCHLORIDE 0.4 MG/ML
.1-.4 INJECTION, SOLUTION INTRAMUSCULAR; INTRAVENOUS; SUBCUTANEOUS
Status: DISCONTINUED | OUTPATIENT
Start: 2019-01-01 | End: 2019-01-01 | Stop reason: HOSPADM

## 2019-01-01 RX ORDER — FENTANYL CITRATE 50 UG/ML
25-50 INJECTION, SOLUTION INTRAMUSCULAR; INTRAVENOUS
Status: DISCONTINUED | OUTPATIENT
Start: 2019-01-01 | End: 2019-01-01

## 2019-01-01 RX ORDER — ACETAMINOPHEN 325 MG/1
975 TABLET ORAL ONCE
Status: COMPLETED | OUTPATIENT
Start: 2019-01-01 | End: 2019-01-01

## 2019-01-01 RX ORDER — HYDROCHLOROTHIAZIDE 12.5 MG/1
12.5 TABLET ORAL DAILY
Qty: 30 TABLET | Refills: 0 | Status: SHIPPED | OUTPATIENT
Start: 2019-01-01 | End: 2019-01-01

## 2019-01-01 RX ORDER — DIPHENHYDRAMINE HYDROCHLORIDE 50 MG/ML
50 INJECTION INTRAMUSCULAR; INTRAVENOUS
Status: DISCONTINUED | OUTPATIENT
Start: 2019-01-01 | End: 2019-01-01 | Stop reason: HOSPADM

## 2019-01-01 RX ORDER — CALCIUM CARBONATE 500 MG/1
500 TABLET, CHEWABLE ORAL DAILY PRN
Status: DISCONTINUED | OUTPATIENT
Start: 2019-01-01 | End: 2019-01-01 | Stop reason: HOSPADM

## 2019-01-01 RX ORDER — METHYLPREDNISOLONE SODIUM SUCCINATE 125 MG/2ML
125 INJECTION, POWDER, LYOPHILIZED, FOR SOLUTION INTRAMUSCULAR; INTRAVENOUS
Status: DISCONTINUED | OUTPATIENT
Start: 2019-01-01 | End: 2019-01-01 | Stop reason: HOSPADM

## 2019-01-01 RX ORDER — EPHEDRINE SULFATE 50 MG/ML
INJECTION, SOLUTION INTRAVENOUS PRN
Status: DISCONTINUED | OUTPATIENT
Start: 2019-01-01 | End: 2019-01-01

## 2019-01-01 RX ORDER — SODIUM CHLORIDE, SODIUM LACTATE, POTASSIUM CHLORIDE, CALCIUM CHLORIDE 600; 310; 30; 20 MG/100ML; MG/100ML; MG/100ML; MG/100ML
INJECTION, SOLUTION INTRAVENOUS CONTINUOUS
Status: DISCONTINUED | OUTPATIENT
Start: 2019-01-01 | End: 2019-01-01

## 2019-01-01 RX ORDER — ACETAMINOPHEN 325 MG/1
650 TABLET ORAL EVERY 4 HOURS PRN
Qty: 40 TABLET | Refills: 0 | Status: SHIPPED | OUTPATIENT
Start: 2019-01-01 | End: 2019-01-01

## 2019-01-01 RX ORDER — ACETAMINOPHEN 500 MG
1000 TABLET ORAL 3 TIMES DAILY
Qty: 90 TABLET | Refills: 1 | Status: SHIPPED | OUTPATIENT
Start: 2019-01-01

## 2019-01-01 RX ORDER — PROPOFOL 10 MG/ML
INJECTION, EMULSION INTRAVENOUS CONTINUOUS PRN
Status: DISCONTINUED | OUTPATIENT
Start: 2019-01-01 | End: 2019-01-01

## 2019-01-01 RX ORDER — ASPIRIN 81 MG/1
81 TABLET ORAL 2 TIMES DAILY
Status: DISCONTINUED | OUTPATIENT
Start: 2019-01-01 | End: 2019-01-01 | Stop reason: HOSPADM

## 2019-01-01 RX ORDER — BUPIVACAINE HYDROCHLORIDE 7.5 MG/ML
INJECTION, SOLUTION EPIDURAL; RETROBULBAR PRN
Status: DISCONTINUED | OUTPATIENT
Start: 2019-01-01 | End: 2019-01-01

## 2019-01-01 RX ORDER — LABETALOL HYDROCHLORIDE 5 MG/ML
2.5 INJECTION, SOLUTION INTRAVENOUS
Status: DISCONTINUED | OUTPATIENT
Start: 2019-01-01 | End: 2019-01-01 | Stop reason: CLARIF

## 2019-01-01 RX ORDER — OXYCODONE HYDROCHLORIDE 5 MG/1
5 TABLET ORAL EVERY 12 HOURS PRN
Qty: 12 TABLET | Refills: 0 | Status: SHIPPED | OUTPATIENT
Start: 2019-01-01 | End: 2019-01-01

## 2019-01-01 RX ORDER — HYDROMORPHONE HCL/0.9% NACL/PF 0.2MG/0.2
0.2 SYRINGE (ML) INTRAVENOUS
Status: DISCONTINUED | OUTPATIENT
Start: 2019-01-01 | End: 2019-01-01 | Stop reason: HOSPADM

## 2019-01-01 RX ORDER — BISACODYL 10 MG
10 SUPPOSITORY, RECTAL RECTAL DAILY
Status: DISCONTINUED | OUTPATIENT
Start: 2019-01-01 | End: 2019-01-01 | Stop reason: HOSPADM

## 2019-01-01 RX ORDER — ONDANSETRON 4 MG/1
4 TABLET, ORALLY DISINTEGRATING ORAL EVERY 30 MIN PRN
Status: DISCONTINUED | OUTPATIENT
Start: 2019-01-01 | End: 2019-01-01

## 2019-01-01 RX ORDER — LISINOPRIL 20 MG/1
20 TABLET ORAL DAILY
Qty: 30 TABLET | Refills: 1 | Status: SHIPPED | OUTPATIENT
Start: 2019-01-01 | End: 2019-01-01

## 2019-01-01 RX ORDER — SODIUM CHLORIDE, SODIUM LACTATE, POTASSIUM CHLORIDE, CALCIUM CHLORIDE 600; 310; 30; 20 MG/100ML; MG/100ML; MG/100ML; MG/100ML
INJECTION, SOLUTION INTRAVENOUS CONTINUOUS
Status: DISCONTINUED | OUTPATIENT
Start: 2019-01-01 | End: 2019-01-01 | Stop reason: HOSPADM

## 2019-01-01 RX ORDER — ATORVASTATIN CALCIUM 40 MG/1
40 TABLET, FILM COATED ORAL DAILY
Status: DISCONTINUED | OUTPATIENT
Start: 2019-01-01 | End: 2019-01-01 | Stop reason: HOSPADM

## 2019-01-01 RX ORDER — HYDROMORPHONE HYDROCHLORIDE 1 MG/ML
0.2 INJECTION, SOLUTION INTRAMUSCULAR; INTRAVENOUS; SUBCUTANEOUS EVERY 5 MIN PRN
Status: DISCONTINUED | OUTPATIENT
Start: 2019-01-01 | End: 2019-01-01

## 2019-01-01 RX ORDER — HYDROCHLOROTHIAZIDE 25 MG/1
12.5 TABLET ORAL DAILY
Qty: 1 TABLET | Refills: 0 | Status: ON HOLD | COMMUNITY
Start: 2019-01-01 | End: 2019-01-01

## 2019-01-01 RX ORDER — SODIUM CHLORIDE, SODIUM LACTATE, POTASSIUM CHLORIDE, CALCIUM CHLORIDE 600; 310; 30; 20 MG/100ML; MG/100ML; MG/100ML; MG/100ML
INJECTION, SOLUTION INTRAVENOUS CONTINUOUS PRN
Status: DISCONTINUED | OUTPATIENT
Start: 2019-01-01 | End: 2019-01-01

## 2019-01-01 RX ORDER — AMOXICILLIN 250 MG
1 CAPSULE ORAL 2 TIMES DAILY
Status: DISCONTINUED | OUTPATIENT
Start: 2019-01-01 | End: 2019-01-01 | Stop reason: HOSPADM

## 2019-01-01 RX ORDER — ONDANSETRON 2 MG/ML
4 INJECTION INTRAMUSCULAR; INTRAVENOUS EVERY 6 HOURS PRN
Status: DISCONTINUED | OUTPATIENT
Start: 2019-01-01 | End: 2019-01-01 | Stop reason: HOSPADM

## 2019-01-01 RX ORDER — OXYCODONE HYDROCHLORIDE 5 MG/1
2.5-5 TABLET ORAL EVERY 4 HOURS PRN
Qty: 20 TABLET | Refills: 0 | Status: SHIPPED | DISCHARGE
Start: 2019-01-01 | End: 2019-01-01 | Stop reason: DRUGHIGH

## 2019-01-01 RX ORDER — LISINOPRIL 20 MG/1
20 TABLET ORAL DAILY
Qty: 90 TABLET | Refills: 3 | Status: SHIPPED | OUTPATIENT
Start: 2019-01-01 | End: 2019-01-01

## 2019-01-01 RX ORDER — KETOROLAC TROMETHAMINE 30 MG/ML
15 INJECTION, SOLUTION INTRAMUSCULAR; INTRAVENOUS EVERY 6 HOURS
Status: DISCONTINUED | OUTPATIENT
Start: 2019-01-01 | End: 2019-01-01

## 2019-01-01 RX ORDER — DEXAMETHASONE SODIUM PHOSPHATE 4 MG/ML
4 INJECTION, SOLUTION INTRA-ARTICULAR; INTRALESIONAL; INTRAMUSCULAR; INTRAVENOUS; SOFT TISSUE
Status: DISCONTINUED | OUTPATIENT
Start: 2019-01-01 | End: 2019-01-01 | Stop reason: HOSPADM

## 2019-01-01 RX ORDER — FERROUS GLUCONATE 324(38)MG
324 TABLET ORAL
Status: DISCONTINUED | OUTPATIENT
Start: 2019-01-01 | End: 2019-01-01

## 2019-01-01 RX ORDER — HYDROCHLOROTHIAZIDE 12.5 MG/1
12.5 TABLET ORAL
Status: DISCONTINUED | OUTPATIENT
Start: 2019-01-01 | End: 2019-01-01

## 2019-01-01 RX ORDER — AMOXICILLIN 250 MG
2 CAPSULE ORAL 2 TIMES DAILY
Status: DISCONTINUED | OUTPATIENT
Start: 2019-01-01 | End: 2019-01-01 | Stop reason: HOSPADM

## 2019-01-01 RX ORDER — ATORVASTATIN CALCIUM 80 MG/1
TABLET, FILM COATED ORAL
Qty: 45 TABLET | Refills: 2 | Status: SHIPPED | OUTPATIENT
Start: 2019-01-01 | End: 2020-01-01

## 2019-01-01 RX ORDER — CEFAZOLIN SODIUM 1 G/3ML
1 INJECTION, POWDER, FOR SOLUTION INTRAMUSCULAR; INTRAVENOUS EVERY 8 HOURS
Status: COMPLETED | OUTPATIENT
Start: 2019-01-01 | End: 2019-01-01

## 2019-01-01 RX ORDER — FAMOTIDINE 20 MG/1
20 TABLET, FILM COATED ORAL 2 TIMES DAILY
Qty: 180 TABLET | Refills: 1 | Status: SHIPPED | OUTPATIENT
Start: 2019-01-01

## 2019-01-01 RX ORDER — ONDANSETRON 4 MG/1
4 TABLET, ORALLY DISINTEGRATING ORAL EVERY 6 HOURS PRN
Status: DISCONTINUED | OUTPATIENT
Start: 2019-01-01 | End: 2019-01-01 | Stop reason: HOSPADM

## 2019-01-01 RX ORDER — ACETAMINOPHEN 325 MG/1
650 TABLET ORAL EVERY 4 HOURS PRN
Status: DISCONTINUED | OUTPATIENT
Start: 2019-01-01 | End: 2019-01-01 | Stop reason: HOSPADM

## 2019-01-01 RX ORDER — GABAPENTIN 100 MG/1
100 CAPSULE ORAL
Status: COMPLETED | OUTPATIENT
Start: 2019-01-01 | End: 2019-01-01

## 2019-01-01 RX ORDER — ACETAMINOPHEN 325 MG/1
650 TABLET ORAL EVERY 4 HOURS PRN
Qty: 40 TABLET | Refills: 0 | DISCHARGE
Start: 2019-01-01 | End: 2019-01-01 | Stop reason: DRUGHIGH

## 2019-01-01 RX ORDER — CEFAZOLIN SODIUM 2 G/100ML
2 INJECTION, SOLUTION INTRAVENOUS
Status: DISCONTINUED | OUTPATIENT
Start: 2019-01-01 | End: 2019-01-01 | Stop reason: HOSPADM

## 2019-01-01 RX ORDER — LISINOPRIL 20 MG/1
20 TABLET ORAL DAILY
Qty: 90 TABLET | Refills: 1 | Status: SHIPPED | OUTPATIENT
Start: 2019-01-01 | End: 2020-01-01

## 2019-01-01 RX ORDER — HYDROMORPHONE HYDROCHLORIDE 1 MG/ML
.3-.5 INJECTION, SOLUTION INTRAMUSCULAR; INTRAVENOUS; SUBCUTANEOUS EVERY 5 MIN PRN
Status: DISCONTINUED | OUTPATIENT
Start: 2019-01-01 | End: 2019-01-01

## 2019-01-01 RX ORDER — LIDOCAINE 40 MG/G
CREAM TOPICAL
Status: DISCONTINUED | OUTPATIENT
Start: 2019-01-01 | End: 2019-01-01 | Stop reason: HOSPADM

## 2019-01-01 RX ORDER — OXYCODONE HYDROCHLORIDE 5 MG/1
2.5-5 TABLET ORAL
Qty: 20 TABLET | Refills: 0 | Status: SHIPPED | OUTPATIENT
Start: 2019-01-01 | End: 2019-01-01

## 2019-01-01 RX ORDER — CELECOXIB 200 MG/1
200 CAPSULE ORAL ONCE
Status: COMPLETED | OUTPATIENT
Start: 2019-01-01 | End: 2019-01-01

## 2019-01-01 RX ORDER — LISINOPRIL 40 MG/1
TABLET ORAL
Qty: 90 TABLET | Refills: 1 | Status: SHIPPED | OUTPATIENT
Start: 2019-01-01 | End: 2019-01-01

## 2019-01-01 RX ORDER — KETOROLAC TROMETHAMINE 15 MG/ML
15 INJECTION, SOLUTION INTRAMUSCULAR; INTRAVENOUS EVERY 6 HOURS PRN
Status: DISCONTINUED | OUTPATIENT
Start: 2019-01-01 | End: 2019-01-01 | Stop reason: HOSPADM

## 2019-01-01 RX ORDER — OXYCODONE HYDROCHLORIDE 5 MG/1
5-10 TABLET ORAL EVERY 4 HOURS PRN
Status: DISCONTINUED | OUTPATIENT
Start: 2019-01-01 | End: 2019-01-01

## 2019-01-01 RX ORDER — CEFAZOLIN SODIUM 1 G/3ML
1 INJECTION, POWDER, FOR SOLUTION INTRAMUSCULAR; INTRAVENOUS SEE ADMIN INSTRUCTIONS
Status: DISCONTINUED | OUTPATIENT
Start: 2019-01-01 | End: 2019-01-01 | Stop reason: HOSPADM

## 2019-01-01 RX ORDER — LISINOPRIL 20 MG/1
TABLET ORAL
Qty: 0.1 TABLET | Refills: 0 | OUTPATIENT
Start: 2019-01-01

## 2019-01-01 RX ORDER — OXYCODONE HYDROCHLORIDE 5 MG/1
5 TABLET ORAL EVERY 12 HOURS PRN
Qty: 6 TABLET | Refills: 0 | Status: SHIPPED | OUTPATIENT
Start: 2019-01-01 | End: 2020-01-01 | Stop reason: ALTCHOICE

## 2019-01-01 RX ORDER — LISINOPRIL 40 MG/1
40 TABLET ORAL
Status: DISCONTINUED | OUTPATIENT
Start: 2019-01-01 | End: 2019-01-01

## 2019-01-01 RX ADMIN — SENNOSIDES AND DOCUSATE SODIUM 2 TABLET: 8.6; 5 TABLET ORAL at 20:04

## 2019-01-01 RX ADMIN — Medication 2.5 MG: at 05:14

## 2019-01-01 RX ADMIN — ACETAMINOPHEN 975 MG: 325 TABLET, FILM COATED ORAL at 11:59

## 2019-01-01 RX ADMIN — ATORVASTATIN CALCIUM 40 MG: 40 TABLET, FILM COATED ORAL at 07:54

## 2019-01-01 RX ADMIN — FENTANYL CITRATE 25 MCG: 50 INJECTION, SOLUTION INTRAMUSCULAR; INTRAVENOUS at 13:57

## 2019-01-01 RX ADMIN — CEFAZOLIN 2 G: 1 INJECTION, POWDER, FOR SOLUTION INTRAMUSCULAR; INTRAVENOUS at 14:00

## 2019-01-01 RX ADMIN — ACETAMINOPHEN 975 MG: 325 TABLET, FILM COATED ORAL at 11:54

## 2019-01-01 RX ADMIN — SODIUM CHLORIDE 1 G: 9 INJECTION, SOLUTION INTRAVENOUS at 15:30

## 2019-01-01 RX ADMIN — FLUOXETINE 20 MG: 20 CAPSULE ORAL at 08:16

## 2019-01-01 RX ADMIN — RANITIDINE 150 MG: 150 TABLET ORAL at 19:54

## 2019-01-01 RX ADMIN — ASPIRIN 81 MG: 81 TABLET, COATED ORAL at 07:54

## 2019-01-01 RX ADMIN — Medication 5 MG: at 11:37

## 2019-01-01 RX ADMIN — ASPIRIN 81 MG: 81 TABLET, COATED ORAL at 20:54

## 2019-01-01 RX ADMIN — GABAPENTIN 100 MG: 100 CAPSULE ORAL at 11:54

## 2019-01-01 RX ADMIN — ASPIRIN 81 MG: 81 TABLET, COATED ORAL at 20:04

## 2019-01-01 RX ADMIN — SENNOSIDES AND DOCUSATE SODIUM 2 TABLET: 8.6; 5 TABLET ORAL at 07:54

## 2019-01-01 RX ADMIN — PHENYLEPHRINE HYDROCHLORIDE 0.1 MCG/KG/MIN: 10 INJECTION, SOLUTION INTRAMUSCULAR; INTRAVENOUS; SUBCUTANEOUS at 13:57

## 2019-01-01 RX ADMIN — RANITIDINE 150 MG: 150 TABLET ORAL at 07:49

## 2019-01-01 RX ADMIN — FLUTICASONE FUROATE AND VILANTEROL TRIFENATATE 1 PUFF: 200; 25 POWDER RESPIRATORY (INHALATION) at 07:50

## 2019-01-01 RX ADMIN — Medication 2.5 MG: at 21:53

## 2019-01-01 RX ADMIN — RANITIDINE 150 MG: 150 TABLET ORAL at 07:54

## 2019-01-01 RX ADMIN — RANITIDINE 150 MG: 150 TABLET ORAL at 20:54

## 2019-01-01 RX ADMIN — RANITIDINE 150 MG: 150 TABLET ORAL at 08:15

## 2019-01-01 RX ADMIN — Medication 2.5 MG: at 23:19

## 2019-01-01 RX ADMIN — KETOROLAC TROMETHAMINE 15 MG: 30 INJECTION, SOLUTION INTRAMUSCULAR at 16:46

## 2019-01-01 RX ADMIN — ACETAMINOPHEN 975 MG: 325 TABLET, FILM COATED ORAL at 11:37

## 2019-01-01 RX ADMIN — Medication 2.5 MG: at 12:14

## 2019-01-01 RX ADMIN — FERRIC CARBOXYMALTOSE INJECTION 750 MG: 50 INJECTION, SOLUTION INTRAVENOUS at 15:00

## 2019-01-01 RX ADMIN — FENTANYL CITRATE 25 MCG: 50 INJECTION, SOLUTION INTRAMUSCULAR; INTRAVENOUS at 14:25

## 2019-01-01 RX ADMIN — ACETAMINOPHEN 975 MG: 325 TABLET, FILM COATED ORAL at 05:13

## 2019-01-01 RX ADMIN — ATORVASTATIN CALCIUM 40 MG: 40 TABLET, FILM COATED ORAL at 08:15

## 2019-01-01 RX ADMIN — SENNOSIDES AND DOCUSATE SODIUM 2 TABLET: 8.6; 5 TABLET ORAL at 08:15

## 2019-01-01 RX ADMIN — Medication 2.5 MG: at 20:04

## 2019-01-01 RX ADMIN — KETOROLAC TROMETHAMINE 15 MG: 30 INJECTION, SOLUTION INTRAMUSCULAR at 22:52

## 2019-01-01 RX ADMIN — Medication 2.5 MG: at 22:40

## 2019-01-01 RX ADMIN — ASPIRIN 81 MG: 81 TABLET, COATED ORAL at 07:50

## 2019-01-01 RX ADMIN — MIDAZOLAM 1 MG: 1 INJECTION INTRAMUSCULAR; INTRAVENOUS at 13:45

## 2019-01-01 RX ADMIN — SODIUM CHLORIDE, POTASSIUM CHLORIDE, SODIUM LACTATE AND CALCIUM CHLORIDE: 600; 310; 30; 20 INJECTION, SOLUTION INTRAVENOUS at 13:36

## 2019-01-01 RX ADMIN — EPHEDRINE SULFATE 10 MG: 50 INJECTION, SOLUTION INTRAVENOUS at 14:31

## 2019-01-01 RX ADMIN — ACETAMINOPHEN 975 MG: 325 TABLET, FILM COATED ORAL at 12:14

## 2019-01-01 RX ADMIN — SENNOSIDES AND DOCUSATE SODIUM 2 TABLET: 8.6; 5 TABLET ORAL at 07:50

## 2019-01-01 RX ADMIN — SENNOSIDES AND DOCUSATE SODIUM 1 TABLET: 8.6; 5 TABLET ORAL at 20:55

## 2019-01-01 RX ADMIN — Medication 5 MG: at 05:01

## 2019-01-01 RX ADMIN — ACETAMINOPHEN 975 MG: 325 TABLET, FILM COATED ORAL at 05:29

## 2019-01-01 RX ADMIN — ASPIRIN 81 MG: 81 TABLET, COATED ORAL at 08:16

## 2019-01-01 RX ADMIN — KETOROLAC TROMETHAMINE 15 MG: 15 INJECTION, SOLUTION INTRAMUSCULAR; INTRAVENOUS at 18:38

## 2019-01-01 RX ADMIN — ACETAMINOPHEN 975 MG: 325 TABLET, FILM COATED ORAL at 20:54

## 2019-01-01 RX ADMIN — FLUTICASONE FUROATE AND VILANTEROL TRIFENATATE 1 PUFF: 200; 25 POWDER RESPIRATORY (INHALATION) at 08:22

## 2019-01-01 RX ADMIN — FLUOXETINE 20 MG: 20 CAPSULE ORAL at 19:54

## 2019-01-01 RX ADMIN — ACETAMINOPHEN 975 MG: 325 TABLET, FILM COATED ORAL at 19:54

## 2019-01-01 RX ADMIN — Medication 2.5 MG: at 11:59

## 2019-01-01 RX ADMIN — SODIUM CHLORIDE, POTASSIUM CHLORIDE, SODIUM LACTATE AND CALCIUM CHLORIDE: 600; 310; 30; 20 INJECTION, SOLUTION INTRAVENOUS at 19:58

## 2019-01-01 RX ADMIN — ACETAMINOPHEN 975 MG: 325 TABLET, FILM COATED ORAL at 05:01

## 2019-01-01 RX ADMIN — ACETAMINOPHEN 975 MG: 325 TABLET, FILM COATED ORAL at 20:04

## 2019-01-01 RX ADMIN — FLUTICASONE FUROATE AND VILANTEROL TRIFENATATE 1 PUFF: 200; 25 POWDER RESPIRATORY (INHALATION) at 07:57

## 2019-01-01 RX ADMIN — FLUOXETINE 20 MG: 20 CAPSULE ORAL at 07:50

## 2019-01-01 RX ADMIN — CEFAZOLIN 1 G: 1 INJECTION, POWDER, FOR SOLUTION INTRAMUSCULAR; INTRAVENOUS at 22:52

## 2019-01-01 RX ADMIN — ATORVASTATIN CALCIUM 40 MG: 40 TABLET, FILM COATED ORAL at 07:50

## 2019-01-01 RX ADMIN — FLUOXETINE 20 MG: 20 CAPSULE ORAL at 07:54

## 2019-01-01 RX ADMIN — Medication 2.5 MG: at 02:11

## 2019-01-01 RX ADMIN — SODIUM CHLORIDE 1 G: 9 INJECTION, SOLUTION INTRAVENOUS at 14:01

## 2019-01-01 RX ADMIN — Medication 2.5 MG: at 16:11

## 2019-01-01 RX ADMIN — Medication 2.5 MG: at 18:53

## 2019-01-01 RX ADMIN — PHENYLEPHRINE HYDROCHLORIDE 0.3 MCG/KG/MIN: 10 INJECTION, SOLUTION INTRAMUSCULAR; INTRAVENOUS; SUBCUTANEOUS at 14:44

## 2019-01-01 RX ADMIN — FENTANYL CITRATE 25 MCG: 50 INJECTION, SOLUTION INTRAMUSCULAR; INTRAVENOUS at 13:45

## 2019-01-01 RX ADMIN — IRON SUCROSE 300 MG: 20 INJECTION, SOLUTION INTRAVENOUS at 10:15

## 2019-01-01 RX ADMIN — ASPIRIN 81 MG: 81 TABLET, COATED ORAL at 19:55

## 2019-01-01 RX ADMIN — MIDAZOLAM 0.5 MG: 1 INJECTION INTRAMUSCULAR; INTRAVENOUS at 14:54

## 2019-01-01 RX ADMIN — Medication 2.5 MG: at 15:52

## 2019-01-01 RX ADMIN — FERRIC CARBOXYMALTOSE INJECTION 750 MG: 50 INJECTION, SOLUTION INTRAVENOUS at 14:45

## 2019-01-01 RX ADMIN — SENNOSIDES AND DOCUSATE SODIUM 2 TABLET: 8.6; 5 TABLET ORAL at 19:54

## 2019-01-01 RX ADMIN — CEFAZOLIN 1 G: 1 INJECTION, POWDER, FOR SOLUTION INTRAMUSCULAR; INTRAVENOUS at 05:29

## 2019-01-01 RX ADMIN — CELECOXIB 200 MG: 200 CAPSULE ORAL at 11:54

## 2019-01-01 RX ADMIN — EPHEDRINE SULFATE 10 MG: 50 INJECTION, SOLUTION INTRAVENOUS at 13:48

## 2019-01-01 RX ADMIN — PROPOFOL 50 MCG/KG/MIN: 10 INJECTION, EMULSION INTRAVENOUS at 13:52

## 2019-01-01 RX ADMIN — KETOROLAC TROMETHAMINE 15 MG: 30 INJECTION, SOLUTION INTRAMUSCULAR at 05:29

## 2019-01-01 RX ADMIN — MIDAZOLAM 0.5 MG: 1 INJECTION INTRAMUSCULAR; INTRAVENOUS at 15:05

## 2019-01-01 RX ADMIN — RANITIDINE 150 MG: 150 TABLET ORAL at 20:04

## 2019-01-01 RX ADMIN — Medication 5 MG: at 07:48

## 2019-01-01 RX ADMIN — BUPIVACAINE HYDROCHLORIDE 1.6 ML: 7.5 INJECTION, SOLUTION EPIDURAL; RETROBULBAR at 13:57

## 2019-01-01 RX ADMIN — ATORVASTATIN CALCIUM 40 MG: 40 TABLET, FILM COATED ORAL at 19:54

## 2019-01-01 RX ADMIN — Medication 3 MG: at 22:39

## 2019-01-01 RX ADMIN — Medication 2.5 MG: at 08:02

## 2019-01-01 ASSESSMENT — PAIN SCALES - GENERAL
PAINLEVEL: MODERATE PAIN (5)
PAINLEVEL: NO PAIN (0)
PAINLEVEL: SEVERE PAIN (7)
PAINLEVEL: NO PAIN (0)
PAINLEVEL: NO PAIN (0)

## 2019-01-01 ASSESSMENT — ENCOUNTER SYMPTOMS
BLOOD IN STOOL: 0
INCREASED ENERGY: 1
RECTAL PAIN: 0
DYSURIA: 0
DIARRHEA: 0
FATIGUE: 1
TREMORS: 0
ALTERED TEMPERATURE REGULATION: 0
BOWEL INCONTINENCE: 1
JAUNDICE: 0
NERVOUS/ANXIOUS: 1
NAUSEA: 1
ABDOMINAL PAIN: 0
NERVOUS/ANXIOUS: 1
JAUNDICE: 0
LOSS OF CONSCIOUSNESS: 0
MUSCLE WEAKNESS: 1
FEVER: 0
NERVOUS/ANXIOUS: 0
DECREASED CONCENTRATION: 0
HEADACHES: 0
WEIGHT GAIN: 0
POLYDIPSIA: 0
TREMORS: 1
DYSURIA: 0
DISTURBANCES IN COORDINATION: 0
MEMORY LOSS: 0
HEARTBURN: 0
DEPRESSION: 1
ABDOMINAL PAIN: 0
NAUSEA: 0
WEIGHT LOSS: 0
DISTURBANCES IN COORDINATION: 0
STIFFNESS: 1
NUMBNESS: 0
PARALYSIS: 0
BLOATING: 0
HALLUCINATIONS: 0
MUSCLE CRAMPS: 0
DECREASED CONCENTRATION: 0
NIGHT SWEATS: 0
PARALYSIS: 0
FEVER: 0
POLYDIPSIA: 0
BACK PAIN: 1
TINGLING: 0
SEIZURES: 0
HALLUCINATIONS: 0
STIFFNESS: 1
VOMITING: 0
SPEECH CHANGE: 0
SEIZURES: 0
TINGLING: 0
NECK PAIN: 1
TREMORS: 1
WEIGHT GAIN: 0
BOWEL INCONTINENCE: 1
MYALGIAS: 1
HEARTBURN: 0
ALTERED TEMPERATURE REGULATION: 0
VOMITING: 0
DIZZINESS: 0
DECREASED APPETITE: 0
HEADACHES: 1
DIZZINESS: 0
CHILLS: 0
HALLUCINATIONS: 0
WEIGHT LOSS: 1
BOWEL INCONTINENCE: 0
HEADACHES: 0
DIARRHEA: 0
JOINT SWELLING: 0
WEIGHT GAIN: 0
DEPRESSION: 1
RECTAL PAIN: 0
WEIGHT LOSS: 1
DISTURBANCES IN COORDINATION: 0
ARTHRALGIAS: 1
POLYPHAGIA: 0
BLOOD IN STOOL: 0
JAUNDICE: 0
MEMORY LOSS: 0
DIFFICULTY URINATING: 0
ABDOMINAL PAIN: 0
DIFFICULTY URINATING: 0
CONSTIPATION: 1
MUSCLE CRAMPS: 0
WEAKNESS: 0
HEMATURIA: 0
INCREASED ENERGY: 0
TINGLING: 0
DEPRESSION: 1
FLANK PAIN: 0
POLYDIPSIA: 0
NECK PAIN: 0
HALLUCINATIONS: 0
INCREASED ENERGY: 1
DECREASED APPETITE: 1
INSOMNIA: 1
FATIGUE: 1
RECTAL PAIN: 0
CONSTIPATION: 1
SPEECH CHANGE: 0
INSOMNIA: 1
PANIC: 0
CONSTIPATION: 1
INSOMNIA: 1
PARALYSIS: 0
WEAKNESS: 0
WEIGHT GAIN: 0
WEIGHT LOSS: 0
PANIC: 0
BLOATING: 0
ALTERED TEMPERATURE REGULATION: 0
DECREASED CONCENTRATION: 1
PANIC: 0
DECREASED CONCENTRATION: 0
MYALGIAS: 1
WEAKNESS: 0
FATIGUE: 1
INCREASED ENERGY: 1
CHILLS: 0
NUMBNESS: 0
NAUSEA: 1
FATIGUE: 1
SEIZURES: 0
NERVOUS/ANXIOUS: 1
SPEECH CHANGE: 0
JOINT SWELLING: 1
BLOATING: 0
FEVER: 0
DECREASED APPETITE: 0
NIGHT SWEATS: 0
POLYDIPSIA: 0
ARTHRALGIAS: 1
BLOOD IN STOOL: 0
DECREASED APPETITE: 1
FEVER: 0
BACK PAIN: 1
VOMITING: 0
DIARRHEA: 0
DEPRESSION: 1
NUMBNESS: 0
PANIC: 0
MEMORY LOSS: 0
MUSCLE WEAKNESS: 0
INSOMNIA: 1
ALTERED TEMPERATURE REGULATION: 0
HEARTBURN: 0
HEMATURIA: 0
LOSS OF CONSCIOUSNESS: 0
DIZZINESS: 0
FLANK PAIN: 0
LOSS OF CONSCIOUSNESS: 0

## 2019-01-01 ASSESSMENT — PATIENT HEALTH QUESTIONNAIRE - PHQ9
SUM OF ALL RESPONSES TO PHQ QUESTIONS 1-9: 4
10. IF YOU CHECKED OFF ANY PROBLEMS, HOW DIFFICULT HAVE THESE PROBLEMS MADE IT FOR YOU TO DO YOUR WORK, TAKE CARE OF THINGS AT HOME, OR GET ALONG WITH OTHER PEOPLE: NOT DIFFICULT AT ALL
SUM OF ALL RESPONSES TO PHQ QUESTIONS 1-9: 2
SUM OF ALL RESPONSES TO PHQ QUESTIONS 1-9: 4
SUM OF ALL RESPONSES TO PHQ QUESTIONS 1-9: 4

## 2019-01-01 ASSESSMENT — ANXIETY QUESTIONNAIRES
1. FEELING NERVOUS, ANXIOUS, OR ON EDGE: SEVERAL DAYS
GAD7 TOTAL SCORE: 10
7. FEELING AFRAID AS IF SOMETHING AWFUL MIGHT HAPPEN: MORE THAN HALF THE DAYS
4. TROUBLE RELAXING: NOT AT ALL
2. NOT BEING ABLE TO STOP OR CONTROL WORRYING: NEARLY EVERY DAY
GAD7 TOTAL SCORE: 10
7. FEELING AFRAID AS IF SOMETHING AWFUL MIGHT HAPPEN: MORE THAN HALF THE DAYS
5. BEING SO RESTLESS THAT IT IS HARD TO SIT STILL: NOT AT ALL
GAD7 TOTAL SCORE: 10
6. BECOMING EASILY ANNOYED OR IRRITABLE: SEVERAL DAYS
GAD7 TOTAL SCORE: 10
3. WORRYING TOO MUCH ABOUT DIFFERENT THINGS: NEARLY EVERY DAY

## 2019-01-01 ASSESSMENT — ACTIVITIES OF DAILY LIVING (ADL)
ADLS_ACUITY_SCORE: 19
FALL_HISTORY_WITHIN_LAST_SIX_MONTHS: YES
ADLS_ACUITY_SCORE: 19
ADLS_ACUITY_SCORE: 19
SWALLOWING: 0-->SWALLOWS FOODS/LIQUIDS WITHOUT DIFFICULTY
ADLS_ACUITY_SCORE: 18
ADLS_ACUITY_SCORE: 18
ADLS_ACUITY_SCORE: 19
ADLS_ACUITY_SCORE: 19
ADLS_ACUITY_SCORE: 18
AMBULATION: 1-->ASSISTIVE EQUIPMENT
ADLS_ACUITY_SCORE: 19
ADLS_ACUITY_SCORE: 19
BATHING: 1-->ASSISTIVE EQUIPMENT
ADLS_ACUITY_SCORE: 20
RETIRED_COMMUNICATION: 0-->UNDERSTANDS/COMMUNICATES WITHOUT DIFFICULTY
DRESS: 0-->INDEPENDENT
ADLS_ACUITY_SCORE: 18
COGNITION: 0 - NO COGNITION ISSUES REPORTED
NUMBER_OF_TIMES_PATIENT_HAS_FALLEN_WITHIN_LAST_SIX_MONTHS: 1
TOILETING: 1-->ASSISTIVE EQUIPMENT
TRANSFERRING: 1-->ASSISTIVE EQUIPMENT
ADLS_ACUITY_SCORE: 19
ADLS_ACUITY_SCORE: 19
ADLS_ACUITY_SCORE: 18
ADLS_ACUITY_SCORE: 17
WHICH_OF_THE_ABOVE_FUNCTIONAL_RISKS_HAD_A_RECENT_ONSET_OR_CHANGE?: AMBULATION;TRANSFERRING;TOILETING;FALL HISTORY
RETIRED_EATING: 0-->INDEPENDENT
ADLS_ACUITY_SCORE: 19

## 2019-01-01 ASSESSMENT — MIFFLIN-ST. JEOR
SCORE: 1152.74
SCORE: 1125.38
SCORE: 1125.4
SCORE: 1152.61

## 2019-01-03 ENCOUNTER — HOSPITAL ENCOUNTER (INPATIENT)
Facility: CLINIC | Age: 84
LOS: 2 days | Discharge: HOME HEALTH | DRG: 641 | End: 2019-01-05
Attending: EMERGENCY MEDICINE | Admitting: INTERNAL MEDICINE
Payer: COMMERCIAL

## 2019-01-03 DIAGNOSIS — M62.81 GENERALIZED MUSCLE WEAKNESS: Primary | ICD-10-CM

## 2019-01-03 DIAGNOSIS — E87.1 HYPONATREMIA: ICD-10-CM

## 2019-01-03 DIAGNOSIS — N18.30 CKD (CHRONIC KIDNEY DISEASE) STAGE 3, GFR 30-59 ML/MIN (H): ICD-10-CM

## 2019-01-03 DIAGNOSIS — K59.00 CONSTIPATION, UNSPECIFIED CONSTIPATION TYPE: ICD-10-CM

## 2019-01-03 DIAGNOSIS — E87.6 HYPOKALEMIA: ICD-10-CM

## 2019-01-03 DIAGNOSIS — K21.9 GASTROESOPHAGEAL REFLUX DISEASE, ESOPHAGITIS PRESENCE NOT SPECIFIED: ICD-10-CM

## 2019-01-03 DIAGNOSIS — E83.42 HYPOMAGNESEMIA: ICD-10-CM

## 2019-01-03 PROBLEM — E87.8 ELECTROLYTE ABNORMALITY: Status: ACTIVE | Noted: 2019-01-03

## 2019-01-03 LAB
ALBUMIN SERPL-MCNC: 3.3 G/DL (ref 3.4–5)
ALBUMIN UR-MCNC: NEGATIVE MG/DL
ALP SERPL-CCNC: 82 U/L (ref 40–150)
ALT SERPL W P-5'-P-CCNC: 9 U/L (ref 0–50)
ANION GAP SERPL CALCULATED.3IONS-SCNC: 10 MMOL/L (ref 3–14)
ANION GAP SERPL CALCULATED.3IONS-SCNC: 13 MMOL/L (ref 3–14)
ANION GAP SERPL CALCULATED.3IONS-SCNC: 8 MMOL/L (ref 3–14)
APPEARANCE UR: CLEAR
AST SERPL W P-5'-P-CCNC: 18 U/L (ref 0–45)
BACTERIA #/AREA URNS HPF: ABNORMAL /HPF
BASOPHILS # BLD AUTO: 0 10E9/L (ref 0–0.2)
BASOPHILS NFR BLD AUTO: 0.2 %
BILIRUB SERPL-MCNC: 0.5 MG/DL (ref 0.2–1.3)
BILIRUB UR QL STRIP: NEGATIVE
BUN SERPL-MCNC: 6 MG/DL (ref 7–30)
BUN SERPL-MCNC: 6 MG/DL (ref 7–30)
BUN SERPL-MCNC: 7 MG/DL (ref 7–30)
CALCIUM SERPL-MCNC: 9 MG/DL (ref 8.5–10.1)
CALCIUM SERPL-MCNC: 9.4 MG/DL (ref 8.5–10.1)
CALCIUM SERPL-MCNC: 9.8 MG/DL (ref 8.5–10.1)
CHLORIDE SERPL-SCNC: 85 MMOL/L (ref 94–109)
CHLORIDE SERPL-SCNC: 90 MMOL/L (ref 94–109)
CHLORIDE SERPL-SCNC: 96 MMOL/L (ref 94–109)
CK SERPL-CCNC: 156 U/L (ref 30–225)
CO2 SERPL-SCNC: 26 MMOL/L (ref 20–32)
CO2 SERPL-SCNC: 27 MMOL/L (ref 20–32)
CO2 SERPL-SCNC: 28 MMOL/L (ref 20–32)
COLOR UR AUTO: YELLOW
CREAT SERPL-MCNC: 0.69 MG/DL (ref 0.52–1.04)
CREAT SERPL-MCNC: 0.7 MG/DL (ref 0.52–1.04)
CREAT SERPL-MCNC: 0.71 MG/DL (ref 0.52–1.04)
CREAT UR-MCNC: 81 MG/DL
CRP SERPL-MCNC: 56.9 MG/L (ref 0–8)
DIFFERENTIAL METHOD BLD: ABNORMAL
EOSINOPHIL # BLD AUTO: 0 10E9/L (ref 0–0.7)
EOSINOPHIL NFR BLD AUTO: 0.3 %
ERYTHROCYTE [DISTWIDTH] IN BLOOD BY AUTOMATED COUNT: 12.8 % (ref 10–15)
GFR SERPL CREATININE-BSD FRML MDRD: 79 ML/MIN/{1.73_M2}
GFR SERPL CREATININE-BSD FRML MDRD: 80 ML/MIN/{1.73_M2}
GFR SERPL CREATININE-BSD FRML MDRD: 81 ML/MIN/{1.73_M2}
GLUCOSE BLDC GLUCOMTR-MCNC: 104 MG/DL (ref 70–99)
GLUCOSE SERPL-MCNC: 105 MG/DL (ref 70–99)
GLUCOSE SERPL-MCNC: 110 MG/DL (ref 70–99)
GLUCOSE SERPL-MCNC: 121 MG/DL (ref 70–99)
GLUCOSE UR STRIP-MCNC: NEGATIVE MG/DL
HCT VFR BLD AUTO: 32.9 % (ref 35–47)
HGB BLD-MCNC: 10.9 G/DL (ref 11.7–15.7)
HGB UR QL STRIP: ABNORMAL
IMM GRANULOCYTES # BLD: 0 10E9/L (ref 0–0.4)
IMM GRANULOCYTES NFR BLD: 0.3 %
INTERPRETATION ECG - MUSE: NORMAL
KETONES UR STRIP-MCNC: NEGATIVE MG/DL
LEUKOCYTE ESTERASE UR QL STRIP: ABNORMAL
LYMPHOCYTES # BLD AUTO: 1.6 10E9/L (ref 0.8–5.3)
LYMPHOCYTES NFR BLD AUTO: 24.8 %
MAGNESIUM SERPL-MCNC: 1.1 MG/DL (ref 1.6–2.3)
MAGNESIUM SERPL-MCNC: 3 MG/DL (ref 1.6–2.3)
MCH RBC QN AUTO: 29.1 PG (ref 26.5–33)
MCHC RBC AUTO-ENTMCNC: 33.1 G/DL (ref 31.5–36.5)
MCV RBC AUTO: 88 FL (ref 78–100)
MONOCYTES # BLD AUTO: 0.5 10E9/L (ref 0–1.3)
MONOCYTES NFR BLD AUTO: 8.4 %
NEUTROPHILS # BLD AUTO: 4.2 10E9/L (ref 1.6–8.3)
NEUTROPHILS NFR BLD AUTO: 66 %
NITRATE UR QL: NEGATIVE
NRBC # BLD AUTO: 0 10*3/UL
NRBC BLD AUTO-RTO: 0 /100
PH UR STRIP: 7 PH (ref 5–7)
PHOSPHATE SERPL-MCNC: 2.9 MG/DL (ref 2.5–4.5)
PLATELET # BLD AUTO: 412 10E9/L (ref 150–450)
POTASSIUM SERPL-SCNC: 2.5 MMOL/L (ref 3.4–5.3)
POTASSIUM SERPL-SCNC: 3.6 MMOL/L (ref 3.4–5.3)
POTASSIUM SERPL-SCNC: 3.6 MMOL/L (ref 3.4–5.3)
PROT SERPL-MCNC: 8.1 G/DL (ref 6.8–8.8)
RBC # BLD AUTO: 3.74 10E12/L (ref 3.8–5.2)
RBC #/AREA URNS AUTO: <1 /HPF (ref 0–2)
SODIUM SERPL-SCNC: 126 MMOL/L (ref 133–144)
SODIUM SERPL-SCNC: 127 MMOL/L (ref 133–144)
SODIUM SERPL-SCNC: 130 MMOL/L (ref 133–144)
SODIUM UR-SCNC: 86 MMOL/L
SOURCE: ABNORMAL
SP GR UR STRIP: 1.01 (ref 1–1.03)
SQUAMOUS #/AREA URNS AUTO: 1 /HPF (ref 0–1)
TROPONIN I SERPL-MCNC: <0.015 UG/L (ref 0–0.04)
UROBILINOGEN UR STRIP-MCNC: NORMAL MG/DL (ref 0–2)
WBC # BLD AUTO: 6.3 10E9/L (ref 4–11)
WBC #/AREA URNS AUTO: 2 /HPF (ref 0–5)

## 2019-01-03 PROCEDURE — 25000132 ZZH RX MED GY IP 250 OP 250 PS 637: Performed by: INTERNAL MEDICINE

## 2019-01-03 PROCEDURE — 25000128 H RX IP 250 OP 636: Performed by: EMERGENCY MEDICINE

## 2019-01-03 PROCEDURE — 99223 1ST HOSP IP/OBS HIGH 75: CPT | Mod: AI | Performed by: INTERNAL MEDICINE

## 2019-01-03 PROCEDURE — 82570 ASSAY OF URINE CREATININE: CPT | Performed by: EMERGENCY MEDICINE

## 2019-01-03 PROCEDURE — A9270 NON-COVERED ITEM OR SERVICE: HCPCS | Mod: GY | Performed by: EMERGENCY MEDICINE

## 2019-01-03 PROCEDURE — 25000132 ZZH RX MED GY IP 250 OP 250 PS 637: Mod: GY | Performed by: EMERGENCY MEDICINE

## 2019-01-03 PROCEDURE — 00000146 ZZHCL STATISTIC GLUCOSE BY METER IP

## 2019-01-03 PROCEDURE — 80053 COMPREHEN METABOLIC PANEL: CPT | Performed by: EMERGENCY MEDICINE

## 2019-01-03 PROCEDURE — 93010 ELECTROCARDIOGRAM REPORT: CPT | Mod: Z6 | Performed by: EMERGENCY MEDICINE

## 2019-01-03 PROCEDURE — 87086 URINE CULTURE/COLONY COUNT: CPT | Performed by: EMERGENCY MEDICINE

## 2019-01-03 PROCEDURE — 83735 ASSAY OF MAGNESIUM: CPT | Performed by: EMERGENCY MEDICINE

## 2019-01-03 PROCEDURE — 81001 URINALYSIS AUTO W/SCOPE: CPT | Performed by: EMERGENCY MEDICINE

## 2019-01-03 PROCEDURE — 82550 ASSAY OF CK (CPK): CPT | Performed by: INTERNAL MEDICINE

## 2019-01-03 PROCEDURE — 83735 ASSAY OF MAGNESIUM: CPT | Performed by: INTERNAL MEDICINE

## 2019-01-03 PROCEDURE — 99285 EMERGENCY DEPT VISIT HI MDM: CPT | Mod: 25

## 2019-01-03 PROCEDURE — 85025 COMPLETE CBC W/AUTO DIFF WBC: CPT | Performed by: EMERGENCY MEDICINE

## 2019-01-03 PROCEDURE — 86140 C-REACTIVE PROTEIN: CPT | Performed by: INTERNAL MEDICINE

## 2019-01-03 PROCEDURE — 25000128 H RX IP 250 OP 636: Performed by: INTERNAL MEDICINE

## 2019-01-03 PROCEDURE — 84300 ASSAY OF URINE SODIUM: CPT | Performed by: EMERGENCY MEDICINE

## 2019-01-03 PROCEDURE — A9270 NON-COVERED ITEM OR SERVICE: HCPCS | Mod: GY | Performed by: INTERNAL MEDICINE

## 2019-01-03 PROCEDURE — 84484 ASSAY OF TROPONIN QUANT: CPT | Performed by: EMERGENCY MEDICINE

## 2019-01-03 PROCEDURE — 80048 BASIC METABOLIC PNL TOTAL CA: CPT | Performed by: INTERNAL MEDICINE

## 2019-01-03 PROCEDURE — 84100 ASSAY OF PHOSPHORUS: CPT | Performed by: INTERNAL MEDICINE

## 2019-01-03 PROCEDURE — 99285 EMERGENCY DEPT VISIT HI MDM: CPT | Mod: 25 | Performed by: EMERGENCY MEDICINE

## 2019-01-03 PROCEDURE — 93005 ELECTROCARDIOGRAM TRACING: CPT

## 2019-01-03 PROCEDURE — 12000001 ZZH R&B MED SURG/OB UMMC

## 2019-01-03 PROCEDURE — 36415 COLL VENOUS BLD VENIPUNCTURE: CPT | Performed by: INTERNAL MEDICINE

## 2019-01-03 RX ORDER — AMOXICILLIN 250 MG
2 CAPSULE ORAL DAILY
Status: DISCONTINUED | OUTPATIENT
Start: 2019-01-03 | End: 2019-01-05

## 2019-01-03 RX ORDER — PANTOPRAZOLE SODIUM 40 MG/1
40 TABLET, DELAYED RELEASE ORAL ONCE
Status: COMPLETED | OUTPATIENT
Start: 2019-01-03 | End: 2019-01-03

## 2019-01-03 RX ORDER — POLYETHYLENE GLYCOL 3350 17 G/17G
17 POWDER, FOR SOLUTION ORAL DAILY
Status: DISCONTINUED | OUTPATIENT
Start: 2019-01-03 | End: 2019-01-05

## 2019-01-03 RX ORDER — POTASSIUM CHLORIDE 1.5 G/1.58G
40 POWDER, FOR SOLUTION ORAL ONCE
Status: COMPLETED | OUTPATIENT
Start: 2019-01-03 | End: 2019-01-03

## 2019-01-03 RX ORDER — HYDRALAZINE HYDROCHLORIDE 25 MG/1
25 TABLET, FILM COATED ORAL 4 TIMES DAILY PRN
Status: DISCONTINUED | OUTPATIENT
Start: 2019-01-03 | End: 2019-01-05 | Stop reason: HOSPADM

## 2019-01-03 RX ORDER — ONDANSETRON 4 MG/1
4 TABLET, ORALLY DISINTEGRATING ORAL EVERY 6 HOURS PRN
Status: DISCONTINUED | OUTPATIENT
Start: 2019-01-03 | End: 2019-01-05 | Stop reason: HOSPADM

## 2019-01-03 RX ORDER — SODIUM CHLORIDE 9 MG/ML
INJECTION, SOLUTION INTRAVENOUS
Status: DISPENSED
Start: 2019-01-03 | End: 2019-01-04

## 2019-01-03 RX ORDER — NALOXONE HYDROCHLORIDE 0.4 MG/ML
.1-.4 INJECTION, SOLUTION INTRAMUSCULAR; INTRAVENOUS; SUBCUTANEOUS
Status: DISCONTINUED | OUTPATIENT
Start: 2019-01-03 | End: 2019-01-05 | Stop reason: HOSPADM

## 2019-01-03 RX ORDER — AMOXICILLIN 250 MG
1-2 CAPSULE ORAL 2 TIMES DAILY PRN
Status: DISCONTINUED | OUTPATIENT
Start: 2019-01-03 | End: 2019-01-05

## 2019-01-03 RX ORDER — POTASSIUM CHLORIDE 29.8 MG/ML
20 INJECTION INTRAVENOUS
Status: DISCONTINUED | OUTPATIENT
Start: 2019-01-03 | End: 2019-01-05 | Stop reason: HOSPADM

## 2019-01-03 RX ORDER — MAGNESIUM SULFATE HEPTAHYDRATE 40 MG/ML
4 INJECTION, SOLUTION INTRAVENOUS ONCE
Status: COMPLETED | OUTPATIENT
Start: 2019-01-03 | End: 2019-01-03

## 2019-01-03 RX ORDER — POTASSIUM CL/LIDO/0.9 % NACL 10MEQ/0.1L
10 INTRAVENOUS SOLUTION, PIGGYBACK (ML) INTRAVENOUS
Status: DISCONTINUED | OUTPATIENT
Start: 2019-01-03 | End: 2019-01-05 | Stop reason: HOSPADM

## 2019-01-03 RX ORDER — ONDANSETRON 2 MG/ML
4 INJECTION INTRAMUSCULAR; INTRAVENOUS EVERY 6 HOURS PRN
Status: DISCONTINUED | OUTPATIENT
Start: 2019-01-03 | End: 2019-01-05 | Stop reason: HOSPADM

## 2019-01-03 RX ORDER — ACETAMINOPHEN 500 MG
500-1000 TABLET ORAL EVERY 6 HOURS PRN
Status: DISCONTINUED | OUTPATIENT
Start: 2019-01-03 | End: 2019-01-05 | Stop reason: HOSPADM

## 2019-01-03 RX ORDER — SODIUM CHLORIDE 9 MG/ML
INJECTION, SOLUTION INTRAVENOUS CONTINUOUS
Status: DISCONTINUED | OUTPATIENT
Start: 2019-01-03 | End: 2019-01-03

## 2019-01-03 RX ORDER — CALCIUM CARBONATE 500 MG/1
1000 TABLET, CHEWABLE ORAL 4 TIMES DAILY PRN
Status: DISCONTINUED | OUTPATIENT
Start: 2019-01-03 | End: 2019-01-05 | Stop reason: HOSPADM

## 2019-01-03 RX ORDER — MULTIPLE VITAMINS W/ MINERALS TAB 9MG-400MCG
1 TAB ORAL DAILY
Status: DISCONTINUED | OUTPATIENT
Start: 2019-01-03 | End: 2019-01-05 | Stop reason: HOSPADM

## 2019-01-03 RX ORDER — SODIUM CHLORIDE AND POTASSIUM CHLORIDE 150; 900 MG/100ML; MG/100ML
INJECTION, SOLUTION INTRAVENOUS CONTINUOUS
Status: DISCONTINUED | OUTPATIENT
Start: 2019-01-03 | End: 2019-01-04

## 2019-01-03 RX ORDER — ALBUTEROL SULFATE 5 MG/ML
2.5 SOLUTION RESPIRATORY (INHALATION)
Status: DISCONTINUED | OUTPATIENT
Start: 2019-01-03 | End: 2019-01-05 | Stop reason: HOSPADM

## 2019-01-03 RX ORDER — PANTOPRAZOLE SODIUM 40 MG/1
40 TABLET, DELAYED RELEASE ORAL
Status: DISCONTINUED | OUTPATIENT
Start: 2019-01-04 | End: 2019-01-05 | Stop reason: HOSPADM

## 2019-01-03 RX ORDER — MAGNESIUM SULFATE HEPTAHYDRATE 40 MG/ML
4 INJECTION, SOLUTION INTRAVENOUS EVERY 4 HOURS PRN
Status: DISCONTINUED | OUTPATIENT
Start: 2019-01-03 | End: 2019-01-05 | Stop reason: HOSPADM

## 2019-01-03 RX ORDER — POTASSIUM CHLORIDE 7.45 MG/ML
10 INJECTION INTRAVENOUS
Status: DISCONTINUED | OUTPATIENT
Start: 2019-01-03 | End: 2019-01-05 | Stop reason: HOSPADM

## 2019-01-03 RX ORDER — ASPIRIN 81 MG/1
81 TABLET, CHEWABLE ORAL DAILY
Status: DISCONTINUED | OUTPATIENT
Start: 2019-01-04 | End: 2019-01-05 | Stop reason: HOSPADM

## 2019-01-03 RX ORDER — POTASSIUM CHLORIDE 750 MG/1
20-40 TABLET, EXTENDED RELEASE ORAL
Status: DISCONTINUED | OUTPATIENT
Start: 2019-01-03 | End: 2019-01-05 | Stop reason: HOSPADM

## 2019-01-03 RX ORDER — POTASSIUM CHLORIDE 1.5 G/1.58G
20-40 POWDER, FOR SOLUTION ORAL
Status: DISCONTINUED | OUTPATIENT
Start: 2019-01-03 | End: 2019-01-05 | Stop reason: HOSPADM

## 2019-01-03 RX ADMIN — DOCUSATE SODIUM AND SENNOSIDES 2 TABLET: 8.6; 5 TABLET, FILM COATED ORAL at 16:30

## 2019-01-03 RX ADMIN — Medication 1 MG: at 23:57

## 2019-01-03 RX ADMIN — POTASSIUM CHLORIDE AND SODIUM CHLORIDE: 900; 150 INJECTION, SOLUTION INTRAVENOUS at 16:44

## 2019-01-03 RX ADMIN — PANTOPRAZOLE SODIUM 40 MG: 40 TABLET, DELAYED RELEASE ORAL at 16:46

## 2019-01-03 RX ADMIN — SODIUM CHLORIDE 1000 ML: 9 INJECTION, SOLUTION INTRAVENOUS at 11:04

## 2019-01-03 RX ADMIN — POTASSIUM CHLORIDE 40 MEQ: 1.5 POWDER, FOR SOLUTION ORAL at 10:53

## 2019-01-03 RX ADMIN — POTASSIUM CHLORIDE 40 MEQ: 1.5 POWDER, FOR SOLUTION ORAL at 13:20

## 2019-01-03 RX ADMIN — FLUOXETINE HYDROCHLORIDE 20 MG: 20 CAPSULE ORAL at 16:30

## 2019-01-03 RX ADMIN — MAGNESIUM SULFATE IN WATER 4 G: 40 INJECTION, SOLUTION INTRAVENOUS at 13:21

## 2019-01-03 RX ADMIN — MULTIPLE VITAMINS W/ MINERALS TAB 1 TABLET: TAB at 16:31

## 2019-01-03 ASSESSMENT — ACTIVITIES OF DAILY LIVING (ADL)
DRESS: 0-->INDEPENDENT
AMBULATION: 1-->ASSISTIVE EQUIPMENT
COGNITION: 0 - NO COGNITION ISSUES REPORTED
TRANSFERRING: 0-->INDEPENDENT
TOILETING: 0-->INDEPENDENT
RETIRED_COMMUNICATION: 0-->UNDERSTANDS/COMMUNICATES WITHOUT DIFFICULTY
BATHING: 1-->ASSISTIVE EQUIPMENT
RETIRED_EATING: 0-->INDEPENDENT
FALL_HISTORY_WITHIN_LAST_SIX_MONTHS: NO
SWALLOWING: 0-->SWALLOWS FOODS/LIQUIDS WITHOUT DIFFICULTY
ADLS_ACUITY_SCORE: 14
ADLS_ACUITY_SCORE: 14

## 2019-01-03 ASSESSMENT — ENCOUNTER SYMPTOMS
ABDOMINAL PAIN: 0
SHORTNESS OF BREATH: 0
HEADACHES: 0
NECK STIFFNESS: 0
ARTHRALGIAS: 0
NAUSEA: 1
DECREASED CONCENTRATION: 0
WEAKNESS: 1
COLOR CHANGE: 0
CONFUSION: 0
UNEXPECTED WEIGHT CHANGE: 1
EYE REDNESS: 0
DIFFICULTY URINATING: 0
FEVER: 0

## 2019-01-03 NOTE — LETTER
Transition Communication Hand-off for Care Transitions to Next Level of Care Provider    Name: Staci Watt  : 1935  MRN #: [id number]  Primary Care Provider: Destin Wolf     Primary Clinic: [address]  [facility] [address]     Reason for Hospitalization:  Hypokalemia [E87.6]  Hypomagnesemia [E83.42]  Hyponatremia [E87.1]  Generalized muscle weakness [M62.81]  Admit Date/Time: 1/3/2019  8:44 AM  Discharge Date: 2019  Payor Source: Payor: MEDICA / Plan: MEDICA PRIME SOLUTION / Product Type: Indemnity /   Discharge Plan:  Discharge to home with orders for home care  RN and PT.    Most Recent Value   Anticipated Changes Related to Illness  none   Equipment Currently Used at Home  walker, rolling            Referrals     Future Labs/Procedures    Home care nursing referral     Comments:    Please fax discharge orders to [facility]    Ph:  [phone]    Fax: [phone]    Skilled home care RN for initial home safety evaluation and 1-3 times a week to evaluate medication management, nutrition and hydration evaluation, endurance evaluation, and general status evaluation after discharge from the acute care hospital setting.    Skilled home care Physical Therapist to evaluate and recommend a plan of care in home setting per recommendations of the inpatient Rehabilitation Consult Team.    Home Care PT Referral for Hospital Discharge     Comments:    PT to eval and treat    Your provider has ordered home care - physical therapy. If you have not been contacted within 2 days of your discharge please call the department phone number listed on the top of this document.            Zena Saavedra, B.S.N., P.H.N.,R.N.         Pager
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
## 2019-01-03 NOTE — PROGRESS NOTES
[facility] [place]   ED Nurse to Floor Handoff     Staci Watt is a 83 year old female who speaks English and lives with family members,  in a home  They arrived in the ED by car from home    ED Chief Complaint: Generalized Weakness (c/o increasing weakness, joint pain , not eating over the last 3 weeks) and Nausea (continuous nausea, repors has not been able to vomit)    ED Dx;   Final diagnoses:   Hyponatremia   Hypokalemia   Generalized muscle weakness   Hypomagnesemia         Needed?: No    Allergies:   Allergies   Allergen Reactions     Contrast Dye Itching     Seasonal Allergies    .  Past Medical Hx:   Past Medical History:   Diagnosis Date     Allergic rhinitis, cause unspecified      CKD (chronic kidney disease) stage 3, GFR 30-59 ml/min (H)      Gastroesophageal reflux disease      Gout, unspecified      Hyperlipidemia LDL goal <100 7/9/2004     Hypertension goal BP (blood pressure) < 140/90 7/9/2004     Iron deficiency anemia      Moderate major depression (H) 10/19/2006     Moderate persistent asthma 12/4/2005     Obese 2/2/2012     Retinal detachment with retinal defect, unspecified      Unspecified cataract     s/p cataract surgery      Baseline Mental status: WDL  Current Mental Status changes: at basesline    Infection present or suspected this encounter: no  Sepsis suspected: No  Isolation type: No active isolations     Activity level - Baseline/Home:  Stand with Assist  Activity Level - Current:   Stand with Assist    Bariatric equipment needed?: No    In the ED these meds were given:   Medications   magnesium sulfate 4 g in 100 mL sterile water (premade) (4 g Intravenous New Bag 1/3/19 1321)   potassium chloride (KLOR-CON) Packet 40 mEq (40 mEq Oral Given 1/3/19 1053)   0.9% sodium chloride BOLUS (0 mLs Intravenous Stopped 1/3/19 1205)   potassium chloride (KLOR-CON) Packet 40 mEq (40 mEq Oral Given 1/3/19 1320)       Drips running?  No    Home pump   No    Current LDAs  Peripheral IV 01/03/19 Right Lower forearm (Active)   Number of days: 0       Labs results:   Labs Ordered and Resulted from Time of ED Arrival Up to the Time of Departure from the ED   CBC WITH PLATELETS DIFFERENTIAL - Abnormal; Notable for the following components:       Result Value    RBC Count 3.74 (*)     Hemoglobin 10.9 (*)     Hematocrit 32.9 (*)     All other components within normal limits   COMPREHENSIVE METABOLIC PANEL - Abnormal; Notable for the following components:    Sodium 126 (*)     Potassium 2.5 (*)     Chloride 85 (*)     Glucose 105 (*)     Albumin 3.3 (*)     All other components within normal limits   UA MACROSCOPIC WITH REFLEX TO MICRO AND CULTURE - Abnormal; Notable for the following components:    Blood Urine Trace (*)     Leukocyte Esterase Urine Moderate (*)     Bacteria Urine Few (*)     All other components within normal limits   MAGNESIUM - Abnormal; Notable for the following components:    Magnesium 1.1 (*)     All other components within normal limits   TROPONIN I   SODIUM RANDOM URINE   CREATININE RANDOM URINE   MEASURE WEIGHT   ORTHOSTATIC BLOOD PRESSURE AND PULSE   URINE CULTURE AEROBIC BACTERIAL       Imaging Studies: No results found for this or any previous visit (from the past 24 hour(s)).    Recent vital signs:   BP (!) 148/97   Pulse 91   Temp 98.2  F (36.8  C) (Oral)   Resp 16   Wt 78.7 kg (173 lb 6.4 oz)   SpO2 100%   BMI 32.76 kg/m      Cardiac Rhythm: Normal Sinus, occasional PVC's  Pt needs tele? No  Skin/wound Issues: None    Code Status: DNR / DNI    Pain control: pt had none    Nausea control: improved    Abnormal labs/tests/findings requiring intervention: low potassium, received KCL oral x 2, magnesium low received IV Mag, low sodium received IV 0.9 NS bolus    Family present during ED course? Yes   Family Comments/Social Situation comments: NA    Tasks needing completion: None    Leta Evans, RN  [facility]-- [phone] [place]  ED  [phone] [facility]

## 2019-01-03 NOTE — ED PROVIDER NOTES
"  History     Chief Complaint   Patient presents with     Generalized Weakness     c/o increasing weakness, joint pain , not eating over the last 3 weeks     Nausea     continuous nausea, repors has not been able to vomit     The history is provided by the patient and medical records.     Staci Watt is a 83 year old female with a history of depression, CKD stage 3, GERD, HTN, and HLD, who presents to the Emergency Department for evaluation of generalized weakness, loss of appetite, and nausea. The patient reports that her symptoms began approximately three weeks ago. The patient states, \"all food smells bad\" and that she has only been able to drink water, lemon tea, broth, clear soup, ginger ale, and seven up. The patient describes a sensation of \"having to burp, but nothing comes up\". The patient reports that she is able to ambulate normally, despite her generalized weakness.  The patient also complains of: swelling in her feet, which she reports \"has gone down a bit\", reduced frequency of bowel movements, and some weight loss.  The patient denies falls, chills, fever, diarrhea, emesis, or decreased concentration.  The patient endorses taking Maalox, which she reports does not alleviate her symptoms, as well as Prozac. The patient reports that she has denied an increase in dosage of her Prozac as, \"it made me feel like I was in a different world\", and further states, \"I'm not depressed, I'm sick\".    Social: The patient presents here with her family.    I have reviewed the Medications, Allergies, Past Medical and Surgical History, and Social History in the [facility] system.    Past Medical History:   Diagnosis Date     Allergic rhinitis, cause unspecified      CKD (chronic kidney disease) stage 3, GFR 30-59 ml/min (H)      Gastroesophageal reflux disease      Gout, unspecified      Hyperlipidemia LDL goal <100 7/9/2004     Hypertension goal BP (blood pressure) < 140/90 7/9/2004     Iron deficiency anemia      " Moderate major depression (H) 10/19/2006     Moderate persistent asthma 12/4/2005     Obese 2/2/2012     Retinal detachment with retinal defect, unspecified      Unspecified cataract     s/p cataract surgery       Past Surgical History:   Procedure Laterality Date     C NONSPECIFIC PROCEDURE      (B) detatched retina     C NONSPECIFIC PROCEDURE      LASIK surgery     C NONSPECIFIC PROCEDURE      NISSA/BSO     C NONSPECIFIC PROCEDURE      Hernia     C NONSPECIFIC PROCEDURE      Tonsillectomy     C NONSPECIFIC PROCEDURE      Arthroscopic knee surgery       Family History   Problem Relation Age of Onset     Hypertension Mother      Respiratory Mother      Breast Cancer Maternal Aunt      Respiratory Maternal Aunt      Respiratory Maternal Aunt        Social History     Tobacco Use     Smoking status: Never Smoker     Smokeless tobacco: Never Used     Tobacco comment: Once in awhile when goes to [employer].   Substance Use Topics     Alcohol use: Yes     Comment: has a drink every night before she goes to bed       Current Facility-Administered Medications   Medication     magnesium sulfate 4 g in 100 mL sterile water (premade)     potassium chloride (KLOR-CON) Packet 40 mEq     Current Outpatient Medications   Medication     ASPIRIN CHILDRENS 81 MG OR CHEW     atorvastatin (LIPITOR) 80 MG tablet     FLUoxetine (PROZAC) 20 MG capsule     fluticasone-salmeterol (ADVAIR DISKUS) 500-50 MCG/DOSE diskus inhaler     hydrochlorothiazide (HYDRODIURIL) 50 MG tablet     indomethacin (INDOCIN) 25 MG capsule     lisinopril (PRINIVIL/ZESTRIL) 40 MG tablet     multivitamin, therapeutic with minerals (MULTI-VITAMIN) TABS tablet     senna-docusate (DANIEL-COLACE) 8.6-50 MG per tablet     traMADol (ULTRAM) 50 MG tablet     acetaminophen (TYLENOL) 500 MG tablet        Allergies   Allergen Reactions     Contrast Dye Itching     Seasonal Allergies        Review of Systems   Constitutional: Positive for unexpected weight change (mild weight loss).  Negative for fever.   HENT: Negative for congestion.    Eyes: Negative for redness.   Respiratory: Negative for shortness of breath.    Cardiovascular: Negative for chest pain.   Gastrointestinal: Positive for nausea. Negative for abdominal pain.   Genitourinary: Negative for difficulty urinating.   Musculoskeletal: Negative for arthralgias and neck stiffness.   Skin: Negative for color change.   Neurological: Positive for weakness. Negative for headaches.   Psychiatric/Behavioral: Negative for confusion and decreased concentration.   All other systems reviewed and are negative.      Physical Exam   BP: (!) 152/100  Pulse: 93  Heart Rate: 92  Temp: 98.2  F (36.8  C)  Resp: 24  Weight: 78.7 kg (173 lb 6.4 oz)  SpO2: 99 %  Lying Orthostatic BP: 146/98  Lying Orthostatic Pulse: 91 bpm  Sitting Orthostatic BP: 151/94  Sitting Orthostatic Pulse: 102 bpm  Standing Orthostatic BP: 161/89  Standing Orthostatic Pulse: 122 bpm      Physical Exam   Constitutional: No distress.   HENT:   Head: Atraumatic.   Mouth/Throat: Oropharynx is clear and moist. No oropharyngeal exudate.   Eyes: Pupils are equal, round, and reactive to light. No scleral icterus.   Cardiovascular: Normal heart sounds and intact distal pulses.   Pulmonary/Chest: Breath sounds normal. No respiratory distress.   Abdominal: Soft. Bowel sounds are normal. There is no tenderness.   Musculoskeletal: She exhibits no edema or tenderness.   Skin: Skin is warm. No rash noted. She is not diaphoretic.       ED Course   9:11 AM  The patient was seen and examined by Dr. Nuñez in Room ED02.     Procedures             EKG Interpretation:      Interpreted by Dave Nuñez  Time reviewed: 9:47 AM  Symptoms at time of EKG: Generalized weakness  Rhythm: normal sinus   Rate: Normal  Axis: Normal  Ectopy: premature ventricular contractions (unifocal)  Conduction: normal  ST Segments/ T Waves: Non-specific ST-T wave changes  Q Waves: none  Comparison to prior: Compared with EKG  from Lucia 3, 2010, patient is no longer in sinus tachycardia.    Clinical Impression: no acute changes                Critical Care time:  none             Labs Ordered and Resulted from Time of ED Arrival Up to the Time of Departure from the ED   CBC WITH PLATELETS DIFFERENTIAL - Abnormal; Notable for the following components:       Result Value    RBC Count 3.74 (*)     Hemoglobin 10.9 (*)     Hematocrit 32.9 (*)     All other components within normal limits   COMPREHENSIVE METABOLIC PANEL - Abnormal; Notable for the following components:    Sodium 126 (*)     Potassium 2.5 (*)     Chloride 85 (*)     Glucose 105 (*)     Albumin 3.3 (*)     All other components within normal limits   MAGNESIUM - Abnormal; Notable for the following components:    Magnesium 1.1 (*)     All other components within normal limits   UA MACROSCOPIC WITH REFLEX TO MICRO AND CULTURE   TROPONIN I   SODIUM RANDOM URINE   CREATININE RANDOM URINE   MEASURE WEIGHT   ORTHOSTATIC BLOOD PRESSURE AND PULSE          Results for orders placed or performed during the hospital encounter of 01/03/19   CBC with platelets differential   Result Value Ref Range    WBC 6.3 4.0 - 11.0 10e9/L    RBC Count 3.74 (L) 3.8 - 5.2 10e12/L    Hemoglobin 10.9 (L) 11.7 - 15.7 g/dL    Hematocrit 32.9 (L) 35.0 - 47.0 %    MCV 88 78 - 100 fl    MCH 29.1 26.5 - 33.0 pg    MCHC 33.1 31.5 - 36.5 g/dL    RDW 12.8 10.0 - 15.0 %    Platelet Count 412 150 - 450 10e9/L    Diff Method Automated Method     % Neutrophils 66.0 %    % Lymphocytes 24.8 %    % Monocytes 8.4 %    % Eosinophils 0.3 %    % Basophils 0.2 %    % Immature Granulocytes 0.3 %    Nucleated RBCs 0 0 /100    Absolute Neutrophil 4.2 1.6 - 8.3 10e9/L    Absolute Lymphocytes 1.6 0.8 - 5.3 10e9/L    Absolute Monocytes 0.5 0.0 - 1.3 10e9/L    Absolute Eosinophils 0.0 0.0 - 0.7 10e9/L    Absolute Basophils 0.0 0.0 - 0.2 10e9/L    Abs Immature Granulocytes 0.0 0 - 0.4 10e9/L    Absolute Nucleated RBC 0.0    Comprehensive  metabolic panel   Result Value Ref Range    Sodium 126 (L) 133 - 144 mmol/L    Potassium 2.5 (LL) 3.4 - 5.3 mmol/L    Chloride 85 (L) 94 - 109 mmol/L    Carbon Dioxide 28 20 - 32 mmol/L    Anion Gap 13 3 - 14 mmol/L    Glucose 105 (H) 70 - 99 mg/dL    Urea Nitrogen 7 7 - 30 mg/dL    Creatinine 0.71 0.52 - 1.04 mg/dL    GFR Estimate 79 >60 mL/min/[1.73_m2]    GFR Estimate If Black >90 >60 mL/min/[1.73_m2]    Calcium 9.8 8.5 - 10.1 mg/dL    Bilirubin Total 0.5 0.2 - 1.3 mg/dL    Albumin 3.3 (L) 3.4 - 5.0 g/dL    Protein Total 8.1 6.8 - 8.8 g/dL    Alkaline Phosphatase 82 40 - 150 U/L    ALT 9 0 - 50 U/L    AST 18 0 - 45 U/L   Troponin I   Result Value Ref Range    Troponin I ES <0.015 0.000 - 0.045 ug/L   Magnesium   Result Value Ref Range    Magnesium 1.1 (L) 1.6 - 2.3 mg/dL   EKG 12-lead, tracing only   Result Value Ref Range    Interpretation ECG Click View Image link to view waveform and result      Medications   potassium chloride (KLOR-CON) Packet 40 mEq (not administered)   magnesium sulfate 4 g in 100 mL sterile water (premade) (not administered)   potassium chloride (KLOR-CON) Packet 40 mEq (40 mEq Oral Given 1/3/19 1053)   0.9% sodium chloride BOLUS (0 mLs Intravenous Stopped 1/3/19 1205)       Assessments & Plan (with Medical Decision Making)   83-year-old female presents for evaluation of generalized weakness.  Exam revealed no focal neurologic findings.  Vital signs revealed elevated blood pressure.  Differential included depression, electrolyte abnormality, infection, ACS or MI, CHF.  Laboratories were obtained including CBC, comprehensive metabolic panel and CBC revealing multiple electrolyte abnormalities including low sodium, low potassium and low magnesium.  CBC revealed anemia at baseline.  In the emergency room, patient was treated with potassium x2, normal saline and magnesium.  The case was discussed at length with the hospitalist and the patient will be admitted for further evaluation and  management as well as correction of electrolytes.    I have reviewed the nursing notes.    I have reviewed the findings, diagnosis, plan and need for follow up with the patient.       Medication List      There are no discharge medications for this visit.         Final diagnoses:   Hyponatremia   Hypokalemia   Generalized muscle weakness   Hypomagnesemia   IFam, am serving as a trained medical scribe to document services personally performed by Javier Nuñez MD, based on the provider's statements to me.      Javier JONAS MD, was physically present and have reviewed and verified the accuracy of this note documented by Fam Maddox.     1/3/2019   [facility], [place], EMERGENCY DEPARTMENT     Dave Nuñez MD  01/03/19 0152

## 2019-01-04 ENCOUNTER — APPOINTMENT (OUTPATIENT)
Dept: PHYSICAL THERAPY | Facility: CLINIC | Age: 84
DRG: 641 | End: 2019-01-04
Payer: COMMERCIAL

## 2019-01-04 LAB
ANION GAP SERPL CALCULATED.3IONS-SCNC: 6 MMOL/L (ref 3–14)
BACTERIA SPEC CULT: NORMAL
BUN SERPL-MCNC: 5 MG/DL (ref 7–30)
CALCIUM SERPL-MCNC: 8.5 MG/DL (ref 8.5–10.1)
CHLORIDE SERPL-SCNC: 102 MMOL/L (ref 94–109)
CO2 SERPL-SCNC: 25 MMOL/L (ref 20–32)
CREAT SERPL-MCNC: 0.7 MG/DL (ref 0.52–1.04)
ERYTHROCYTE [DISTWIDTH] IN BLOOD BY AUTOMATED COUNT: 12.9 % (ref 10–15)
GFR SERPL CREATININE-BSD FRML MDRD: 80 ML/MIN/{1.73_M2}
GLUCOSE BLDC GLUCOMTR-MCNC: 89 MG/DL (ref 70–99)
GLUCOSE SERPL-MCNC: 98 MG/DL (ref 70–99)
HCT VFR BLD AUTO: 28.5 % (ref 35–47)
HGB BLD-MCNC: 9.3 G/DL (ref 11.7–15.7)
Lab: NORMAL
MCH RBC QN AUTO: 29.3 PG (ref 26.5–33)
MCHC RBC AUTO-ENTMCNC: 32.6 G/DL (ref 31.5–36.5)
MCV RBC AUTO: 90 FL (ref 78–100)
PLATELET # BLD AUTO: 362 10E9/L (ref 150–450)
POTASSIUM SERPL-SCNC: 4 MMOL/L (ref 3.4–5.3)
RBC # BLD AUTO: 3.17 10E12/L (ref 3.8–5.2)
SODIUM SERPL-SCNC: 133 MMOL/L (ref 133–144)
SPECIMEN SOURCE: NORMAL
TSH SERPL DL<=0.005 MIU/L-ACNC: 1.53 MU/L (ref 0.4–4)
WBC # BLD AUTO: 4.9 10E9/L (ref 4–11)

## 2019-01-04 PROCEDURE — 25000132 ZZH RX MED GY IP 250 OP 250 PS 637: Performed by: INTERNAL MEDICINE

## 2019-01-04 PROCEDURE — 12000001 ZZH R&B MED SURG/OB UMMC

## 2019-01-04 PROCEDURE — 36415 COLL VENOUS BLD VENIPUNCTURE: CPT | Performed by: INTERNAL MEDICINE

## 2019-01-04 PROCEDURE — 84443 ASSAY THYROID STIM HORMONE: CPT | Performed by: INTERNAL MEDICINE

## 2019-01-04 PROCEDURE — 85027 COMPLETE CBC AUTOMATED: CPT | Performed by: INTERNAL MEDICINE

## 2019-01-04 PROCEDURE — 25000128 H RX IP 250 OP 636: Performed by: INTERNAL MEDICINE

## 2019-01-04 PROCEDURE — 40000193 ZZH STATISTIC PT WARD VISIT: Performed by: PHYSICAL THERAPIST

## 2019-01-04 PROCEDURE — 97161 PT EVAL LOW COMPLEX 20 MIN: CPT | Mod: GP | Performed by: PHYSICAL THERAPIST

## 2019-01-04 PROCEDURE — 99232 SBSQ HOSP IP/OBS MODERATE 35: CPT | Performed by: INTERNAL MEDICINE

## 2019-01-04 PROCEDURE — A9270 NON-COVERED ITEM OR SERVICE: HCPCS | Mod: GY | Performed by: INTERNAL MEDICINE

## 2019-01-04 PROCEDURE — 00000146 ZZHCL STATISTIC GLUCOSE BY METER IP

## 2019-01-04 PROCEDURE — 97530 THERAPEUTIC ACTIVITIES: CPT | Mod: GP | Performed by: PHYSICAL THERAPIST

## 2019-01-04 PROCEDURE — 80048 BASIC METABOLIC PNL TOTAL CA: CPT | Performed by: INTERNAL MEDICINE

## 2019-01-04 RX ORDER — ALBUTEROL SULFATE 90 UG/1
2 AEROSOL, METERED RESPIRATORY (INHALATION) EVERY 4 HOURS PRN
Status: DISCONTINUED | OUTPATIENT
Start: 2019-01-04 | End: 2019-01-05 | Stop reason: HOSPADM

## 2019-01-04 RX ORDER — LANOLIN ALCOHOL/MO/W.PET/CERES
1000 CREAM (GRAM) TOPICAL DAILY
Status: DISCONTINUED | OUTPATIENT
Start: 2019-01-05 | End: 2019-01-05 | Stop reason: HOSPADM

## 2019-01-04 RX ORDER — LISINOPRIL 20 MG/1
20 TABLET ORAL DAILY
Status: DISCONTINUED | OUTPATIENT
Start: 2019-01-04 | End: 2019-01-05

## 2019-01-04 RX ORDER — LANOLIN ALCOHOL/MO/W.PET/CERES
1000 CREAM (GRAM) TOPICAL DAILY
COMMUNITY

## 2019-01-04 RX ORDER — ATORVASTATIN CALCIUM 40 MG/1
40 TABLET, FILM COATED ORAL EVERY EVENING
Status: DISCONTINUED | OUTPATIENT
Start: 2019-01-04 | End: 2019-01-05 | Stop reason: HOSPADM

## 2019-01-04 RX ORDER — ALBUTEROL SULFATE 90 UG/1
2 AEROSOL, METERED RESPIRATORY (INHALATION) EVERY 4 HOURS PRN
COMMUNITY

## 2019-01-04 RX ORDER — FERROUS GLUCONATE 324(38)MG
324 TABLET ORAL
COMMUNITY

## 2019-01-04 RX ORDER — FERROUS GLUCONATE 324(38)MG
324 TABLET ORAL
Status: DISCONTINUED | OUTPATIENT
Start: 2019-01-05 | End: 2019-01-05 | Stop reason: HOSPADM

## 2019-01-04 RX ORDER — CHOLECALCIFEROL (VITAMIN D3) 50 MCG
1 TABLET ORAL DAILY
COMMUNITY
End: 2019-01-01

## 2019-01-04 RX ADMIN — POTASSIUM CHLORIDE AND SODIUM CHLORIDE: 900; 150 INJECTION, SOLUTION INTRAVENOUS at 00:40

## 2019-01-04 RX ADMIN — POTASSIUM CHLORIDE AND SODIUM CHLORIDE: 900; 150 INJECTION, SOLUTION INTRAVENOUS at 16:35

## 2019-01-04 RX ADMIN — FLUOXETINE HYDROCHLORIDE 20 MG: 20 CAPSULE ORAL at 07:49

## 2019-01-04 RX ADMIN — ENOXAPARIN SODIUM 40 MG: 40 INJECTION SUBCUTANEOUS at 18:42

## 2019-01-04 RX ADMIN — ATORVASTATIN CALCIUM 40 MG: 40 TABLET, FILM COATED ORAL at 20:32

## 2019-01-04 RX ADMIN — LISINOPRIL 20 MG: 20 TABLET ORAL at 18:42

## 2019-01-04 RX ADMIN — Medication 1 MG: at 21:41

## 2019-01-04 RX ADMIN — ASPIRIN 81 MG 81 MG: 81 TABLET ORAL at 07:49

## 2019-01-04 RX ADMIN — POTASSIUM CHLORIDE AND SODIUM CHLORIDE: 900; 150 INJECTION, SOLUTION INTRAVENOUS at 08:49

## 2019-01-04 RX ADMIN — PANTOPRAZOLE SODIUM 40 MG: 40 TABLET, DELAYED RELEASE ORAL at 06:40

## 2019-01-04 RX ADMIN — FLUTICASONE FUROATE AND VILANTEROL TRIFENATATE 1 PUFF: 200; 25 POWDER RESPIRATORY (INHALATION) at 07:51

## 2019-01-04 RX ADMIN — MULTIPLE VITAMINS W/ MINERALS TAB 1 TABLET: TAB at 07:49

## 2019-01-04 RX ADMIN — TRAMADOL HYDROCHLORIDE 25 MG: 50 TABLET, FILM COATED ORAL at 20:32

## 2019-01-04 ASSESSMENT — MIFFLIN-ST. JEOR: SCORE: 1192.08

## 2019-01-04 ASSESSMENT — ACTIVITIES OF DAILY LIVING (ADL)
ADLS_ACUITY_SCORE: 15
ADLS_ACUITY_SCORE: 14
ADLS_ACUITY_SCORE: 15
ADLS_ACUITY_SCORE: 14

## 2019-01-04 NOTE — PLAN OF CARE
VS: Temp: 98.3  F (36.8  C) Temp src: Oral BP: 134/68 Pulse: 87 Heart Rate: 83 Resp: 16 SpO2: 98 % O2 Device: None (Room air)      O2: Stable room air. Denies SOB   Output: Voiding spontaneously without difficulty.    Last BM: 1/3/19   Activity: Ax1, ambulates to bathroom   Skin: Intact.   Pain: denies   CMS: Intact. No numbness or tingling    Dressing:    Diet: Reg. Tolerating well. No nausea   LDA: PIV 125ml/hr.    Equipment: IV pole   Plan: Continue plan of care   Additional Info: Rescue inhaler at bedside.

## 2019-01-04 NOTE — PLAN OF CARE
Pt c/o feeling generalized weakness but thinks this has improved. Pt up in nino with stand by assist of 1.  Gait steady. Pt had one episode of nausea but this had resolved and pt refused Zofran.  No emesis noted. Pt tolerating Regular diet after this. Abd soft. BS+. Passing flatus. Refused stool softeners this am due to loose stools 1/3/19. PT in and worked with patient in room. Pt denies pain. LS diminished at bases with occ expiratory wheeze. Infrequent cough bringing up small amount of clear phlegm. Rescue inhaler at bedside.

## 2019-01-04 NOTE — PROGRESS NOTES
[facility], [facility] Progress Note - Hospitalist Service       Date of Admission:  1/3/2019    Assessment & Plan:       Staci Watt is a 83 year old female w PMH of depression, CKD stage 3,  GERD, HTN, and HLD, asthma who presents to the ED for evaluation of generalized weakness, loss of appetite, reflux symptoms, found to have electrolyte abnormalities.        # Generalized weakness: suspect 2/2 poor po intake, electrolyte abn. No e.o sepsis. Denies feeling depressed.   # Electrolyte abnormalities: low K, mg, sodium: suspect related to poor po intake, meds: hydrochlorothiazide.  Denies other losses. - Better now.   - CK, TSH: wnl.     - Holding PTA hydrochlorothiazide, lisinopril  1/4/2019: resume lisinopril 20 mg daily.     - lytes better: ct K, Mg  protocol.   - follow-up BMP.  - IVF 0.9 nss w kcl: will discontinue.     - PT consult for safe dispo planning. Follow-up.     - Nutrition consult  - MVI daily.   - fall precautions.   - Encourage oral intake.   - monitor vitals. I/o.   - Bowel regimen.        # HTN: hold home hydrochlorothiazide, aCEi. Resume as rahel. Hydralazine prn for sbp>160  # HL: resume home statin.  pta aspirin 81  # Depression: fluoxetine 20.   # chronic knee pain: tramadol prn. Tylenol prn.   # Asthma: stable. Ct home inhalers. Albuterol nebs prn.    # resume home supplements: vit d, vit b12. Iron pills.   # Reflux Symptoms: start on Protonix daily. 40 mg.        Prophylaxis: Lovenox sQ  IV Access: piv  CODE: DNR, DNI (confirmed w patient and family)         Diet:Orders Placed This Encounter      Combination Diet Regular Diet Adult      Disposition Plan   Expected discharge: 2 - 3 days, recommended to rehab vs home once once ready. pending PT input. .     Entered: Arias Pineda 01/04/2019, 3:36 PM         The patient's care was discussed with the Bedside Nurse and Care Coordinator/.    Arias Pineda MD  Hospitalist  Service  [facility], [place]  Pager: [phone]  2019    ______________________________________________________________________    Interval History   Overnight events reviewed.   Significant events: none. Doing better.   No new concern.   rahel orals.   No fc.   No NVD      ROS: 4 point ROS including Respi, CVS, GI and , other than that noted above is negative        Data reviewed today: I reviewed all medications, new labs and imaging results over the last 24 hours. I personally reviewed no images or EKG's today.    Physical Exam   Vital Signs: Temp: 98.2  F (36.8  C) Temp src: Oral BP: 102/64 Pulse: 91 Heart Rate: 100 Resp: 16 SpO2: 99 % O2 Device: None (Room air) Oxygen Delivery: 1.5 LPM  Weight: 155 lbs 6.79 oz    Temp (24hrs), Av.8  F (36.6  C), Min:97.2  F (36.2  C), Max:98.3  F (36.8  C)      Wt Readings from Last 5 Encounters:   19 79.5 kg (175 lb 3.2 oz)   18 79.4 kg (175 lb)   16 80.9 kg (178 lb 5 oz)   16 80.3 kg (177 lb)   16 81.2 kg (179 lb)         Intake/Output Summary (Last 24 hours) at 2019 1536  Last data filed at 2019 0814  Gross per 24 hour   Intake --   Output 1050 ml   Net -1050 ml         General Appearance: awake, alert, cooperative, NAD.  HEENT: AT/NC, sclera clear and conjunctiva normal, Moist MM  Respiratory: Non labored.   Cardiovascular: RRR,   GI: non distended.   Skin: no new rash.   MSK/Extremities: distally warm, well perfused. No pedal edema.  Neurologic: AO x 4, grossly non focal.       Data   Recent Labs   Lab 19  0555 19  1526 19  1007   WBC 4.9  --   --  6.3   HGB 9.3*  --   --  10.9*   MCV 90  --   --  88     --   --  412    130* 127* 126*   POTASSIUM 4.0 3.6 3.6 2.5*   CHLORIDE 102 96 90* 85*   CO2 25 26 27 28   BUN 5* 6* 6* 7   CR 0.70 0.70 0.69 0.71   ANIONGAP 6 8 10 13   ELIDA 8.5 9.0 9.4 9.8   GLC 98 110* 121* 105*   ALBUMIN  --   --   --  3.3*    PROTTOTAL  --   --   --  8.1   BILITOTAL  --   --   --  0.5   ALKPHOS  --   --   --  82   ALT  --   --   --  9   AST  --   --   --  18   TROPI  --   --   --  <0.015

## 2019-01-04 NOTE — PLAN OF CARE
Discharge Planner PT   Patient plan for discharge: home with assist, may consider home PT  Current status: Pt evaluated today, feeling much better, still a little weak.  Min A to enter/exit bed. Needed several rest breaks during gait activity. Climbed stairs x 3 with supervision  Barriers to return to prior living situation: limited activity tolerance, deconditioning  Recommendations for discharge: home with assist,  May consider home PT depending on progress., pt states she has concerns about cost  Rationale for recommendations: Pt to continue to instruct in strengthening and increased mobility to maximize independence for safe return to home setting.  Encourage pt to walk with nursing several times during the day as well.         Entered by: Deya Haque 01/04/2019 12:56 PM

## 2019-01-04 NOTE — PLAN OF CARE
VS:   /69 (BP Location: Left arm)   Pulse 103   Temp 96.3  F (35.7  C) (Oral)   Resp 16   Wt 85.6 kg (188 lb 12.8 oz)   SpO2 100%   BMI 35.67 kg/m       Output:   Voids spontaneously. Two BM tonight, +passing gas and +BS.   3 Urine occurrences tonight, missed Hat once and 2 sttol mixed in with urine    Lungs Diminished in the lung bases. Patient has a hx of Asthma, has rescue inhaler at bedside.    Activity:   Asst of 1.   Skin: Intact.    Pain:   Denies any pain    Neuro/CMS:   A&O x 4, CMS intact, denies N/T   Dressing(s):   None    Diet:   Regular - fair appetite    LDA:   PIV R arm, infusing 125 NS +K   Equipment:   IV Pole, PCD 's    Plan:   Continue to monitor per POC   Additional Info:   Patient has low Na this shift, patient has had 3 lab draws and Na is trending in the right direction, notified moonlighter during shift.   Patient was able to eat tonight about 50% of meal. Seemed perky at end of shift.  Patient refused Lovenox and Miralax.   Strict I/O

## 2019-01-04 NOTE — PROGRESS NOTES
" 01/04/19 1121   Quick Adds   Type of Visit Initial PT Evaluation   Living Environment   Lives With child(lupe), adult   Living Arrangements house   Home Accessibility no concerns   Living Environment Comment 2 story home. \"It's all set up.\" Extended tub bench, hand held shower, no grab bars at toilet but \"not needed\",  Bedroom and bathroom are upstairs. 4 steps to enter with a railing on the left.    Self-Care   Usual Activity Tolerance good   Current Activity Tolerance fair   Equipment Currently Used at Home walker, rolling   Activity/Exercise/Self-Care Comment independent with self cares   Functional Level Prior   Ambulation 1-->assistive equipment   Transferring 1-->assistive equipment   Toileting 0-->independent   Bathing 1-->assistive equipment   Communication 0-->understands/communicates without difficulty   Swallowing 0-->swallows foods/liquids without difficulty   Cognition 0 - no cognition issues reported   Fall history within last six months no   Prior Functional Level Comment Limited tolerance for activity lately due to weakness and nausea. Uses a 2ww in the house.    General Information   Onset of Illness/Injury or Date of Surgery - Date 01/03/19   Referring Physician Arias Pineda MD   Patient/Family Goals Statement Pt just wants to go home tomorrow.  Wishes her knees were in better shape.    Pertinent History of Current Problem (include personal factors and/or comorbidities that impact the POC) Staci Watt is a 83 year old female presents to the ED for evaluation of generalized weakness, loss of appetite x 3 weeks, and nausea, hearburn (reflux symptoms) PMH:  depression, asthma, CKD stage 3, GERD, HTN, HLD,   Precautions/Limitations fall precautions   Weight-Bearing Status - LLE full weight-bearing   Weight-Bearing Status - RLE full weight-bearing   Heart Disease Risk Factors Dislipidemia;High blood pressure;Lack of physical activity;Race   General Observations Pt lying in bed,  well-groomed.  " "  General Info Comments States she just got up to walk with the nurses but is willing to go again with therapist.  Feels at least 50% better than when she came in. complains of smells in the room and tastes ( her coffee tastes \"like panther piss.\"    Cognitive Status Examination   Orientation orientation to person, place and time   Level of Consciousness alert   Follows Commands and Answers Questions 100% of the time   Personal Safety and Judgment intact   Memory intact   Pain Assessment   Patient Currently in Pain No   Integumentary/Edema   Integumentary/Edema Comments mild swelling noted in ankles   Posture    Posture Forward head position;Protracted shoulders   Range of Motion (ROM)   ROM Comment LE's are adequate for mobility.  Some stiffness from arthritis pain.    Strength   Strength Comments not tested functionally,  can lift legs against gravity but needed assist to lift then to enter and exit bed.  Unable to stand on one leg to don her slipper in standing without UE support   Bed Mobility   Bed Mobility Comments min A at LE's.    Transfer Skills   Transfer Comments sit<>stand with ww for support , slow, needs extra time to rise to stand from low bed or plinth.    Gait   Gait Comments walked 50' then standing rest break with ww for support.  Needs ww to wt shift.    Balance   Balance Comments limited due to knee arthritis,  can not take steps without UE support.    Coordination   Coordination no deficits were identified   General Therapy Interventions   Planned Therapy Interventions strengthening   Clinical Impression   PT Diagnosis deconditioning   Influenced by the following impairments generalized deconditioning.    Functional limitations due to impairments decreased activity tolerance, decreased independence with bed mobility and gait   Clinical Presentation Stable/Uncomplicated   Clinical Presentation Rationale per clinical observation   Clinical Decision Making (Complexity) Low complexity   Therapy " "Frequency` daily   Predicted Duration of Therapy Intervention (days/wks) 2 days   Anticipated Equipment Needs at Discharge (owns a 2ww)   Anticipated Discharge Disposition Home with Assist;Home with Home Therapy   Risk & Benefits of therapy have been explained Yes   Patient, Family & other staff in agreement with plan of care Yes   [facility] TM \"6 Clicks\"   2016, Trustees of [facility], under license to [employer].  All rights reserved.   6 Clicks Short Forms Basic Mobility Inpatient Short Form   [facility]  \"6 Clicks\" V.2 Basic Mobility Inpatient Short Form   1. Turning from your back to your side while in a flat bed without using bedrails? 4 - None   2. Moving from lying on your back to sitting on the side of a flat bed without using bedrails? 3 - A Little   3. Moving to and from a bed to a chair (including a wheelchair)? 3 - A Little   4. Standing up from a chair using your arms (e.g., wheelchair, or bedside chair)? 3 - A Little   5. To walk in hospital room? 3 - A Little   6. Climbing 3-5 steps with a railing? 3 - A Little   Basic Mobility Raw Score (Score out of 24.Lower scores equate to lower levels of function) 19   Total Evaluation Time   Total Evaluation Time (Minutes) 10     "

## 2019-01-04 NOTE — PHARMACY-ADMISSION MEDICATION HISTORY
Admission medication history interview status for the 1/3/2019 admission is complete. See Epic admission navigator for allergy information, pharmacy, prior to admission medications and immunization status.     Medication history interview sources:  patient and [facility]    Changes made to PTA medication list (reason)  Added: albuterol inhaler, Ferrous gluconate, vitamin D3 and vitamin B12  Deleted: multivitamin w/ minerals  Changed: none    Additional medication history information (including reliability of information, actions taken by pharmacist):patient knows most of her home medications. She cannot clearly recall the doses of some medications.   Hydrochlorothiazide - pt stated that she is on 25 mg po daily. However, it is 50 mg po daily in the [facility] outpatient pharmacy profile.  Indomethacin- pt stated that she is on 25 -50 mg po daily. However, it is not in the [facility] pharmacy profile.     Prior to Admission medications    Medication Sig Last Dose Taking? Auth Provider   albuterol (PROAIR HFA/PROVENTIL HFA/VENTOLIN HFA) 108 (90 Base) MCG/ACT inhaler Inhale 2 puffs into the lungs every 4 hours as needed for shortness of breath / dyspnea or wheezing Past Week at Unknown time Yes Unknown, Entered By History   ASPIRIN CHILDRENS 81 MG OR CHEW 1 TABLET DAILY 1/2/2019 at Unknown time Yes Mau Paul MD   atorvastatin (LIPITOR) 80 MG tablet Take 0.5 tablets (40 mg) by mouth daily For high cholesterol. 1/2/2019 at Unknown time Yes Destin Wolf MD   ferrous gluconate (FERGON) 324 (38 Fe) MG tablet Take 324 mg by mouth daily (with breakfast) 1/2/2019 Yes Unknown, Entered By History   FLUoxetine (PROZAC) 20 MG capsule Take 1 capsule (20 mg) by mouth daily 1/2/2019 at Unknown time Yes Destin Wolf MD   fluticasone-salmeterol (ADVAIR DISKUS) 500-50 MCG/DOSE diskus inhaler Inhale 1 puff into the lungs 2 times daily 1/2/2019 at Unknown time Yes Destin Wolf MD    hydrochlorothiazide (HYDRODIURIL) 50 MG tablet Take 1 tablet (50 mg) by mouth daily 1/2/2019 at Unknown time Yes Destin Wolf MD   indomethacin (INDOCIN) 25 MG capsule Take 1-2 capsules (25-50 mg) by mouth 3 times daily (with meals) Past Week at Unknown time Yes Destin Wolf MD   lisinopril (PRINIVIL/ZESTRIL) 40 MG tablet Take 1 tablet (40 mg) by mouth daily 1/2/2019 at Unknown time Yes Destin Wolf MD   senna-docusate (DANIEL-COLACE) 8.6-50 MG per tablet Take 2 tablets by mouth daily Past Week at Unknown time Yes Heidi Velasquez MD   traMADol (ULTRAM) 50 MG tablet Take 0.5-1 tablets (25-50 mg) by mouth nightly as needed for pain maximum 1 tablet(s) per day 1/2/2019 at Unknown time Yes Destin Wolf MD   vitamin B-12 (CYANOCOBALAMIN) 1000 MCG tablet Take 1,000 mcg by mouth daily 1/2/2019 Yes Unknown, Entered By History   vitamin D3 (CHOLECALCIFEROL) 2000 units tablet Take 1 tablet by mouth daily 1/2/2019 Yes Unknown, Entered By History   acetaminophen (TYLENOL) 500 MG tablet Take 500-1,000 mg by mouth every 6 hours as needed Reported on 5/2/2017 Unknown at Unknown time  Reported, Patient         Medication history completed by: Tara Hamilton PharmD

## 2019-01-05 ENCOUNTER — APPOINTMENT (OUTPATIENT)
Dept: PHYSICAL THERAPY | Facility: CLINIC | Age: 84
DRG: 641 | End: 2019-01-05
Payer: COMMERCIAL

## 2019-01-05 VITALS
TEMPERATURE: 98.4 F | OXYGEN SATURATION: 99 % | RESPIRATION RATE: 18 BRPM | DIASTOLIC BLOOD PRESSURE: 71 MMHG | SYSTOLIC BLOOD PRESSURE: 140 MMHG | HEART RATE: 86 BPM | BODY MASS INDEX: 33.08 KG/M2 | HEIGHT: 61 IN | WEIGHT: 175.2 LBS

## 2019-01-05 LAB
ANION GAP SERPL CALCULATED.3IONS-SCNC: 7 MMOL/L (ref 3–14)
BUN SERPL-MCNC: 6 MG/DL (ref 7–30)
CALCIUM SERPL-MCNC: 8.3 MG/DL (ref 8.5–10.1)
CHLORIDE SERPL-SCNC: 106 MMOL/L (ref 94–109)
CO2 SERPL-SCNC: 23 MMOL/L (ref 20–32)
CREAT SERPL-MCNC: 0.76 MG/DL (ref 0.52–1.04)
GFR SERPL CREATININE-BSD FRML MDRD: 72 ML/MIN/{1.73_M2}
GLUCOSE SERPL-MCNC: 90 MG/DL (ref 70–99)
MAGNESIUM SERPL-MCNC: 1.5 MG/DL (ref 1.6–2.3)
POTASSIUM SERPL-SCNC: 3.9 MMOL/L (ref 3.4–5.3)
SODIUM SERPL-SCNC: 136 MMOL/L (ref 133–144)

## 2019-01-05 PROCEDURE — 36415 COLL VENOUS BLD VENIPUNCTURE: CPT | Performed by: INTERNAL MEDICINE

## 2019-01-05 PROCEDURE — 40000193 ZZH STATISTIC PT WARD VISIT

## 2019-01-05 PROCEDURE — 97110 THERAPEUTIC EXERCISES: CPT | Mod: GP

## 2019-01-05 PROCEDURE — 80048 BASIC METABOLIC PNL TOTAL CA: CPT | Performed by: INTERNAL MEDICINE

## 2019-01-05 PROCEDURE — 99239 HOSP IP/OBS DSCHRG MGMT >30: CPT | Performed by: INTERNAL MEDICINE

## 2019-01-05 PROCEDURE — 25000128 H RX IP 250 OP 636: Performed by: INTERNAL MEDICINE

## 2019-01-05 PROCEDURE — 25000132 ZZH RX MED GY IP 250 OP 250 PS 637: Performed by: INTERNAL MEDICINE

## 2019-01-05 PROCEDURE — A9270 NON-COVERED ITEM OR SERVICE: HCPCS | Mod: GY | Performed by: INTERNAL MEDICINE

## 2019-01-05 PROCEDURE — 97116 GAIT TRAINING THERAPY: CPT | Mod: GP

## 2019-01-05 PROCEDURE — 83735 ASSAY OF MAGNESIUM: CPT | Performed by: INTERNAL MEDICINE

## 2019-01-05 RX ORDER — PANTOPRAZOLE SODIUM 40 MG/1
40 TABLET, DELAYED RELEASE ORAL
Qty: 14 TABLET | Refills: 0 | Status: SHIPPED | OUTPATIENT
Start: 2019-01-06 | End: 2019-01-01

## 2019-01-05 RX ORDER — SODIUM CHLORIDE 9 MG/ML
INJECTION, SOLUTION INTRAVENOUS
Status: DISCONTINUED
Start: 2019-01-05 | End: 2019-01-05 | Stop reason: HOSPADM

## 2019-01-05 RX ORDER — MULTIPLE VITAMINS W/ MINERALS TAB 9MG-400MCG
1 TAB ORAL DAILY
Qty: 30 TABLET | Refills: 0 | COMMUNITY
Start: 2019-01-05

## 2019-01-05 RX ORDER — LISINOPRIL 40 MG/1
40 TABLET ORAL DAILY
Status: DISCONTINUED | OUTPATIENT
Start: 2019-01-06 | End: 2019-01-05 | Stop reason: HOSPADM

## 2019-01-05 RX ORDER — AMOXICILLIN 250 MG
2 CAPSULE ORAL 2 TIMES DAILY
Status: DISCONTINUED | OUTPATIENT
Start: 2019-01-05 | End: 2019-01-05 | Stop reason: HOSPADM

## 2019-01-05 RX ORDER — POLYETHYLENE GLYCOL 3350 17 G/17G
17 POWDER, FOR SOLUTION ORAL DAILY
Qty: 30 PACKET | Refills: 0 | Status: SHIPPED | OUTPATIENT
Start: 2019-01-05 | End: 2019-01-01

## 2019-01-05 RX ORDER — POLYETHYLENE GLYCOL 3350 17 G/17G
17 POWDER, FOR SOLUTION ORAL 2 TIMES DAILY
Status: DISCONTINUED | OUTPATIENT
Start: 2019-01-05 | End: 2019-01-05 | Stop reason: HOSPADM

## 2019-01-05 RX ADMIN — FLUOXETINE HYDROCHLORIDE 20 MG: 20 CAPSULE ORAL at 07:40

## 2019-01-05 RX ADMIN — FLUTICASONE FUROATE AND VILANTEROL TRIFENATATE 1 PUFF: 200; 25 POWDER RESPIRATORY (INHALATION) at 07:39

## 2019-01-05 RX ADMIN — SENNOSIDES AND DOCUSATE SODIUM 2 TABLET: 8.6; 5 TABLET ORAL at 11:34

## 2019-01-05 RX ADMIN — CYANOCOBALAMIN TAB 1000 MCG 1000 MCG: 1000 TAB at 07:40

## 2019-01-05 RX ADMIN — MULTIPLE VITAMINS W/ MINERALS TAB 1 TABLET: TAB at 07:41

## 2019-01-05 RX ADMIN — POLYETHYLENE GLYCOL 3350 17 G: 17 POWDER, FOR SOLUTION ORAL at 11:34

## 2019-01-05 RX ADMIN — FERROUS GLUCONATE 324 MG: 324 TABLET ORAL at 07:39

## 2019-01-05 RX ADMIN — MAGNESIUM SULFATE IN WATER 4 G: 40 INJECTION, SOLUTION INTRAVENOUS at 11:33

## 2019-01-05 RX ADMIN — VITAMIN D, TAB 1000IU (100/BT) 2000 UNITS: 25 TAB at 07:41

## 2019-01-05 RX ADMIN — LISINOPRIL 20 MG: 20 TABLET ORAL at 07:40

## 2019-01-05 RX ADMIN — PANTOPRAZOLE SODIUM 40 MG: 40 TABLET, DELAYED RELEASE ORAL at 07:40

## 2019-01-05 RX ADMIN — ONDANSETRON 4 MG: 2 INJECTION INTRAMUSCULAR; INTRAVENOUS at 00:39

## 2019-01-05 RX ADMIN — ASPIRIN 81 MG 81 MG: 81 TABLET ORAL at 07:40

## 2019-01-05 ASSESSMENT — ACTIVITIES OF DAILY LIVING (ADL)
ADLS_ACUITY_SCORE: 15

## 2019-01-05 NOTE — PLAN OF CARE
VS:   VSS, pt on room air   Output:   Up to bathroom to void   Activity:   Up with SBA and walker, repositions independently    Skin: WDL   Pain:   Denies pain   Neuro/CMS:   A&Ox4, CMS intact, pt was able to sleep through the night   Diet:   regular   LDA:   PIV saline locked    Equipment:   walker   Plan:   Discharge home today   Additional Info:   Pt is able to make needs known  Reported nausea, relieved with IV zofran

## 2019-01-05 NOTE — PLAN OF CARE
"      VS:   /62 (BP Location: Left arm)   Pulse 86   Temp 97.1  F (36.2  C) (Oral)   Resp 16   Ht 1.549 m (5' 1\")   Wt 79.5 kg (175 lb 3.2 oz)   SpO2 100%   BMI 33.10 kg/m       Output:   Voids spontaneously. Last BM yday 1/3/19   Lungs Clear anterior, diminished at the bases. Patient has a hx of Asthma and rescue inhaler at the patient bedside    Activity:   Independent with walker    Skin: Intact, Dry    Pain:   Denies any current pain, has bilateral knee pain which is controlled with hot packs and Ultram 25mg    Neuro/CMS:   A&O x 4, CMS intact, denies any N/T   Dressing(s):   None    Diet:   Regular as tolerated.    LDA:   PIV saline locked.    Equipment:   PCD's, walker    Plan:   Possible discharge 1/4/19   Additional Info:   Patient has generalized weakness.        "

## 2019-01-05 NOTE — DISCHARGE INSTRUCTIONS
CMP  Recent Labs   Lab 01/05/19  0629 01/04/19  0555 01/03/19  2026 01/03/19  1526 01/03/19  1007    133 130* 127* 126*   POTASSIUM 3.9 4.0 3.6 3.6 2.5*   CHLORIDE 106 102 96 90* 85*   CO2 23 25 26 27 28   ANIONGAP 7 6 8 10 13   GLC 90 98 110* 121* 105*   BUN 6* 5* 6* 6* 7   CR 0.76 0.70 0.70 0.69 0.71   GFRESTIMATED 72 80 80 81 79   GFRESTBLACK 84 >90 >90 >90 >90   ELIDA 8.3* 8.5 9.0 9.4 9.8   MAG 1.5*  --   --  3.0* 1.1*   PHOS  --   --   --  2.9  --    PROTTOTAL  --   --   --   --  8.1   ALBUMIN  --   --   --   --  3.3*   BILITOTAL  --   --   --   --  0.5   ALKPHOS  --   --   --   --  82   AST  --   --   --   --  18   ALT  --   --   --   --  9     CBC  Recent Labs   Lab 01/04/19  0555 01/03/19  1007   WBC 4.9 6.3   RBC 3.17* 3.74*   HGB 9.3* 10.9*   HCT 28.5* 32.9*   MCV 90 88   MCH 29.3 29.1   MCHC 32.6 33.1   RDW 12.9 12.8    412     Results for orders placed or performed in visit on 08/01/18   XR Knee Bilateral 3 Views    Narrative    3 views right and left knee radiographs 8/1/2018 10:37 AM    History: Primary osteoarthritis of both knees     Comparison: X-ray right knee 3/2/2017    Findings:    AP view of bilateral knees, lateral and patellofemoral views of the  right and left knee were obtained.     Left: No acute osseous abnormality. Mild joint effusion. There is  degenerative joint disease with moderate medial joint space narrowing  and mild lateral joint space narrowing, with osteophyte formation.  There is mild patellar lateral tilt and subluxation with joint space  narrowing of the lateral patellofemoral joint. Soft tissues  unremarkable.    Right:  No acute osseous abnormality. Mild joint effusion.  Degenerative joint disease with moderate medial and mild lateral joint  space loss. Osteophyte formation. No patellar tilt or lateral  subluxation. Mild degenerative change of the patellofemoral  compartment. Soft tissue is unremarkable.      Impression    Impression:  Bilateral knee  tricompartmental osteoarthrosis with osteophyte  formation and joint space narrowing.    I have personally reviewed the examination and initial interpretation  and I agree with the findings.    WALLACE MASTERS MD

## 2019-01-05 NOTE — DISCHARGE SUMMARY
Discharge Summary    Staci Watt MRN# [id number]   YOB: 1935 Age: 83 year old     Date of Admission:  1/3/2019  Date of Discharge:  1/5/2019  Admitting Physician:  Arias Pineda MD  Discharge Physician:  Arias Pineda MD   Discharging Service:  Internal Medicine     Primary Provider: Destin Wolf           Discharge Diagnosis:      Hyponatremia  Hypokalemia  Hypomagnesemia  Generalized muscle weakness  Gastroesophageal reflux disease, esophagitis presence not specified  Constipation, unspecified constipation type             Procedures:   No procedures performed during this admission             Consultations:   Consultation during this admission received from Physical therapy, nutrition.                Brief History of Illness:   For details please refer to H and P dated 1/3/2019  Briefly, Staci Watt is a 83 year old female w PMH of depression, CKD stage 3,  GERD, HTN, and HLD, asthma who presents to the ED for evaluation of generalized weakness, loss of appetite, reflux symptoms, found to have electrolyte abnormalities.                  Hospital Course:   Issues:    # Generalized weakness: suspect 2/2 poor po intake, electrolyte abn. No e.o sepsis. Denies feeling depressed.   # Electrolyte abnormalities: low K, mg, sodium: suspect related to poor po intake, meds: hydrochlorothiazide.  Denies other losses.   Given IVF. repleted lytes using protocol.   Pt eating better now.   - Better now.   - CK, TSH: wnl.      - Held PTA hydrochlorothiazide, lisinopril  1/4/2019: resume lisinopril 20 mg daily.   1/5/2019: increased lisinopril 40 mg daily.      - PT consult for safe dispo planning. Home with home therapy.      - Nutrition consult: Patient does not meet two of the above criteria necessary for diagnosing malnutrition    - MVI daily.   - fall precautions.   - Encourage oral intake.   - Bowel regimen.     Discharge changes: discontinue hydrochlorothiazide. Miralax daily.  "        # HTN: held home hydrochlorothiazide, aCEi.  lisinopril resumed at discharge.   # HL: resume home statin.  pta aspirin 81  # Depression: fluoxetine 20.   # chronic knee pain: tramadol prn. Tylenol prn.   # Asthma: stable. Ct home inhalers. Albuterol nebs prn.    # resume home supplements: vit d, vit b12. Iron pills.      # Reflux Symptoms: started on Protonix daily. 40 mg. discharging with 2 weeks supply              Discharge Day Exam:    Interval/Subjective:   Overnight events reviewed.   Significant events: none. Doing better.   No new concern.   rahel orals.   No fc.   No NVD        Blood pressure 150/81, pulse 86, temperature 98.1  F (36.7  C), temperature source Oral, resp. rate 16, height 1.549 m (5' 1\"), weight 79.5 kg (175 lb 3.2 oz), SpO2 99 %, not currently breastfeeding.    Wt Readings from Last 5 Encounters:   19 79.5 kg (175 lb 3.2 oz)   18 79.4 kg (175 lb)   16 80.9 kg (178 lb 5 oz)   16 80.3 kg (177 lb)   16 81.2 kg (179 lb)       Temp (24hrs), Av.9  F (36.6  C), Min:97.1  F (36.2  C), Max:98.6  F (37  C)    General: Alert and interactive, NAD, obese.   HEENT: AT/NC, anicteric, Moist MM  Neck: Supple  Chest: Clear BL, non labored.   CVS: S1S2 regular, no murmur.   Abd: Soft, non tender, non distended, BS +  MSK: Distal pulses 2+.     Neuro: AO x 4, Grossly intact. gen weakness- improving.   Skin: no new rash on exposed areas.               Significant Results Obtained During Hospitalization:   All relevant data  Reviewed.      Lab Results   Component Value Date    WBC 4.9 2019    HGB 9.3 (L) 2019    HCT 28.5 (L) 2019     2019     2019    POTASSIUM 3.9 2019    CHLORIDE 106 2019    CO2 23 2019    BUN 6 (L) 2019    CR 0.76 2019    GLC 90 2019    SED 36 (H) 2018    NTBNPI 506 2010    TROPONIN 0.00 2010    TROPI <0.015 2019    AST 18 2019    ALT 9 2019 " "   ALKPHOS 82 01/03/2019    BILITOTAL 0.5 01/03/2019    INR 0.99 06/02/2010      No results found for this or any previous visit (from the past 48 hour(s)).                  Pending Results:     Unresulted Labs Ordered in the Past 30 Days of this Admission     No orders found from 11/4/2018 to 1/4/2019.               Condition on Discharge:   Discharge condition: Stable   Discharge vitals: Blood pressure 140/71, pulse 86, temperature 98.4  F (36.9  C), temperature source Oral, resp. rate 18, height 1.549 m (5' 1\"), weight 79.5 kg (175 lb 3.2 oz), SpO2 99 %, not currently breastfeeding.                  Discharge Medications:     Current Discharge Medication List      START taking these medications    Details   pantoprazole (PROTONIX) 40 MG EC tablet Take 1 tablet (40 mg) by mouth every morning (before breakfast) for 14 days  Qty: 14 tablet, Refills: 0    Comments: Follow up with primary care about continuing beyond 2 weeks.  Associated Diagnoses: Gastroesophageal reflux disease, esophagitis presence not specified      polyethylene glycol (MIRALAX/GLYCOLAX) packet Take 17 g by mouth daily  Qty: 30 packet, Refills: 0    Associated Diagnoses: Constipation, unspecified constipation type         CONTINUE these medications which have CHANGED    Details   multivitamin w/minerals (MULTI-VITAMIN) tablet Take 1 tablet by mouth daily  Qty: 30 tablet, Refills: 0         CONTINUE these medications which have NOT CHANGED    Details   albuterol (PROAIR HFA/PROVENTIL HFA/VENTOLIN HFA) 108 (90 Base) MCG/ACT inhaler Inhale 2 puffs into the lungs every 4 hours as needed for shortness of breath / dyspnea or wheezing      ASPIRIN CHILDRENS 81 MG OR CHEW 1 TABLET DAILY  Qty: 0, Refills: 11    Associated Diagnoses: Type II or unspecified type diabetes mellitus without mention of complication, not stated as uncontrolled      atorvastatin (LIPITOR) 80 MG tablet Take 0.5 tablets (40 mg) by mouth daily For high cholesterol.  Qty: 45 tablet, " Refills: 3    Associated Diagnoses: Hyperlipidemia LDL goal <100      ferrous gluconate (FERGON) 324 (38 Fe) MG tablet Take 324 mg by mouth daily (with breakfast)      FLUoxetine (PROZAC) 20 MG capsule Take 1 capsule (20 mg) by mouth daily  Qty: 90 capsule, Refills: 1    Associated Diagnoses: Major depressive disorder, recurrent episode, moderate (H)      fluticasone-salmeterol (ADVAIR DISKUS) 500-50 MCG/DOSE diskus inhaler Inhale 1 puff into the lungs 2 times daily  Qty: 3 Inhaler, Refills: 3    Associated Diagnoses: Moderate persistent asthma without complication      lisinopril (PRINIVIL/ZESTRIL) 40 MG tablet Take 1 tablet (40 mg) by mouth daily  Qty: 90 tablet, Refills: 3    Associated Diagnoses: HTN, goal below 150/90      senna-docusate (DANIEL-COLACE) 8.6-50 MG per tablet Take 2 tablets by mouth daily  Qty: 100 tablet, Refills: 3    Associated Diagnoses: Slow transit constipation      traMADol (ULTRAM) 50 MG tablet Take 0.5-1 tablets (25-50 mg) by mouth nightly as needed for pain maximum 1 tablet(s) per day  Qty: 14 tablet, Refills: 0    Associated Diagnoses: Primary osteoarthritis of both knees      vitamin B-12 (CYANOCOBALAMIN) 1000 MCG tablet Take 1,000 mcg by mouth daily      vitamin D3 (CHOLECALCIFEROL) 2000 units tablet Take 1 tablet by mouth daily      acetaminophen (TYLENOL) 500 MG tablet Take 500-1,000 mg by mouth every 6 hours as needed Reported on 5/2/2017    Associated Diagnoses: OA (osteoarthritis) of knee         STOP taking these medications       hydrochlorothiazide (HYDRODIURIL) 50 MG tablet Comments:   Reason for Stopping:         indomethacin (INDOCIN) 25 MG capsule Comments:   Reason for Stopping:                      Discharge Disposition:   Discharged to home             Discharge Instructions:     Discharge Procedure Orders   Adult [facility] Follow-up and recommended labs and tests   Order Comments: Follow up with primary care provider, Destin Wolf, within 7 days for hospital  follow- up.  The following labs/tests are recommended: BMP, Mg. Review Need to resume Hydrochlorothiazide.       Appointments on [facility] and/or [facility] (with [facility] or [facility] provider or service). Call [phone] if you haven't heard regarding these appointments within 7 days of discharge.     Activity   Order Comments: Your activity upon discharge: activity as tolerated     Order Specific Question Answer Comments   Is discharge order? Yes      Monitor and record   Order Comments: blood pressure daily     Reason for your hospital stay   Order Comments: Generalized Weakness. Electrolyte abnormalities. Reflux symptoms.     Diet   Order Comments: Follow this diet upon discharge: Orders Placed This Encounter      Combination Diet Regular Diet Adult     Order Specific Question Answer Comments   Is discharge order? Yes           Thank you for the opportunity to participate in the care of your patient.  Please do not hesitate to contact our service with questions or concerns.    Total time spent was greater than 30 minutes; Greater than 50% of the time was in counseling and care coordination. Care coordination included discussion of medication changes, lifestyle changes and reviewing instructions to the patient .     D/W RN      Arias Pineda MD   Hospitalist (Internal Medicine)  Pager: [phone].     DOS: 1/5/2019

## 2019-01-05 NOTE — PROGRESS NOTES
Care Coordinator - Discharge Planning    Admission Date/Time:  1/3/2019  Attending MD:  Arias Pineda MD     Data  Date of initial CC assessment:  Today after call from the PCU Charge RN requesting home care follow up at the request of MD team for patient who will discharge today.  Chart reviewed, discussed with interdisciplinary team.   Patient was admitted for:   1. Generalized muscle weakness    2. Hyponatremia    3. Hypokalemia    4. Hypomagnesemia    5. Gastroesophageal reflux disease, esophagitis presence not specified    6. Constipation, unspecified constipation type    7. CKD (chronic kidney disease) stage 3, GFR 30-59 ml/min (H)         Assessment   The following home care plans of care have been arranged on behalf of Ms. Watt at the recommendation of the inpatient interdisciplinary team:    Please fax discharge orders to [facility]     Ph:  [phone]     Fax: [phone]     Skilled home care RN for initial home safety evaluation and 1-3 times a week to evaluate medication management, nutrition and hydration evaluation, endurance evaluation, and general status evaluation after discharge from the acute care hospital setting.     Skilled home care Physical Therapist to evaluate and recommend a plan of care in home setting per recommendations of the inpatient Rehabilitation Consult Team.     Charge RN will review home care plans of care with patient including the fact that the home care staff will now appear at patients home without phoning patient first.  If patient decides she does not want the recommended services at the time of the phone call to provide services, she can cancel.       Plan  Anticipated Discharge Date:  Today per MD team.  Anticipated Discharge Plan:  As above and as designated in discharge orders.  Patient will follow up in clinic as designated in discharge orders.    CTS Handoff completed:  {(Clinic Letter)  To be sent    MARINO KhouryS.[email or web address],  BOAZ.,R.N.         Pager

## 2019-01-05 NOTE — PLAN OF CARE
D: Pt  A/O x4. VSS . Lungs- CL, no c/o chest pain/ SOB. sats= 99 % RA. Bowel+. PP- +. +2 edema- feet/legs. CMS- intact. Pt taking PO REG  food/ fluids well. Voiding Good amountsin BR walks with walker INDEPENDENTLY. Pain /CAPA- denies. Pt to dC to home this afternoon.  A: con't to monitor. Call light in reach. Pt able to make needs known.

## 2019-01-05 NOTE — PLAN OF CARE
Discharge Planner PT   Patient plan for discharge: home with assist  Current status: pt arie good mobility today adequate for discharge home. She is IND bed mobility from flat bed, Hank sit<>stand to FWW, ambulates x200' with FWW SBA with cues for improved gait 2/2 knee pain. Pt also participates in LE exercises with handout for home program to improve B LE strength/ROM. Pt with no concerns for return home, PT agrees with this.  Barriers to return to prior living situation: none  Recommendations for discharge: home w/assist + HEP  Rationale for recommendations: pt would benefit from assist at home for higher level activity and HEP to address LE deficits that limit mobility. Arie good ability to perform these at home IND.       Entered by: Danya Scott 01/05/2019 11:45 AM     Physical Therapy Discharge Summary    Reason for therapy discharge:    All goals and outcomes met, no further needs identified.    Progress towards therapy goal(s). See goals on Care Plan in [facility] electronic health record for goal details.  Goals met    Therapy recommendation(s):    Continue home exercise program.

## 2019-01-06 ENCOUNTER — PATIENT OUTREACH (OUTPATIENT)
Dept: CARE COORDINATION | Facility: CLINIC | Age: 84
End: 2019-01-06

## 2019-01-06 ENCOUNTER — TRANSFERRED RECORDS (OUTPATIENT)
Dept: HEALTH INFORMATION MANAGEMENT | Facility: CLINIC | Age: 84
End: 2019-01-06

## 2019-01-07 ENCOUNTER — PATIENT OUTREACH (OUTPATIENT)
Dept: CARE COORDINATION | Facility: CLINIC | Age: 84
End: 2019-01-07

## 2019-01-07 DIAGNOSIS — E87.8 ELECTROLYTE ABNORMALITY: Primary | ICD-10-CM

## 2019-01-07 ASSESSMENT — ACTIVITIES OF DAILY LIVING (ADL): DEPENDENT_IADLS:: INDEPENDENT

## 2019-01-07 NOTE — PROGRESS NOTES
Clinic Care Coordination Contact    Clinic Care Coordination Contact  OUTREACH    Referral Information:  Referral Source: IP Report    Primary Diagnosis: Other (include Comment box)(low K and NA)    Chief Complaint   Patient presents with     Clinic Care Coordination - Post Hospital     Clinic Care Coordination RN         [place] Utilization:  [facility] admission  1/3/2019-1/5/2019--  Hyponatremia  Hypokalemia  Hypomagnesemia  Generalized muscle weakness  Gastroesophageal reflux disease, esophagitis presence not specified  Constipation, unspecified constipation type    Clinic Utilization  Difficulty keeping appointments:: No  Compliance Concerns: No  No-Show Concerns: No  No PCP office visit in Past Year: No  Utilization    Last refreshed: 1/6/2019  8:33 PM:  Hospital Admissions 1           Last refreshed: 1/6/2019  8:33 PM:  ED Visits 0           Last refreshed: 1/6/2019  8:33 PM:  No Show Count (past year) 2              Current as of: 1/6/2019  8:33 PM              Clinical Concerns:  Current Medical Concerns: Patient report she was feeling flu like/washed out  and not eating before she was hospitalized  .  Patient reports she is feeling better and appetite is coming back  Patient has been drinking plenty of fluids .  Patient refuses home care stating she has her own PT exercises she is doing for her knee.  Patient states she plans to have a knee replaced in March .  Current Behavioral Concerns: Not discussed     Pain  Pain (GOAL):: No  Health Maintenance Reviewed: Due/Overdue   Health Maintenance Due   Topic Date Due     URINE DRUG SCREEN Q1 YR  07/04/1950     ZOSTER IMMUNIZATION (1 of 2) 07/04/1986     FALL RISK ASSESSMENT  05/15/2018     ASTHMA CONTROL TEST Q6 MOS  01/09/2019     Clinical Pathway: None    Medication Management:  Reviewed      Functional Status:  Dependent ADLs:: Ambulation-walker  Dependent IADLs:: Independent  Bed or wheelchair confined:: No  Mobility Status: Independent  w/Device    Living Situation:  Current living arrangement:: I live in a private home with family    Diet/Exercise/Sleep:  Diet:: Regular  Inadequate nutrition (GOAL):: Yes  Food Insecurity: No  Tube Feeding: No  Exercise:: Yes  Days per week of moderate to strenuous exercise (like a brisk walk): 7  On average, minutes per day of exercise at this level: 10  How intense was your typical exercise? : Light (like stretching or slow walking)  Exercise Minutes per Week: 70  Inadequate activity/exercise (GOAL):: Yes  Significant changes in sleep pattern (GOAL): No    Transportation: Not discussed            Psychosocial:  Mental health DX:: No  Mental health management concern (GOAL):: No  Informal Support system:: Children     Financial/Insurance:   Community Resources: None     Equipment Currently Used at Home: walker, rolling    Advance Care Plan/Directive  Advanced Care Plans/Directives on file:: No    Referrals Placed: None     Goals:   Goals        General    Healthy Eating (pt-stated)     Notes - Note edited  1/7/2019 11:01 AM by Keturah Nugent RN    Goal Statement: I will increase my strength   Measure of Success: I will have more energy for daily activities   Supportive Steps to Achieve:   I will concentrate on eating 3 healthy meals a day   I will drink 6-8 glasses of water a day   I will start taking a multivitamin daily   I will do home physical therapy exercises daily   Barriers: Deconditioned due to recent hospital stay   Strengths: I live with a supportive son who assists with needs   Date to Achieve By: 2/7/2018  Patient expressed understanding of goal: Yes                Patient/Caregiver understanding: Patient expresses good understanding of discharge instructions     Outreach Frequency: weekly  Future Appointments              Tomorrow Destin Wolf MD [facility]          Plan:   1. Patient will continue home PT exercises daily  2. Patient will drink 6-8 glasses  of water to  prevent dehydration  3. Patient will start taking a daily Multivitamin  4. CC mailed introduction letter and care plan  5. Patient will keep her hospital follow up appointment 1/8  6. CC will follow up in 3-5 business days     Keturah Nugent RN / Clinical Care Coordinator     [facility]   [email or web address]  Office :  [phone] / Fax :  [phone]

## 2019-01-07 NOTE — LETTER
[place] CARE COORDINATION  [address]  [place] [address]      January 7, 2019    Staci Watt  [address] [address]      Dear Staci,    I am a clinic care coordinator who works with Destin Wolf MD at [facility] . I wanted to thank you for spending the time to talk with me.  I wanted to introduce myself and provide you with my contact information so that you can call me with questions or concerns about your health care. Below is a description of clinic care coordination and how I can further assist you.     The clinic care coordinator is a registered nurse and/or  who understand the health care system. The goal of clinic care coordination is to help you manage your health and improve access to the [facility] system in the most efficient manner. The registered nurse can assist you in meeting your health care goals by providing education, coordinating services, and strengthening the communication among your providers. The  can assist you with financial, behavioral, psychosocial, chemical dependency, counseling, and/or psychiatric resources.    Please feel free to contact me at [phone], with any questions or concerns. We at [facility] are focused on providing you with the highest-quality healthcare experience possible and that all starts with you.     Sincerely,     Keturah Nugent RN / Clinical Care Coordinator     [facility]    [facility]   [email or web address]  Office :  [phone] / Fax :  [phone]      Enclosed: I have enclosed a copy of the Complex Care Plan. This has helpful information and goals that we have talked about. Please keep this in an easy to access place to use as needed.

## 2019-01-07 NOTE — PROGRESS NOTES
"[facility]: Post-Discharge Note  SITUATION                                                      Admission:    Admission Date: 01/03/19   Reason for Admission: Hyponatremia  Discharge:   Discharge Date: 01/05/19  Discharge Diagnosis: Hyponatremia  Discharge Service: Internal Medicine     BACKGROUND                                                      Staci Watt is a 83 year old female  with:   a history of depression, CKD stage 3,  GERD, HTN, and HLD, who presents to the ED for evaluation of generalized weakness, loss of appetite, and nausea, hearburn (reflux symptoms).      The patient reports that her symptoms began approximately three weeks ago.  Says she might have flu prior to that.  The patient states- poor appetite, poor oral intake, and that she has only been able to drink water, lemon tea, broth, clear soup, ginger ale, and seven up. The patient describes a sensation of \"having to burp, but nothing comes up\". The patient reports that she is able to ambulate normally, despite her generalized weakness using walker.  The patient denies falls, chills, fever, diarrhea, emesis, or pain abdomen. No focal s.s. No sick contact. No recent travel.  Says mood stable. Denies feeling depressed.    no other concern.   Chronic bl knee DJD     In ED:   >> Laboratories were obtained including CBC, comprehensive metabolic panel and CBC revealing multiple electrolyte abnormalities including low sodium, low potassium and low magnesium.  CBC revealed anemia at baseline.  In the emergency room, patient was treated with potassium x2, normal saline and magnesium.     ASSESSMENT      Discharge Assessment  Patient reports symptoms are: Improved  Does the patient have all of their medications?: Yes  Does patient know what their new medications are for?: Yes  Does patient have a follow-up appointment scheduled?: Yes(with PCP on 1/9/19)  Does patient have any other questions or concerns?: No    Post-op  Did the " patient have surgery or a procedure: No  Fever: No  Chills: No  Eating & Drinking: eating and drinking without complaints/concerns  Bowel Function: normal  Urinary Status: voiding without complaint/concerns    PLAN                                                      Outpatient Plan:      Follow up with primary care provider, Destin Wolf, within 7 days for hospital follow- up.  The following labs/tests are recommended: BMP, Mg. Review Need to resume Hydrochlorothiazide.       Nohemi Rendon, CMA

## 2019-01-07 NOTE — LETTER
[facility]  Complex Care Plan  About Me  Patient Name:  Staci Watt    YOB: 1935  Age:     83 year old   Iain MRN:   [id number] Telephone Information:  Home Phone [phone]   Mobile Not on file.       Address:    [address] [address] Email address:  No e-mail address on record      Emergency Contact(s)  Name Relationship Lgl Grd Work Phone Home Phone Mobile Phone   1. BRUNA WATT Daughter No  [phone]           Primary language:  English     needed? No   [place] [facility]:  [phone] op. 1  Other communication barriers: None  Preferred Method of Communication:  Mail  Current living arrangement: I live in a private home with family  Mobility Status/ Medical Equipment: Independent w/Device    Health Maintenance  Health Maintenance Reviewed: Due/Overdue   Health Maintenance Due   Topic Date Due     URINE DRUG SCREEN Q1 YR  07/04/1951     ZOSTER IMMUNIZATION (1 of 2) 07/04/1986     FALL RISK ASSESSMENT  05/15/2018     ASTHMA CONTROL TEST Q6 MOS  01/09/2019       My Access Plan  Medical Emergency 911   Primary Clinic Line [facility], [place] - [phone]   24 Hour Appointment Line [phone] or  [phone]) (toll-free)   24 Hour Nurse Line [phone] (toll-free)   Preferred Urgent Care [facility], [phone]   Preferred Hospital [facility]  [phone]   Preferred Pharmacy [place] [facility] [address]     Behavioral Health Crisis Line The National Suicide Prevention Lifeline at 1-986.975.3793 or 911     My Care Team Members    Patient Care Team       Relationship Specialty Notifications Start End    Destin Wolf MD PCP - General Family Practice  1/3/19     Phone: [phone] Fax: [phone] [address] [facility] [address]     Destin Wolf MD PCP - Assigned PCP   7/15/18     Phone: [phone] Fax: [phone]         [address] [facility] [address]    Diego Gupta MD MD Family Practice  3/2/17     Phone: [phone] Fax: [phone]         [address] [facility] [address] [facility] [address]    Destin Wolf MD Referring Physician Family Practice  6/4/18     Referring to Ortho Surgeon    Phone: [phone] Fax: [phone]         [address] [facility] [address]    Diego Hi MD MD Orthopedics  6/4/18     Phone: [phone] Fax: [phone]         [address] [facility] [address]    Hospice, [facility] ([facility] ([place])  1/5/19     Fax: [phone]          [address] [place] [address]    Keturah Nugent, RN Lead Care Coordinator Primary Care - CC Admissions 1/7/19     Phone: [phone] Fax: [phone]                My Care Plans  Self Management and Treatment Plan  Goals and (Comments)  Goals        General    Healthy Eating (pt-stated)     Notes - Note edited  1/7/2019 11:01 AM by Keturah Nugent, RN    Goal Statement: I will increase my strength   Measure of Success: I will have more energy for daily activities   Supportive Steps to Achieve:   I will concentrate on eating 3 healthy meals a day   I will drink 6-8 glasses of water a day   I will start taking a multivitamin daily   I will do home physical therapy exercises daily   Barriers: Deconditioned due to recent hospital stay   Strengths: I live with a supportive son who assists with needs   Date to Achieve By: 2/7/2018  Patient expressed understanding of goal: Yes               Action Plans on File:   Asthma        Depression          Advance Care Plans/Directives Type: None       My Medical and Care Information  Problem List   Patient Active Problem List   Diagnosis     Allergic rhinitis     Moderate Persistent Asthma     Gout     Hyperlipidemia LDL goal <100     CKD (chronic  kidney disease) stage 3, GFR 30-59 ml/min (H)     Obese     OA (osteoarthritis) of knee     Microalbuminuria     Ganglion cyst     HTN, goal below 150/90     Major depressive disorder, recurrent episode, moderate (H)     Moderate persistent asthma without complication     Primary osteoarthritis of both knees     Anemia, unspecified type     Rotator cuff syndrome, left     Bilateral knee pain     Electrolyte abnormality      Current Medications and Allergies:  See printed Medication Report.    Care Coordination Start Date: 1/7/2019   Frequency of Care Coordination: weekly   Form Last Updated: 01/07/2019

## 2019-01-09 ENCOUNTER — TELEPHONE (OUTPATIENT)
Dept: FAMILY MEDICINE | Facility: CLINIC | Age: 84
End: 2019-01-09

## 2019-01-09 ENCOUNTER — OFFICE VISIT (OUTPATIENT)
Dept: FAMILY MEDICINE | Facility: CLINIC | Age: 84
End: 2019-01-09
Payer: COMMERCIAL

## 2019-01-09 VITALS — OXYGEN SATURATION: 99 % | SYSTOLIC BLOOD PRESSURE: 141 MMHG | HEART RATE: 98 BPM | DIASTOLIC BLOOD PRESSURE: 70 MMHG

## 2019-01-09 DIAGNOSIS — E83.42 HYPOMAGNESEMIA: ICD-10-CM

## 2019-01-09 DIAGNOSIS — M17.0 PRIMARY OSTEOARTHRITIS OF BOTH KNEES: ICD-10-CM

## 2019-01-09 DIAGNOSIS — R25.1 TREMOR: ICD-10-CM

## 2019-01-09 DIAGNOSIS — I10 ESSENTIAL HYPERTENSION: ICD-10-CM

## 2019-01-09 DIAGNOSIS — K21.9 GASTROESOPHAGEAL REFLUX DISEASE WITHOUT ESOPHAGITIS: ICD-10-CM

## 2019-01-09 PROCEDURE — 83735 ASSAY OF MAGNESIUM: CPT | Performed by: FAMILY MEDICINE

## 2019-01-09 PROCEDURE — 80048 BASIC METABOLIC PNL TOTAL CA: CPT | Performed by: FAMILY MEDICINE

## 2019-01-09 PROCEDURE — 99495 TRANSJ CARE MGMT MOD F2F 14D: CPT | Performed by: FAMILY MEDICINE

## 2019-01-09 PROCEDURE — 36415 COLL VENOUS BLD VENIPUNCTURE: CPT | Performed by: FAMILY MEDICINE

## 2019-01-09 RX ORDER — TRAMADOL HYDROCHLORIDE 50 MG/1
25-50 TABLET ORAL
Qty: 30 TABLET | Refills: 1 | Status: SHIPPED | OUTPATIENT
Start: 2019-01-09 | End: 2019-01-01

## 2019-01-09 NOTE — TELEPHONE ENCOUNTER
Reason for Call: Request for an order or referral:    Order or referral being requested: nursing and PT orders    Date needed: as soon as possible    Has the patient been seen by the PCP for this problem? YES    Additional comments: verbal orders ok    Phone number Patient can be reached at:  Other phone number:  Zippy- [phone]    Best Time:  anytime    Can we leave a detailed message on this number?  YES    Call taken on 1/9/2019 at 12:05 PM by Puja Reynaga

## 2019-01-09 NOTE — PROGRESS NOTES
SUBJECTIVE:   Staci Watt is a 83 year old female who presents to clinic today for the following health issues:    Hospital Follow-up Visit:    Hospital/Nursing Home/IP Rehab Facility: [facility]   Date of Admission: 01-  Date of Discharge: 01/05/2019  Reason(s) for Admission:   Hyponatremia  Hypokalemia  Hypomagnesemia  Generalized muscle weakness  Gastroesophageal reflux disease, esophagitis presence not specified  Constipation, unspecified constipation type            Problems taking medications regularly:  None       Medication changes since discharge: None       Problems adhering to non-medication therapy:  None    Summary of hospitalization:  [facility] discharge summary reviewed  Diagnostic Tests/Treatments reviewed.  Follow up needed: none  Other Healthcare Providers Involved in Patient s Care:         None  Update since discharge: stable.     Post Discharge Medication Reconciliation: discharge medications reconciled and changed, per note/orders (see AVS).  Plan of care communicated with patient     Coding guidelines for this visit:  Type of Medical   Decision Making Face-to-Face Visit       within 7 Days of discharge Face-to-Face Visit        within 14 days of discharge   Moderate Complexity 10202 [id number]   High Complexity 48712 [id number]          She started to experience tremors in the left hand. Symptoms have been present for a few months. Symptoms are present at rest and worse with activity. No other tremors.    Pts appetite has gotten slightly better. She is eating three meals/day.     She feels that she is weaker. She did PT while in the hospital.      Knee pain - needs injection, will schedule appt     Due to electrolyte abnormality - hydrochlorothiazide discontinued.     GERD - pt started on Protonix     Problem list and histories reviewed & adjusted, as indicated.  Additional history: as documented    Labs reviewed in EPIC    Reviewed and updated as needed this visit by clinical  staff  Tobacco  Allergies  Med Hx  Surg Hx  Fam Hx  Soc Hx      Reviewed and updated as needed this visit by Provider         ROS:  Constitutional, HEENT, cardiovascular, pulmonary, gi and gu systems are negative, except as otherwise noted.    OBJECTIVE:     /70   Pulse 98   SpO2 99%   There is no height or weight on file to calculate BMI.  GENERAL: healthy, alert and no distress  EYES: Eyes grossly normal to inspection  HENT: nose and mouth without ulcers or lesions  PSYCH: mentation appears normal, affect normal    Diagnostic Test Results:  none     ASSESSMENT/PLAN:     1. Primary osteoarthritis of both knees  - traMADol (ULTRAM) 50 MG tablet; Take 0.5-1 tablets (25-50 mg) by mouth nightly as needed for pain maximum 1 tablet(s) per day  Dispense: 30 tablet; Refill: 1    2. Essential hypertension  - Basic metabolic panel  - repeat BMP, continue to hold HTCZ until results are back     3. Hypomagnesemia  - Magnesium    4. Gastroesophageal reflux disease without esophagitis  - continue protonix     4. Tremor   - didn't address during visit  - during next visit if symptoms continue to persist then refer to neurology     Addendum -   RN, can you please inform pt that kidney functions were stable. Magnesium is slightly low. She can eat foods rich in magnesium. Below foods are great sources -      Almonds   Avocado   Black beans   Kidney beans   Oatmeal   Peanut butter   Soymilk   Spinach   Whole grain bread     Yogurt      Can she start checking home B/P's since HTCZ is still being held. Bring log to next visit in 1-2 weeks. During that visit I'll also do knee injection.      RAJANI Wolf MD  [facility]

## 2019-01-10 ENCOUNTER — TELEPHONE (OUTPATIENT)
Dept: FAMILY MEDICINE | Facility: CLINIC | Age: 84
End: 2019-01-10

## 2019-01-10 LAB
ANION GAP SERPL CALCULATED.3IONS-SCNC: 8 MMOL/L (ref 3–14)
BUN SERPL-MCNC: 10 MG/DL (ref 7–30)
CALCIUM SERPL-MCNC: 9.3 MG/DL (ref 8.5–10.1)
CHLORIDE SERPL-SCNC: 104 MMOL/L (ref 94–109)
CO2 SERPL-SCNC: 24 MMOL/L (ref 20–32)
CREAT SERPL-MCNC: 0.81 MG/DL (ref 0.52–1.04)
GFR SERPL CREATININE-BSD FRML MDRD: 68 ML/MIN/{1.73_M2}
GLUCOSE SERPL-MCNC: 91 MG/DL (ref 70–99)
MAGNESIUM SERPL-MCNC: 1.4 MG/DL (ref 1.6–2.3)
POTASSIUM SERPL-SCNC: 3.9 MMOL/L (ref 3.4–5.3)
SODIUM SERPL-SCNC: 136 MMOL/L (ref 133–144)

## 2019-01-10 NOTE — TELEPHONE ENCOUNTER
Mercedes - [facility] - Physical therapist requesting to continue home physical therapy 2 x 2 weeks 1 x for 2 weeks for strengthening, transfer, balance and gait, fall prevention    Writer gave verbal orders as requested    Closing encounter - no further actions needed at this time    Stephanie Hedrick RN

## 2019-01-11 ENCOUNTER — TELEPHONE (OUTPATIENT)
Dept: FAMILY MEDICINE | Facility: CLINIC | Age: 84
End: 2019-01-11

## 2019-01-11 DIAGNOSIS — I10 HTN, GOAL BELOW 150/90: Primary | ICD-10-CM

## 2019-01-11 DIAGNOSIS — R53.81 PHYSICAL DECONDITIONING: Primary | ICD-10-CM

## 2019-01-11 NOTE — RESULT ENCOUNTER NOTE
Please send the letter to the patient with the following.         Below are a copy of your labs. Please call or message me if you have questions or concerns.

## 2019-01-11 NOTE — TELEPHONE ENCOUNTER
Laura called from  home care requesting a DME order for a 2 wheeled walker be faxed to  home medical at [phone]

## 2019-01-11 NOTE — TELEPHONE ENCOUNTER
Routing to provider - Maryann - please review and advise as appropriate  - order placed on your desk patient said she will try to  Monday 1/14 when she picks up her other medications at  pharmacy    DME Order request:  Blood pressure cuff           Writer called patient and discussed lab results and nutrition recommendations - scheduled office visit appointment - she say she doesn't have a blood pressure cuff so I will request a DME order from provider - she verbalized understanding and agrees with plan    Thank you,  Stephanie Hedrick RN

## 2019-01-11 NOTE — TELEPHONE ENCOUNTER
Routing to provider - Maryann - please review and advise as appropriate    DME order request: 2 omero walker    Last office visit 1/9/19    Thank you,  Stephanie Hedrick RN

## 2019-01-11 NOTE — TELEPHONE ENCOUNTER
RN, can you please inform pt that kidney functions were stable. Magnesium is slightly low. She can eat foods rich in magnesium. Below foods are great sources -     Almonds   Avocado   Black beans   Kidney beans   Oatmeal   Peanut butter   Soymilk   Spinach   Whole grain bread   Yogurt     Can she start checking home B/P's since HTCZ is still being held. Bring log to next visit in 1-2 weeks. During that visit I'll also do knee injection.     DM

## 2019-01-14 DIAGNOSIS — Z53.9 DIAGNOSIS NOT YET DEFINED: Primary | ICD-10-CM

## 2019-01-14 PROCEDURE — G0180 MD CERTIFICATION HHA PATIENT: HCPCS | Performed by: FAMILY MEDICINE

## 2019-01-14 NOTE — TELEPHONE ENCOUNTER
Spoke with patient to let her know that her DME order will be at the [facility] for  to take to a medical supply place of her choice

## 2019-01-15 ENCOUNTER — TELEPHONE (OUTPATIENT)
Dept: FAMILY MEDICINE | Facility: CLINIC | Age: 84
End: 2019-01-15

## 2019-01-15 NOTE — TELEPHONE ENCOUNTER
Home health certification and plan of care faxed to  home care and hospice  Fax # [phone]  Also stat faxed

## 2019-01-24 ENCOUNTER — PATIENT OUTREACH (OUTPATIENT)
Dept: CARE COORDINATION | Facility: CLINIC | Age: 84
End: 2019-01-24

## 2019-01-24 NOTE — PROGRESS NOTES
Clinic Care Coordination Contact    Clinic Care Coordination Contact  OUTREACH    Referral Information:       Chief Complaint   Patient presents with     Clinic Care Coordination - Follow-up     Clinic Care Coordination RN         [place] Utilization:   [facility] admission  1/3/2019-1/5/2019--  Hyponatremia  Hypokalemia  Hypomagnesemia  Generalized muscle weakness  Gastroesophageal reflux disease, esophagitis presence not specified  Constipation, unspecified constipation type      Utilization    Last refreshed: 1/22/2019  9:44 PM:  Hospital Admissions 1           Last refreshed: 1/22/2019  9:44 PM:  ED Visits 0           Last refreshed: 1/22/2019  9:44 PM:  No Show Count (past year) 2              Current as of: 1/22/2019  9:44 PM              Clinical Concerns:   Current Medical Concerns:  Patient reports she has a few more PT sessions left Patient reports the PT is aggravating her knee.  Patient has an appointment with PCP 1/28/2019 and she will discuss blood pressure results.  CC will meet the patient face to face at PCP visit         Health Maintenance Reviewed:    Clinical Pathway: None    Medication Management:  Not discussed     Functional Status: Uses a walker   Goals:   Goals        General    Healthy Eating (pt-stated)     Notes - Note edited  1/7/2019 11:01 AM by Keturah Nugent, SEEMA    Goal Statement: I will increase my strength   Measure of Success: I will have more energy for daily activities   Supportive Steps to Achieve:   I will concentrate on eating 3 healthy meals a day   I will drink 6-8 glasses of water a day   I will start taking a multivitamin daily   I will do home physical therapy exercises daily   Barriers: Deconditioned due to recent hospital stay   Strengths: I live with a supportive son who assists with needs   Date to Achieve By: 2/7/2018  Patient expressed understanding of goal: Yes                  Future Appointments              In 4 days Destin Wolf MD  [facility]          Plan:     Patient will finish 2 more home PT sessions  Patient will keep her PCP blood pressure follow up 1/28/2019  CC will meet the patient face to face at PCP visit 1/28/2019    Keturah Nugent RN / Clinical Care Coordinator     [facility]   [email or web address]  Office :  [phone] / Fax :  [phone]

## 2019-01-28 ENCOUNTER — OFFICE VISIT (OUTPATIENT)
Dept: FAMILY MEDICINE | Facility: CLINIC | Age: 84
End: 2019-01-28
Payer: COMMERCIAL

## 2019-01-28 ENCOUNTER — PATIENT OUTREACH (OUTPATIENT)
Dept: CARE COORDINATION | Facility: CLINIC | Age: 84
End: 2019-01-28

## 2019-01-28 VITALS
TEMPERATURE: 98 F | OXYGEN SATURATION: 99 % | SYSTOLIC BLOOD PRESSURE: 147 MMHG | DIASTOLIC BLOOD PRESSURE: 81 MMHG | HEART RATE: 114 BPM

## 2019-01-28 DIAGNOSIS — R79.0 LOW BLOOD MAGNESIUM: ICD-10-CM

## 2019-01-28 DIAGNOSIS — R42 LIGHTHEADEDNESS: ICD-10-CM

## 2019-01-28 DIAGNOSIS — I10 ESSENTIAL HYPERTENSION: Primary | ICD-10-CM

## 2019-01-28 DIAGNOSIS — E87.8 ELECTROLYTE ABNORMALITY: Primary | ICD-10-CM

## 2019-01-28 DIAGNOSIS — M17.12 PRIMARY OSTEOARTHRITIS OF LEFT KNEE: ICD-10-CM

## 2019-01-28 PROCEDURE — 36415 COLL VENOUS BLD VENIPUNCTURE: CPT | Performed by: FAMILY MEDICINE

## 2019-01-28 PROCEDURE — 99214 OFFICE O/P EST MOD 30 MIN: CPT | Mod: 25 | Performed by: FAMILY MEDICINE

## 2019-01-28 PROCEDURE — 20610 DRAIN/INJ JOINT/BURSA W/O US: CPT | Mod: LT | Performed by: FAMILY MEDICINE

## 2019-01-28 PROCEDURE — 83735 ASSAY OF MAGNESIUM: CPT | Performed by: FAMILY MEDICINE

## 2019-01-28 PROCEDURE — 80048 BASIC METABOLIC PNL TOTAL CA: CPT | Performed by: FAMILY MEDICINE

## 2019-01-28 RX ORDER — TRIAMCINOLONE ACETONIDE 40 MG/ML
40 INJECTION, SUSPENSION INTRA-ARTICULAR; INTRAMUSCULAR ONCE
Status: DISCONTINUED | OUTPATIENT
Start: 2019-01-28 | End: 2019-01-01

## 2019-01-28 ASSESSMENT — ACTIVITIES OF DAILY LIVING (ADL): DEPENDENT_IADLS:: INDEPENDENT

## 2019-01-28 NOTE — PROGRESS NOTES
Clinic Care Coordination Contact    Clinic Care Coordination Contact  OUTREACH    Referral Information:  Referral Source: IP Report    Primary Diagnosis: Other (include Comment box)(low K and NA)    Chief Complaint   Patient presents with     Clinic Care Coordination - Face To Face     Clinic Care Coordination RN         [place] Utilization:[facility] admission  1/3/2019-1/5/2019--  Hyponatremia  Hypokalemia  Hypomagnesemia  Generalized muscle weakness  Gastroesophageal reflux disease, esophagitis presence not specified  Constipation, unspecified constipation type  Clinic Utilization  Difficulty keeping appointments:: No  Compliance Concerns: No  No-Show Concerns: No  No PCP office visit in Past Year: No  Utilization    Last refreshed: 1/25/2019  5:47 PM:  Hospital Admissions 1           Last refreshed: 1/25/2019  5:47 PM:  ED Visits 0           Last refreshed: 1/25/2019  5:47 PM:  No Show Count (past year) 3              Current as of: 1/25/2019  5:47 PM              Clinical Concerns:  Current Medical Concerns: CC met the patient face to face in the lobby before PCP visit today  Patient presents with a walker and is smiluing and very pleaseant   CC gave buinses card for future questions or concerns.      Pain  Pain (GOAL):: No  Health Maintenance Reviewed:    Clinical Pathway: None    Medication Management:  Not discussed today      Functional Status:  Dependent ADLs:: Ambulation-walker  Dependent IADLs:: Independent  Bed or wheelchair confined:: No  Mobility Status: Independent w/Device    Living Situation:  Current living arrangement:: I live in a private home with family    Diet/Exercise/Sleep:  Diet:: Regular  Inadequate nutrition (GOAL):: Yes  Food Insecurity: No  Tube Feeding: No  Exercise:: Yes  Inadequate activity/exercise (GOAL):: Yes  Significant changes in sleep pattern (GOAL): No    Transportation: Family provides transportation            Psychosocial:  Mental health DX:: No  Mental  health management concern (GOAL):: No  Informal Support system:: Children     Financial/Insurance:   Financial/Insurance concerns (GOAL):: No  Community Resources: None     Equipment Currently Used at Home: walker, rolling    Advance Care Plan/Directive  Advanced Care Plans/Directives on file:: No    Referrals Placed: None     Goals:   Goals        General    Healthy Eating (pt-stated)     Notes - Note edited  1/7/2019 11:01 AM by Keturah Nugent RN    Goal Statement: I will increase my strength   Measure of Success: I will have more energy for daily activities   Supportive Steps to Achieve:   I will concentrate on eating 3 healthy meals a day   I will drink 6-8 glasses of water a day   I will start taking a multivitamin daily   I will do home physical therapy exercises daily   Barriers: Deconditioned due to recent hospital stay   Strengths: I live with a supportive son who assists with needs   Date to Achieve By: 2/7/2018  Patient expressed understanding of goal: Yes                  Outreach Frequency: weekly      Plan:   Patient will continue PT exercises to improve strength in legs and us her walker for safety  CC left contact information with Akosua Russell [facility] RN   CC will follow up when discharge from home care      Keturah Nugent RN / Clinical Care Coordinator     [facility] [place]   [email or web address]  Office :  [phone] / Fax :  [phone]

## 2019-01-28 NOTE — LETTER
January 30, 2019      Staci Watt  [address]  [place] [address]        Dear ,    We are writing to inform you of your test results.    Below is a copy of your results. Please call or message me if you have questions or concerns.       Resulted Orders   Magnesium   Result Value Ref Range    Magnesium 1.7 1.6 - 2.3 mg/dL   Basic metabolic panel   Result Value Ref Range    Sodium 136 133 - 144 mmol/L    Potassium 3.7 3.4 - 5.3 mmol/L    Chloride 104 94 - 109 mmol/L    Carbon Dioxide 22 20 - 32 mmol/L    Anion Gap 10 3 - 14 mmol/L    Glucose 76 70 - 99 mg/dL    Urea Nitrogen 11 7 - 30 mg/dL    Creatinine 0.75 0.52 - 1.04 mg/dL    GFR Estimate 74 >60 mL/min/[1.73_m2]      Comment:      Non  GFR Calc  Starting 12/18/2018, serum creatinine based estimated GFR (eGFR) will be   calculated using the Chronic Kidney Disease Epidemiology Collaboration   (CKD-EPI) equation.      GFR Estimate If Black 86 >60 mL/min/[1.73_m2]      Comment:       GFR Calc  Starting 12/18/2018, serum creatinine based estimated GFR (eGFR) will be   calculated using the Chronic Kidney Disease Epidemiology Collaboration   (CKD-EPI) equation.      Calcium 9.9 8.5 - 10.1 mg/dL       If you have any questions or concerns, please call the clinic at the number listed above.       Sincerely,        Destin Wolf MD

## 2019-01-28 NOTE — PROGRESS NOTES
SUBJECTIVE:   Staci Watt is a 83 year old female who presents to clinic today for the following health issues:      Knee injection   HTN -   Home bloood pressures   01/06 - 122/65  01/10 - 124/74  01/11 - 150/74  01/15 - 140/68  01/122 - 160/76  01/23 - 158/80  01/24 - 170/92  01/26 - 162/93      Pt feels lightheaded.   She is moving slower due to knee pain.   When she is at home - she mainly is in bed. She gets up to go to the bathroom.  She is finished physical therapy. She is doing therapy exercises while at home.   She is staying hydrated and appetite better.   Symptoms not worse with positional changes.   She endorses occasional headaches, which improve with tylenol.   She is taking tylenol 1, 000 mg in the morning and 1, 000 mg at night. She stopped taking the tramadol as she was also taking tramadol. Tramadol does help.  No chest pain or shortness of breath.  Pt feels that depression is controlled.     Problem list and histories reviewed & adjusted, as indicated.  Additional history: as documented        Reviewed and updated as needed this visit by clinical staff  Tobacco  Allergies  Meds  Med Hx  Surg Hx  Fam Hx  Soc Hx      Reviewed and updated as needed this visit by Provider         ROS:  Constitutional, HEENT, cardiovascular, pulmonary, gi and gu systems are negative, except as otherwise noted.    OBJECTIVE:     /81   Pulse 114   Temp 98  F (36.7  C) (Oral)   SpO2 99%   There is no height or weight on file to calculate BMI.  GENERAL: healthy, alert and no distress  EYES: Eyes grossly normal to inspection  HENT:  nose and mouth without ulcers or lesions  PSYCH: mentation appears normal, affect normal    Diagnostic Test Results:  none     ASSESSMENT/PLAN:       1. Essential hypertension  - Basic metabolic panel  - Home B/P's are elevated. Recheck BMP if stable then I'll restart HTCZ (pt has medication at home) and follow up in one month. Advised to continue to monitor home B/P.  Repeat BMP in on [facility].     2. Low blood magnesium  - Magnesium    3. Primary osteoarthritis of left knee  Consent obtained after discussion of RBA, including side effects of medications. After sterile preparation, 40 mg of kenalog and 4 cc of 1% lidocaine were injected into the left knee without complication. Post injection instructions were given.   - Large Joint/Bursa injection and/or drainage (Shoulder, Knee)  - triamcinolone (KENALOG-40) injection 40 mg; 1 mL (40 mg) by INTRA-ARTICULAR route once    4. Lightheadedness  - advised to stay hydrated, continue to monitor     Also advised to stay active while at home.     Destin Wolf MD  [facility]

## 2019-01-29 ENCOUNTER — TELEPHONE (OUTPATIENT)
Dept: FAMILY MEDICINE | Facility: CLINIC | Age: 84
End: 2019-01-29

## 2019-01-29 LAB
ANION GAP SERPL CALCULATED.3IONS-SCNC: 10 MMOL/L (ref 3–14)
BUN SERPL-MCNC: 11 MG/DL (ref 7–30)
CALCIUM SERPL-MCNC: 9.9 MG/DL (ref 8.5–10.1)
CHLORIDE SERPL-SCNC: 104 MMOL/L (ref 94–109)
CO2 SERPL-SCNC: 22 MMOL/L (ref 20–32)
CREAT SERPL-MCNC: 0.75 MG/DL (ref 0.52–1.04)
GFR SERPL CREATININE-BSD FRML MDRD: 74 ML/MIN/{1.73_M2}
GLUCOSE SERPL-MCNC: 76 MG/DL (ref 70–99)
MAGNESIUM SERPL-MCNC: 1.7 MG/DL (ref 1.6–2.3)
POTASSIUM SERPL-SCNC: 3.7 MMOL/L (ref 3.4–5.3)
SODIUM SERPL-SCNC: 136 MMOL/L (ref 133–144)

## 2019-01-30 ENCOUNTER — TELEPHONE (OUTPATIENT)
Dept: FAMILY MEDICINE | Facility: CLINIC | Age: 84
End: 2019-01-30

## 2019-01-30 DIAGNOSIS — I10 ESSENTIAL HYPERTENSION: Primary | ICD-10-CM

## 2019-01-30 RX ORDER — HYDROCHLOROTHIAZIDE 25 MG/1
25 TABLET ORAL DAILY
Qty: 90 TABLET | Refills: 3 | Status: SHIPPED | OUTPATIENT
Start: 2019-01-30 | End: 2019-01-01

## 2019-01-30 NOTE — TELEPHONE ENCOUNTER
Writer called patient and reviewed message per Dr. Wolf.    Patient verbalized understanding and in agreement with plan.    Patient has appt on 2/25/19.    JUNG RenteriaN, RN

## 2019-01-30 NOTE — TELEPHONE ENCOUNTER
RN, can you please inform pt that kidney functions have improved. She can restart hydrochlorothiazide 25 mg daily. Office visit in one month to recheck blood pressure and kidney functions. Magnesium was also normal.   DM

## 2019-02-07 ENCOUNTER — TELEPHONE (OUTPATIENT)
Dept: FAMILY MEDICINE | Facility: CLINIC | Age: 84
End: 2019-02-07

## 2019-02-07 NOTE — TELEPHONE ENCOUNTER
Needs orders for skilled nursing visits: 1 time a week for 2 weeks, with one prn visit. Please call.  OK to leave message on voicemail.

## 2019-02-07 NOTE — TELEPHONE ENCOUNTER
Yoel call to Akosua Gilliland RN  [facility]     Verbal orders provided for:   Skilled Nursing Visits 1 x a week for 2 weeks with 1 PRN visit     Dalila Nathan, Registered Nurse   [facility]

## 2019-02-13 ENCOUNTER — TELEPHONE (OUTPATIENT)
Dept: FAMILY MEDICINE | Facility: CLINIC | Age: 84
End: 2019-02-13

## 2019-02-25 ENCOUNTER — PATIENT OUTREACH (OUTPATIENT)
Dept: CARE COORDINATION | Facility: CLINIC | Age: 84
End: 2019-02-25

## 2019-02-25 DIAGNOSIS — M25.562 BILATERAL KNEE PAIN: Primary | ICD-10-CM

## 2019-02-25 DIAGNOSIS — M25.561 BILATERAL KNEE PAIN: Primary | ICD-10-CM

## 2019-02-27 ASSESSMENT — ACTIVITIES OF DAILY LIVING (ADL): DEPENDENT_IADLS:: INDEPENDENT

## 2019-02-27 NOTE — PROGRESS NOTES
Clinic Care Coordination Contact    Clinic Care Coordination Contact  OUTREACH    Referral Information:  Referral Source: IP Report    Primary Diagnosis: Other (include Comment box)(low K and NA)    Chief Complaint   Patient presents with     Clinic Care Coordination - Follow-up     Clinic Care Coordination         [place] Utilization: [facility] admission  1/3/2019-1/5/2019--  Hyponatremia  Hypokalemia  Hypomagnesemia  Generalized muscle weakness  Gastroesophageal reflux disease, esophagitis presence not specified  Constipation, unspecified constipation type  Clinic Utilization  Difficulty keeping appointments:: No  Compliance Concerns: No  No-Show Concerns: No  No PCP office visit in Past Year: No  Utilization    Last refreshed: 2/25/2019  8:11 PM:  Hospital Admissions 1           Last refreshed: 2/25/2019  8:11 PM:  ED Visits 0           Last refreshed: 2/25/2019  8:11 PM:  No Show Count (past year) 3              Current as of: 2/25/2019  8:11 PM              Clinical Concerns:  Current Medical Concerns:  Patient continue to have weakness and stiffness in her knees Patient continue to use her walker and daily PT exercises   Patient lives with her adult son and he is supportive   Pain  Pain (GOAL):: No  Health Maintenance Reviewed:    Clinical Pathway: None    Medication Management:  Not discussed today     Functional Status:  Dependent ADLs:: Ambulation-walker  Dependent IADLs:: Independent  Bed or wheelchair confined:: No  Mobility Status: Independent w/Device    Living Situation:  Current living arrangement:: I live in a private home with family    Diet/Exercise/Sleep:  Diet:: Regular  Inadequate nutrition (GOAL):: Yes  Food Insecurity: No  Tube Feeding: No  Exercise:: Yes  Inadequate activity/exercise (GOAL):: Yes  Significant changes in sleep pattern (GOAL): No      Psychosocial:  Mental health DX:: No  Mental health management concern (GOAL):: No  Informal Support system:: Children      Financial/Insurance:   Financial/Insurance concerns (GOAL):: No  Community Resources: None     Equipment Currently Used at Home: walker, rolling    Advance Care Plan/Directive  Advanced Care Plans/Directives on file:: No    Referrals Placed: None     Goals:   Goals        General    Healthy Eating (pt-stated)     Notes - Note edited  2/27/2019 12:07 PM by Keturah Nugent RN    Goal Statement: I will increase my strength   Measure of Success: I will have more energy for daily activities   Supportive Steps to Achieve:   I will concentrate on eating 3 healthy meals a day   I will drink 6-8 glasses of water a day   I will start taking a multivitamin daily   I will do home physical therapy exercises daily   Barriers: Deconditioned due to recent hospital stay   Strengths: I live with a supportive son who assists with needs   Date to Achieve By: 3/20/2019  Patient expressed understanding of goal: Yes                Outreach Frequency: weekly  Future Appointments              In 5 days Destin Wolf MD [facility]          Plan:   Patient will continue to do daily PT exercises and use her walker for safety  CC will follow up monthly for updates     Keturah Nugent RN / Clinical Care Coordinator     [facility]    [facility]   [email or web address]  Office :  [phone] / Fax :  [phone]

## 2019-02-28 ENCOUNTER — TELEPHONE (OUTPATIENT)
Dept: ORTHOPEDICS | Facility: CLINIC | Age: 84
End: 2019-02-28

## 2019-02-28 NOTE — TELEPHONE ENCOUNTER
Patient is scheduled for surgery with Dr. Hi      Spoke or left message with: Geradl     Date of Surgery: 5/7/19    Location: [facility]    Informed patient they will need an adult  Yes    H&P: Scheduled with PAC 4/10/19    Additional imaging/appointments: N/A    Surgery packet: Given in clinic    Additional comments: Patient will await pre admission phone call 1-2 days prior to surgery for arrival time

## 2019-02-28 NOTE — TELEPHONE ENCOUNTER
Attempted to reach out to patient and her son Gerald. Left detailed message letting them know we are ok to schedule surgery with Dr. Hi as well as scheduling PAC and a follow up with Dr. Hi before surgery. Informed them first available would be 5/7/19 and left best call back number of [phone]

## 2019-03-04 ENCOUNTER — OFFICE VISIT (OUTPATIENT)
Dept: FAMILY MEDICINE | Facility: CLINIC | Age: 84
End: 2019-03-04
Payer: COMMERCIAL

## 2019-03-04 VITALS
SYSTOLIC BLOOD PRESSURE: 126 MMHG | OXYGEN SATURATION: 98 % | TEMPERATURE: 98.7 F | DIASTOLIC BLOOD PRESSURE: 67 MMHG | HEART RATE: 98 BPM

## 2019-03-04 DIAGNOSIS — F33.1 MODERATE EPISODE OF RECURRENT MAJOR DEPRESSIVE DISORDER (H): ICD-10-CM

## 2019-03-04 DIAGNOSIS — K21.9 GASTROESOPHAGEAL REFLUX DISEASE WITHOUT ESOPHAGITIS: ICD-10-CM

## 2019-03-04 DIAGNOSIS — I10 ESSENTIAL HYPERTENSION: Primary | ICD-10-CM

## 2019-03-04 DIAGNOSIS — J45.40 MODERATE PERSISTENT ASTHMA WITHOUT COMPLICATION: ICD-10-CM

## 2019-03-04 DIAGNOSIS — K59.00 CONSTIPATION, UNSPECIFIED CONSTIPATION TYPE: ICD-10-CM

## 2019-03-04 PROCEDURE — 36415 COLL VENOUS BLD VENIPUNCTURE: CPT | Performed by: FAMILY MEDICINE

## 2019-03-04 PROCEDURE — 80048 BASIC METABOLIC PNL TOTAL CA: CPT | Performed by: FAMILY MEDICINE

## 2019-03-04 PROCEDURE — 99214 OFFICE O/P EST MOD 30 MIN: CPT | Performed by: FAMILY MEDICINE

## 2019-03-04 ASSESSMENT — PATIENT HEALTH QUESTIONNAIRE - PHQ9
SUM OF ALL RESPONSES TO PHQ QUESTIONS 1-9: 2
SUM OF ALL RESPONSES TO PHQ QUESTIONS 1-9: 2
10. IF YOU CHECKED OFF ANY PROBLEMS, HOW DIFFICULT HAVE THESE PROBLEMS MADE IT FOR YOU TO DO YOUR WORK, TAKE CARE OF THINGS AT HOME, OR GET ALONG WITH OTHER PEOPLE: NOT DIFFICULT AT ALL

## 2019-03-04 ASSESSMENT — ASTHMA QUESTIONNAIRES
QUESTION_2 LAST FOUR WEEKS HOW OFTEN HAVE YOU HAD SHORTNESS OF BREATH: NOT AT ALL
ACT_TOTALSCORE: 25
QUESTION_3 LAST FOUR WEEKS HOW OFTEN DID YOUR ASTHMA SYMPTOMS (WHEEZING, COUGHING, SHORTNESS OF BREATH, CHEST TIGHTNESS OR PAIN) WAKE YOU UP AT NIGHT OR EARLIER THAN USUAL IN THE MORNING: NOT AT ALL
QUESTION_5 LAST FOUR WEEKS HOW WOULD YOU RATE YOUR ASTHMA CONTROL: COMPLETELY CONTROLLED
QUESTION_4 LAST FOUR WEEKS HOW OFTEN HAVE YOU USED YOUR RESCUE INHALER OR NEBULIZER MEDICATION (SUCH AS ALBUTEROL): NOT AT ALL
QUESTION_1 LAST FOUR WEEKS HOW MUCH OF THE TIME DID YOUR ASTHMA KEEP YOU FROM GETTING AS MUCH DONE AT WORK, SCHOOL OR AT HOME: NONE OF THE TIME

## 2019-03-04 NOTE — LETTER
March 11, 2019      Staci Watt  [address]  [place] [address]        Dear Staci,    Here are your results for your recent labs. They are reassuring and continue the current medication dose.     Please call or message me if you have questions or concerns.  Results for orders placed or performed in visit on 03/04/19   Basic metabolic panel   Result Value Ref Range    Sodium 136 133 - 144 mmol/L    Potassium 3.9 3.4 - 5.3 mmol/L    Chloride 103 94 - 109 mmol/L    Carbon Dioxide 23 20 - 32 mmol/L    Anion Gap 10 3 - 14 mmol/L    Glucose 81 70 - 99 mg/dL    Urea Nitrogen 19 7 - 30 mg/dL    Creatinine 0.82 0.52 - 1.04 mg/dL    GFR Estimate 66 >60 mL/min/[1.73_m2]    GFR Estimate If Black 76 >60 mL/min/[1.73_m2]    Calcium 10.0 8.5 - 10.1 mg/dL               Sincerely,        Destin Wolf MD/andres
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
## 2019-03-05 LAB
ANION GAP SERPL CALCULATED.3IONS-SCNC: 10 MMOL/L (ref 3–14)
BUN SERPL-MCNC: 19 MG/DL (ref 7–30)
CALCIUM SERPL-MCNC: 10 MG/DL (ref 8.5–10.1)
CHLORIDE SERPL-SCNC: 103 MMOL/L (ref 94–109)
CO2 SERPL-SCNC: 23 MMOL/L (ref 20–32)
CREAT SERPL-MCNC: 0.82 MG/DL (ref 0.52–1.04)
GFR SERPL CREATININE-BSD FRML MDRD: 66 ML/MIN/{1.73_M2}
GLUCOSE SERPL-MCNC: 81 MG/DL (ref 70–99)
POTASSIUM SERPL-SCNC: 3.9 MMOL/L (ref 3.4–5.3)
SODIUM SERPL-SCNC: 136 MMOL/L (ref 133–144)

## 2019-03-05 ASSESSMENT — ASTHMA QUESTIONNAIRES: ACT_TOTALSCORE: 25

## 2019-03-05 ASSESSMENT — PATIENT HEALTH QUESTIONNAIRE - PHQ9: SUM OF ALL RESPONSES TO PHQ QUESTIONS 1-9: 2

## 2019-03-27 ENCOUNTER — PATIENT OUTREACH (OUTPATIENT)
Dept: CARE COORDINATION | Facility: CLINIC | Age: 84
End: 2019-03-27

## 2019-03-27 DIAGNOSIS — M25.561 BILATERAL KNEE PAIN: Primary | ICD-10-CM

## 2019-03-27 DIAGNOSIS — M25.562 BILATERAL KNEE PAIN: Primary | ICD-10-CM

## 2019-03-27 ASSESSMENT — ACTIVITIES OF DAILY LIVING (ADL): DEPENDENT_IADLS:: INDEPENDENT

## 2019-03-27 NOTE — PROGRESS NOTES
Clinic Care Coordination Contact    Clinic Care Coordination Contact  OUTREACH    Referral Information:  Referral Source: IP Report    Primary Diagnosis: Orthopedic(low K and NA)    Chief Complaint   Patient presents with     Clinic Care Coordination - Follow-up     Clinic Care Coordination RN         [place] Utilization: [facility] admission  1/3/2019-1/5/2019--  Hyponatremia  Hypokalemia  Hypomagnesemia  Generalized muscle weakness  Gastroesophageal reflux disease, esophagitis presence not specified  Constipation, unspecified constipation type  Clinic Utilization  Difficulty keeping appointments:: No  Compliance Concerns: No  No-Show Concerns: No  No PCP office visit in Past Year: No  Utilization    Last refreshed: 3/22/2019  2:19 PM:  Hospital Admissions 1           Last refreshed: 3/22/2019  2:19 PM:  ED Visits 0           Last refreshed: 3/22/2019  2:19 PM:  No Show Count (past year) 3              Current as of: 3/22/2019  2:19 PM              Clinical Concerns:  Current Medical Concerns:  Patient has her knee replacement set up for 5/7/2019-No unmet needs at this time   Using her walker for safety .  Patient is getting stronger   Pain  Pain (GOAL):: No  Health Maintenance Reviewed: Not assessed  Clinical Pathway: None    Medication Management:  Not discussed today      Functional Status:  Dependent ADLs:: Ambulation-walker  Dependent IADLs:: Independent  Bed or wheelchair confined:: No  Mobility Status: Independent w/Device    Living Situation:  Current living arrangement:: I live in a private home with family    Diet/Exercise/Sleep:  Diet:: Regular  Inadequate nutrition (GOAL):: Yes  Food Insecurity: No  Tube Feeding: No  Exercise:: Yes  Inadequate activity/exercise (GOAL):: Yes  Significant changes in sleep pattern (GOAL): No    Transportation:           Psychosocial:  Mental health DX:: No  Mental health management concern (GOAL):: No  Informal Support system:: Children      Financial/Insurance:   Financial/Insurance concerns (GOAL):: No    Community Resources: None     Equipment Currently Used at Home: walker, rolling    Advance Care Plan/Directive  Advanced Care Plans/Directives on file:: No    Referrals Placed: None       Outreach Frequency: weekly  Future Appointments              In 2 days Destin Wolf MD [facility]    In 2 weeks Diego Hi MD [facility], [facility]    In 2 weeks Shanice Montoya APRN [facility], [facility]    In 1 month Diego Sears PA-C [facility], [facility]    In 2 months Diego Hi MD [facility], [facility]          Plan:   Right knee replacement is set up for 5/7/2019.  No unmet needs, no further care coordination at this time     Keturah Nugent RN / Clinical Care Coordinator     [facility]    [facility]   [email or web address]  Office :  [phone] / Fax :  [phone]

## 2019-03-29 ENCOUNTER — OFFICE VISIT (OUTPATIENT)
Dept: FAMILY MEDICINE | Facility: CLINIC | Age: 84
End: 2019-03-29
Payer: COMMERCIAL

## 2019-03-29 VITALS
OXYGEN SATURATION: 99 % | TEMPERATURE: 98.3 F | DIASTOLIC BLOOD PRESSURE: 60 MMHG | RESPIRATION RATE: 12 BRPM | HEART RATE: 100 BPM | SYSTOLIC BLOOD PRESSURE: 130 MMHG

## 2019-03-29 DIAGNOSIS — N18.30 CKD (CHRONIC KIDNEY DISEASE) STAGE 3, GFR 30-59 ML/MIN (H): ICD-10-CM

## 2019-03-29 DIAGNOSIS — M17.12 PRIMARY OSTEOARTHRITIS OF LEFT KNEE: Primary | ICD-10-CM

## 2019-03-29 PROCEDURE — 99214 OFFICE O/P EST MOD 30 MIN: CPT | Performed by: FAMILY MEDICINE

## 2019-03-29 RX ORDER — IBUPROFEN 800 MG/1
800 TABLET, FILM COATED ORAL 2 TIMES DAILY PRN
Qty: 60 TABLET | Refills: 3 | Status: ON HOLD | OUTPATIENT
Start: 2019-03-29 | End: 2019-01-01

## 2019-03-29 NOTE — PATIENT INSTRUCTIONS
Continue tylenol 1, 000 mg twice daily   Ibuprofen 800 mg twice daily as needed for pain, please take with food   Please follow up for a lab only visit in one month to recheck your kidneys and continue to drink plenty of fluids

## 2019-04-01 ENCOUNTER — DOCUMENTATION ONLY (OUTPATIENT)
Dept: EDUCATION SERVICES | Facility: CLINIC | Age: 84
End: 2019-04-01

## 2019-04-01 NOTE — PLAN OF CARE
04/01/19 Fredy3 Bren Ware, RN       4/1/19 Patient contacted [facility] to state she took the [facility] class on line and has no further questions or concerns at this time.
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
## 2019-04-10 NOTE — NURSING NOTE
Reason For Visit:   Chief Complaint   Patient presents with     Surgical Followup     DOS 5/7/19 S/P Right TKA       Primary MD: Destin Wolf      Pain Assessment  Patient Currently in Pain: Denies    Current Outpatient Medications   Medication     acetaminophen (TYLENOL) 500 MG tablet     albuterol (PROAIR HFA/PROVENTIL HFA/VENTOLIN HFA) 108 (90 Base) MCG/ACT inhaler     ASPIRIN CHILDRENS 81 MG OR CHEW     atorvastatin (LIPITOR) 80 MG tablet     ferrous gluconate (FERGON) 324 (38 Fe) MG tablet     FLUoxetine (PROZAC) 20 MG capsule     fluticasone-salmeterol (ADVAIR DISKUS) 500-50 MCG/DOSE diskus inhaler     hydrochlorothiazide (HYDRODIURIL) 25 MG tablet     ibuprofen (ADVIL/MOTRIN) 800 MG tablet     lisinopril (PRINIVIL/ZESTRIL) 40 MG tablet     multivitamin w/minerals (MULTI-VITAMIN) tablet     order for DME     order for DME     senna-docusate (DANIEL-COLACE) 8.6-50 MG per tablet     traMADol (ULTRAM) 50 MG tablet     vitamin B-12 (CYANOCOBALAMIN) 1000 MCG tablet     vitamin D3 (CHOLECALCIFEROL) 2000 units tablet     Current Facility-Administered Medications   Medication     triamcinolone (KENALOG-40) injection 40 mg          Allergies   Allergen Reactions     Contrast Dye Itching     Seasonal Allergies            Lissett Otero LPN

## 2019-04-10 NOTE — TELEPHONE ENCOUNTER
Called pt and spoke to her regarding her low lab values (Na and Hgb).  Instructed her to call her PCP and make an appointment in order to have these corrected.  Pt voiced understanding and said she would make appointment.

## 2019-04-10 NOTE — TELEPHONE ENCOUNTER
COLLEEN John with ortho clinic, saw pt in preparation for knee surgery on 5/7/19.    She reported sodium of 131 and hgb of 9.7.    They would want this addressed in prep for knee surgery.  I recommended pt see a clinic provider to f/u on these labs and possible treatment.      PCP is going out of clinic very soon. Pt should see another clinic provider.  Dora will send a staff message to pcp.  SEEMA Caraballo
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
## 2019-04-10 NOTE — ANESTHESIA PREPROCEDURE EVALUATION
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 Pt has had prior anesthetic. Type: General and MAC    No history of anesthetic complications          ROS/MED HX    ENT/Pulmonary:     (+)MILDRED risk factors snores loudly, hypertension, Moderate Persistent asthma , . .    Neurologic:  - neg neurologic ROS     Cardiovascular:     (+) Dyslipidemia, hypertension----. : . . . :. . Previous cardiac testing date:results:date: results:ECG reviewed date:1/3/2019 results: date: results:          METS/Exercise Tolerance:  1 - Eating, dressing   Hematologic:        (-) history of blood clots and History of Transfusion   Musculoskeletal:   (+) arthritis,  -       GI/Hepatic:  - neg GI/hepatic ROS       Renal/Genitourinary:     (+) chronic renal disease, type: CRI, Pt does not require dialysis, Pt has no history of transplant,       Endo:         Psychiatric:     (+)  "psychiatric history depression      Infectious Disease:  - neg infectious disease ROS       Malignancy:      - no malignancy   Other:    (+) No chance of pregnancy C-spine cleared: N/A, no H/O Chronic Pain,no other significant disability                        PHYSICAL EXAM:   Mental Status/Neuro: A/A/O; Age Appropriate   Airway: Facies: Feasible  Mallampati: III  Mouth/Opening: Full  TM distance: > 6 cm  Neck ROM: Limited   Respiratory: Auscultation: CTAB     Resp. Rate: Normal     Resp. Effort: Normal      CV: Rhythm: Regular  Rate: Age appropriate  Heart: Normal Sounds   Comments:      Dental:  Dental Comments: Upper denture  Missing lower teeth in posterior                Lab Results   Component Value Date    WBC 4.9 01/04/2019    HGB 9.3 (L) 01/04/2019    HCT 28.5 (L) 01/04/2019     01/04/2019    CRP 56.9 (H) 01/03/2019    SED 36 (H) 05/14/2018     03/04/2019    POTASSIUM 3.9 03/04/2019    CHLORIDE 103 03/04/2019    CO2 23 03/04/2019    BUN 19 03/04/2019    CR 0.82 03/04/2019    GLC 81 03/04/2019    ELIDA 10.0 03/04/2019    PHOS 2.9 01/03/2019    MAG 1.7 01/28/2019    ALBUMIN 3.3 (L) 01/03/2019    PROTTOTAL 8.1 01/03/2019    ALT 9 01/03/2019    AST 18 01/03/2019    ALKPHOS 82 01/03/2019    BILITOTAL 0.5 01/03/2019    LIPASE 60 06/02/2010    PTT 29 06/02/2010    INR 0.99 06/02/2010    TSH 1.53 01/04/2019       Preop Vitals  BP Readings from Last 3 Encounters:   03/29/19 130/60   03/04/19 126/67   01/28/19 147/81    Pulse Readings from Last 3 Encounters:   03/29/19 100   03/04/19 98   01/28/19 114      Resp Readings from Last 3 Encounters:   03/29/19 12   01/05/19 18   07/09/18 20    SpO2 Readings from Last 3 Encounters:   03/29/19 99%   03/04/19 98%   01/28/19 99%      Temp Readings from Last 1 Encounters:   03/29/19 98.3  F (36.8  C) (Oral)    Ht Readings from Last 1 Encounters:   01/04/19 1.549 m (5' 1\")      Wt Readings from Last 1 Encounters:   01/04/19 79.5 kg (175 lb 3.2 oz)    Estimated body " "mass index is 33.1 kg/m  as calculated from the following:    Height as of 1/4/19: 1.549 m (5' 1\").    Weight as of 1/4/19: 79.5 kg (175 lb 3.2 oz).     LDA:  Peripheral IV 01/03/19 Right Lower forearm (Active)   Number of days: 97            Assessment:              Tobacco Use:  NO Active use of Tobacco/UNKNOWN Tobacco use status     Plan:                             PONV Management:  Adult Risk Factors: Female, Non-Smoker                  PAC Discussion and Assessment    ASA Classification: 3  Case is suitable for:   Anesthetic techniques and relevant risks discussed: PAC Recommendations anesthetic techniques: choice anesthesia.  Invasive monitoring and risk discussed: No  Types:   Possibility and Risk of blood transfusion discussed: Yes  NPO instructions given:   Additional anesthetic preparation and risks discussed:   Needs early admission to pre-op area:   Other:     PAC Resident/NP Anesthesia Assessment:  Staci Watt is an 83 year old female scheduled for Right TKA on 5/7/2019 by Dr. Hi in treatment of Osteoarthritis of right knee.  PAC referral for risk assessment and optimization for anesthesia with comorbid conditions of Hypertension, hyperlipidemia, CRI, and gout:    Pre-operative considerations:  1.  Cardiac:  Functional status- METS 1. Pt does not exert herself and uses walker at home.   intermediate risk surgery with 0.4% risk of major adverse cardiac event.   2.  Pulm:  Airway feasible.  MILDRED risk: intermediate 3/8 risk factors  3.  GI:  Risk of PONV score = 3.  If > 2, anti-emetic intervention recommended.  4.  Please note pt requests IV access at left antecubital space. History of difficulty in right antecubital space and dorsal aspect of hands bilaterally.    VTE risk: 0.5%    #Cardiac - hypertension treated with hydrochlorothiazide and lisinopril.  Will hold DOS  Will hold ASA 7 days prior to surgery.  Denies CP, SOB, JACKSON, palpitations.  Pt uses walker at home and reports very little " activity.  BNP today and if elevated will obtain echo.  #pulmonary - well controlled moderate persistent asthma.  Advair inhaler daily and albuterol prn. No recent exacerbations. Denies cough or wheezing.  #Renal - CRI.  Creatinine - 0.82 (3/4/2019)    Patient discussed with Dr. Lubin. See recommendations below.     **For further details of assessment, testing, and physical exam please see H and P completed on same date.          Dora Hernadez PA-C, [facility]      Reviewed and Signed by PAC Mid-Level Provider/Resident  Mid-Level Provider/Resident: Dora Hernadez  Date: 4/10/2019  Time: 1410    Attending Anesthesiologist Anesthesia Assessment:        Anesthesiologist:   Date:   Time:   Pass/Fail:   Disposition:     PAC Pharmacist Assessment:        Pharmacist:   Date:   Time:        Dora Hernadez PA-C

## 2019-04-10 NOTE — PATIENT INSTRUCTIONS
Preparing for Your Surgery      Name:  Staci Watt   MRN:  [id number]   :  1935   Today's Date:  4/10/2019     Arriving for surgery:  Surgery date:  2019  Arrival time:  11:15 am  Please come to:     [facility] [address]  [address] [place]  [address]    - parking is available in front of [facility] from 5:15AM to 8:00PM. If you prefer, park your car in the Green Lot.  -Proceed to the 3rd floor, check in at the Adult Surgery Waiting Lounge. [phone]  If an escort is needed stop at the Information Desk in the lobby. Inform the information person that you are here for surgery. An escort to the Adult Surgery Waiting Lounge will be provided.        What can I eat or drink?  -  You may have solid food or milk products until 8 hours prior to your surgery.(5 am)  -  You may have water, apple juice or 7up/Sprite until 2 hours prior to your surgery.(until 11:15 am arrival time)     Which medicines can I take?        Stop Aspirin, vitamins and supplements one week prior to surgery.      Hold Ibuprofen for 24 hours and/or Naproxen for 48 hours prior to surgery.     -  Do NOT take these medications in the morning, the day of surgery:     Hydrodiuril      Lisinopril      Miralax (polyethylene glycol)     Ferrous gluconate       -  Please take these medications the day of surgery:     Inhalers as usual and bring to hospital      Fluoxetine      Pantoprazole (protonix)      How do I prepare myself?  -  Take two showers: one the night before surgery; and one the morning of surgery.         Use Scrubcare or Hibiclens to wash from neck down.  You may use your own shampoo and conditioner. No other hair products.   -  Do NOT use lotion, powder, deodorant, or antiperspirant the day of your surgery.  -  Do NOT wear any makeup, fingernail polish or jewelry.  -  Begin using Incentive Spirometer 1 week prior to surgery.  Use 4 times per day, 5-10 breaths  each time.  Bring Incentive Spirometer to hospital.  - Do not bring your own medications to the hospital, except for inhalers and eye drops.  -  Bring your ID and insurance card.    Questions or Concerns:  -If you have questions or concerns regarding the day of surgery, please call   The Pre Admission Nursing Office at [phone].       -For questions after surgery please call your surgeons office.           Enhanced Recovery After Surgery     This is a team effort, including you, to get you back on your feet, eating and drinking normally and out of the hospital as quickly as possible.  The goals are:     1) NO INFECTIONS and   2) RETURN TO NORMAL DIET    How can we achieve these goals?  1) STAY ACTIVE: Walk every day before your surgery; try to increase the amount every day.  Walk after surgery as much as you can-the nurses will help you.  Walking speeds healing and gets you home quicker, you heal better at home and have less risk of infection.     2) INCENTIVE SPIROMETER: Practice your incentive spirometer 4 times per day with 5-10 repetitions each time.  Using the incentive spirometer can strengthen your muscles between your ribs and help you have a strong cough after surgery.  A more effective cough can help prevent problems with your lungs.    3) STAY HYDRATED: Drink clear liquids up until 2 hours before your surgery. We would like you to purchase a drink such as Gatorade or Ensure Clear (not the milkshake type).  Drink this before bedtime and on the way into the hospital, drink between 8-12 ounces or until you feel hydrated.  Keeping well hydrated leads to your veins being plump, you wake up faster, and you are less likely to be nauseated. Start drinking water as soon as you can after surgery and advance to clear liquids and food as tolerated.  IV fluids contain salt, drinking fluids will minimize the amount of IV fluids you need and decrease the amount of salt you get.    The most common reason for the  patient to be readmitted is dehydration. Staying hydrated after you go home from the hospital is very important.  Ensure or Ensure Clear are good options to keep you hydrated.     4) PAIN MANAGEMENT: If we minimize the amount of opioids and narcotics, and use regional blocks (which numb the area where your surgery is) along with oral pain medications; you will have less side effects of nausea and constipation. Narcotics can slow down your bowels and cause you to stay in the hospital longer.     Our goal is to keep you comfortable; eating and drinking normally and back home safely.     Using an Incentive Spirometer    An incentive spirometer is a device that helps you do deep breathing exercises. These exercises expand your lungs, aid in circulation, and help prevent pneumonia. Deep breathing exercises also help you breathe better and improve the function of your lungs by:    Keeping your lungs clear    Strengthening your breathing muscles    Helping prevent respiratory complications or problems  The incentive spirometer gives you a way to take an active part in your care. A nurse or therapist will teach you breathing exercises. To do these exercises, you will breathe in through your mouth and not your nose. The incentive spirometer only works correctly if you breathe in through your mouth.    Steps to clear lungs  Step 1. Exhale normally. Then, inhale normally.    Relax and breathe out.  Step 2. Place your lips tightly around the mouthpiece.    Make sure the device is upright and not tilted.  Step 3. Inhale as much air as you can through the mouthpiece (don't breath through your nose).    Inhale slowly and deeply.    Hold your breath long enough to keep the balls or disk raised for at least 3 to 5 seconds, or as instructed by your healthcare provider.  Step 4. Repeat the exercise regularly.  Begin using the Incentive Spirometer one week prior to your surgery, 4 times per day-5 times each.

## 2019-04-11 NOTE — ADDENDUM NOTE
Addendum  created 04/11/19 0845 by Dora Hernadez PA-C    Diagnosis association updated, Order list changed, Visit diagnoses modified

## 2019-04-11 NOTE — TELEPHONE ENCOUNTER
Bren - can you please call patient and schedule an appointment for tomorrow to address abnormal labs that were noted during her pre-op.   Thanks!  DM

## 2019-04-11 NOTE — TELEPHONE ENCOUNTER
Pt scheduled and Dr. Bernstein is aware. No further action at this time.     Thanks! Quiana Mujica RN

## 2019-04-11 NOTE — ADDENDUM NOTE
Addendum  created 04/11/19 1255 by Dora Hernadez PA-C    Diagnosis association updated, Order list changed, Visit diagnoses modified

## 2019-04-11 NOTE — TELEPHONE ENCOUNTER
Appt scheduled with Dr. eBrnstein tomorrow at 10 am. Routing message to her as an FYI.    Brenna RACHEL  [place]

## 2019-04-12 PROBLEM — E87.8 ELECTROLYTE ABNORMALITY: Status: RESOLVED | Noted: 2019-01-03 | Resolved: 2019-01-01

## 2019-04-12 PROBLEM — Z79.1 NSAID LONG-TERM USE: Status: ACTIVE | Noted: 2019-01-01

## 2019-04-12 PROBLEM — K21.9 GASTROESOPHAGEAL REFLUX DISEASE WITHOUT ESOPHAGITIS: Status: ACTIVE | Noted: 2019-01-01

## 2019-04-12 NOTE — RESULT ENCOUNTER NOTE
-Hemoglobin is decreased indicating anemia.  Anemia can cause fatigue and, occasionally, light-headedness.  additional studies/ workup pending to determine the exact cause of your anemia.  -White blood cell and platelet counts are normal.

## 2019-04-12 NOTE — PROGRESS NOTES
[place]  SUBJECTIVE:   Staci Watt is a 83 year old female who presents to clinic today for the following health issues:  Here  With son    Is scheduled for Right TKA on 5/7/2019 by Dr. Hi in treatment of Osteoarthritis of right knee.  PAC saw her 4/10/19 for risk assessment and optimization for anesthesia with comorbid conditions of Hypertension, hyperlipidemia, CRI, and gout: assessed she does not exert herself and uses walker at home.   intermediate risk surgery with 0.4% risk of major adverse cardiac event. Airway feasible.  MILDRED risk: intermediate 3/8 risk factors. VTE risk: 0.5%. hypertension treated with hydrochlorothiazide and lisinopril.  Advised to hold on day f surgery & hold ASA 7 days prior to surgery.  Denied CP, SOB, JACKSON, palpitations.  Uses walker at home and reported very little activity. Had well controlled moderate persistent asthma on Advair inhaler daily and albuterol prn. No recent exacerbations. Denied cough or wheezing. Noted stable chronic renal insufficiency  & Creatinine was 0.82 (3/4/2019). Preop labs showed sodium at 133 and hemoglobin at 9.7.  & was advised to see her PCP, Dr. Wolf and patient was contacted to follow up with Dr. Wolf to correct these values before her surgery on 5/7/2019. In absence of her PCP she is here to see me today to follow up with lab results per the order of Dr. Wolf. New to me.    Hx of obesity, HTN on asa, hydrochlorothiazide & lisinopril, HLD on Lipitor, CKD3, microalbuminuria, allergic rhinitis, moderate persistent asthma on albuterol and Advair, MDD on Prozac, ganglion cyst, gout , OA, rotator cuff syndrome, B/L knee pain prior left knee surgery, tramadol and ibuprofen and kenalog shots prn, hx of electrolyte abnormalities, prior noted anemia, prior retinal detachment s/p surgery, cataract surgery, prior FLASK, NISSA BSO, hernia repair, tonsillectomy, B/L bunionectomy,on B 12 & vit d. Allergic to contrast. She was admitted 1/3 to 1/5 for fatigue  & found to have low sodium, low potassium & low magnesium, GERD 7 constipation. Thought secondary to poor intake & hydrochlorothiazide. Lytes were replaced, lisinopril increased to 40 mg & hydrochlorothiazide discontinued & given Protonix for 2 weeks. Seen 1/9/19 for hospital follow up & hydrochlorothiazide continued to be held. On 1/28 kidney function improved & hydrochlorothiazide resumed as BP was running high off it. Seen by PCP 3/4/19 for weight loss, tramadol stopped & resumed ibuprofen prn. Seen 2/29 for knee pain & referred to sports. Seen by sports 4/10 & then by preop assessment as above.MRSA screen was negative & BMP showed sodium was 131, hb was 9.7 with platelets 584, ferritin 307, folate 39 & Vit B 12 was more than adequate. Hb 11 to 12 in past, recently in jan was 9.3.     Hb  Low. Reports No bleed. On tylenol a while. Now on ibuprofen for pain. No black or blood in stools  On iron pills ferrous gluconate, & vit d. Has hx of GERD.no longer on Protonix. Uses Tums occasionally   Feels weak, Lost appetite a while ago. Has had  weight down. Weight 170's in late dec, then noted 188 on 1/3 , 170 something 1/4. 163 lbs today.   No fever or chills, no headache or dizziness, no double or blurry vision, no facial pain, earache, sore throat, runny nose, post nasal drip, no trouble hearing, smelling, tasting or swallowing, no cough , no chest pain, trouble breathing or palpitations, No abdominal pain, heart burn, reflux, nausea or vomiting or diarrhea, no blood in stools or black stools, no night sweats. No dysuria, hematuria, frequency, urgency, hesitancy, incontinence, No pelvic complaints. No leg swelling or joint pain. No rash.    Additional history: as documented    Reviewed  and updated as needed this visit by clinical staff         Reviewed and updated as needed this visit by Provider         Patient Active Problem List   Diagnosis     Allergic rhinitis     Moderate Persistent Asthma     Gout      Hyperlipidemia LDL goal <100     CKD (chronic kidney disease) stage 3, GFR 30-59 ml/min (H)     Obese     OA (osteoarthritis) of knee     Microalbuminuria     Ganglion cyst     HTN, goal below 150/90     Major depressive disorder, recurrent episode, moderate (H)     Primary osteoarthritis of both knees     Anemia, unspecified type     Rotator cuff syndrome, left     Bilateral knee pain     Gastroesophageal reflux disease without esophagitis     NSAID long-term use     Past Surgical History:   Procedure Laterality Date     BUNIONECTOMY JASWANT BILATERAL       C NONSPECIFIC PROCEDURE      (B) detatched retina     C NONSPECIFIC PROCEDURE      LASIK surgery     C NONSPECIFIC PROCEDURE      NISSA/BSO     C NONSPECIFIC PROCEDURE      Hernia     C NONSPECIFIC PROCEDURE      Tonsillectomy     C NONSPECIFIC PROCEDURE      Arthroscopic knee surgery       Social History     Tobacco Use     Smoking status: Never Smoker     Smokeless tobacco: Never Used     Tobacco comment: Once in awhile when goes to [place].   Substance Use Topics     Alcohol use: Yes     Comment: has a drink every night before she goes to bed     Family History   Problem Relation Age of Onset     Hypertension Mother      Respiratory Mother      Breast Cancer Maternal Aunt      Respiratory Maternal Aunt      Respiratory Maternal Aunt          Current Outpatient Medications   Medication Sig Dispense Refill     acetaminophen (TYLENOL) 500 MG tablet Take 500-1,000 mg by mouth every 6 hours as needed Reported on 5/2/2017       albuterol (PROAIR HFA/PROVENTIL HFA/VENTOLIN HFA) 108 (90 Base) MCG/ACT inhaler Inhale 2 puffs into the lungs every 4 hours as needed for shortness of breath / dyspnea or wheezing       ASPIRIN CHILDRENS 81 MG OR CHEW 1 TABLET DAILY 0 11     atorvastatin (LIPITOR) 80 MG tablet Take 0.5 tablets (40 mg) by mouth daily For high cholesterol. 45 tablet 3     ferrous gluconate (FERGON) 324 (38 Fe) MG tablet Take 324 mg by mouth daily (with breakfast)        FLUoxetine (PROZAC) 20 MG capsule Take 1 capsule (20 mg) by mouth daily 90 capsule 1     fluticasone-salmeterol (ADVAIR DISKUS) 500-50 MCG/DOSE diskus inhaler Inhale 1 puff into the lungs 2 times daily 3 Inhaler 3     hydrochlorothiazide (HYDRODIURIL) 25 MG tablet Take 0.5 tablets (12.5 mg) by mouth daily 1 tablet 0     ibuprofen (ADVIL/MOTRIN) 800 MG tablet Take 1 tablet (800 mg) by mouth 2 times daily as needed for moderate pain 60 tablet 3     lisinopril (PRINIVIL/ZESTRIL) 40 MG tablet Take 1 tablet (40 mg) by mouth daily 90 tablet 3     multivitamin w/minerals (MULTI-VITAMIN) tablet Take 1 tablet by mouth daily 30 tablet 0     order for DME Equipment being ordered: 2 wheeled walker 1 Product 0     order for DME Equipment being ordered: Digital home blood pressure monitor kit 1 kit 0     ranitidine (ZANTAC) 150 MG tablet Take 1 tablet (150 mg) by mouth 2 times daily 60 tablet 1     vitamin B-12 (CYANOCOBALAMIN) 1000 MCG tablet Take 1,000 mcg by mouth daily       vitamin D3 (CHOLECALCIFEROL) 2000 units tablet Take 1 tablet by mouth daily       Allergies   Allergen Reactions     Contrast Dye Itching     Seasonal Allergies      Recent Labs   Lab Test 04/12/19  1138 04/10/19  1437  01/04/19  0555  01/03/19  1007 12/24/18  0953  01/02/18  1411  05/02/17  1533  03/07/11  1402   A1C  --   --   --   --   --   --   --   --   --   --   --   --  5.7   LDL  --   --   --   --   --   --  76  --  72  --  72   < > 148*   HDL  --   --   --   --   --   --  77  --  101  --  117   < > 61   TRIG  --   --   --   --   --   --  78  --  96  --  79   < > 87   ALT 20  --   --   --   --  9 14   < >  --   --  17   < > 16   CR 0.84 0.79   < > 0.70   < > 0.71 1.29*   < > 0.97   < > 0.91   < > 1.01   GFRESTIMATED 64 69   < > 80   < > 79 38*   < > 55*   < > 59*   < > 54*   GFRESTBLACK 74 80   < > >90   < > >90 45*   < > 66   < > 72   < > 65   POTASSIUM 3.6 3.4   < > 4.0   < > 2.5* 3.7   < > 3.5   < > 4.0   < > 4.3   TSH 1.52  --   --  1.53   --   --   --    < >  --   --   --    < > 0.94    < > = values in this interval not displayed.      BP Readings from Last 3 Encounters:   04/12/19 113/61   04/10/19 137/71   03/29/19 130/60    Wt Readings from Last 3 Encounters:   04/10/19 73.9 kg (163 lb)   01/04/19 79.5 kg (175 lb 3.2 oz)   08/01/18 79.4 kg (175 lb)                  Labs reviewed in EPIC    ROS:  Constitutional, HEENT, cardiovascular, pulmonary, GI, , musculoskeletal, neuro, skin, endocrine and psych systems are negative, except as otherwise noted.    OBJECTIVE:     /61 (BP Location: Left arm, Patient Position: Chair, Cuff Size: Adult Regular)   Pulse 96   Temp 98.3  F (36.8  C) (Oral)   Resp 16   SpO2 99%   There is no height or weight on file to calculate BMI.  GENERAL: alert, no distress, obese, elderly and fatigued  EYES: Eyes grossly normal to inspection, PERRL and conjunctivae and sclerae normal  HENT: ear canals and TM's normal, nose and mouth without ulcers or lesions  NECK: no adenopathy, no asymmetry, masses, or scars and thyroid normal to palpation  RESP: lungs clear to auscultation - no rales, rhonchi or wheezes  CV: regular rate and rhythm, normal S1 S2, no S3 or S4, no murmur, click or rub, no peripheral edema and peripheral pulses strong  ABDOMEN: soft, non tender, no hepatosplenomegaly, no masses and bowel sounds normal  MS: difficulty walking & getting up on bed. Uses a cane  SKIN: no suspicious lesions or rashes  NEURO: Normal strength and tone, mentation intact and speech normal  PSYCH: mentation appears normal, affect normal/bright, anxious, fatigued, judgement and insight intact and appearance well groomed    Diagnostic Test Results:  No results found for this or any previous visit (from the past 24 hour(s)).    ASSESSMENT/PLAN:     1. Hyponatremia  Check sodium today to make sure not worse, Decrease hydrochlorothiazide to 1/2 pill of 25 mg daily. Drink 3.5 bottles of water a day instead of 4. Repeat labs next week  on decreased dose hydrochlorothiazide. If sodium still low may have to remove her off the hydrochlorothiazide and try something different for BP if it is up on lisinopril alone. See hematology for preop prep. Do stool test Given weight loss and anemia need to see GI. Anemia could be related to ageing and kidneys. seems stable to boom & repeat today stable to what she's had in the past. If cannot manage before surgery in a timely fashion may have to delay knee surgery  - Basic metabolic panel  (Ca, Cl, CO2, Creat, Gluc, K, Na, BUN)  - Magnesium  - **Basic metabolic panel FUTURE 14d; Future    2. Anemia, unspecified type  - Basic metabolic panel  (Ca, Cl, CO2, Creat, Gluc, K, Na, BUN)  - ONC/HEME ADULT REFERRAL  - GASTROENTEROLOGY ADULT REF PROCEDURE ONLY  - Blood Morphology Pathologist Review  - Reticulocyte Count  - CBC with platelets differential  - Fecal colorectal cancer screen FIT; Future  - GASTROENTEROLOGY ADULT REF CONSULT ONLY    3. HTN, goal below 150/90  Well controlled currently. decrease hydrochlorothiazide & recheck BP in 2 weeks.   - Basic metabolic panel  (Ca, Cl, CO2, Creat, Gluc, K, Na, BUN)  - hydrochlorothiazide (HYDRODIURIL) 25 MG tablet; Take 0.5 tablets (12.5 mg) by mouth daily  Dispense: 1 tablet; Refill: 0    4. Gastroesophageal reflux disease without esophagitis  - Basic metabolic panel  (Ca, Cl, CO2, Creat, Gluc, K, Na, BUN)  - GASTROENTEROLOGY ADULT REF PROCEDURE ONLY  - ranitidine (ZANTAC) 150 MG tablet; Take 1 tablet (150 mg) by mouth 2 times daily  Dispense: 60 tablet; Refill: 1    5. NSAID long-term use  - Basic metabolic panel  (Ca, Cl, CO2, Creat, Gluc, K, Na, BUN)  - GASTROENTEROLOGY ADULT REF PROCEDURE ONLY    6. Weight loss  Unclear exct mount 5 to 20 lbs depending on what weights seen in epic last 4 months.  - Basic metabolic panel  (Ca, Cl, CO2, Creat, Gluc, K, Na, BUN)  - GASTROENTEROLOGY ADULT REF PROCEDURE ONLY  - Hepatic panel (Albumin, ALT, AST, Bili, Alk Phos, TP)  - Fecal  colorectal cancer screen FIT; Future  - GASTROENTEROLOGY ADULT REF CONSULT ONLY  - TSH with free T4 reflex  - Calcium ionized; Future    7. Hypercalcemia  Mild on bmp will do additional tests on Monday at recheck labs.   - Calcium ionized; Future  - **Parathyroid Hormone Intact FUTURE 2mo; Future  - **Vitamin D Deficiency FUTURE 2mo; Future    See Patient Instructions    Sri Bernstein MD  [facility] [place] [facility] [place]

## 2019-04-12 NOTE — TELEPHONE ENCOUNTER
Gave pt this message.  She has lab appt on 4/15/19.  She sees Dr. Bernstein for f/u on 4/29/19.    Discussed the referrals to hematology and GI.  PT said she does have an appt in [place] for one of these referrals.  (I am not able to confirm this appt because not within .)  Pt is overwhelmed by the paperwork she was given in clinic.    I confirmed with TC that pt has the GI appt and hematology will call her to schedule.  SEEMA Caraballo

## 2019-04-12 NOTE — LETTER
April 15, 2019      Staci Watt  [address] [facility] [address]        Dear ,    We are writing to inform you of your test results.    Normal retic count ( indicates normal bone marrow function)   Normal liver function   Normal magnesium       -Liver and gallbladder tests (ALT,AST, Alk phos,bilirubin) are normal.   -Kidney function (GFR) is normal.   -Sodium is decreased but improved from couple days ago. Will see if lowering the hydrochlorothiazide will make a difference when you get labs repeated on monday   -Potassium is normal.   -Calcium is elevated. Will recheck this on Monday with sodium as planned.   -Glucose is normal.   -TSH (thyroid stimulating hormone) level is normal which indicates normal thyroid function..     Resulted Orders   Basic metabolic panel  (Ca, Cl, CO2, Creat, Gluc, K, Na, BUN)   Result Value Ref Range    Sodium 132 (L) 133 - 144 mmol/L    Potassium 3.6 3.4 - 5.3 mmol/L    Chloride 98 94 - 109 mmol/L    Carbon Dioxide 25 20 - 32 mmol/L    Anion Gap 9 3 - 14 mmol/L    Glucose 90 70 - 99 mg/dL    Urea Nitrogen 14 7 - 30 mg/dL    Creatinine 0.84 0.52 - 1.04 mg/dL    GFR Estimate 64 >60 mL/min/[1.73_m2]      Comment:      Non  GFR Calc  Starting 12/18/2018, serum creatinine based estimated GFR (eGFR) will be   calculated using the Chronic Kidney Disease Epidemiology Collaboration   (CKD-EPI) equation.      GFR Estimate If Black 74 >60 mL/min/[1.73_m2]      Comment:       GFR Calc  Starting 12/18/2018, serum creatinine based estimated GFR (eGFR) will be   calculated using the Chronic Kidney Disease Epidemiology Collaboration   (CKD-EPI) equation.      Calcium 10.5 (H) 8.5 - 10.1 mg/dL   Magnesium   Result Value Ref Range    Magnesium 1.6 1.6 - 2.3 mg/dL   Reticulocyte Count   Result Value Ref Range    % Retic 1.2 0.5 - 2.0 %    Absolute Retic 43.1 25 - 95 10e9/L   CBC with platelets differential   Result Value Ref Range    WBC 7.1 4.0 -  11.0 10e9/L    RBC Count 3.56 (L) 3.8 - 5.2 10e12/L    Hemoglobin 10.3 (L) 11.7 - 15.7 g/dL    Hematocrit 31.4 (L) 35.0 - 47.0 %    MCV 88 78 - 100 fl    MCH 28.9 26.5 - 33.0 pg    MCHC 32.8 31.5 - 36.5 g/dL    RDW 14.5 10.0 - 15.0 %    Platelet Count 606 (H) 150 - 450 10e9/L    % Neutrophils 57.3 %    % Lymphocytes 35.3 %    % Monocytes 6.0 %    % Eosinophils 1.1 %    % Basophils 0.3 %    Absolute Neutrophil 4.1 1.6 - 8.3 10e9/L    Absolute Lymphocytes 2.5 0.8 - 5.3 10e9/L    Absolute Monocytes 0.4 0.0 - 1.3 10e9/L    Absolute Eosinophils 0.1 0.0 - 0.7 10e9/L    Absolute Basophils 0.0 0.0 - 0.2 10e9/L    Diff Method Automated Method    Hepatic panel (Albumin, ALT, AST, Bili, Alk Phos, TP)   Result Value Ref Range    Bilirubin Direct <0.1 0.0 - 0.2 mg/dL    Bilirubin Total 0.3 0.2 - 1.3 mg/dL    Albumin 3.5 3.4 - 5.0 g/dL    Protein Total 8.4 6.8 - 8.8 g/dL    Alkaline Phosphatase 76 40 - 150 U/L    ALT 20 0 - 50 U/L    AST 17 0 - 45 U/L   TSH with free T4 reflex   Result Value Ref Range    TSH 1.52 0.40 - 4.00 mU/L     If you have any questions or concerns, please call the clinic at the number listed above.   Sincerely,  Sri Bernstein MD/nr

## 2019-04-12 NOTE — TELEPHONE ENCOUNTER
Hb is stable at 10.4 today  Rest of labs not back  No evidence of ongoing loss    Still given value and her other symptoms would go ahead with her seeing gi and hematology and recheck labs on monday for sodium ( today's not back ) and going form there

## 2019-04-12 NOTE — PATIENT INSTRUCTIONS
See hematology for preop prep  Do stool test and repeat labs next week on decreased dose hydrochlorothiazide  Drink 3.5 bottles of water a day instead of 4  Decrease hydrochlorothiazide to 1/2 pill   If sodium still low may have to remove you off the hydrochlorothiazide and try something different f BP is up on lisinopril alone  Given weight loss and anemia need to see GI   Could be related to ageing and kidneys  If cannot manage before surgery in a timely fashion may have to delay knee surgery

## 2019-04-12 NOTE — TELEPHONE ENCOUNTER
ONCOLOGY INTAKE: Records Information      APPT INFORMATION:  Referring provider:  Sri Bernstein  Referring provider s clinic:  [facility]  Reason for visit/diagnosis:  Anemia    RECORDS INFORMATION:  Were the records received with the referral (via [employer])? No - Internal Referral    Has patient been seen for any external appt for this diagnosis? No    If yes, where? NA    Has patient had any imaging or procedures outside of [place]  [place] for this condition? No      If Yes, where? NA    ADDITIONAL INFORMATION:  Per Bren @ , no outside records

## 2019-04-12 NOTE — TELEPHONE ENCOUNTER
RECORDS STATUS - ALL OTHER DIAGNOSIS      RECORDS RECEIVED FROM: [facility]   DATE RECEIVED: 4/12/19   NOTES STATUS DETAILS   OFFICE NOTE from referring provider  Epic   OFFICE NOTE from medical oncologist     DISCHARGE SUMMARY from hospital  Epic   DISCHARGE REPORT from the ER NA    OPERATIVE REPORT     MEDICATION LIST  [facility]   CLINICAL TRIAL TREATMENTS TO DATE     LABS     PATHOLOGY REPORTS NA    ANYTHING RELATED TO DIAGNOSIS  Epic   GENONOMIC TESTING     TYPE:     IMAGING (NEED IMAGES & REPORT)     CT SCANS NA    MRI NA    MAMMO NA    ULTRASOUND NA    PET NA

## 2019-04-13 NOTE — RESULT ENCOUNTER NOTE
Normal retic count ( indicates normal bone marrow function)  Normal liver function  Normal magnesium      -Liver and gallbladder tests (ALT,AST, Alk phos,bilirubin) are normal.  -Kidney function (GFR) is normal.  -Sodium is decreased but improved from couple days ago. Will see if lowering the hydrochlorothiazide will make a difference when you get labs repeated on monday  -Potassium is normal.  -Calcium is elevated. Will recheck this on Monday with sodium as planned.   -Glucose is normal.  -TSH (thyroid stimulating hormone) level is normal which indicates normal thyroid function..

## 2019-04-15 NOTE — RESULT ENCOUNTER NOTE
Results within acceptable limits.  -Kidney function is normal (Cr, GFR), Sodium is normal, Potassium is normal, Calcium is normal, Glucose is normal. .
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
## 2019-04-15 NOTE — LETTER
April 16, 2019      Staci WILSON Shukri  [address] [facility] [facility] [address]        Dear ,    We are writing to inform you of your test results.    Results within acceptable limits.  -Kidney function is normal (Cr, GFR), Sodium is normal, Potassium is normal, Calcium is normal, Glucose is normal. .     Resulted Orders   **Parathyroid Hormone Intact FUTURE 2mo   Result Value Ref Range    Parathyroid Hormone Intact 36 18 - 80 pg/mL   Calcium ionized   Result Value Ref Range    Calcium Ionized 4.9 4.4 - 5.2 mg/dL   **Basic metabolic panel FUTURE 14d   Result Value Ref Range    Sodium 133 133 - 144 mmol/L    Potassium 3.7 3.4 - 5.3 mmol/L    Chloride 100 94 - 109 mmol/L    Carbon Dioxide 22 20 - 32 mmol/L    Anion Gap 11 3 - 14 mmol/L    Glucose 97 70 - 99 mg/dL    Urea Nitrogen 12 7 - 30 mg/dL    Creatinine 0.66 0.52 - 1.04 mg/dL    GFR Estimate 82 >60 mL/min/[1.73_m2]      Comment:      Non  GFR Calc  Starting 12/18/2018, serum creatinine based estimated GFR (eGFR) will be   calculated using the Chronic Kidney Disease Epidemiology Collaboration   (CKD-EPI) equation.      GFR Estimate If Black >90 >60 mL/min/[1.73_m2]      Comment:       GFR Calc  Starting 12/18/2018, serum creatinine based estimated GFR (eGFR) will be   calculated using the Chronic Kidney Disease Epidemiology Collaboration   (CKD-EPI) equation.      Calcium 10.1 8.5 - 10.1 mg/dL       If you have any questions or concerns, please call the clinic at the number listed above.       Sincerely,    Sri Bernstein MD/nr

## 2019-04-15 NOTE — LETTER
April 16, 2019      Staci Watt  [address] [facility] [address]        Dear MsJohnnaShukri,    We are writing to inform you of your test results.    Normal parathyroid level     Resulted Orders   **Parathyroid Hormone Intact FUTURE 2mo   Result Value Ref Range    Parathyroid Hormone Intact 36 18 - 80 pg/mL   Calcium ionized   Result Value Ref Range    Calcium Ionized 4.9 4.4 - 5.2 mg/dL       If you have any questions or concerns, please call the clinic at the number listed above.       Sincerely,    Sri Bernstein MD/nr

## 2019-04-15 NOTE — LETTER
April 16, 2019      Staci Watt  [address] [facility] [address]        Dear ,    We are writing to inform you of your test results.    Results within acceptable limits. Normal ionized calcium    Resulted Orders   **Parathyroid Hormone Intact FUTURE 2mo   Result Value Ref Range    Parathyroid Hormone Intact 36 18 - 80 pg/mL   Calcium ionized   Result Value Ref Range    Calcium Ionized 4.9 4.4 - 5.2 mg/dL       If you have any questions or concerns, please call the clinic at the number listed above.       Sincerely,    Sri Bernstein MD/nr
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
## 2019-04-15 NOTE — TELEPHONE ENCOUNTER
Sodium improved on lower dose hydrochlorothiazide and water restriction  calcium also better and parathyroid is normal, vt d not back  How is BP doing?  If stable continue same meds as on currently     Keep apts hematology and GI to evaluate the low hb   Can close encounter when done

## 2019-04-16 NOTE — RESULT ENCOUNTER NOTE
Results within acceptable limits.  -Vitamin D level is normal and getting 1000 IU daily in your diet or supplements is recommended. .

## 2019-04-17 NOTE — NURSING NOTE
"Oncology Rooming Note    April 17, 2019 12:22 PM   Staci Watt is a 83 year old female who presents for:    Chief Complaint   Patient presents with     Oncology Clinic Visit     new pt complete anemia      Initial Vitals: There were no vitals taken for this visit. Estimated body mass index is 29.81 kg/m  as calculated from the following:    Height as of 4/10/19: 1.575 m (5' 2\").    Weight as of 4/10/19: 73.9 kg (163 lb). There is no height or weight on file to calculate BSA.  Data Unavailable Comment: Data Unavailable   No LMP recorded. Patient has had a hysterectomy.  Allergies reviewed: Yes  Medications reviewed: Yes    Medications: Medication refills not needed today.  Pharmacy name entered into GutCheck:    [facility] [place], [place] - [address]  [facility] [place], [place] - [address]  [facility] [address] [place] [address] [address] [facility] [address]  [facility] [place], [place]    Clinical concerns: none        Mariela Jhonatan, CMA              "

## 2019-04-17 NOTE — PATIENT INSTRUCTIONS
I am evaluating why you have anemia and high platelet count.     You should get IV iron infusion. I have placed orders.     I will be in contact with you regarding lab results and with your surgery team as well.     Flora Alberto MD/PhD   of Medicine  Division of Hematology, Oncology and Transplantation    [facility]  [address] [place] [address], Mail [id number]  [place] [address]    Clinic [phone]  Nurse Line: [phone]    RN Care Coordinator: Christine

## 2019-04-17 NOTE — LETTER
2019       RE: Staci Watt  [address]  [facility] [address]     Dear Colleague,    Thank you for referring your patient, Staci Watt, to the [facility]. Please see a copy of my visit note below.        [facility] Consultation    Outpatient Visit Note:    Patient: Staci Watt  MRN: [id number]  : 1935  NESTOR: 2019    Reason for Consultation:  Staci Watt is a referred for evaluation and treatment of anemia and thrombocythemia    Assessment:  In summary, Staci Watt is a 83 year old woman with PMHx of CKD3, gout, MDD on Prozac, asthma who presents for anemia with thrombocythemia detected on krystin-operative evaluation.     1). Hypoproliferative normocytic anemia  2). Thrombocythemia  3). Hypercalcemia  4). 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 Watt most likely has had proliferative normocytic anemia in the setting of having iron deficiency.  She is a total iron of 15 with an iron saturation of 66%.  Her ferritin is elevated , but is relatively low in the context of a sed rate of 89. Her thrombocythemia is also probably secondary to an elevated sed rate along with iron deficiency.  She makes her elevated B12 level of greater than 4000 somewhat less accurate.  And increased B12 level could raise suspicion of a myelodysplastic disorder however the patient does not have leukopenia and her MCV is not elevated.  In clinic today we discussed the potential that she could have multiple myeloma or another blood cancer.  I sent off protein evaluation which is returned with two very small monoclonal proteins (each 0.1 g/dL) seen in the gamma fraction.  She also had a slight elevation of her kappa free light chains at 2.10 with a normal lambda free light chain of 1.5 to Hiwot ratio that is normal at 1.38.  Overall she had a slightly low albumin and with an increased ESR this pattern suggests an acute phase reaction  is well.  Therefore based on this evaluation although the patient does have a monoclonal protein along with anemia and a slight elevation of her calcium level in the context of vitamin D deficiency I will not pursue a bone marrow in her. 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 Discussion in clinic regarding need for monitoring every 6-12 months to ensure this process does not evolve to multiple myeloma      Given her iron labs I do feel that she would benefit from intravenous iron given that she has been taking oral iron without substantial benefit.  Suspect she may not be absorbing iron due to use of PPI for GERD. She also has history of polyps on her colonoscopies in the past--- so agree with GI evaluation. Discussed with patient and family the risks and benefits of intravenous iron replacement.  Given the patient has an anticipated surgical procedure will proceed with giving her 2 doses of Injectafer 750 mg x2 which we will replete her iron deficit of about 1.4 g.    Overall I do not feel that the patient's anemia nor her thrombocythemia will make current increased risk in surgery above that of her other chronic medical conditions.  Anticipate that once patient receives her IV iron her anemia will improve as well her elevated platelet count.      Plan:  1. Majority of today's visit was spent counseling the patient regarding anemia and thrombocytopenia.  2.   Orders Placed This Encounter   Procedures     Beta 2 microglobulin     Immunoglobulins A G and M     Kappa and lambda light chain     Protein electrophoresis     CBC with platelets differential     Iron and iron binding capacity     Erythrocyte sedimentation rate auto     Blood Morphology Pathologist Review     Reticulocyte Count     Ferritin     Lactate Dehydrogenase     Free kappa and lambda light chains urine   3. Injectafer 750 mg IV x2 doses       The patient is given our center's contact information and is instructed to call if she should have any  further questions or concerns.  Otherwise, we will plan on seeing her back Follow up with Provider - 6-8 weeks after IV iron.      Flora Alberto MD/PhD   of Medicine  [facility]   ----------------------------------------------------------------------------------------------------------------------    History of Present Illness:  Staci Watt is a 83 year old woman presents in consultation due to a preoperative workup finding anemia and thrombocythemia.  Ms. Watt reports that she was anemic as a child.  She denies having a history of heavy periods.  She had 4 pregnancies.  States that after 1 of her pregnancy she did receive iron shots.  To the best of her knowledge she does not know she had high platelet counts.  She states that about 3 years ago she was put on a B12 supplement as well as an iron supplement by her primary care provider.  She has been taking these without having since substantial constipation.  The patient reports that she stopped having colonoscopies after she turned 81.  She does report a personal history of having polyps.  Presently she does not endorse having dark tarry stool.  She also does not report having bright red blood per rectum.  Overall her energy level has abound about the same.  She does endorse having some slight weight loss.  However she is working on regaining this weight.  Reports that her appetite has been fair.  She does not endorse having abdominal pain.  She does have right knee pain which is limiting her ability to walk.  She sustained a fall the other day but did not have any head trauma.  She denies having any gum bleeding.    Past Medical History:  Past Medical History:   Diagnosis Date     Allergic rhinitis, cause unspecified      CKD (chronic kidney disease) stage 3, GFR 30-59 ml/min (H)      Gout, unspecified      Hyperlipidemia LDL goal <100 7/9/2004     Hypertension goal BP (blood pressure) < 140/90 7/9/2004      Iron deficiency anemia      Moderate major depression (H) 10/19/2006     Moderate persistent asthma 12/4/2005     Obese 2/2/2012     Retinal detachment with retinal defect, unspecified      Unspecified cataract     s/p cataract surgery       Past Surgical History:  Past Surgical History:   Procedure Laterality Date     BUNIONECTOMY JASWANT BILATERAL       C NONSPECIFIC PROCEDURE      (B) detatched retina     C NONSPECIFIC PROCEDURE      LASIK surgery     C NONSPECIFIC PROCEDURE      NISSA/BSO     C NONSPECIFIC PROCEDURE      Hernia     C NONSPECIFIC PROCEDURE      Tonsillectomy     C NONSPECIFIC PROCEDURE      Arthroscopic knee surgery       Medications:  Current Outpatient Medications   Medication Sig Dispense Refill     acetaminophen (TYLENOL) 500 MG tablet Take 500-1,000 mg by mouth every 6 hours as needed Reported on 5/2/2017       albuterol (PROAIR HFA/PROVENTIL HFA/VENTOLIN HFA) 108 (90 Base) MCG/ACT inhaler Inhale 2 puffs into the lungs every 4 hours as needed for shortness of breath / dyspnea or wheezing       ASPIRIN CHILDRENS 81 MG OR CHEW 1 TABLET DAILY 0 11     atorvastatin (LIPITOR) 80 MG tablet Take 0.5 tablets (40 mg) by mouth daily For high cholesterol. 45 tablet 3     ferrous gluconate (FERGON) 324 (38 Fe) MG tablet Take 324 mg by mouth daily (with breakfast)       FLUoxetine (PROZAC) 20 MG capsule Take 1 capsule (20 mg) by mouth daily 90 capsule 1     fluticasone-salmeterol (ADVAIR DISKUS) 500-50 MCG/DOSE diskus inhaler Inhale 1 puff into the lungs 2 times daily 3 Inhaler 3     hydrochlorothiazide (HYDRODIURIL) 25 MG tablet Take 0.5 tablets (12.5 mg) by mouth daily 1 tablet 0     ibuprofen (ADVIL/MOTRIN) 800 MG tablet Take 1 tablet (800 mg) by mouth 2 times daily as needed for moderate pain 60 tablet 3     lisinopril (PRINIVIL/ZESTRIL) 40 MG tablet Take 1 tablet (40 mg) by mouth daily 90 tablet 3     multivitamin w/minerals (MULTI-VITAMIN) tablet Take 1 tablet by mouth daily 30 tablet 0     order  "for DME Equipment being ordered: 2 wheeled walker 1 Product 0     order for DME Equipment being ordered: Digital home blood pressure monitor kit 1 kit 0     ranitidine (ZANTAC) 150 MG tablet Take 1 tablet (150 mg) by mouth 2 times daily 60 tablet 1     vitamin B-12 (CYANOCOBALAMIN) 1000 MCG tablet Take 1,000 mcg by mouth daily       vitamin D3 (CHOLECALCIFEROL) 2000 units tablet Take 1 tablet by mouth daily          Allergies:  Allergies   Allergen Reactions     Contrast Dye Itching     Seasonal Allergies        ROS:  A 14 point ROS is negative except as stated in the HPI    Social History:  Denies any tobacco use. No significant alcohol use. Retired nurse.     Family History:  3 grand children  1 great grandchildren  3 living children  1 child  from complications from pneumonia and seizures  3 living siblings  2 brothers - one from lung cancer- both smoker    Objective:  Vitals: /70 (BP Location: Left arm, Patient Position: Sitting, Cuff Size: Adult Regular)   Pulse 102   Temp 98.9  F (37.2  C) (Oral)   Resp 16   Ht 1.575 m (5' 2.01\")   Wt 73.9 kg (163 lb)   SpO2 100%   BMI 29.81 kg/m       Exam:   Constitutional: Appears well, no distress  HEENT: Pupils equal and reactive to light. No scleral icterus or hemorrhage. Nares without evidence of telangiectasia. Mucous membranes moist with no wet purpura. Dentition overall ok with no signs of decay. No pharyngeal exudates. No lymphadenopathy, no thyromeagaly  CV: regular rate and rhythm, no murmurs  Respiratory: clear  GI: examined in wheelchair. abdomen soft, nontender, without guarding or rebound. Unable to discern HSM.  Mus/Skele: chronic edema bilaterally  Skin: no petechiae, no ecchymosis.  Neuro: CN II-XII intact. AOx3  Heme/Lymph: no supraclavicular, axillary adenopathy.     Labs: personally reviewed with relevant trends annotated below  Results for orders placed or performed in visit on 19   Beta 2 microglobulin   Result Value " Ref Range    Beta-2-Microglobulin 3.1 (H) <2.3 mg/L   Immunoglobulins A G and M   Result Value Ref Range    IGG 1,030 695 - 1,620 mg/dL     (H) 70 - 380 mg/dL    IGM 62 60 - 265 mg/dL   Kappa and lambda light chain   Result Value Ref Range    Kappa Free Lt Chain 2.10 (H) 0.33 - 1.94 mg/dL    Lambda Free Lt Chain 1.52 0.57 - 2.63 mg/dL    Kappa Lambda Ratio 1.38 0.26 - 1.65   Protein electrophoresis   Result Value Ref Range    Albumin Fraction 3.6 (L) 3.7 - 5.1 g/dL    Alpha 1 Fraction 0.6 (H) 0.2 - 0.4 g/dL    Alpha 2 Fraction 1.1 (H) 0.5 - 0.9 g/dL    Beta Fraction 1.1 (H) 0.6 - 1.0 g/dL    Gamma Fraction 1.1 0.7 - 1.6 g/dL    Monoclonal Peak 0.1 (H) 0.0 g/dL    ELP Interpretation:       Two very small monoclonal proteins (each 0.1 g/dL) seen in the gamma fraction, not   previously characterized in our laboratory. Recommend serum and urine immunofixation for   confirmation and further characterization if not previously performed elsewhere.   Hypoalbuminemia with increased alpha 1 and alpha 2 globulins, suggestive of acute phase   reaction. Slightly increased beta globulins. Pathologic significance requires clinical   correlation. Cha Shields M.D., Ph.D.      CBC with platelets differential   Result Value Ref Range    WBC 8.1 4.0 - 11.0 10e9/L    RBC Count 3.50 (L) 3.8 - 5.2 10e12/L    Hemoglobin 10.2 (L) 11.7 - 15.7 g/dL    Hematocrit 30.7 (L) 35.0 - 47.0 %    MCV 88 78 - 100 fl    MCH 29.1 26.5 - 33.0 pg    MCHC 33.2 31.5 - 36.5 g/dL    RDW 14.6 10.0 - 15.0 %    Platelet Count 477 (H) 150 - 450 10e9/L    Diff Method Automated Method     % Neutrophils 64.3 %    % Lymphocytes 29.2 %    % Monocytes 5.5 %    % Eosinophils 0.6 %    % Basophils 0.2 %    % Immature Granulocytes 0.2 %    Nucleated RBCs 0 0 /100    Absolute Neutrophil 5.2 1.6 - 8.3 10e9/L    Absolute Lymphocytes 2.4 0.8 - 5.3 10e9/L    Absolute Monocytes 0.4 0.0 - 1.3 10e9/L    Absolute Eosinophils 0.1 0.0 - 0.7 10e9/L    Absolute Basophils 0.0 0.0  - 0.2 10e9/L    Abs Immature Granulocytes 0.0 0 - 0.4 10e9/L    Absolute Nucleated RBC 0.0    Iron and iron binding capacity   Result Value Ref Range    Iron 15 (L) 35 - 180 ug/dL    Iron Binding Cap 261 240 - 430 ug/dL    Iron Saturation Index 6 (L) 15 - 46 %   Erythrocyte sedimentation rate auto   Result Value Ref Range    Sed Rate 89 (H) 0 - 30 mm/h   Reticulocyte Count   Result Value Ref Range    % Retic 1.4 0.5 - 2.0 %    Absolute Retic 48.6 25 - 95 10e9/L   Ferritin   Result Value Ref Range    Ferritin 274 (H) 8 - 252 ng/mL   Lactate Dehydrogenase   Result Value Ref Range    Lactate Dehydrogenase 149 81 - 234 U/L           Again, thank you for allowing me to participate in the care of your patient.      Sincerely,    Flora Alberto MD

## 2019-04-17 NOTE — PROGRESS NOTES
[facility] Hematology Consultation    Outpatient Visit Note:    Patient: Staci Watt  MRN: [id number]  : 1935  NESTOR: 2019    Reason for Consultation:  Staci Watt is a referred for evaluation and treatment of anemia and thrombocythemia    Assessment:  In summary, Staci Watt is a 83 year old woman with PMHx of CKD3, gout, MDD on Prozac, asthma who presents for anemia with thrombocythemia detected on krystin-operative evaluation.     1). Hypoproliferative normocytic anemia  2). Thrombocythemia  3). Hypercalcemia  4). 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 Watt most likely has had proliferative normocytic anemia in the setting of having iron deficiency.  She is a total iron of 15 with an iron saturation of 66%.  Her ferritin is elevated , but is relatively low in the context of a sed rate of 89. Her thrombocythemia is also probably secondary to an elevated sed rate along with iron deficiency.  She makes her elevated B12 level of greater than 4000 somewhat less accurate.  And increased B12 level could raise suspicion of a myelodysplastic disorder however the patient does not have leukopenia and her MCV is not elevated.  In clinic today we discussed the potential that she could have multiple myeloma or another blood cancer.  I sent off protein evaluation which is returned with two very small monoclonal proteins (each 0.1 g/dL) seen in the gamma fraction.  She also had a slight elevation of her kappa free light chains at 2.10 with a normal lambda free light chain of 1.5 to Hiwot ratio that is normal at 1.38.  Overall she had a slightly low albumin and with an increased ESR this pattern suggests an acute phase reaction is well.  Therefore based on this evaluation although the patient does have a monoclonal protein along with anemia and a slight elevation of her calcium level in the context of vitamin D deficiency I will not pursue a bone marrow in her. 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 Discussion in clinic regarding need for monitoring every 6-12 months to ensure this process does not evolve to multiple myeloma      Given her iron labs I do feel that she would benefit from intravenous iron given that she has been taking oral iron without substantial benefit.  Suspect she may not be absorbing iron due to use of PPI for GERD. She also has history of polyps on her colonoscopies in the past--- so agree with GI evaluation. Discussed with patient and family the risks and benefits of intravenous iron replacement.  Given the patient has an anticipated surgical procedure will proceed with giving her 2 doses of Injectafer 750 mg x2 which we will replete her iron deficit of about 1.4 g.    Overall I do not feel that the patient's anemia nor her thrombocythemia will make current increased risk in surgery above that of her other chronic medical conditions.  Anticipate that once patient receives her IV iron her anemia will improve as well her elevated platelet count.      Plan:  1. Majority of today's visit was spent counseling the patient regarding anemia and thrombocytopenia.  2.   Orders Placed This Encounter   Procedures     Beta 2 microglobulin     Immunoglobulins A G and M     Kappa and lambda light chain     Protein electrophoresis     CBC with platelets differential     Iron and iron binding capacity     Erythrocyte sedimentation rate auto     Blood Morphology Pathologist Review     Reticulocyte Count     Ferritin     Lactate Dehydrogenase     Free kappa and lambda light chains urine   3. Injectafer 750 mg IV x2 doses       The patient is given our center's contact information and is instructed to call if she should have any further questions or concerns.  Otherwise, we will plan on seeing her back Follow up with Provider - 6-8 weeks after IV iron.      Flora Alberto MD/PhD   of Medicine  [facility]    ----------------------------------------------------------------------------------------------------------------------    History of Present Illness:  Staci Watt is a 83 year old woman presents in consultation due to a preoperative workup finding anemia and thrombocythemia.  Ms. Watt reports that she was anemic as a child.  She denies having a history of heavy periods.  She had 4 pregnancies.  States that after 1 of her pregnancy she did receive iron shots.  To the best of her knowledge she does not know she had high platelet counts.  She states that about 3 years ago she was put on a B12 supplement as well as an iron supplement by her primary care provider.  She has been taking these without having since substantial constipation.  The patient reports that she stopped having colonoscopies after she turned 81.  She does report a personal history of having polyps.  Presently she does not endorse having dark tarry stool.  She also does not report having bright red blood per rectum.  Overall her energy level has abound about the same.  She does endorse having some slight weight loss.  However she is working on regaining this weight.  Reports that her appetite has been fair.  She does not endorse having abdominal pain.  She does have right knee pain which is limiting her ability to walk.  She sustained a fall the other day but did not have any head trauma.  She denies having any gum bleeding.    Past Medical History:  Past Medical History:   Diagnosis Date     Allergic rhinitis, cause unspecified      CKD (chronic kidney disease) stage 3, GFR 30-59 ml/min (H)      Gout, unspecified      Hyperlipidemia LDL goal <100 7/9/2004     Hypertension goal BP (blood pressure) < 140/90 7/9/2004     Iron deficiency anemia      Moderate major depression (H) 10/19/2006     Moderate persistent asthma 12/4/2005     Obese 2/2/2012     Retinal detachment with retinal defect, unspecified      Unspecified cataract     s/p cataract  surgery       Past Surgical History:  Past Surgical History:   Procedure Laterality Date     BUNIONECTOMY JASWANT BILATERAL       C NONSPECIFIC PROCEDURE      (B) detatched retina     C NONSPECIFIC PROCEDURE      LASIK surgery     C NONSPECIFIC PROCEDURE      NISSA/BSO     C NONSPECIFIC PROCEDURE      Hernia     C NONSPECIFIC PROCEDURE      Tonsillectomy     C NONSPECIFIC PROCEDURE      Arthroscopic knee surgery       Medications:  Current Outpatient Medications   Medication Sig Dispense Refill     acetaminophen (TYLENOL) 500 MG tablet Take 500-1,000 mg by mouth every 6 hours as needed Reported on 5/2/2017       albuterol (PROAIR HFA/PROVENTIL HFA/VENTOLIN HFA) 108 (90 Base) MCG/ACT inhaler Inhale 2 puffs into the lungs every 4 hours as needed for shortness of breath / dyspnea or wheezing       ASPIRIN CHILDRENS 81 MG OR CHEW 1 TABLET DAILY 0 11     atorvastatin (LIPITOR) 80 MG tablet Take 0.5 tablets (40 mg) by mouth daily For high cholesterol. 45 tablet 3     ferrous gluconate (FERGON) 324 (38 Fe) MG tablet Take 324 mg by mouth daily (with breakfast)       FLUoxetine (PROZAC) 20 MG capsule Take 1 capsule (20 mg) by mouth daily 90 capsule 1     fluticasone-salmeterol (ADVAIR DISKUS) 500-50 MCG/DOSE diskus inhaler Inhale 1 puff into the lungs 2 times daily 3 Inhaler 3     hydrochlorothiazide (HYDRODIURIL) 25 MG tablet Take 0.5 tablets (12.5 mg) by mouth daily 1 tablet 0     ibuprofen (ADVIL/MOTRIN) 800 MG tablet Take 1 tablet (800 mg) by mouth 2 times daily as needed for moderate pain 60 tablet 3     lisinopril (PRINIVIL/ZESTRIL) 40 MG tablet Take 1 tablet (40 mg) by mouth daily 90 tablet 3     multivitamin w/minerals (MULTI-VITAMIN) tablet Take 1 tablet by mouth daily 30 tablet 0     order for DME Equipment being ordered: 2 wheeled walker 1 Product 0     order for DME Equipment being ordered: Digital home blood pressure monitor kit 1 kit 0     ranitidine (ZANTAC) 150 MG tablet Take 1 tablet (150 mg) by mouth 2 times  "daily 60 tablet 1     vitamin B-12 (CYANOCOBALAMIN) 1000 MCG tablet Take 1,000 mcg by mouth daily       vitamin D3 (CHOLECALCIFEROL) 2000 units tablet Take 1 tablet by mouth daily          Allergies:  Allergies   Allergen Reactions     Contrast Dye Itching     Seasonal Allergies        ROS:  A 14 point ROS is negative except as stated in the HPI    Social History:  Denies any tobacco use. No significant alcohol use. Retired nurse.     Family History:  3 grand children  1 great grandchildren  3 living children  1 child  from complications from pneumonia and seizures  3 living siblings  2 brothers - one from lung cancer- both smoker    Objective:  Vitals: /70 (BP Location: Left arm, Patient Position: Sitting, Cuff Size: Adult Regular)   Pulse 102   Temp 98.9  F (37.2  C) (Oral)   Resp 16   Ht 1.575 m (5' 2.01\")   Wt 73.9 kg (163 lb)   SpO2 100%   BMI 29.81 kg/m      Exam:   Constitutional: Appears well, no distress  HEENT: Pupils equal and reactive to light. No scleral icterus or hemorrhage. Nares without evidence of telangiectasia. Mucous membranes moist with no wet purpura. Dentition overall ok with no signs of decay. No pharyngeal exudates. No lymphadenopathy, no thyromeagaly  CV: regular rate and rhythm, no murmurs  Respiratory: clear  GI: examined in wheelchair. abdomen soft, nontender, without guarding or rebound. Unable to discern HSM.  Mus/Skele: chronic edema bilaterally  Skin: no petechiae, no ecchymosis.  Neuro: CN II-XII intact. AOx3  Heme/Lymph: no supraclavicular, axillary adenopathy.     Labs: personally reviewed with relevant trends annotated below  Results for orders placed or performed in visit on 19   Beta 2 microglobulin   Result Value Ref Range    Beta-2-Microglobulin 3.1 (H) <2.3 mg/L   Immunoglobulins A G and M   Result Value Ref Range    IGG 1,030 695 - 1,620 mg/dL     (H) 70 - 380 mg/dL    IGM 62 60 - 265 mg/dL   Kappa and lambda light chain   Result " Value Ref Range    Kappa Free Lt Chain 2.10 (H) 0.33 - 1.94 mg/dL    Lambda Free Lt Chain 1.52 0.57 - 2.63 mg/dL    Kappa Lambda Ratio 1.38 0.26 - 1.65   Protein electrophoresis   Result Value Ref Range    Albumin Fraction 3.6 (L) 3.7 - 5.1 g/dL    Alpha 1 Fraction 0.6 (H) 0.2 - 0.4 g/dL    Alpha 2 Fraction 1.1 (H) 0.5 - 0.9 g/dL    Beta Fraction 1.1 (H) 0.6 - 1.0 g/dL    Gamma Fraction 1.1 0.7 - 1.6 g/dL    Monoclonal Peak 0.1 (H) 0.0 g/dL    ELP Interpretation:       Two very small monoclonal proteins (each 0.1 g/dL) seen in the gamma fraction, not   previously characterized in our laboratory. Recommend serum and urine immunofixation for   confirmation and further characterization if not previously performed elsewhere.   Hypoalbuminemia with increased alpha 1 and alpha 2 globulins, suggestive of acute phase   reaction. Slightly increased beta globulins. Pathologic significance requires clinical   correlation. Cha Shields M.D., Ph.D.      CBC with platelets differential   Result Value Ref Range    WBC 8.1 4.0 - 11.0 10e9/L    RBC Count 3.50 (L) 3.8 - 5.2 10e12/L    Hemoglobin 10.2 (L) 11.7 - 15.7 g/dL    Hematocrit 30.7 (L) 35.0 - 47.0 %    MCV 88 78 - 100 fl    MCH 29.1 26.5 - 33.0 pg    MCHC 33.2 31.5 - 36.5 g/dL    RDW 14.6 10.0 - 15.0 %    Platelet Count 477 (H) 150 - 450 10e9/L    Diff Method Automated Method     % Neutrophils 64.3 %    % Lymphocytes 29.2 %    % Monocytes 5.5 %    % Eosinophils 0.6 %    % Basophils 0.2 %    % Immature Granulocytes 0.2 %    Nucleated RBCs 0 0 /100    Absolute Neutrophil 5.2 1.6 - 8.3 10e9/L    Absolute Lymphocytes 2.4 0.8 - 5.3 10e9/L    Absolute Monocytes 0.4 0.0 - 1.3 10e9/L    Absolute Eosinophils 0.1 0.0 - 0.7 10e9/L    Absolute Basophils 0.0 0.0 - 0.2 10e9/L    Abs Immature Granulocytes 0.0 0 - 0.4 10e9/L    Absolute Nucleated RBC 0.0    Iron and iron binding capacity   Result Value Ref Range    Iron 15 (L) 35 - 180 ug/dL    Iron Binding Cap 261 240 - 430 ug/dL    Iron  Saturation Index 6 (L) 15 - 46 %   Erythrocyte sedimentation rate auto   Result Value Ref Range    Sed Rate 89 (H) 0 - 30 mm/h   Reticulocyte Count   Result Value Ref Range    % Retic 1.4 0.5 - 2.0 %    Absolute Retic 48.6 25 - 95 10e9/L   Ferritin   Result Value Ref Range    Ferritin 274 (H) 8 - 252 ng/mL   Lactate Dehydrogenase   Result Value Ref Range    Lactate Dehydrogenase 149 81 - 234 U/L

## 2019-04-19 NOTE — TELEPHONE ENCOUNTER
Staci is having a TKA in early May and was sent for an evaluation of her anemia. Dr. Flora Alberto saw her in clinic Wednesday, 4/17 (see note) and would like to know if there is a goal hemoglobin to reach prior to her surgery. Message left with reception for Dr. Sri Bernstein, who is covering for Staci's PCP.

## 2019-04-19 NOTE — TELEPHONE ENCOUNTER
Spoke with Staci regarding her labs she had drawn this week after her visit with Dr. Alberto. She is iron deficient and will require iron infusions (as planned) and her beta 2 microglobulin is slightly elevated; reviewed the significance with her son, Gerald, who seemed to understand that it could possibly decrease after her TKA.     Contacted her PCP clinic, [facility], who recommended she needs a hemoglobin of 10 or higher to proceed with surgery. They would also like to have a GI evaluation of her anemia done, unclear if this must be done prior to her TKA. Plan to re-check labs after her Injectafer infusions to re-assess.

## 2019-04-25 NOTE — PROGRESS NOTES
Infusion nursing note:    Staci Watt presents today to [facility] for a injectafer infusion.    Frequency: 1 of 2 weekly doses    Progress note:  ID verified by name and .  Assessment completed.  Vitals were stable throughout time in [facility].  verbal and printed education given to patient/representative regarding infusion and possible side effects.  Patient verbalized understanding.    Note: n/a    Infusion given over approximately  15 minutes followed by a 30 minute observation period.     Administrations This Visit     ferric carboxymaltose (INJECTAFER) 750 mg in sodium chloride 0.9 % 100 mL intermittent infusion     Admin Date  2019 Action  New Bag Dose  750 mg Rate  500 mL/hr Route  Intravenous Administered By  Gem Renner RN                /75   Pulse 101   Temp 98.4  F (36.9  C) (Oral)   Resp 16         Discharge plan:    Discharge instructions were reviewed with patient: Yes  Patient/representative verbalized understanding of discharge instructions and all questions answered: Yes.    Discharged from [facility] with family in stable condition.    Gem Renner

## 2019-04-25 NOTE — PATIENT INSTRUCTIONS
Patient Education     Ferric carboxymaltose injection  Brand Name: Injectafer  What is this medicine?  FERRIC CARBOXYMALTOSE (ferr-ik car-box-ee-mol-toes) is an iron complex. Iron is used to make healthy red blood cells, which carry oxygen and nutrients throughout the body. This medicine is used to treat anemia in people with chronic kidney disease or people who cannot take iron by mouth.  How should I use this medicine?  This medicine is for infusion into a vein. It is given by a health care professional in a hospital or clinic setting.  Talk to your pediatrician regarding the use of this medicine in children. Special care may be needed.  What side effects may I notice from receiving this medicine?  Side effects that you should report to your doctor or health care professional as soon as possible:    allergic reactions like skin rash, itching or hives, swelling of the face, lips, or tongue    dizziness    facial flushing  Side effects that usually do not require medical attention (report to your doctor or health care professional if they continue or are bothersome):    changes in taste    constipation    headache    nausea, vomiting    pain, redness, or irritation at site where injected  What may interact with this medicine?  Do not take this medicine with any of the following medications:    deferoxamine    dimercaprol    other iron products  What if I miss a dose?  It is important not to miss your dose. Call your doctor or health care professional if you are unable to keep an appointment.  Where should I keep my medicine?  This drug is given in a hospital or clinic and will not be stored at home.  What should I tell my health care provider before I take this medicine?  They need to know if you have any of these conditions:    high levels of iron in the blood    liver disease    an unusual or allergic reaction to iron, other medicines, foods, dyes, or preservatives    pregnant or trying to get  pregnant    breast-feeding  What should I watch for while using this medicine?  Visit your doctor or health care professional regularly. Tell your doctor if your symptoms do not start to get better or if they get worse. You may need blood work done while you are taking this medicine.  You may need to follow a special diet. Talk to your doctor. Foods that contain iron include: whole grains/cereals, dried fruits, beans, or peas, leafy green vegetables, and organ meats (liver, kidney).  NOTE:This sheet is a summary. It may not cover all possible information. If you have questions about this medicine, talk to your doctor, pharmacist, or health care provider. Copyright  2018 [employer]

## 2019-04-29 NOTE — LETTER
April 29, 2019      Staci Watt  [address] [facility] [address]        Dear ,    We are writing to inform you of your test results.    Hemoglobin continues to show mild anemia. Stable to prior  Anemia can cause fatigue and, occasionally, light-headedness.     -White blood cell and platelet counts are normal.     Resulted Orders   CBC with platelets differential   Result Value Ref Range    WBC 6.1 4.0 - 11.0 10e9/L    RBC Count 3.31 (L) 3.8 - 5.2 10e12/L    Hemoglobin 9.5 (L) 11.7 - 15.7 g/dL    Hematocrit 30.0 (L) 35.0 - 47.0 %    MCV 91 78 - 100 fl    MCH 28.7 26.5 - 33.0 pg    MCHC 31.7 31.5 - 36.5 g/dL    RDW 15.0 10.0 - 15.0 %    Platelet Count 486 (H) 150 - 450 10e9/L    % Neutrophils 63.3 %    % Lymphocytes 27.3 %    % Monocytes 7.1 %    % Eosinophils 1.6 %    % Basophils 0.7 %    Absolute Neutrophil 3.9 1.6 - 8.3 10e9/L    Absolute Lymphocytes 1.7 0.8 - 5.3 10e9/L    Absolute Monocytes 0.4 0.0 - 1.3 10e9/L    Absolute Eosinophils 0.1 0.0 - 0.7 10e9/L    Absolute Basophils 0.0 0.0 - 0.2 10e9/L    Diff Method Automated Method      If you have any questions or concerns, please call the clinic at the number listed above.   Sincerely,  Sri Bernstein MD/nr

## 2019-04-29 NOTE — TELEPHONE ENCOUNTER
[facility] Call Center    Phone Message    May a detailed message be left on voicemail: yes    Reason for Call: Other: Nurse Christine of [facility] stated that she understands this Pt has a surgery scheduled with Dr. Cabrales.  She would like to report that this Pts Hemoglobin and sodium levels have been off (low).  It has been closely monitered but they would like him to be aware of this for post op monitering as well.  Please call Christine back for further questions. Thank you.     Action Taken: Message routed to:  [facility]: ORTHO

## 2019-04-29 NOTE — TELEPHONE ENCOUNTER
Writer called [facility], spoke with Trevor, who will get message to Dr. Hi/care team regarding patient's hemoglobin and sodium results.    JUNG RenteriaN, RN

## 2019-04-29 NOTE — PATIENT INSTRUCTIONS
Blood pressure stable on lower dose of hydrochlorothiazide continue same  Weight still going down , see GI as planned  asthma action plan given  If labs today stable ok to go ahead with surgery  Labs and diagnostics today   If stable will clear for surgery otherwise may need additional work up   Take no meds am of surgery  Hold aspirin, antiinflammatories like motrin aleve etc, fish oil and glucosamine preferable 5 to 7 days prior to surgery  Will fax /send note to surgeon once completed   Iron infusion on 5/2  Surgery 5/7  continue follow up with hematology, ortho , GI  See Dr Mora in 3 months

## 2019-04-29 NOTE — LETTER
My Asthma Action Plan  Name: Staci Watt   YOB: 1935  Date: 4/29/2019   My doctor: Sri Bernstein MD   My clinic: [facility]        My Control Medicine: Fluticasone + salmeterol (Advair) -  Diskus 500/50 mcg 1puff twice a day   My Rescue Medicine: Albuterol (Proair/Ventolin/Proventil) inhaler 2puffs every 6 hrs as needed   My Asthma Severity: mild persistent  Avoid your asthma triggers: Patient is unaware of triggers  None            GREEN ZONE   Good Control    I feel good    No cough or wheeze    Can work, sleep and play without asthma symptoms       Take your asthma control medicine every day.     1. If exercise triggers your asthma, take your rescue medication    15 minutes before exercise or sports, and    During exercise if you have asthma symptoms  2. Spacer to use with inhaler: If you have a spacer, make sure to use it with your inhaler             YELLOW ZONE Getting Worse  I have ANY of these:    I do not feel good    Cough or wheeze    Chest feels tight    Wake up at night   1. Keep taking your Green Zone medications  2. Start taking your rescue medicine:    every 20 minutes for up to 1 hour. Then every 4 hours for 24-48 hours.  3. If you stay in the Yellow Zone for more than 12-24 hours, contact your doctor.  4. If you do not return to the Green Zone in 12-24 hours or you get worse, start taking your oral steroid medicine if prescribed by your provider.           RED ZONE Medical Alert - Get Help  I have ANY of these:    I feel awful    Medicine is not helping    Breathing getting harder    Trouble walking or talking    Nose opens wide to breathe       1. Take your rescue medicine NOW  2. If your provider has prescribed an oral steroid medicine, start taking it NOW  3. Call your doctor NOW  4. If you are still in the Red Zone after 20 minutes and you have not reached your doctor:    Take your rescue medicine again and    Call 911 or go to the emergency room right  away    See your regular doctor within 2 weeks of an Emergency Room or Urgent Care visit for follow-up treatment.          Annual Reminders:  Meet with Asthma Educator,  Flu Shot in the Fall, consider Pneumonia Vaccination for patients with asthma (aged 19 and older).    Pharmacy:    [facility] [place], [place] - [address] [facility] - [place], [place] - [address]  [facility] [address] [facility] [address] AT [facility] & [address] [place] [address]                      Asthma Triggers  How To Control Things That Make Your Asthma Worse    Triggers are things that make your asthma worse.  Look at the list below to help you find your triggers and what you can do about them.  You can help prevent asthma flare-ups by staying away from your triggers.      Trigger                                                          What you can do   Cigarette Smoke  Tobacco smoke can make asthma worse. Do not allow smoking in your home, car or around you.  Be sure no one smokes at a child s day care or school.  If you smoke, ask your health care provider for ways to help you quit.  Ask family members to quit too.  Ask your health care provider for a referral to Quit Plan to help you quit smoking, or call 8-146-189-PLAN.     Colds, Flu, Bronchitis  These are common triggers of asthma. Wash your hands often.  Don t touch your eyes, nose or mouth.  Get a flu shot every year.     Dust Mites  These are tiny bugs that live in cloth or carpet. They are too small to see. Wash sheets and blankets in hot water every week.   Encase pillows and mattress in dust mite proof covers.  Avoid having carpet if you can. If you have carpet, vacuum weekly.   Use a dust mask and HEPA vacuum.   Pollen and Outdoor Mold  Some people are allergic to trees, grass, or weed pollen, or molds. Try to keep your windows closed.  Limit time out doors when pollen  count is high.   Ask you health care provider about taking medicine during allergy season.     Animal Dander  Some people are allergic to skin flakes, urine or saliva from pets with fur or feathers. Keep pets with fur or feathers out of your home.    If you can t keep the pet outdoors, then keep the pet out of your bedroom.  Keep the bedroom door closed.  Keep pets off cloth furniture and away from stuffed toys.     Mice, Rats, and Cockroaches  Some people are allergic to the waste from these pests.   Cover food and garbage.  Clean up spills and food crumbs.  Store grease in the refrigerator.   Keep food out of the bedroom.   Indoor Mold  This can be a trigger if your home has high moisture. Fix leaking faucets, pipes, or other sources of water.   Clean moldy surfaces.  Dehumidify basement if it is damp and smelly.   Smoke, Strong Odors, and Sprays  These can reduce air quality. Stay away from strong odors and sprays, such as perfume, powder, hair spray, paints, smoke incense, paint, cleaning products, candles and new carpet.   Exercise or Sports  Some people with asthma have this trigger. Be active!  Ask your doctor about taking medicine before sports or exercise to prevent symptoms.    Warm up for 5-10 minutes before and after sports or exercise.     Other Triggers of Asthma  Cold air:  Cover your nose and mouth with a scarf.  Sometimes laughing or crying can be a trigger.  Some medicines and food can trigger asthma.

## 2019-04-29 NOTE — TELEPHONE ENCOUNTER
Gave pt this message.    Pt sees GI on 5/1/19.  Her knee surgery is scheduled for 5/7/19.   Her surgeon is Dr. Diego Hi at [facility].    Should we be calling Ortho to -Advise close monitoring of hb & sodium post op ?    SEEMA Caraballo

## 2019-04-29 NOTE — RESULT ENCOUNTER NOTE
Results within acceptable limits.  -Microalbumin (urine protein) test is normal.  ADVISE: rechecking this annually.

## 2019-04-29 NOTE — RESULT ENCOUNTER NOTE
-Hemoglobin continues to show mild anemia. Stable to prior  Anemia can cause fatigue and, occasionally, light-headedness.    -White blood cell and platelet counts are normal.

## 2019-04-29 NOTE — PROGRESS NOTES
SUBJECTIVE:   Staci Watt is a 83 year old female who presents to clinic today for the following health issues:    Hypertension Follow-up    Outpatient blood pressures are not being checked.    Low Salt Diet: not monitoring salt    Amount of exercise or physical activity: as much as she can     Problems taking medications regularly: No    Medication side effects: none    Diet: regular (no restrictions)    Pt is here for follow up from last OV.BP, weight loss and anemia. Here with her oldest son who lives with her.    Hx of obesity, HTN on asa, hydrochlorothiazide & lisinopril, HLD on Lipitor, CKD3, microalbuminuria, allergic rhinitis, moderate persistent asthma on albuterol and Advair, MDD on Prozac, ganglion cyst, gout , OA, rotator cuff syndrome, B/L knee pain prior left knee surgery, tramadol and ibuprofen and kenalog shots prn, hx of electrolyte abnormalities, prior noted anemia, prior retinal detachment S/p surgery, cataract surgery, prior FLASK, NISSA BSO, hernia repair, tonsillectomy, B/L bunionectomy,on B 12 & vit d. Allergic to contrast. She was admitted 1/3 to 1/5 for fatigue & found to have low sodium, low potassium & low magnesium, GERD 7 constipation. Thought secondary to poor intake & hydrochlorothiazide. Lytes were replaced, lisinopril increased to 40 mg & hydrochlorothiazide discontinued & given Protonix for 2 weeks. Seen 1/9/19 for hospital follow up & hydrochlorothiazide continued to be held. On 1/28 kidney function improved & hydrochlorothiazide resumed as BP was running high off it. Seen by PCP 3/4/19 for weight loss, tramadol stopped & resumed ibuprofen prn. Seen 2/29 for knee pain & referred to sports. Seen by sports 4/10 & then by preop assessment as above.MRSA screen was negative & BMP showed sodium was 131, hb was 9.7 with platelets 584, ferritin 307, folate 39 & Vit B 12 was more than adequate. Hb 11 to 12 in past, recently in jan was 9.3. PAC saw her 4/10/19 for risk assessment and  optimization for anesthesia with comorbid conditions of Hypertension, hyperlipidemia, CRI, and gout: assessed she does not exert herself and uses walker at home.   intermediate risk surgery with 0.4% risk of major adverse cardiac event. Airway feasible.  MILDRED risk: intermediate 3/8 risk factors. VTE risk: 0.5%. hypertension treated with hydrochlorothiazide and lisinopril.  Advised to hold on day f surgery & hold ASA 7 days prior to surgery.  Denied CP, SOB, JACKSON, palpitations.  Uses walker at home and reported very little activity. Had well controlled moderate persistent asthma on Advair inhaler daily and albuterol prn. No recent exacerbations. Denied cough or wheezing. Noted stable chronic renal insufficiency  & Creatinine was 0.82 (3/4/2019). Preop labs showed sodium at 133 and hemoglobin at 9.7.  & was advised to see her PCP, Dr. Wolf and patient was contacted to follow up with Dr. Wolf to correct these values before her surgery on 5/7/2019. Hb  Low. Reports No bleed. On tylenol a while. Now on ibuprofen for pain. No black or blood in stools. On iron pills ferrous gluconate, & vit d. Has hx of GERD. No longer on Protonix. Used Tums occasionally. Felt weak, Lost appetite a while ago. Noted weight loss, used to be 170's in late dec, then noted 188 on 1/3/19 , 170 something 1/4/19 then 163 lbs on 4/12.     Seen 4/12 first time by self to address her low hemoglobin and her low sodium.  Exam was unremarkable.  Seemed euvolemic.  Advised to decrease hydrochlorothiazide to 1/2 pill of 25 mg daily. Drink 3.5 bottles of water a day instead of 4.  Sodium was low but better than before magnesium was normal LFTs were normal calcium was mildly elevated glucose was normal TSH was normal hemoglobin globin looked better stable.  Advised Zantac due to history of NSAID use and referred to heme and GI for further evaluation.  Advised repeat labs in 1 week on decreased dose hydrochlorothiazide.  Recommended if sodium still low  may have to remove her off the hydrochlorothiazide and try something different for BP if it made up on lisinopril alone.  Fit test ordered but never done.  On 4/15 vitamin D and BMP were normal including sodium & calcium, parathyroid was normal as was ionized calcium.  Hemoglobin was stable at 10.2.  Reticulocyte was normal.  Peripheral morphology showed mild normochromic normocytic anemia normal leukocyte count with slight increase in large granulated lymphocytes and mild to moderate thrombocytosis.  And sodium was back to normal, her blood pressure remained stable.  Patient did report she had a fall the day she saw me on her way home but had no adverse effect from it.  Seen by hematology Dr. Monsivais on 4/17 thought most likely has proliferative normocytic anemia in the setting of having iron deficiency.  She had a total iron of 15 with an iron saturation of 66%.  Her ferritin was elevated, but was relatively low in the context of a sed rate of 89. Her thrombocythemia is also probably secondary to an elevated sed rate along with iron deficiency which made her elevated B12 level of greater than 4000 somewhat less accurate.  An increased B12 level could raise suspicion of a myelodysplastic disorder however the patient did not have leukopenia and her MCV was not elevated. Protein evaluation which returned with two very small monoclonal proteins (each 0.1 G/dL) seen in the gamma fraction.  She also had a slight elevation of her kappa free light chains at 2.10 with a normal lambda free light chain of 1.5 to Hiwot ratio that was normal at 1.38.  Overall she had a slightly low albumin and with an increased ESR this pattern suggested an acute phase reaction as well.  Hematology felt a bone marrow was not currently indicated but suspected Ms. Watt had MGUS & recommended need for monitoring every 6-12 months to ensure this process did not evolve to multiple myeloma. Hematology did think she would benefit from intravenous  iron given that she had been taking oral iron without substantial benefit.  Suspected she was not absorbing iron due to prior use of PPI for GERD. She also had history of polyps on her colonoscopies in the past--- so agreed with GI evaluation. Given the patient had an anticipated surgical procedure  Hematology advised giving her 2 doses of Injectafer 750 mg x2 which would replete her iron deficit of about 1.4 G.    She had final iron infusion on 25 April and has another on the second.  Reports she was doing great until last night when she could not sleep, and had to take a Tylenol.  And feels tired and fatigued today.  Has had no recurrent falls.  Appetite is improved but her weight is still down a few pounds more.  They went to see GI but were told that they did not have an appointment. Is Agreeable to having it rescheduled.  Blood pressure is holding up on decreased dose of hydrochlorothiazide and is agreeable to labs to recheck sodium and hemoglobin on lower fluid intake.  Asthma is well controlled and asthma action plan given.  Surgery is planned for 7 May next week. No fever or chills, no headache or dizziness, no double or blurry vision, no facial pain, earache, sore throat, chronic runny nose, post nasal drip, no trouble hearing, smelling, tasting or swallowing,  mild cough after am use of inhaler, no chest pain, trouble breathing or palpitations, No abdominal pain, heart burn, reflux, nausea or vomiting or diarrhea or constipation, no blood in stools or black stools, no weight loss or night sweats. No dysuria, hematuria, frequency, urgency, hesitancy, incontinence, No pelvic complaints. No leg swelling.  No rash.  AMARJIT-7   [employer], 2002; Used with Permission) 4/29/2019   1. Feeling nervous, anxious, or on edge Several days   2. Not being able to stop or control worrying Nearly every day   3. Worrying too much about different things Nearly every day   4. Trouble relaxing Not at all   5. Being so restless  that it is hard to sit still Not at all   6. Becoming easily annoyed or irritable Several days   7. Feeling afraid, as if something awful might happen More than half the days   AMARJIT 7 TOTAL SCORE 10 (moderate anxiety)   1. Feeling nervous, anxious, or on edge 1   2. Not being able to stop or control worrying 3   3. Worrying too much about different things 3   4. Trouble relaxing 0   5. Being so restless that it is hard to sit still 0   6. Becoming easily annoyed or irritable 1   7. Feeling afraid, as if something awful might happen 2   AMARJIT-7 Total Score 10   PHQ-9 ([employer]) 4/29/2019   1.  Little interest or pleasure in doing things 0   2.  Feeling down, depressed, or hopeless 0   3.  Trouble falling or staying asleep, or sleeping too much 0   4.  Feeling tired or having little energy 3   5.  Poor appetite or overeating 1   6.  Feeling bad about yourself 0   7.  Trouble concentrating 0   8.  Moving slowly or restless 0   9.  Suicidal or self-harm thoughts 0   PHQ-9 Total Score 4   Difficulty at work, home, or with people    1.  Little interest or pleasure in doing things Not at all   2.  Feeling down, depressed, or hopeless Not at all   3.  Trouble falling or staying asleep, or sleeping too much Not at all   4.  Feeling tired or having little energy Nearly every day   5.  Poor appetite or overeating Several days   6.  Feeling bad about yourself Not at all   7.  Trouble concentrating Not at all   8.  Moving slowly or restless Not at all   9.  Suicidal or self-harm thoughts Not at all   PHQ[facility]9 [facility] TOTAL SCORE-----> 4 (Minimal depression)   Difficulty at work, home, or with people Not difficult at all     Additional history: as documented    Reviewed  and updated as needed this visit by clinical staff  Tobacco  Allergies  Med Hx  Surg Hx  Fam Hx  Soc Hx        Reviewed and updated as needed this visit by Provider         Patient Active Problem List   Diagnosis     Allergic rhinitis     Moderate Persistent  Asthma     Gout     Hyperlipidemia LDL goal <100     CKD (chronic kidney disease) stage 3, GFR 30-59 ml/min (H)     Obese     Microalbuminuria     Ganglion cyst     HTN, goal below 150/90     Major depressive disorder, recurrent episode, moderate (H)     Primary osteoarthritis of both knees     Anemia, unspecified type     Rotator cuff syndrome, left     Bilateral knee pain     Gastroesophageal reflux disease without esophagitis     NSAID long-term use     Past Surgical History:   Procedure Laterality Date     BUNIONECTOMY JASWANT BILATERAL       C NONSPECIFIC PROCEDURE      (B) detatched retina     C NONSPECIFIC PROCEDURE      LASIK surgery     C NONSPECIFIC PROCEDURE      NISSA/BSO     C NONSPECIFIC PROCEDURE      Hernia     C NONSPECIFIC PROCEDURE      Tonsillectomy     C NONSPECIFIC PROCEDURE      Arthroscopic knee surgery       Social History     Tobacco Use     Smoking status: Never Smoker     Smokeless tobacco: Never Used     Tobacco comment: Once in awhile when goes to [employer].   Substance Use Topics     Alcohol use: Yes     Comment: has a drink every night before she goes to bed     Family History   Problem Relation Age of Onset     Hypertension Mother      Respiratory Mother      Breast Cancer Maternal Aunt      Respiratory Maternal Aunt      Respiratory Maternal Aunt          Current Outpatient Medications   Medication Sig Dispense Refill     acetaminophen (TYLENOL) 500 MG tablet Take 500-1,000 mg by mouth every 6 hours as needed Reported on 5/2/2017       albuterol (PROAIR HFA/PROVENTIL HFA/VENTOLIN HFA) 108 (90 Base) MCG/ACT inhaler Inhale 2 puffs into the lungs every 4 hours as needed for shortness of breath / dyspnea or wheezing       ASPIRIN CHILDRENS 81 MG OR CHEW 1 TABLET DAILY 0 11     atorvastatin (LIPITOR) 80 MG tablet Take 0.5 tablets (40 mg) by mouth daily For high cholesterol. 45 tablet 3     ferrous gluconate (FERGON) 324 (38 Fe) MG tablet Take 324 mg by mouth daily (with breakfast)        FLUoxetine (PROZAC) 20 MG capsule Take 1 capsule (20 mg) by mouth daily 90 capsule 1     fluticasone-salmeterol (ADVAIR DISKUS) 500-50 MCG/DOSE diskus inhaler Inhale 1 puff into the lungs 2 times daily 3 Inhaler 3     hydrochlorothiazide (HYDRODIURIL) 25 MG tablet Take 0.5 tablets (12.5 mg) by mouth daily 1 tablet 0     ibuprofen (ADVIL/MOTRIN) 800 MG tablet Take 1 tablet (800 mg) by mouth 2 times daily as needed for moderate pain 60 tablet 3     lisinopril (PRINIVIL/ZESTRIL) 40 MG tablet Take 1 tablet (40 mg) by mouth daily 90 tablet 3     multivitamin w/minerals (MULTI-VITAMIN) tablet Take 1 tablet by mouth daily 30 tablet 0     order for DME Equipment being ordered: 2 wheeled walker 1 Product 0     order for DME Equipment being ordered: Digital home blood pressure monitor kit 1 kit 0     ranitidine (ZANTAC) 150 MG tablet Take 1 tablet (150 mg) by mouth 2 times daily 60 tablet 1     vitamin B-12 (CYANOCOBALAMIN) 1000 MCG tablet Take 1,000 mcg by mouth daily       vitamin D3 (CHOLECALCIFEROL) 2000 units tablet Take 1 tablet by mouth daily       Allergies   Allergen Reactions     Contrast Dye Itching     Seasonal Allergies      Recent Labs   Lab Test 04/15/19  1021 04/12/19  1138  01/04/19  0555  01/03/19  1007 12/24/18  0953  01/02/18  1411  05/02/17  1533  03/07/11  1402   A1C  --   --   --   --   --   --   --   --   --   --   --   --  5.7   LDL  --   --   --   --   --   --  76  --  72  --  72   < > 148*   HDL  --   --   --   --   --   --  77  --  101  --  117   < > 61   TRIG  --   --   --   --   --   --  78  --  96  --  79   < > 87   ALT  --  20  --   --   --  9 14   < >  --   --  17   < > 16   CR 0.66 0.84   < > 0.70   < > 0.71 1.29*   < > 0.97   < > 0.91   < > 1.01   GFRESTIMATED 82 64   < > 80   < > 79 38*   < > 55*   < > 59*   < > 54*   GFRESTBLACK >90 74   < > >90   < > >90 45*   < > 66   < > 72   < > 65   POTASSIUM 3.7 3.6   < > 4.0   < > 2.5* 3.7   < > 3.5   < > 4.0   < > 4.3   TSH  --  1.52  --  1.53  --    --   --    < >  --   --   --    < > 0.94    < > = values in this interval not displayed.      BP Readings from Last 3 Encounters:   04/29/19 122/67   04/25/19 130/73   04/17/19 140/70    Wt Readings from Last 3 Encounters:   04/29/19 72.3 kg (159 lb 8 oz)   04/17/19 73.9 kg (163 lb)   04/10/19 73.9 kg (163 lb)                  Labs reviewed in EPIC    ROS:  Constitutional, HEENT, cardiovascular, pulmonary, GI, , musculoskeletal, neuro, skin, endocrine and psych systems are negative, except as otherwise noted.    OBJECTIVE:     /67 (BP Location: Left arm, Patient Position: Chair, Cuff Size: Adult Regular)   Pulse 106   Temp 98.6  F (37  C) (Oral)   Resp 16   Wt 72.3 kg (159 lb 8 oz)   SpO2 96%   BMI 29.17 kg/m    Body mass index is 29.17 kg/m .  GENERAL: alert, no distress and obese  EYES: Eyes grossly normal to inspection, PERRL and conjunctivae and sclerae normal  NECK: no adenopathy, no asymmetry, masses, or scars and thyroid normal to palpation  RESP: lungs clear to auscultation - no rales, rhonchi or wheezes  CV: regular rate and rhythm, normal S1 S2, no S3 or S4, no murmur, click or rub, no peripheral edema and peripheral pulses strong  ABDOMEN: soft, non tender  MS: B/l Knee arthritic changes  SKIN: no suspicious lesions or rashes  NEURO: Normal strength and tone, mentation intact and speech normal  PSYCH: mentation appears normal, affect normal/bright    Diagnostic Test Results:  Results for orders placed or performed in visit on 04/29/19 (from the past 24 hour(s))   CBC with platelets differential   Result Value Ref Range    WBC 6.1 4.0 - 11.0 10e9/L    RBC Count 3.31 (L) 3.8 - 5.2 10e12/L    Hemoglobin 9.5 (L) 11.7 - 15.7 g/dL    Hematocrit 30.0 (L) 35.0 - 47.0 %    MCV 91 78 - 100 fl    MCH 28.7 26.5 - 33.0 pg    MCHC 31.7 31.5 - 36.5 g/dL    RDW 15.0 10.0 - 15.0 %    Platelet Count 486 (H) 150 - 450 10e9/L    % Neutrophils 63.3 %    % Lymphocytes 27.3 %    % Monocytes 7.1 %    % Eosinophils  1.6 %    % Basophils 0.7 %    Absolute Neutrophil 3.9 1.6 - 8.3 10e9/L    Absolute Lymphocytes 1.7 0.8 - 5.3 10e9/L    Absolute Monocytes 0.4 0.0 - 1.3 10e9/L    Absolute Eosinophils 0.1 0.0 - 0.7 10e9/L    Absolute Basophils 0.0 0.0 - 0.2 10e9/L    Diff Method Automated Method    Albumin Random Urine Quantitative with Creat Ratio   Result Value Ref Range    Creatinine Urine 142 mg/dL    Albumin Urine mg/L 9 mg/L    Albumin Urine mg/g Cr 6.19 0 - 25 mg/g Cr       ASSESSMENT/PLAN:     1. Anemia, unspecified type  Work-up been seen by hematology assessed to have iron deficiency anemia possible MGUS and need to rule out GI source given prior history of polyps.  Hemoglobin today slightly lower than the 15 but stable to prior in the past 6 months.  Has not done the fit test yet of seen GI and that will be rescheduled.  No evidence of active GI bleed.  Did receive iron infusion on the 25 th and has another one coming up in a couple days.  We will see hematology again in June.  Okay to go ahead with her procedure given that this hemoglobin level given its stability but need to complete GI work-up and monitor hemoglobin and sodium closely perioperatively.  Blood pressure stable on lower dose of hydrochlorothiazide continue same.  Sodium returns low we will stop the hydrochlorothiazide as blood pressure is stable despite the dose decreased.  He is on lisinopril anyway.  Appetite is picked up but weight still going down, to see GI as planned. Asthma action plan given. Advised to take no meds am of surgery. Hold aspirin, antiinflammatories like motrin aleve etc, fish oil and glucosamine preferable 5 to 7 days prior to surgery. Will fax /send note to surgeon once completed. Keep Iron infusion on 5/2, Surgery 5/7. Continue follow up with hematology, ortho, GI. See Dr Mora PCP in 3 months & follow up with us in as needed in between.   - CBC with platelets differential    2. Hyponatremia  Improved with fluid restriction and  drop in hydrochlorothiazide dose.  We will recheck today and if still low will discontinue the hydrochlorothiazide as blood pressure stable on lisinopril.  - Basic metabolic panel    3. Weight loss  Appetite improved but continues to lose weight.  We will see what GI says will probably need scopes.  - CBC with platelets differential    4. Hypercalcemia  Mild hypercalcemia improved with recheck parathyroid normal.  Continue to monitor.    5. MGUS (monoclonal gammopathy of unknown significance)  Suspected MGUS per hematology work-up follow-up with hematology every 6 months to make sure he is not developing into multiple myeloma.    6. HTN, goal below 150/90  Pressure stable on decreased dose of hydrochlorothiazide & prior dosing of lisinopril.  - Albumin Random Urine Quantitative with Creat Ratio    7. Gastroesophageal reflux disease without esophagitis  GERD asymptomatic currently on Zantac advised limited use of NSAID's given risk of possible GI related bleed.  Not on PPI.  - CBC with platelets differential    8. NSAID long-term use  Using ibuprofen sparingly with food.  May account for anemia and iron loss  - CBC with platelets differential    9. CKD (chronic kidney disease) stage 3, GFR 30-59 ml/min (H)  Follow-up BMP today.    10. Moderate persistent asthma without complication  Stable on Advair and albuterol as needed.  - Asthma Action Plan (AAP)    11. Primary osteoarthritis of both knees  Right knee worse than the left is considering a cortisone shot in the left but advised to wait until her surgery is done as it may decrease healing.  Right TKA Surgery planned for  May 7 and hopefully if lab work all stable can go ahead with it.    See Patient Instructions    Sri Bernstein MD  [facility]

## 2019-04-29 NOTE — LETTER
April 30, 2019      Staci Watt  [address] [facility] [address]        Dear ,    We are writing to inform you of your test results.    Results within acceptable limits.  -Kidney function is normal (Cr, GFR), Sodium is normal, Potassium is normal, Calcium is normal, Glucose is normal.  Continue with current dosing of hydrochlorothiazide and fluid restriction as discussed.     Resulted Orders   CBC with platelets differential   Result Value Ref Range    WBC 6.1 4.0 - 11.0 10e9/L    RBC Count 3.31 (L) 3.8 - 5.2 10e12/L    Hemoglobin 9.5 (L) 11.7 - 15.7 g/dL    Hematocrit 30.0 (L) 35.0 - 47.0 %    MCV 91 78 - 100 fl    MCH 28.7 26.5 - 33.0 pg    MCHC 31.7 31.5 - 36.5 g/dL    RDW 15.0 10.0 - 15.0 %    Platelet Count 486 (H) 150 - 450 10e9/L    % Neutrophils 63.3 %    % Lymphocytes 27.3 %    % Monocytes 7.1 %    % Eosinophils 1.6 %    % Basophils 0.7 %    Absolute Neutrophil 3.9 1.6 - 8.3 10e9/L    Absolute Lymphocytes 1.7 0.8 - 5.3 10e9/L    Absolute Monocytes 0.4 0.0 - 1.3 10e9/L    Absolute Eosinophils 0.1 0.0 - 0.7 10e9/L    Absolute Basophils 0.0 0.0 - 0.2 10e9/L    Diff Method Automated Method    Albumin Random Urine Quantitative with Creat Ratio   Result Value Ref Range    Creatinine Urine 142 mg/dL    Albumin Urine mg/L 9 mg/L    Albumin Urine mg/g Cr 6.19 0 - 25 mg/g Cr   Basic metabolic panel   Result Value Ref Range    Sodium 137 133 - 144 mmol/L    Potassium 4.1 3.4 - 5.3 mmol/L    Chloride 105 94 - 109 mmol/L    Carbon Dioxide 23 20 - 32 mmol/L    Anion Gap 9 3 - 14 mmol/L    Glucose 81 70 - 99 mg/dL    Urea Nitrogen 12 7 - 30 mg/dL    Creatinine 0.76 0.52 - 1.04 mg/dL    GFR Estimate 73 >60 mL/min/[1.73_m2]      Comment:      Non  GFR Calc  Starting 12/18/2018, serum creatinine based estimated GFR (eGFR) will be   calculated using the Chronic Kidney Disease Epidemiology Collaboration   (CKD-EPI) equation.      GFR Estimate If Black 84 >60 mL/min/[1.73_m2]       Comment:       GFR Calc  Starting 12/18/2018, serum creatinine based estimated GFR (eGFR) will be   calculated using the Chronic Kidney Disease Epidemiology Collaboration   (CKD-EPI) equation.      Calcium 9.8 8.5 - 10.1 mg/dL       If you have any questions or concerns, please call the clinic at the number listed above.       Sincerely,        Sri Bernstein MD/nr

## 2019-04-29 NOTE — TELEPHONE ENCOUNTER
Hb today 9.5 stable to prior and last 6 month ranged between 9.3 to 10.5  No sign of acute bleeding  Seen by hematology, had one iron infusion, to get second one this week and to see GI to evaluate low iron too  Ok to go ahead with knee surgery in terms of hb  Will see what sodium comes back , not back yet  Advise close monitoring of hb & sodium post op   rest of plan as discussed in clinic today

## 2019-04-29 NOTE — LETTER
April 30, 2019      Staci Watt  [address] [facility] [address]        Dear ,    We are writing to inform you of your test results.    Results within acceptable limits.  -Microalbumin (urine protein) test is normal.  ADVISE: rechecking this annually.     Resulted Orders   CBC with platelets differential   Result Value Ref Range    WBC 6.1 4.0 - 11.0 10e9/L    RBC Count 3.31 (L) 3.8 - 5.2 10e12/L    Hemoglobin 9.5 (L) 11.7 - 15.7 g/dL    Hematocrit 30.0 (L) 35.0 - 47.0 %    MCV 91 78 - 100 fl    MCH 28.7 26.5 - 33.0 pg    MCHC 31.7 31.5 - 36.5 g/dL    RDW 15.0 10.0 - 15.0 %    Platelet Count 486 (H) 150 - 450 10e9/L    % Neutrophils 63.3 %    % Lymphocytes 27.3 %    % Monocytes 7.1 %    % Eosinophils 1.6 %    % Basophils 0.7 %    Absolute Neutrophil 3.9 1.6 - 8.3 10e9/L    Absolute Lymphocytes 1.7 0.8 - 5.3 10e9/L    Absolute Monocytes 0.4 0.0 - 1.3 10e9/L    Absolute Eosinophils 0.1 0.0 - 0.7 10e9/L    Absolute Basophils 0.0 0.0 - 0.2 10e9/L    Diff Method Automated Method    Albumin Random Urine Quantitative with Creat Ratio   Result Value Ref Range    Creatinine Urine 142 mg/dL    Albumin Urine mg/L 9 mg/L    Albumin Urine mg/g Cr 6.19 0 - 25 mg/g Cr       If you have any questions or concerns, please call the clinic at the number listed above.       Sincerely,        Sri Bernstein MD/nr

## 2019-04-30 NOTE — RESULT ENCOUNTER NOTE
Results within acceptable limits.  -Kidney function is normal (Cr, GFR), Sodium is normal, Potassium is normal, Calcium is normal, Glucose is normal.  Continue with current dosing of hydrochlorothiazide and fluid restriction as discussed.

## 2019-05-02 NOTE — PROGRESS NOTES
Nursing Note  Staci Watt presents today to [facility] for:   Chief Complaint   Patient presents with     Infusion     injectafer     During today's [facility] appointment, orders from Dr Flora Alberto were completed.  Frequency: today is dose #2 of 2  She states she had no difficulty with her first dose.     Progress note:  Patient identification verified by name and date of birth.  Assessment completed.  Vitals recorded in Doc Flowsheets.  Patient was provided with education regarding infusion and possible side effects.  Patient verbalized understanding.      needed: No  Premedications: were not ordered.  Infusion Rates: 750mg given over approximately 15min.  Approximate Infusion length:15 minutes.   Labs: were not ordered for this appointment.  Vascular access: peripheral IV placed today.  Treatment Conditions: non-applicable.  Patient tolerated infusion: well. , including the 30minute post infusion monitoring    Drug Waste Record? No     Discharge Plan:   Follow up plan of care with: primary medical doctor.  Discharge instructions were reviewed with patient.  Patient/representative verbalized understanding of discharge instructions and all questions answered.  Patient discharged from [facility] in stable condition.    Madeleine Em RN    Administrations This Visit     ferric carboxymaltose (INJECTAFER) 750 mg in sodium chloride 0.9 % 100 mL intermittent infusion     Admin Date  05/02/2019 Action  New Bag Dose  750 mg Rate  500 mL/hr Route  Intravenous Administered By  Madeleine Em RN                /61 (BP Location: Left arm, Patient Position: Sitting, Cuff Size: Adult Regular)   Pulse 96   Temp 97.9  F (36.6  C) (Oral)   Resp 16   SpO2 100%

## 2019-05-07 PROBLEM — Z98.890 STATUS POST KNEE SURGERY: Status: ACTIVE | Noted: 2019-01-01

## 2019-05-07 NOTE — OR NURSING
PACU to Inpatient Nursing Handoff    Patient Staci Watt is a 83 year old female who speaks English.   Procedure Procedure(s):  Right Total Knee Arthroplasty   Surgeon(s) Primary: Diego Hi MD  Assisting: Michael Cruz PA-C  Fellow - Assisting: Hans Mandujano MD     Allergies   Allergen Reactions     Contrast Dye Itching     Seasonal Allergies        Isolation  None    Past Medical History   has a past medical history of Allergic rhinitis, cause unspecified, CKD (chronic kidney disease) stage 3, GFR 30-59 ml/min (H), Gout, unspecified, Hyperlipidemia LDL goal <100 (7/9/2004), Hypertension goal BP (blood pressure) < 140/90 (7/9/2004), Iron deficiency anemia, Moderate major depression (H) (10/19/2006), Moderate persistent asthma (12/4/2005), OA (osteoarthritis) of knee (2/2/2012), Obese (2/2/2012), Retinal detachment with retinal defect, unspecified, and Unspecified cataract.    Anesthesia Spinal   Dermatome Level Dermatomes Left: L2  Dermatomes Right: L2   Preop Meds acetaminophen (Tylenol) - time given: 1154  celecoxib (Celebrex) - time given: 1154  gabapentin (Neurontin) - time given: 1154   Nerve block Not applicable   Intraop Meds fentanyl (Sublimaze): 75 mcg total   Local Meds Yes - Local Cocktail (morphine, ropivacaine, epinephrine, Toradol)   Antibiotics cefazolin (Ancef) - last given at 1400     Pain Patient Currently in Pain: denies  Comfort: tolerable with discomfort  Pain Control: partially effective   PACU meds  ketorolac (Toradol): 15 mg last given at 1645   PCA / epidural No   Capnography     Telemetry ECG Rhythm: Sinus rhythm   Inpatient Telemetry Monitor Ordered? No        Labs Glucose Lab Results   Component Value Date    GLC 96 05/07/2019       Hgb Lab Results   Component Value Date    HGB 9.5 04/29/2019       INR Lab Results   Component Value Date    INR 0.99 06/02/2010      PACU Imaging Completed     Wound/Incision Incision/Surgical Site 05/07/19 Right Knee (Active)    Incision Assessment UTV 5/7/2019  4:00 PM   Closure GRISELDA 5/7/2019  4:00 PM   Incision Drainage Amount None 5/7/2019  4:00 PM   Dressing Intervention Clean, dry, intact 5/7/2019  4:00 PM   Number of days: 0      CMS        Equipment ice pack   Other LDA       IV Access Peripheral IV 05/07/19 Left Hand (Active)   Site Assessment WDL 5/7/2019  4:00 PM   Line Status Infusing 5/7/2019  4:00 PM   Phlebitis Scale 0-->no symptoms 5/7/2019  4:00 PM   Number of days: 0      Blood Products Not applicable  mL   Intake/Output Date 05/07/19 0700 - 05/08/19 0659   Shift 7739-3402 3415-1807 6080-7277 24 Hour Total   INTAKE   P.O.  120  120   I.V.  700  700   Shift Total(mL/kg)  820(11.52)  820(11.52)   OUTPUT   Blood  200  200   Shift Total(mL/kg)  200(2.81)  200(2.81)   Weight (kg) 71.2 71.2 71.2 71.2      Drains / Felder Closed/Suction Drain 1 Right;Lateral Knee Accordion 10 Belarusian (Active)   Site Description UTV 5/7/2019  4:00 PM   Dressing Status Normal: Clean, Dry & Intact 5/7/2019  4:00 PM   Drainage Appearance Bloody/Bright Red 5/7/2019  4:00 PM   Status To bulb suction 5/7/2019  4:00 PM   Number of days: 0      Time of void PreOp Void Prior to Procedure: 0800 (05/07/19 1203)    PostOp      Diapered? No   Bladder Scan Bladder Scan Volume (mL): 84 ml (05/07/19 1635)    mL (05/07/19 1635)  tolerating sips and crackers     Vitals    B/P: 125/54  T: 98.1  F (36.7  C)    Temp src: Axillary  P:  Pulse: 107 (05/07/19 1630)    Heart Rate: 108 (05/07/19 1630)     R: 15  O2:  SpO2: 99 %    O2 Device: None (Room air) (05/07/19 1615)    Oxygen Delivery: 4 LPM (05/07/19 1600)         Family/support present 2 sons and 1 daughter   Patient belongings     Patient transported on bed   DC meds/scripts (obs/outpt) Not applicable   Inpatient Pain Meds Released? Yes       Special needs/considerations None   Tasks needing completion None       Chelsea Chakraborty, RN   [facility]  [facility] [id number]

## 2019-05-07 NOTE — LETTER
Transition Communication Hand-off for Care Transitions to Next Level of Care Provider    Name: Staci Watt  : 1935  MRN #: [id number]  Primary Care Provider: Destin Wolf     Primary Clinic: [address]  [facility] [address]     Reason for Hospitalization:  Osteoarthritis  Status post knee surgery  Admit Date/Time: 2019 10:01 AM  Discharge Date: 5/10/2019  Payor Source: Payor: MEDICA / Plan: [employer] / Product Type: HMO /          Reason for Communication Hand-off Referral: Avoidable readmission within 30 days    Discharge Plan: Home with home care.    [facility]  Phone  [phone]  Fax  [phone]    Discharge Needs Assessment:  Needs      Most Recent Value   Equipment Currently Used at Home  walker, rolling        Follow-up specialty is recommended: Yes    Follow-up plan:    Future Appointments   Date Time Provider Department Center   2019  1:30 PM Stefanie Bhatia Pt, PT [facility] [place]   5/10/2019 10:30 AM Stefanie Bhatia Pt, PT [facility] [place]   5/10/2019  2:00 PM Stefanie Bhatia Pt, PT [facility] [place]   2019 12:00 PM Diego Sears PA-C [facility]   2019 11:30 AM Diego Hi MD [facility]   2019  5:30 PM  [facility]   2019  6:00 PM Flora Alberto MD [facility]       Any outstanding tests or procedures:        Referrals     Future Labs/Procedures    Home care nursing referral     Comments:    [facility]  Phone  [phone]  Fax  [phone]     RN skilled nursing visit.   RN to assess vital signs and weight, respiratory and cardiac status, pain level and activity tolerance, hydration, nutrition and bowel status   RN to complete home safety evaluation.  RN to provide lab draws as needed and communicate results to PCP      Physical Therapy to evaluate and treat    Your provider has ordered home care nursing services. If you have not been contacted within 2 days of your  discharge please call the inpatient department phone number at [phone] .            Kate Aguilera RN, BSN  Care Coordinator, 8A  Phone [phone]  Pager [phone]    AVS/Discharge Summary is the source of truth; this is a helpful guide for improved communication of patient story

## 2019-05-07 NOTE — ANESTHESIA PREPROCEDURE EVALUATION
Anesthesia Pre-Procedure Evaluation    Patient: Staci Watt   MRN:     [id number] Gender:   female   Age:    83 year old :      1935        Preoperative Diagnosis: Osteoarthritis   Procedure(s):  Right Total Knee Arthroplasty     Past Medical History:   Diagnosis Date     Allergic rhinitis, cause unspecified      CKD (chronic kidney disease) stage 3, GFR 30-59 ml/min (H)      Gout, unspecified      Hyperlipidemia LDL goal <100 2004     Hypertension goal BP (blood pressure) < 140/90 2004     Iron deficiency anemia      Moderate major depression (H) 10/19/2006     Moderate persistent asthma 2005     OA (osteoarthritis) of knee 2012     Obese 2012     Retinal detachment with retinal defect, unspecified      Unspecified cataract     s/p cataract surgery      Past Surgical History:   Procedure Laterality Date     BUNIONECTOMY JASWANT BILATERAL       C NONSPECIFIC PROCEDURE      (B) detatched retina     C NONSPECIFIC PROCEDURE      LASIK surgery     C NONSPECIFIC PROCEDURE      NISSA/BSO     C NONSPECIFIC PROCEDURE      Hernia     C NONSPECIFIC PROCEDURE      Tonsillectomy     C NONSPECIFIC PROCEDURE      Arthroscopic knee surgery                   PHYSICAL EXAM:   Mental Status/Neuro: A/A/O; Age Appropriate   Airway: Facies: Challenging  Mallampati: IV  Mouth/Opening: Limited  TM distance: < 6 cm  Neck ROM: Immobile   Respiratory: Auscultation: CTAB     Resp. Rate: Normal     Resp. Effort: Normal      CV: Rhythm: Regular  Rate: Age appropriate  Heart: Normal Sounds   Comments:                    Lab Results   Component Value Date    WBC 6.1 2019    HGB 9.5 (L) 2019    HCT 30.0 (L) 2019     (H) 2019    CRP 56.9 (H) 2019    SED 89 (H) 2019     2019    POTASSIUM 3.4 2019    CHLORIDE 105 2019    CO2 23 2019    BUN 12 2019    CR 0.76 2019    GLC 96 2019    ELIDA 9.8 2019    PHOS 2.9 2019    MAG  "1.6 04/12/2019    ALBUMIN 3.5 04/12/2019    PROTTOTAL 8.4 04/12/2019    ALT 20 04/12/2019    AST 17 04/12/2019    ALKPHOS 76 04/12/2019    BILITOTAL 0.3 04/12/2019    LIPASE 60 06/02/2010    PTT 29 06/02/2010    INR 0.99 06/02/2010    TSH 1.52 04/12/2019       Preop Vitals  BP Readings from Last 3 Encounters:   05/07/19 141/68   05/02/19 134/61   04/29/19 122/67    Pulse Readings from Last 3 Encounters:   05/07/19 117   05/02/19 96   04/29/19 106      Resp Readings from Last 3 Encounters:   05/07/19 16   05/02/19 16   04/29/19 16    SpO2 Readings from Last 3 Encounters:   05/07/19 100%   05/02/19 100%   04/29/19 96%      Temp Readings from Last 1 Encounters:   05/07/19 36.6  C (97.9  F) (Oral)    Ht Readings from Last 1 Encounters:   05/07/19 1.575 m (5' 2.01\")      Wt Readings from Last 1 Encounters:   05/07/19 71.2 kg (156 lb 15.5 oz)    Estimated body mass index is 28.7 kg/m  as calculated from the following:    Height as of this encounter: 1.575 m (5' 2.01\").    Weight as of this encounter: 71.2 kg (156 lb 15.5 oz).     LDA:  Peripheral IV 05/07/19 Left Hand (Active)   Number of days: 0            Assessment:   ASA SCORE: 3       Documentation: H&P complete; Preop Testing complete; Consents complete   Proceeding: Proceed without further delay  Tobacco Use:  NO Active use of Tobacco/UNKNOWN Tobacco use status     Plan:   Anes. Type:  General   Pre-Induction: Midazolam IV; Acetaminophen PO   Induction:  IV (Standard)   Airway: Oral ETT   Access/Monitoring: PIV   Maintenance: Balanced   Emergence: Procedure Site   Logistics: Same Day Surgery     Postop Pain/Sedation Strategy:  Standard-Options: Opioids PRN     PONV Management:  Adult Risk Factors: Female, Non-Smoker, Postop Opioids     CONSENT: Direct conversation   Plan and risks discussed with: Patient                            Praveen Mccullough DO  "

## 2019-05-07 NOTE — ANESTHESIA POSTPROCEDURE EVALUATION
Anesthesia POST Procedure Evaluation    Patient: Staci Watt   MRN:     [id number] Gender:   female   Age:    83 year old :      1935        Preoperative Diagnosis: Osteoarthritis   Procedure(s):  Right Total Knee Arthroplasty   Postop Comments: No value filed.       Anesthesia Type:  Regional  No value filed.    Reportable Event: NO     PAIN: Uncomplicated   Sign Out status: Comfortable, Well controlled pain     PONV: No PONV   Sign Out status:  No Nausea or Vomiting     Neuro/Psych: Uneventful perioperative course   Sign Out Status: Preoperative baseline; Age appropriate mentation     Airway/Resp.: Uneventful perioperative course   Sign Out Status: Non labored breathing, age appropriate RR; Resp. Status within EXPECTED Parameters     CV: Uneventful perioperative course   Sign Out status: Appropriate BP and perfusion indices; Appropriate HR/Rhythm     Disposition:   Sign Out in:  PACU  Disposition:  Floor  Recovery Course: Uneventful  Follow-Up: Not required           Last Anesthesia Record Vitals:  CRNA VITALS  2019 1522 - 2019 1622      2019             Pulse:  109    SpO2:  100 %          Last PACU Vitals:  Vitals Value Taken Time   /58 2019  4:45 PM   Temp 36.7  C (98.1  F) 2019  4:00 PM   Pulse 104 2019  4:45 PM   Resp 16 2019  4:58 PM   SpO2 98 % 2019  4:58 PM   Temp src     NIBP     Pulse     SpO2     Resp     Temp     Ht Rate     Temp 2     Vitals shown include unvalidated device data.      Electronically Signed By: Kentrell Meek MD, May 7, 2019, 5:00 PM

## 2019-05-07 NOTE — ANESTHESIA CARE TRANSFER NOTE
Patient: Staci aWtt    Procedure(s):  Right Total Knee Arthroplasty    Diagnosis: Osteoarthritis  Diagnosis Additional Information: No value filed.    Anesthesia Type:   No value filed.     Note:  Airway :Face Mask  Patient transferred to:PACU  Handoff Report: Identifed the Patient, Identified the Reponsible Provider, Reviewed the pertinent medical history, Discussed the surgical course, Reviewed Intra-OP anesthesia mangement and issues during anesthesia, Set expectations for post-procedure period and Allowed opportunity for questions and acknowledgement of understanding      Vitals: (Last set prior to Anesthesia Care Transfer)    CRNA VITALS  5/7/2019 1522 - 5/7/2019 1622      5/7/2019             Pulse:  109    SpO2:  100 %                Electronically Signed By: JAIRON Rodriguez CRNA  May 7, 2019  5:13 PM

## 2019-05-07 NOTE — ANESTHESIA PROCEDURE NOTES
Spinal/LP Procedure Note    Spinal Block  Staff:     Anesthesiologist:  Pravene Mccullough DO  Location: OR  Procedure Start/Stop Times:      5/7/2019 1:48 PM     5/7/2019 1:57 PM    patient identified, IV checked, site marked, risks and benefits discussed, informed consent, monitors and equipment checked, pre-op evaluation, at physician/surgeon's request and post-op pain management      Correct Patient: Yes      Correct Position: Yes      Correct Site: Yes      Correct Procedure: Yes      Correct Laterality:  Yes    Site Marked:  Yes  Procedure:     Procedure:  Intrathecal    ASA:  3    Position:  Sitting    Sterile Prep: Betadine      Insertion site:  L4-5    Approach:  Midline    Needle Type:  Suzan    Needle gauge (G):  25    Local Skin Infiltration:  1% lidocaine    Needle Length (in):  3.5    Introducer used: Yes      Introducer gauge:  20 G    Attempts:  1    Redirects:  0    CSF:  Clear    Paresthesias:  No    Time injected:  13:57

## 2019-05-07 NOTE — OP NOTE
PREOPERATIVE DIAGNOSIS: Degenerative arthritis, right knee.  POSTOPERATIVE DIAGNOSIS: Same as pre-op.  PROCEDURE: right Total Knee Arthroplasty  DATE OF SURGERY: 5/7/2019   ASSISTANTS: Hans Mandujano MD; BERNARD Nicole    COMPONENTS USED:  1. Julien Persona Size 6 PS Femoral Component  2. Julien Persona Size D Tibial Component  3. Julien Size 32 Patellar Button    4. Julien Persona PS Polyethylene Liner, Size 12    ANESTHESIA: Spinal  COMPLICATIONS: None  EBL: 200cc    FINDINGS: Preop ROM under anesthesia: 19 to 95.  Cartilage loss, sclerotic bone, and osteophyte formation of the medial, lateral, and PF compartment.  Femoral, tibial, and patellar components cemented into place without complication.  Following placement of the polyethylene liner the knee appeared to be in neutral alignment, have full extension, flexion to 120, good patellar tracking, and stable to to anterior and posterior stress in flexion and varus/valgus stress in extension, 30 degrees, and 90 degrees.  The wound was closed without tension.      INDICATIONS: Staci Watt is a 83 year old female with longstanding history of right knee pain.  The patient was recently seen in clinic and found to have degenerative arthritis of the knee.  The patient was followed and noted to have failed conservative treatment options. Radiographs and preoperative clinical symptoms are consistent with degenerative arthritis as the cause of the patient's pain.   We discussed the risks, benefits, and alternatives to total knee arthroplasty with the patient.  Please see clinic note for full details of the preoperative discussion.  Following that the discussion, the patient elected to proceed with total knee arthroplasty.      DESCRIPTION OF PROCEDURE: We met the patient in the preoperative area.  There we again reviewed the risks, benefits, and alternatives to total knee arthroplasty.  Informed written consent was obtained.  The operative right knee was marked with an  indelible marker after patient confirmation of the operative site.   Preoperative exam was notable for:   RLE:  Skin clean and dry on the surgical leg  EHL/fhl/gs/at intact  [place]: dp/sp/t/s/s  2+ dp pulse     All the patient's questions were answered.  The patient was taken to the operating room and underwent induction of  Spinal anesthesia.  The patient was placed on the operating table in the supine position.  Bony prominences were well padded and the patient was secured to the table in the standard fashion.  An SCD was placed on the nonoperative leg.   A tourniquet was placed on the operative thigh and the patient was prepped and draped in the normal sterile fashion.       A timeout was performed in which the patient's name, MRN, imaging, preoperative plan and laterality was reviewed.  We also confirmed that the patient received the appropriate preoperative IV antibiotics and 1 gm of TXA.  The tourniquet was inflated with the use of an esmarch.         A standard midline incision was made. Dissection was carried down to the knee retinaculum and the quadriceps tendon. A standard medial parapatellar arthrotomy was performed. Subperiosteal dissection was carried out along the medial proximal tibia. The fat pad was removed.  Care was taken to protect the patellar tendon and extensor mechanism during fat pad removal. The tissue along the anterior aspect of the distal femur was also removed.  The patella was subluxed and the knee was flexed.     The ACL and PCL were released.  A drill was advanced down the femoral canal in a retrograde direction. The sword was set at 5 degrees of valgus in accordance with the preoperative plan and was advanced into the canal.  The distal femoral cutting block was then placed and pinned. The loren was removed and the distal femur cuts were made medially and laterally.  The cutting block was removed and the loren with the alignment piece was inserted into the canal to ensure proper angle of  the cut and a flat cut.      The posterior referencing femoral sizing block was used and the femur was measured to be a size 6.  Two holes were drilled to obtain 5 degrees of external rotation with respect to the posterior condylar axis of the femur.  The femoral cutting block was then slid over the pins and fixed onto the bone.  The anterior cut, posterior cut, anterior and posterior chamfer cuts were made.  The block was then removed and excess bone fragments were removed.     Attention was then directed towards the proximal tibia. The meniscus and soft tissue was removed to expose the medial and lateral tibial plateau. Retractors were placed around the knee to obtain good visualization, then the extramedullary tibial alignment guide was placed in the appropriate position. The 2-10 guide was used to select the resection level of the tibia and the cutting guide was pinned into position. With care directed towards preserving the posterior nerves and blood vessels and the medial collateral ligament, the saw was used to cut the proximal tibia.     Lamina spreaders were used to visualize the posterior knee. A curved osteotome was used to remove posterior osteophytes. Remnants of meniscus were also removed. Our anesthetic injection was then introduced through the posterior capsule with extreme care directed towards not injuring the posterior neurovascular structures.  The posterolateral capsular tissue was avoided.  The sizing blocks were used to measure and confirm appropriate sizing of the flexion and extension gaps.    The proximal tibia was again exposed and sized. The tibia was measured to be a size D.   The tibial trial was placed into proper rotation and pinned into place.  The drop loren was used to confirm proper alignment of the cut.    The femoral trial was placed and the box cutting guide was used and the notch cut was performed. The excess bone was removed.  The lug holes were drilled.  The trial liner was  placed.  The knee was slightly tigher laterally.  The trial liner was removed and a selective pie crusting of the LCL and IT band was performed.  This greatly improved the balance.  The trial liner was placed.  The knee was found to have excellent range of motion from full extension to 120 of flexion and was stable to anterior and posterior stress in flexion and extension as well as varus/valgus stress in 0, 30, and 90 degrees of flexion.     The patella was then exposed.  Soft tissues were removed around the patella for visualization. The patella thickness was measured with a caliper to be 22, and a measured resection of 9 mm was made.  A caliper was then used to measure the residual thickness of the patella to be 13.  The patella was measured with the lollipops and found to be a size 32. The drill guide was placed and the three lug holes were drilled. The patella trial was then placed and the knee was ranged. The patella was found to track well.      The patellar and femoral trials were removed and the final tibial preparation was done. The stove pipe was used to guide the drill, and then the wing punch was used. The tibial trial was then removed.     A bone plug was placed into the femur and then pulsitile lavage was used to clean the bone in preparation for cementing. The bone was dried. Cement was mixed, and then all the components were cemented into place. While the cement was hardening, the remainder of the anesthetic injection was spread around the deep retinacular layer and the subcutaneous layer with a spinal needle.   The knee was irrigated with betadine lavage for approximately 3 minutes.  Once the cement had hardened, the excess cement was removed.  The tourniquet was released.  A second gram of TXA was given.  Total tourniquet time was 64 minutes.  Hemostasis was obtained.      Various trial liners were then used. The 12 mm liner fit best and the knee had full extension to 120 degrees and was stable to  anterior and posterior stress in flexion and extension as well as varus/valgus stress in 0, 30, and 90 degrees of flexion.  The trial liner was removed and the real liner was placed.      The knee was again copiously irrigated.  A deep drain was used.  Closure was performed with a #1 symmetric stratafix in the retinacular layer, 2-0 stratafix spiral in the  subcutaneous tissues, and monocryl in the skin.  A sterile dressing was applied.  The patient was then awakened, transferred to the [facility], and brought to the recovery room in stable condition. There were no complications.    POSTOPERATIVE PLAN:  Weight bearing: WBAT  Anticoagulation: ASA  Precautions: None  Pain control and monitoring  ID: Routine perioperative antibiotics

## 2019-05-08 NOTE — PROGRESS NOTES
Patient was seen, case reviewed with nursing staff and orthopedics.  Internal medicine consult from last evening was reviewed.    Patient reports feeling well, states pain control has been adequate.  She denies cough, chest pain, shortness of breath, abdominal pain.    Afebrile  Blood pressure systolic 100s to 120s  Heart rate 90s  Room air saturations upper 90s    Alert, fully oriented, in good spirits  Lungs clear  CV RRR  Abdomen soft, nontender, nondistended  No lower extremity edema.      Results for MAE GALLOWAY (MRN [id number]) as of 5/8/2019 12:27   Ref. Range 5/8/2019 06:42   Sodium Latest Ref Range: 133 - 144 mmol/L 134   Potassium Latest Ref Range: 3.4 - 5.3 mmol/L 3.7   Chloride Latest Ref Range: 94 - 109 mmol/L 103   Carbon Dioxide Latest Ref Range: 20 - 32 mmol/L 26   Urea Nitrogen Latest Ref Range: 7 - 30 mg/dL 15   Creatinine Latest Ref Range: 0.52 - 1.04 mg/dL 0.94   GFR Estimate Latest Ref Range: >60 mL/min/1.73_m2 56 (L)   GFR Estimate If Black Latest Ref Range: >60 mL/min/1.73_m2 65   Calcium Latest Ref Range: 8.5 - 10.1 mg/dL 7.9 (L)   Anion Gap Latest Ref Range: 3 - 14 mmol/L 5   Glucose Latest Ref Range: 70 - 99 mg/dL 101 (H)   Hemoglobin Latest Ref Range: 11.7 - 15.7 g/dL 8.4 (L)       Assessment    S/p R TKA, Pt is doing well    HTN, stable off lisinopril and hydrochlorothiazide    Acute on chronic anemia (recent IV Fe transfusion)    Asthma, stable on PTA inhalers    History of hyponatremia secondary to diuretic therapy,     Plan  IV Fe  Repeat Hgb in am  Monitor BMP, BP closely  Continue current inhaler regimen  Short course of Toradol ok, with close attention to BMP  Continue to hold hydrochlorothiazide and lisinopril  ASA for DVT proph

## 2019-05-08 NOTE — PLAN OF CARE
VS: VSS.   O2: RA   Output: Voiding without difficulty   Last BM: 5/7/19   Activity: WBAT. Up with 1 assist, uses walker.    Skin: Intact ex incision   Pain: Denies    CMS: Intact   Dressing: CDI   Diet: Regular, tolerated dinner well   LDA: PIV in R hand infusing. Hemovac   Equipment: IV pole, capno, PCD foot pumps on, walker, gait belt   Plan: TBD   Additional Info:

## 2019-05-08 NOTE — PLAN OF CARE
VS: VSS. Tachycardic. Denies chest pain/SOB   O2: >90% on RA   Output: Voided x1 this shift, no pain or difficulty. Last PVR at 2145 was 76. Likes to wear mesh underwear with pad, can experience urgency at night   Last BM: 5/7/19, prior to surgery per pt report. Passing gas.   Activity: WBAT. Up with 1 assist, uses walker.    Up for meals? No   Skin: Intact ex incision   Pain: Denies significant pain, taking tylenol, using ice packs   CMS: Intact, no numbness or tingling    Dressing: CDI   Diet: Regular, tolerated dinner well   LDA: PIV in R hand infusing. Hemovac   Equipment: IV pole, capno, PCD foot pumps on, walker, gait belt   Plan: TBD   Additional Info:

## 2019-05-08 NOTE — PLAN OF CARE
VS: VSS. Baseline tachycardic. Denies chest pain/SOB. Capno finished.    O2: >90% on RA   Output: Voiding spontaneously w/o pain or difficulty. At times can experience urgency.   Last BM: 5/7/19   Activity: WBAT, up with 1 assist, uses walker   Up for meals? No   Skin: Incision    Pain: Pain in R knee, radiating down side of leg, managing with oxycodone, tylenol and ice packs   CMS: Intact, denies numbness and tingling. Slight tremor in upper extremities, pt stated this is her baseline   Dressing: CDI   Diet: Regular   LDA: PIV in L hand saline locked. Hemovac removed today.   Equipment: IV pole, foot pump PCDs, walker   Plan: TBD, discharge to home   Additional Info:

## 2019-05-08 NOTE — PROGRESS NOTES
05/08/19 1543   Quick Adds   Type of Visit Initial Occupational Therapy Evaluation   Living Environment   Lives With child(lupe), adult   Living Arrangements house   Home Accessibility stairs to enter home   Number of Stairs, Main Entrance 3   Stair Railings, Main Entrance railing on left side (ascending)   Transportation Anticipated family or friend will provide   Living Environment Comment Pt reports planning on going to daughters house at first   Self-Care   Usual Activity Tolerance fair   Current Activity Tolerance poor   Regular Exercise No   Equipment Currently Used at Home walker, rolling   Activity/Exercise/Self-Care Comment Pt reports independence with ADL   Functional Level   Ambulation 1-->assistive equipment   Transferring 1-->assistive equipment   Toileting 1-->assistive equipment   Bathing 1-->assistive equipment   Dressing 0-->independent   Eating 0-->independent   Communication 0-->understands/communicates without difficulty   Swallowing 0-->swallows foods/liquids without difficulty   Cognition 0 - no cognition issues reported   Fall history within last six months yes   Number of times patient has fallen within last six months 1   Which of the above functional risks had a recent onset or change? ambulation;transferring   Prior Functional Level Comment Pt reports independence with ADL, needing some help with socks at times.  Pt has raised toilet seated and tub trnasfer bench at home.  Pt also has access to reacher and sock aide if needed   General Information   Onset of Illness/Injury or Date of Surgery - Date 05/07/19   Referring Physician Hans Mandujano   Patient/Family Goals Statement To get home   Additional Occupational Profile Info/Pertinent History of Current Problem 83 year old female with PMH of obesity, HTN, HLD , CKD3, microalbuminuria, allergic rhinitis, moderate persistent asthma, MDD, ganglion cyst, gout, OA, rotator cuff syndrome  is s/p: R TKA by Dr. Hi on 5/7.    Precautions/Limitations no known precautions/limitations   Weight-Bearing Status - RLE weight-bearing as tolerated   Cognitive Status Examination   Orientation orientation to person, place and time   Level of Consciousness alert   Follows Commands (Cognition) WNL   Memory intact   Attention No deficits were identified   Organization/Problem Solving No deficits were identified   Executive Function No deficits were identified   Visual Perception   Visual Perception No deficits were identified   Sensory Examination   Sensory Quick Adds No deficits were identified   Pain Assessment   Patient Currently in Pain Yes, see Vital Sign flowsheet   Integumentary/Edema   Integumentary/Edema no deficits were identifed   Range of Motion (ROM)   ROM Quick Adds No deficits were identified   ROM Comment B UE WFL   Strength   Manual Muscle Testing Quick Adds No deficits were identified   Strength Comments B UE WFL   Coordination   Upper Extremity Coordination No deficits were identified   Transfer Skill: Sit to Stand   Level of [place]: Sit/Stand stand-by assist   Lower Body Dressing   Level of [place]: Dress Lower Body stand-by assist   Grooming   Level of [place]: Grooming independent   Instrumental Activities of Daily Living (IADL)   IADL Comments Son is able to assist with IADL   Activities of Daily Living Analysis   Impairments Contributing to Impaired Activities of Daily Living pain;strength decreased   General Therapy Interventions   Planned Therapy Interventions ADL retraining;ROM;strengthening;home program guidelines;transfer training   Clinical Impression   Criteria for Skilled Therapeutic Interventions Met yes, treatment indicated   OT Diagnosis decreased ADL independence   Influenced by the following impairments strength, ROM decreases   Assessment of Occupational Performance 3-5 Performance Deficits   Identified Performance Deficits dressing, bathing, toileting, standing ADL   Clinical Decision Making  "(Complexity) Low complexity   Therapy Frequency daily   Predicted Duration of Therapy Intervention (days/wks) 1 day   Anticipated Equipment Needs at Discharge reacher;sock aide;raised toilet seat;tub bench   Anticipated Discharge Disposition Home with Assist   Risks and Benefits of Treatment have been explained. Yes   Patient, Family & other staff in agreement with plan of care Yes   Clinical Impression Comments Pt presents with decreased strength post knee surgery.  Pt would benefit from a single OT session to progress engagement in ADL and increase independence with ADL.   [facility] [employer] TM \"6 Clicks\"   2016, Trustees of [facility], under license to [employer].  All rights reserved.   6 Clicks Short Forms Daily Activity Inpatient Short Form   [facility] [employer]  \"6 Clicks\" Daily Activity Inpatient Short Form   1. Putting on and taking off regular lower body clothing? 3 - A Little   2. Bathing (including washing, rinsing, drying)? 4 - None   3. Toileting, which includes using toilet, bedpan or urinal? 4 - None   4. Putting on and taking off regular upper body clothing? 4 - None   5. Taking care of personal grooming such as brushing teeth? 4 - None   6. Eating meals? 4 - None   Daily Activity Raw Score (Score out of 24.Lower scores equate to lower levels of function) 23   Total Evaluation Time   Total Evaluation Time (Minutes) 5     "

## 2019-05-08 NOTE — PLAN OF CARE
Discharge Planner OT   Patient plan for discharge: Home  Current status: Pt able to complete LE dressing with SBA with education on use of reacher and sock aide.  Pt has AE to use at home and familiar with all equipment  Barriers to return to prior living situation: Medical status  Recommendations for discharge: Home with assist  Rationale for recommendations: Pt with no further OT needs       Entered by: Tc Ferrari 05/08/2019 3:58 PM     Occupational Therapy Discharge Summary    Reason for therapy discharge:    All goals and outcomes met, no further needs identified.    Progress towards therapy goal(s). See goals on Care Plan in [facility] electronic health record for goal details.  Goals met    Therapy recommendation(s):    Continue home exercise program.
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
## 2019-05-08 NOTE — PROGRESS NOTES
"Orthopedic Surgery Progress Note    Subjective: NAEON. Pain controlled. Denies new n/t, f, c, CP, SOB, or N/V.    Exam:  /63 (BP Location: Right arm)   Pulse 102   Temp 97  F (36.1  C) (Oral)   Resp 17   Ht 1.575 m (5' 2.01\")   Wt 71.2 kg (156 lb 15.5 oz)   SpO2 96%   BMI 28.70 kg/m    Gen: Awake, alert, NAD  Resp: breathing equal and non-labored  Extremities:  RLE: Aquacel CDI. 5/5 EHL/FHL/TA/Gsc. SILT in s/s/dp/sp/t distributions. 2+ DP and PT pulses. Toes WWP, 2+ cap refill.    Drain: 60 / 60     Labs:  Recent Labs   Lab 05/08/19  0642   HGB 8.4*     Recent Labs   Lab 05/08/19  0642 05/07/19  1138     --    POTASSIUM 3.7 3.4   CHLORIDE 103  --    CO2 26  --    BUN 15  --    CR 0.94  --    * 96     No lab results found in last 7 days.    Imaging: Satisfactory placement of TKA components. No fxs.    Assessment:   83 year old female with PMH of obesity, HTN, HLD , CKD3, microalbuminuria, allergic rhinitis, moderate persistent asthma, MDD, ganglion cyst, gout, OA, rotator cuff syndrome  is s/p: R TKA by Dr. Hi on 5/7.    Inpatient admission for: pain control needing IV narcotics, wound drainage    Plan:  -Activity: WBAT  -PT/OT: ROM, ADLs  -Drain: Document per shift, d/c when <30 per shift  -Dressing: Change POD#7  -Anticoagulation: ASA 81 mg BID x 4 weeks  -Antibiotics: IV cefazolin x 24 hrs  -Pain control:  Transition from IV to PO as able.   -Diet: ADAT  -Labs: CBC, BMP  -Imaging: completed in PACU    -Disposition: Pending pain control, PT/OT, and medical clearance, anticipate that the patient will discharge to home tomorrow    Future Appointments   Date Time Provider Department Center   5/9/2019  9:30 AM Stefanie Bhatia Pt, PT [facility] [place]   5/9/2019  1:30 PM Stefanie Bhatia Pt, PT [facility] [place]   5/24/2019 12:00 PM Diego Sears PA-C [facility]   6/12/2019 11:30 AM Diego Hi MD [facility]   6/19/2019  5:30 PM  [facility]   6/19/2019 "  6:00 PM Flora Alberto MD [facility]        [facility]  Orthopaedic PGY4  [phone] (pager)

## 2019-05-08 NOTE — PLAN OF CARE
Discharge Planner PT   Patient plan for discharge: Going to daughter's house where everything is one level to start, HHPT  Current status: Pt supine in bed upon arrival and is agreeable to PT. Pt completed supin exercises to improve strength and ROM. R knee ROM 0-5-86. Supine>sit SBA pt paused to rest part way to edge of bed. Sit<>stand CGA from bed and chair. Pt ambulated 2x40' with RW and CGA requiring a seated break in between. Pt requested to use commode when back from walk. SIt<>stand from commode CGA. Pt able to stand at sink to wash hands with CGA for 2 min. Pt up in chair at end of session.   Barriers to return to prior living situation: Decreased activity tolerance, decreased strength and ROM, Pain  Recommendations for discharge: Home with assist and HHPT  Rationale for recommendations: Pt is mobilizing well but continues to require skilled PT to improve strength, ROM, and activity tolerance.        Entered by: Sravani Lancaster 05/08/2019 10:29 AM

## 2019-05-08 NOTE — PROGRESS NOTES
SPIRITUAL HEALTH SERVICES  [facility] ([facility]) 8A  ON-CALL VISIT     REFERRAL SOURCE: Pt request for  support with admission.  Stcai shared difficult stories from her growing up years that she continues to struggle with in regard to forgiveness. She is a deeply faithful believer in God, starting from an early age. She shares about the blessings in her life and her gratitude for reuniting with lost family members. She states that she prays many times a day, has a Gnosticist and [employer] Qu[employer] background. We shared reflective conversation and prayer.      PLAN: Chaplains remain available per pt/family/staff request.     Rev. Jocelyn Joyce MDiv, [facility]  Staff    Pager [phone]  * [facility] remains available 24/7 for emergent requests/referrals, either by having the switchboard page the on-call  or by entering an ASAP/STAT consult in Epic (this will also page the on-call ).*

## 2019-05-09 NOTE — PLAN OF CARE
VS: VSS    O2: >90% on room air    Output: Voiding spontaneously and hydrating well    Last BM: 5/7/19 - refused suppository    Activity: Up with SBA and walker, ambulation encouraged     Up for meals? Yes in chair    Skin: CDI ex R knee icision   Pain: Well controlled with Q3H oxycodone, scheduled tylenol, and ice pack applied.    CMS: intact   Dressing: CDI    Diet: Regular    LDA: N/a    Equipment: IV pump and pole   Plan: Discharge tomorrow to daughter home with home care   Additional Info: Continue to monitor

## 2019-05-09 NOTE — PROGRESS NOTES
"Orthopedic Surgery Progress Note    Subjective: NAEON. Pain controlled. Denies new n/t, f, c, CP, SOB, or N/V.    Exam:  /54 (BP Location: Left arm)   Pulse 104   Temp 97.1  F (36.2  C) (Oral)   Resp 16   Ht 1.575 m (5' 2.01\")   Wt 71.2 kg (156 lb 15.5 oz)   SpO2 99%   BMI 28.70 kg/m    Gen: Awake, alert, NAD  Resp: breathing equal and non-labored  Extremities:  RLE: Aquacel CDI. 5/5 EHL/FHL/TA/Gsc. SILT in s/s/dp/sp/t distributions. 2+ DP and PT pulses. Toes WWP, 2+ cap refill.    Drain: 60 / 60     Labs:    Recent Labs   Lab 05/09/19  0708 05/08/19  0642   HGB 8.3* 8.4*     Recent Labs   Lab 05/09/19  0708 05/08/19  0642 05/07/19  1138    134  --    POTASSIUM 4.2 3.7 3.4   CHLORIDE 105 103  --    CO2 23 26  --    BUN 14 15  --    CR 0.86 0.94  --    GLC 84 101* 96     No lab results found in last 7 days.    Imaging: Satisfactory placement of TKA components. No fxs.    Assessment:   83 year old female with PMH of obesity, HTN, HLD , CKD3, microalbuminuria, allergic rhinitis, moderate persistent asthma, MDD, ganglion cyst, gout, OA, rotator cuff syndrome  is s/p: R TKA by Dr. Hi on 5/7.    Inpatient admission for: pain control needing IV narcotics, wound drainage    Plan:  -Activity: WBAT  -PT/OT: ROM, ADLs  -Drain: Document per shift, d/c when <30 per shift  -Dressing: Change POD#7  -Anticoagulation: ASA 81 mg BID x 4 weeks  -Antibiotics: IV cefazolin x 24 hrs  -Pain control:  Transition from IV to PO as able.   -Diet: ADAT  -Labs: CBC, BMP  -Imaging: completed in PACU    -Disposition: Pending pain control, PT/OT, and medical clearance, anticipate that the patient will discharge to home today    Future Appointments   Date Time Provider Department Center   5/9/2019  9:30 AM Stefanie Bhatia Pt, PT [facility]   5/9/2019  1:30 PM Stefanie Bhatia Pt, PT [facility] [place]   5/24/2019 12:00 PM Diego Sears PA-C [facility]   6/12/2019 11:30 AM Diego Hi MD [facility] "   6/19/2019  5:30 PM [facility] LAB DRAW [facility]   6/19/2019  6:00 PM Flora Alberto MD [facility]        Zeyad Ospinah  Orthopaedic PGY4  [phone] (pager)

## 2019-05-09 NOTE — PROGRESS NOTES
Care Coordinator Progress Note    Admission Date/Time:  5/7/2019  Attending MD:  Diego Hi MD    Data  Chart reviewed, discussed with interdisciplinary team.   Patient was admitted for: Status post knee surgery.    Concerns with insurance coverage for discharge needs: None.  Current Living Situation: Patient lives alone.  Support System: Supportive and Involved  Services Involved: Home Care  Transportation at Discharge: TBD  Transportation to Medical Appointments:   - Not applicable  Barriers to Discharge: NA    Coordination of Care and Referrals: Provided patient/family with options for Home Care.        Assessment  Met with patient at bedside to discuss discharge planning. Patient was very teary eyed today because she does not want to be a burden on her family. Patient does not have any support to day from her family and will plan on discharging on 5/10/2019. Patient will be staying with her daughter during recovery from surgery. Her son will be visiting often and assisting her as needed. A referral for home care was sent to [facility] for skilled nursing and physical therapy. No further discharge concerns at this time. All therapy orders completed. RNCC available as needed.    [facility]  Phone  [phone]  Fax  [phone]      Plan  Anticipated Discharge Date:  5/10/2019  Anticipated Discharge Plan:  Home with home care.    Kate Aguilera RN, BSN  Care Coordinator, 8A  Phone [phone]  Pager [phone]

## 2019-05-09 NOTE — PLAN OF CARE
Discharge Planner PT   Patient plan for discharge: Home to daughters with assist, HHPT  Current status: Patient in bed upon arrival, just had breakfast delivered.  Knee ROM 0-10-64, ambulated 125' with FWW, SBA, completed 3 stairs 2 rails CGA. Patient familiar with how to do stairs.  Barriers to return to prior living situation: Functional mobility, pain  Recommendations for discharge: Home to daughters with HHPT  Rationale for recommendations: patient is mobilizing well, and demonstrates safety throughout mobility, would benefit from PT in order to return to PLOF.        Entered by: Sherrie Pettit 05/09/2019 10:38 AM

## 2019-05-09 NOTE — PROGRESS NOTES
A/Ox's 4. Pt rated pain as tolerable. Oxycodone, Tylenol and Ice giveb for pain control. Dressing CDI. CMS to baseline. Tolerated regular diet. Denied any nausea, CP, SOB, lightheadedness or dizziness. Voiding without pain or difficulty. Passing flatus. Up with SBA. Resting in bed at this time with call light in reach. Able to make needs known. Continue to monitor.

## 2019-05-09 NOTE — PROGRESS NOTES
Patient was seen, case reviewed with nursing staff and orthopedics.    Patient notes improved pain control this morning, though she was quite uncomfortable last evening.  She is progressing in therapy and anticipates discharge to home with family tomorrow    She denies cough, chest pain, shortness of breath, abdominal pain.  She is voiding without difficulty.  Last bowel movement was 2 days ago      Afebrile, blood pressure 100s to 130s systolic  Heart rate low 100s    Alert, fully oriented, appears comfortable lying in bed  Lungs clear  CV RRR  Abdomen soft, nontender, nondistended  No calf edema or tenderness    Hemoglobin 8.3 (8.4 yest)  BMP normal      Assessment       S/p R TKA, Pt is doing well overall, pain control appears improved this morning     HTN, stable off lisinopril and hydrochlorothiazide     Acute on chronic anemia stable over the last 24 hours, appears asymptomatic.  Patient received IV iron yesterday     Asthma, stable on PTA inhalers     History of hyponatremia secondary to diuretic therapy, stable    Plan  Continue to mobilize  Monitor pain control  Continue to hold HCTZ and lisinopril  Probable discharge to home tomorrow

## 2019-05-10 NOTE — PROGRESS NOTES
Clinic Care Coordination Contact  Care Coordination Transition Communication         Clinical Data: Patient was hospitalized at [facility]     Date of Admission:              5/7/2019  Date of Discharge::             5/10/2019   Discharge To:                     TCU         Admission Diagnoses:   Osteoarthritis  Status post knee surgery  Hx of Hypertention          Procedures Performed:   Right Total Knee Arthroplasty        Transition to Facility:              Facility Name: [facility]                  Contact name and phone number/fax: [phone]    Plan: RN/SW Care Coordinator will await notification from facility staff informing RN/SW Care Coordinator of patient's discharge plans/needs. RN/SW Care Coordinator will review chart and outreach to facility staff every 4 weeks and as needed.

## 2019-05-10 NOTE — PLAN OF CARE
PM PT session cancelled secondary to pt getting ready to discharge to TCU.       Physical Therapy Discharge Summary    Reason for therapy discharge:    Discharged to transitional care facility.    Progress towards therapy goal(s). See goals on Care Plan in [facility] electronic health record for goal details.  Goals not met.  Barriers to achieving goals:   discharge from facility.    Therapy recommendation(s):    Continued therapy is recommended.  Rationale/Recommendations:  Pt would benefit from further skilled PT for LE strengthening/ROM and increase independence with all functional mobility/gait. .

## 2019-05-10 NOTE — PLAN OF CARE
VS: VSS.   O2: RA   Output: Voiding without difficulty   Last BM: 5/7/19   Activity: Standby assist with walker    Skin: Intact ex incision   Pain: Oxycodone 2.5 mg   CMS: Intact   Dressing: CDI   Diet: Regular   LDA: none   Plan: Home with home care   Additional Info:

## 2019-05-10 NOTE — PROGRESS NOTES
Report given to Azeb Del Real RN at [facility]. Package will be given to patient and her daughter and son will provide transportation.

## 2019-05-10 NOTE — PLAN OF CARE
Patient A&O x4, lungs sound clear, Bowel sound active- passing gas but no BM yet, Denied CP, lightheadedness, dizziness, numbness, tingling and SOB, drinking well and voiding spontaneously without difficulties, able to wiggle toes,dressing clean,dry and intact, CMS intact, pain tolerable and taking  5 mg of oxycodone for pain, ice pack applied to right knee,  incentive spirometer encouraged and done several times, up to bathroom with stand by assistance using walker, patient informed writer that she want to go to rehab as she fears that her kids will not be able to care of her at home.  Will inform care coordinator later, heels elevated off bed, demonstrates the ability to use call light appropriately, will continue to monitor patient.

## 2019-05-10 NOTE — PROGRESS NOTES
Patient was seen, case reviewed with team and therapist.    Overall, patient is feeling well however she is very concerned that she would not be able to discharge to home and that her care needs are too great for her family members to assist her with.  Pain has been controlled    She denies cough, chest pain, shortness of breath, dizziness, abdominal pain.    Afebrile, blood pressure  systolic 130s  Heart rate 100    Alert, fully oriented, tearful.  Lungs clear  CV RRR  Abdomen soft, nontender, nondistended  No lower extremity edema      No new labs today      Assessment      S/p R TKA, Pt is doing well overall, pain control a adequate.  Patient will discharge to TCU today    HTN, stable off lisinopril and hydrochlorothiazide     Acute on chronic anemia stable over the last 24 hours, appears asymptomatic.  Patient received IV iron 2 days ago.     Asthma, stable on PTA inhalers     History of hyponatremia secondary to diuretic therapy, stable      Plan  Patient's medically stable for discharge.  Discharge orders completed.

## 2019-05-10 NOTE — PLAN OF CARE
Discharge Planner PT   Patient plan for discharge: TCU  Current status: Supine to/from sitting with min A x 1(needs assist with lifting LEs in/out of bed).  Sit to/from standing from EOB with FWW and CGA x 1.  Pt demonstrated steady gait with no LOB observed, ambulated 150' with FWW, CGA to SBA x 1.  Pt needed 1 standing rest break due to fatigue.  Pt concerned about discharging home due to level of assist and her family working.    Barriers to return to prior living situation: Stairs, activity tolerance, and decreased strength/ROM.   Recommendations for discharge: If pt does not have assist at home will need to discharge to TCU, but if pt has family to assist at home could discharge home with home PT.  Rationale for recommendations: Pt would benefit from further skilled PT for LE strengthening/ROM and increase independence with all functional mobility/gait.         Entered by: Stefanie Bhatia 05/10/2019 10:07 AM

## 2019-05-10 NOTE — PLAN OF CARE
VS: VSS; alert and oriented x4   O2: >95% on RA; denies SOB/cough. LS clear    Output: Voiding spontaneously in toilet   Last BM: 5/7/19; scheduled senna given    Activity: Up w/SBA and walker to toilet    Skin: Intact w/exception of R knee incision    Pain: Mild incisional pain present; controlled w/ prn oxycodone x2 this shift. Ice applied    CMS: Intact; denies numbness/tingling    Dressing: Aquacel on R knee CDI; Ace wrap present as well    Diet: Regular; tolerating well    LDA: No IV access   Equipment: Walker, PCDs worn    Plan: Discharge to daughter's home with home care on 5/10/19   Additional Info: Pt calm and cooperative with nursing cares. Able to make needs known.

## 2019-05-10 NOTE — DISCHARGE SUMMARY
[facility] Orthopaedic Surgery Discharge Summary    Staci Watt MRN# [id number]   Age: 83 year old YOB: 1935       Date of Admission:  5/7/2019  Date of Discharge::  5/10/2019   Admitting Physician:  Diego Hi MD  Discharge To:  TCU  Primary Care Physician:      Destin Wolf          Admission Diagnoses:   Osteoarthritis  Status post knee surgery  Hx of Hypertention         Discharge Diagnosis:   Same         Procedures Performed:   Right Total Knee Arthroplasty         Consultations:   INTERNAL MEDICINE ADULT IP CONSULT FOR [facility]  OCCUPATIONAL THERAPY ADULT IP CONSULT  PHYSICAL THERAPY ADULT IP CONSULT  PHYSICAL THERAPY ADULT IP CONSULT  OCCUPATIONAL THERAPY ADULT IP CONSULT          Brief History:    Staci Watt is a 83 year old female with longstanding history of right knee pain.  The patient was recently seen in clinic and found to have degenerative arthritis of the knee.  The patient was followed and noted to have failed conservative treatment options. Radiographs and preoperative clinical symptoms are consistent with degenerative arthritis as the cause of the patient's pain.   We discussed the risks, benefits, and alternatives to total knee arthroplasty with the patient.  Please see clinic note for full details of the preoperative discussion.  Following that the discussion, the patient elected to proceed with total knee arthroplasty.               Hospital Course:   Patient did well post operatively.  Transitioned from iv medication to oral meds. She tolerated diet, resumed normal bowel and bladder function.  She was followed by medicine team who held her anti-hypertensive medication and recommended not resuming them until systolic blood pressure is greater than 120 systolic consistently.  Physical therapy and occupational therapy worked with pt. They felt she was making good progress with therapy but would benefit from additional supervised safe  environment. Hemoglobin stable. Pt was deemed appropriate for discharge on post op day #3.          Medications Prior to Admission:     Medications Prior to Admission   Medication Sig Dispense Refill Last Dose     atorvastatin (LIPITOR) 80 MG tablet Take 0.5 tablets (40 mg) by mouth daily For high cholesterol. 45 tablet 3 5/6/2019 at Unknown time     FLUoxetine (PROZAC) 20 MG capsule Take 1 capsule (20 mg) by mouth daily 90 capsule 1 5/6/2019 at Unknown time     fluticasone-salmeterol (ADVAIR DISKUS) 500-50 MCG/DOSE diskus inhaler Inhale 1 puff into the lungs 2 times daily 3 Inhaler 3 5/7/2019 at 0800     multivitamin w/minerals (MULTI-VITAMIN) tablet Take 1 tablet by mouth daily 30 tablet 0 5/6/2019 at Unknown time     ranitidine (ZANTAC) 150 MG tablet Take 1 tablet (150 mg) by mouth 2 times daily 60 tablet 1 5/6/2019 at 2000     vitamin B-12 (CYANOCOBALAMIN) 1000 MCG tablet Take 1,000 mcg by mouth daily   Past Month at Unknown time     vitamin D3 (CHOLECALCIFEROL) 2000 units tablet Take 1 tablet by mouth daily   5/6/2019 at 0800     albuterol (PROAIR HFA/PROVENTIL HFA/VENTOLIN HFA) 108 (90 Base) MCG/ACT inhaler Inhale 2 puffs into the lungs every 4 hours as needed for shortness of breath / dyspnea or wheezing   More than a month at Unknown time     ferrous gluconate (FERGON) 324 (38 Fe) MG tablet Take 324 mg by mouth daily (with breakfast)   More than a month at Unknown time     order for DME Equipment being ordered: 2 wheeled walker 1 Product 0 Taking     order for DME Equipment being ordered: Digital home blood pressure monitor kit 1 kit 0 Taking     [DISCONTINUED] acetaminophen (TYLENOL) 500 MG tablet Take 500-1,000 mg by mouth every 6 hours as needed Reported on 5/2/2017 5/6/2019 at 2000     [DISCONTINUED] ASPIRIN CHILDRENS 81 MG OR CHEW 1 TABLET DAILY 0 11 5/6/2019 at Unknown time     [DISCONTINUED] hydrochlorothiazide (HYDRODIURIL) 25 MG tablet Take 0.5 tablets (12.5 mg) by mouth daily 1 tablet 0 5/6/2019  at 0800     [DISCONTINUED] ibuprofen (ADVIL/MOTRIN) 800 MG tablet Take 1 tablet (800 mg) by mouth 2 times daily as needed for moderate pain 60 tablet 3 Past Month at Unknown time     [DISCONTINUED] lisinopril (PRINIVIL/ZESTRIL) 40 MG tablet Take 1 tablet (40 mg) by mouth daily 90 tablet 3 5/6/2019 at 0800            Discharge Medications:        Review of your medicines      START taking      Dose / Directions   aspirin 81 MG EC tablet  Commonly known as:  ASA  Indication:  VTE Prophylaxis  Replaces:  ASPIRIN CHILDRENS 81 MG chewable tablet      Dose:  81 mg  Take 1 tablet (81 mg) by mouth 2 times daily  Quantity:  56 tablet  Refills:  0     oxyCODONE 5 MG tablet  Commonly known as:  ROXICODONE      Dose:  2.5-5 mg  Take 0.5-1 tablets (2.5-5 mg) by mouth every 4 hours as needed for moderate to severe pain For pain oc 3-6 give 2.5 mg, for pain of 7-10 mg every 4 hours as needed for pain  Quantity:  20 tablet  Refills:  0        CONTINUE these medicines which may have CHANGED, or have new prescriptions. If we are uncertain of the size of tablets/capsules you have at home, strength may be listed as something that might have changed.      Dose / Directions   acetaminophen 325 MG tablet  Commonly known as:  TYLENOL  This may have changed:      medication strength    how much to take    when to take this    reasons to take this    additional instructions      Dose:  650 mg  Take 2 tablets (650 mg) by mouth every 4 hours as needed for mild pain  Quantity:  40 tablet  Refills:  0        CONTINUE these medicines which have NOT CHANGED      Dose / Directions   albuterol 108 (90 Base) MCG/ACT inhaler  Commonly known as:  PROAIR HFA/PROVENTIL HFA/VENTOLIN HFA      Dose:  2 puff  Inhale 2 puffs into the lungs every 4 hours as needed for shortness of breath / dyspnea or wheezing  Refills:  0     atorvastatin 80 MG tablet  Commonly known as:  LIPITOR  Used for:  Hyperlipidemia LDL goal <100      Dose:  40 mg  Take 0.5 tablets (40  mg) by mouth daily For high cholesterol.  Quantity:  45 tablet  Refills:  3     cyanocobalamin 1000 MCG tablet  Commonly known as:  VITAMIN B-12      Dose:  1000 mcg  Take 1,000 mcg by mouth daily  Refills:  0     ferrous gluconate 324 (38 Fe) MG tablet  Commonly known as:  FERGON      Dose:  324 mg  Take 324 mg by mouth daily (with breakfast)  Refills:  0     FLUoxetine 20 MG capsule  Commonly known as:  PROzac  Used for:  Major depressive disorder, recurrent episode, moderate (H)      Dose:  20 mg  Take 1 capsule (20 mg) by mouth daily  Quantity:  90 capsule  Refills:  1     fluticasone-salmeterol 500-50 MCG/DOSE inhaler  Commonly known as:  ADVAIR DISKUS  Used for:  Moderate persistent asthma without complication      Dose:  1 puff  Inhale 1 puff into the lungs 2 times daily  Quantity:  3 Inhaler  Refills:  3     Multi-vitamin tablet      Dose:  1 tablet  Take 1 tablet by mouth daily  Quantity:  30 tablet  Refills:  0     order for DME  Used for:  Physical deconditioning      Equipment being ordered: 2 wheeled walker  Quantity:  1 Product  Refills:  0     order for DME  Used for:  HTN, goal below 150/90      Equipment being ordered: Digital home blood pressure monitor kit  Quantity:  1 kit  Refills:  0     ranitidine 150 MG tablet  Commonly known as:  ZANTAC  Used for:  Gastroesophageal reflux disease without esophagitis      Dose:  150 mg  Take 1 tablet (150 mg) by mouth 2 times daily  Quantity:  60 tablet  Refills:  1     vitamin D3 2000 units tablet  Commonly known as:  CHOLECALCIFEROL      Dose:  1 tablet  Take 1 tablet by mouth daily  Refills:  0        STOP taking    ASPIRIN CHILDRENS 81 MG chewable tablet  Generic drug:  aspirin  Replaced by:  aspirin 81 MG EC tablet        hydrochlorothiazide 25 MG tablet  Commonly known as:  HYDRODIURIL        ibuprofen 800 MG tablet  Commonly known as:  ADVIL/MOTRIN        lisinopril 40 MG tablet  Commonly known as:  PRINIVIL/ZESTRIL              Where to get your  "medicines      Information about where to get these medications is not yet available    Ask your nurse or doctor about these medications    oxyCODONE 5 MG tablet                 Pending Results at Discharge:   None         Discharge Instructions:     Discharge Procedure Orders   Reason for your hospital stay   Order Comments: You were admitted following your total knee arthroplasty     Discharge Instructions   Order Comments: TOTAL KNEE ARTHROPLASTY POST OPERATIVE DISCHARGE INSTRUCTIONS    FOLLOW UP APPOINTMENT  You are scheduled for a post operative wound check with Dr. Hi's clinic approximately two weeks after surgery. At approximately six weeks after surgery, you will see Dr. Hi in clinic. During this visit, repeat X rays of your operative knee will be performed.      Your follow up appointments will be at the location that you regularly see Dr. Hi:    [facility]  [address] [place] [address] [phone]    [facility]  [address] [place] [id number] [phone]    Physical therapy:   A prescription for physical therapy will be provided at the time of discharge.       ACTIVITY  Weight bearing status:   You may bear weight on your operative extremity as tolerated, using assistive devices (walker, cane) as needed. As you begin to feel more comfortable ambulating, you may gradually transition from a walker to a cane. Eventually, you should wean from all assistive devices. Although we would like you to discontinue use of assistive devices as soon as possible, do not transition until you have worked with your physical therapist to achieve safe balance and comfort.     Range of motion:  Continue to work on knee range of motion to prevent stiffness. When you are sitting, \"bridge the leg\" and keep the leg fully straight, with a bump under the heel to ensure that you can fully straighten the knee. Also, work on " getting your leg fully straight while walking - landing on your heel and progressing over the knee normally to prevent a flexion contracture.      Exercises:   Perform the following exercises at least three times per day for the first four weeks after surgery to prevent complications, such as blood clots in your legs:  1) Point and flex your feet  2) Move your ankle around in big circles  3) Wiggle your toes   Also, perform thigh muscle tightening exercises for 10 to 15 minutes at least three times per day for the first four weeks after surgery.    Athletic Activities:  Activities such as swimming, bicycling, jogging, running, and stop-and-go sports should be avoided until permitted by your provider.    Driving:  Driving is not permitted until directed by your provider. Under no circumstance are you permitted to drive while using narcotic pain medications.    Return to Work:  You may return to work when directed by your provider.       COMFORT AND PAIN MANAGEMENT  Elevation:  Some swelling in the leg is normal after surgery.  This type of swelling is usually gravity dependent and is improved with elevation. During times of inactivity throughout the first two weeks after surgery, make an effort to decrease swelling by elevating your operative extremity. This is most effectively done by lying down and placing several pillows lengthwise under your thigh and calf to raise your toes above the level of your nose. To ensure that your knee remains in full extension, do not place pillows directly under your knee. If you begin to experience worsening swelling or calf pain, please contact Dr. Hi's office. We may recommend presenting to the Emergency Department to obtain an ultrasound of the leg if there is concern for a DVT.        Icing:  An ice pack will be provided to control swelling and discomfort after surgery. Place a thin towel on your skin and apply the ice pack overtop. You may apply ice for 20 minutes as often as  two times per hour.    Pain Medications:  You will be discharged with acetaminophen (Tylenol) and a narcotic medication for pain management after surgery. Acetaminophen is most effective when it is taken per the schedule outlined by your provider (every four, six, or eight hours as prescribed). You may safely use acetaminophen as prescribed for the first four weeks after surgery provided you do not exceed the maximum daily dose prescribed by your provider (usually 3000 mg - 4000 mg). The narcotic pain medication should only be taken on an as-needed basis when necessary and should be reserved for severe pain that is not controlled with scheduled acetaminophen. In the first three days following surgery, your symptoms may warrant use of the narcotic pain medication every three, four, or six hours as prescribed. After three days, focus your efforts on decreasing (tapering) use of narcotic medications.   The most successful tapering strategy is to first, decrease the dose (number of tablets) and second, decrease the interval (time between doses). For example, if you begin taking two tablets every four hours after surgery, start your taper by decreasing one of these doses to one tablet. Every one to two days, decrease another dose to one tablet until you are eventually taking one tablet every four hours. Once this is achieved, focus on increasing the number of hours between doses, moving from one tablet every four hours to one tablet every six hours. As tolerated, continue to increase the interval to eight and twelve hours. Eventually, taper to one dose every evening and discontinue when no longer needed.      ANTICOAGULATION  Take the aspirin twice daily prescribed for a total of 4 weeks after surgery.  This is given to help minimize your risk of blood clot.      WOUND CARE AND SHOWERING  Wound care:  You have a clean Aquacel dressing on your surgical wound. This dressing should stay in place for seven days to allow the  incision to heal. If the Aquacel dressing becomes excessively wet or saturated before removal on day seven, please contact Dr. Hi's office. After seven days, remove the dressing and leave the wound open to air (see showering instructions below). If there is any drainage from the incision after removal of the Aquacel, dress the wound with sterile 4x4 gauze, using tape over top to secure the dressing in place over the incision. Please contact Dr. Hi's office if you notice the following after removal of the Aquacel dressin) significant cloudy, bloody, or malodorous drainage from the incision, 2) excessive warmth and redness around the incision.    Showering:  You may shower 48 hours after surgery provided the Aquacel dressing is in place. Although you are strictly prohibited from soaking or submerging the surgical wound underwater, you may shower in the usual fashion, allowing water and soap to run over the Aquacel. Once the Aquacel is removed on the seventh day after surgery, you may continue to shower; however, the incision should be covered with saran wrap (or any other non-permeable covering) to allow the incision to remain dry and to prevent you from scrubbing/rubbing the incision. The Dermabond (surgical adhesive that is directly on the incision areas) should be left on until it falls off or is removed at your first office visit. After your incision is examined at your first office visit, you will likely be allowed to return to showering without covering the incision.     Tub Bathing:  Tub bathing, swimming, or any other activities that cause your incision to be submerged should be avoided until allowed by your provider. Typically, patients are allowed to return to these activities six weeks after surgery.      CONTACTING YOUR PHYSICIAN:  You may experience symptoms that require follow-up before your scheduled two week appointment. Please contact Dr. Hi's office if you experience:  1) Pain in your  knee that persists or worsens in the first few days after surgery  2) Excessive redness or drainage of cloudy or bloody material from the wounds (Clear red tinted fluid and some mild drainage should be expected) or drainage of any kind five days after surgery  3) A temperature elevation greater than 101.5    4) Pain, swelling or redness in your calf  5) Numbness or weakness in your leg or foot      Regular business hours (Monday - Friday, 8am - 5pm):  [facility] [place]: [phone]  [facility]: [phone]    After hours and weekends:  [facility] on call Orthopedic resident: [phone]     Follow Up ([facility])   Order Comments: Appointments on [place] and/or [facility] (with [facility] or [facility] provider or service). Call [phone] if you haven't heard regarding these appointments within 7 days of discharge.     Adult [facility] Follow-up and recommended labs and tests   Order Comments: Appointments on [place] and/or [facility] (with [facility] or [facility] provider or service). Call [phone] if you haven't heard regarding these appointments within 7 days of discharge.     Activity   Order Comments: Your activity upon discharge: activity as tolerated     Order Specific Question Answer Comments   Is discharge order? Yes      General info for SNF   Order Comments: Length of Stay Estimate: Short Term Care: Estimated # of Days <30  Condition at Discharge: Improving  Level of care:skilled   Rehabilitation Potential: Good  Admission H&P remains valid and up-to-date: Yes  Recent Chemotherapy: N/A  Use Nursing Home Standing Orders: Yes     Mantoux instructions   Order Comments: Give two-step Mantoux (PPD) Per Facility Policy Yes     Activity - Up with assistive device     Order Specific Question Answer Comments   Is discharge order? Yes      Weight bearing status   Order Comments: Weight bearing as tolerated.     Discharge Instructions   Order  Comments: Prior to admission blood pressure medications, lisinopril and HCTZ, on hold secondary to relatively low blood pressures postoperatively and in view of a history of hyponatremia.  Primary team at TCU may need to phase back in as blood pressure dictates    Basic metabolic panel and hemoglobin need to be obtained on May 13.  Hemoglobin prior to discharge was 8.3     Full Code     Order Specific Question Answer Comments   Code status determined by: Discussion with patient/legal decision maker      Physical Therapy Adult Consult   Order Comments: Evaluate and treat as clinically indicated.    Reason:  S/p tka     Occupational Therapy Adult Consult   Order Comments: Evaluate and treat as clinically indicated.    Reason:  S/p tka     Diet   Order Comments: You may return to your pre surgery diet     Order Specific Question Answer Comments   Is discharge order? Yes      Advance Diet as Tolerated   Order Comments: Follow this diet upon discharge: regular diet     Order Specific Question Answer Comments   Is discharge order? Yes      Future Appointments   Date Time Provider Department Center   5/10/2019  1:30 PM Stefanie Bhatia Pt, PT [facility] [place]   5/24/2019 12:00 PM Diego Sears PA-C [facility]   6/12/2019 11:30 AM Dieog Hi MD [facility]   6[facility]19[facility]2019  [facility]30 [facility]   6/19/2019  6:00 PM Flora Alberto MD [facility]       Estrella Washington APRN, CNP  Department of Orthopedic Surgery  [facility]  [phone]

## 2019-05-10 NOTE — PROGRESS NOTES
Care Coordinator Progress Note    Admission Date/Time:  5/7/2019  Attending MD:  Diego Hi MD    Data  Chart reviewed, discussed with interdisciplinary team.   Patient was admitted for: Status post knee surgery.    Concerns with insurance coverage for discharge needs: None.  Current Living Situation: Patient lives alone.  Support System: Involved  Services Involved: Home Care and Skilled Nursing Facility, TCU  Transportation at Discharge: TBD  Transportation to Medical Appointments:   - Not applicable  Barriers to Discharge: lack of support system/caregiver    Coordination of Care and Referrals: Provided patient/family with options for Skilled Nursing Facility, TCU.        Assessment  Patient will be staying with her daughter during her recovery after surgery. Per patient her daughter will not be able to take care of her during the day and will need TCU placement. See chart for TCU referrals that are pending. [facility] will continue to follow.    Update: Patient has been accepted to [facility]. Plan for discharge today. Family to transport patient. All discharge orders faxed to [facility]. [id number]       Plan  Anticipated Discharge Date:  5/10-5/11  Anticipated Discharge Plan:  TCU    Kate Aguilera RN, BSN  Care Coordinator, 8A  Phone [phone]  Pager [phone]

## 2019-05-10 NOTE — PROGRESS NOTES
"Orthopedic Surgery Progress Note    Subjective / Interval Events:   Patient resting in bed; worked well with therapy yesterday; pain is well controlled. Feels ready to go home today.    Objective:   Vital Signs Temp (24hrs), Av.6  F (36.4  C), Min:97  F (36.1  C), Max:98.8  F (37.1  C)    /53 (BP Location: Left arm)   Pulse 104   Temp 98.8  F (37.1  C) (Oral)   Resp 16   Ht 1.575 m (5' 2.01\")   Wt 71.2 kg (156 lb 15.5 oz)   SpO2 100%   BMI 28.70 kg/m      Physical Examination   General: no acute distress  CV: palpable pulses  Resp: Nonlabored breathing  RLE: long leg soft dressing with ace-wrap in place    +Quad    +TA/GSC/EHL/FHL; SILT DP/SP/Carmen/Saph    Palpable dorsalis pedis pulse    Labs (Last 24 hour):   Lab Results   Component Value Date    WBC 6.1 2019    HGB 8.3 2019    HCT 30.0 2019    MCV 91 2019     2019    RDW 15.0 2019     Lab Results   Component Value Date    BUN 14 2019     2019    CO2 23 2019    ANIONGAP 8 2019     All cultures:  No results for input(s): CULT in the last 168 hours.  Assessment / Plan:   This is a 83 year old female s/p right TKA on 2019 by Dr. Hi    Pain control; continue to wean medications as tolerated  CV: Monitor HR/BP  Resp: Encourage IS  GI/: diet as tolerated  PT/OT    WBAT  DVT ppx: aspirin  Remove aquacel dressing on POD7    Plan for discharge home today    Hans Mandujano MD  Orthopaedic Surgery, Adult Reconstruction Fellow  Pager [phone]  "

## 2019-05-10 NOTE — PROGRESS NOTES
Patient discharged to [facility], discharge package including oxycodone script given to patient.

## 2019-05-13 NOTE — LETTER
5/13/2019        RE: Staci Watt  [address]  [facility] [address]        [place] [facility]  PRIMARY CARE PROVIDER AND CLINIC:  Destin Wolf MD, [address] [facility] [address]  Chief Complaint   Patient presents with     Hospital F/U     [facility] Medical Record Number:  [id number]  [facility] [[id number]    Staci Watt  is a 83 year old  (1935), admitted to the above facility from  [facility]. Hospital stay 5/7/19 through 5/10/19..  Admitted to this facility for  rehab, medical management and nursing care.    HPI:    HPI information obtained from: facility chart records, facility staff, patient report and [facility] chart review.   Brief Summary of Hospital Course:   Admitted to [facility] on 5/7/19 for right knee surgery. Patient did well post operatively.  Transitioned from iv medication to oral meds. She tolerated diet, resumed normal bowel and bladder function.  She was followed by medicine team who held her anti-hypertensive medication and recommended not resuming them until systolic blood pressure is greater than 120 systolic consistently.  Physical therapy and occupational therapy worked with pt. They felt she was making good progress with therapy but would benefit from additional supervised safe environment. Hemoglobin stable. Pt was deemed appropriate for discharge on post op day #3.     Updates on Status Since Skilled nursing Admission:     Met with patient who denies any chest pain, palpitations, shortness of breath, JACKSON, lightheadedness, dizziness, or cough. Denies any abdominal discomfort. Denies N&V. Denies B&B concerns. Denies dysuria or frequency. Denies loose or constipation. Appetite good. Sleeping well. Endorse pain continues to right knee with activity but gets good relief from scheduled tylenol, PRN oxycodone, and ice application.     CODE STATUS/ADVANCE  DIRECTIVES DISCUSSION:   CPR/Full code   Patient's living condition: lives alone  ALLERGIES: Contrast dye and Seasonal allergies  PAST MEDICAL HISTORY:  has a past medical history of Allergic rhinitis, cause unspecified, CKD (chronic kidney disease) stage 3, GFR 30-59 ml/min (H), Gout, unspecified, Hyperlipidemia LDL goal <100 (7/9/2004), Hypertension goal BP (blood pressure) < 140/90 (7/9/2004), Iron deficiency anemia, Moderate major depression (H) (10/19/2006), Moderate persistent asthma (12/4/2005), OA (osteoarthritis) of knee (2/2/2012), Obese (2/2/2012), Retinal detachment with retinal defect, unspecified, and Unspecified cataract.  PAST SURGICAL HISTORY:   has a past surgical history that includes NONSPECIFIC PROCEDURE; NONSPECIFIC PROCEDURE; NONSPECIFIC PROCEDURE; NONSPECIFIC PROCEDURE; NONSPECIFIC PROCEDURE; NONSPECIFIC PROCEDURE; Bunionectomy karen bilateral; and Arthroplasty knee (Right, 5/7/2019).  FAMILY HISTORY: family history includes Breast Cancer in her maternal aunt; Hypertension in her mother; Respiratory in her maternal aunt, maternal aunt, and mother.  SOCIAL HISTORY:   reports that she has never smoked. She has never used smokeless tobacco. She reports that she drinks alcohol. She reports that she does not use drugs.    Post Discharge Medication Reconciliation Status: discharge medications reconciled and changed, per note/orders (see AVS)    Current Outpatient Medications   Medication Sig Dispense Refill     acetaminophen (TYLENOL) 500 MG tablet Take 2 tablets (1,000 mg) by mouth 3 times daily AND 1000mg daily PRN 90 tablet 1     albuterol (PROAIR HFA/PROVENTIL HFA/VENTOLIN HFA) 108 (90 Base) MCG/ACT inhaler Inhale 2 puffs into the lungs every 4 hours as needed for shortness of breath / dyspnea or wheezing       aspirin (ASA) 81 MG EC tablet Take 1 tablet (81 mg) by mouth 2 times daily 56 tablet 0     atorvastatin (LIPITOR) 80 MG tablet Take 0.5 tablets (40 mg) by mouth daily For high  cholesterol. 45 tablet 3     ferrous gluconate (FERGON) 324 (38 Fe) MG tablet Take 324 mg by mouth daily (with breakfast)       FLUoxetine (PROZAC) 20 MG capsule Take 1 capsule (20 mg) by mouth daily 90 capsule 1     fluticasone-salmeterol (ADVAIR DISKUS) 500-50 MCG/DOSE diskus inhaler Inhale 1 puff into the lungs 2 times daily 3 Inhaler 3     multivitamin w/minerals (MULTI-VITAMIN) tablet Take 1 tablet by mouth daily 30 tablet 0     order for DME Equipment being ordered: 2 wheeled walker 1 Product 0     order for DME Equipment being ordered: Digital home blood pressure monitor kit 1 kit 0     oxyCODONE (ROXICODONE) 5 MG tablet Take 1 tablet (5 mg) by mouth every 4 hours as needed for pain 90 tablet 0     ranitidine (ZANTAC) 150 MG tablet Take 1 tablet (150 mg) by mouth 2 times daily 60 tablet 1     vitamin B-12 (CYANOCOBALAMIN) 1000 MCG tablet Take 1,000 mcg by mouth daily       vitamin D3 (CHOLECALCIFEROL) 2000 units tablet Take 1 tablet by mouth daily       ROS:  10 point ROS of systems including Constitutional, Eyes, Respiratory, Cardiovascular, Gastroenterology, Genitourinary, Integumentary, Musculoskeletal, Psychiatric were all negative except for pertinent positives noted in my HPI.    Vitals:  /76   Pulse 101   Temp 98.5  F (36.9  C)   Resp 16   SpO2 99%   Exam:  GENERAL APPEARANCE:  Alert, in no distress, oriented, cooperative  ENT:  Mouth and posterior oropharynx normal, moist mucous membranes, normal hearing acuity  EYES:  EOM, conjunctivae, lids, pupils and irises normal  RESP:  respiratory effort and palpation of chest normal, lungs clear to auscultation , no respiratory distress  CV:  Palpation and auscultation of heart done , regular rate and rhythm, no murmur, rub, or gallop, no edema  ABDOMEN:  normal bowel sounds, soft, nontender, no hepatosplenomegaly or other masses, no guarding or rebound  M/S:   Good strength 5/5 in UE. Weakness noted to BLE. Good ROM to right knee. CMS+  SKIN:   Inspection of skin and subcutaneous tissue baseline, Palpation of skin and subcutaneous tissue baseline, wound healing well, no signs of infection no drainage. aquacel dressing intact  NEURO:   Cranial nerves 2-12 are normal tested and grossly at patient's baseline, no purposeful movement in upper and lower extremities  PSYCH:  oriented X 3, normal insight, judgement and memory, affect and mood normal    Lab/Diagnostic data:    Most Recent 3 CBC's:  Recent Labs   Lab Test 05/09/19  0708 05/08/19  0642 04/29/19  1051 04/17/19  1336 04/12/19  1138   WBC  --   --  6.1 8.1 7.1   HGB 8.3* 8.4* 9.5* 10.2* 10.3*   MCV  --   --  91 88 88   PLT  --   --  486* 477* 606*     Most Recent 3 BMP's:  Recent Labs   Lab Test 05/09/19  0708 05/08/19  0642 05/07/19  1138 04/29/19  1051    134  --  137   POTASSIUM 4.2 3.7 3.4 4.1   CHLORIDE 105 103  --  105   CO2 23 26  --  23   BUN 14 15  --  12   CR 0.86 0.94  --  0.76   ANIONGAP 8 5  --  9   ELIDA 8.5 7.9*  --  9.8   GLC 84 101* 96 81     Most Recent TSH and T4:  Recent Labs   Lab Test 04/12/19  1138   TSH 1.52       ASSESSMENT/PLAN:    (Z98.890) Status post knee surgery  (primary encounter diagnosis)  (M17.0) Primary osteoarthritis of both knees  (M25.561,  M25.562,  G89.29) Chronic pain of both knees  (R53.81) Debility  Comment: Not at goal. Stable  Plan:  -Continue Physical therapy, and Occupational therapy  -Continue Tylenol 1000mg TID and daily PRN  -Continue oxycodone 5mg every 4 hours PRN for pain control  -Continue Aspirin 81mg BID as directed by ortho  -Continue vitamin D supplemention as directed  -Continue ice application for good relief  -Follow up with ortho as directed---pending 5/24/19    (K21.9) Gastroesophageal reflux disease without esophagitis  Comment:Chronic. Stable.   Plan:  -Continue to monitor worsening s/sx of concerns  -Continue zantac BID   -CBC and BMP pending today, will f/u labs in 1 week    (D64.9) Anemia, unspecified type  Comment: Acute on  chronic.   Plan:   -Continue daily vitamin D, B12, and MVI supplementation  -Continue iron daily as directed  -CBC pending today, will monitor weekly    (I10) HTN, goal below 150/90  Comment: Chronic. Stable. Based on JNC-8 goals,  patients age of 83 year old, no presence of diabetes or CKD, and goals of care goal BP is <150/90 mm Hg. Patient is stable with current plan of care and routine assessment..Elevated today r/t pain vs ? Hydrochlorothiazide and lisinopril held on hospital  Plan:   -Continue no pharmacological interventions as this time  -Monitor BP and HR daily  -CBC and BMP pending today, follow up in 1 week  --may restart hydrochlorothiazide next week pending BP readings if SBP>120.     (N18.3) CKD (chronic kidney disease) stage 3, GFR 30-59 ml/min (H)  Comment: acute on chronic. Creatinine baseline 0.74.   Plan:   -Continue to monitor  -CBC and BMP pending today; follow in 1 week  -Avoid ibuprofen    (F33.1) Major depressive disorder, recurrent episode, moderate (H)  Comment: Chronic. Stable  Plan:   -Continue Prozac as directed  -Monitor for worsening moods and behaviors  -CBC and BMP pending today; follow up in 1 week.     Orders written by provider at facility    Electronically signed by:  Jeannine Perales DNP, APRARTHUR                Sincerely,        JAIRON Restrepo CNP

## 2019-05-13 NOTE — PROGRESS NOTES
Clinic Care Coordination Contact  Care Coordination Transition Communication         Clinical Data:   Date of Admission:              5/7/2019  Date of Discharge::             5/10/2019   Discharge To:                     TCU          Admission Diagnoses:   Osteoarthritis  Status post knee surgery  Hx of Hypertention          Procedures Performed:   Right Total Knee Arthroplasty       Transition to Facility:              Facility Name:[facility]               Contact name and phone number/fax:[facility] [phone]) [phone]    Plan: RN/SW Care Coordinator will await notification from facility staff informing RN/SW Care Coordinator of patient's discharge plans/needs. RN/SW Care Coordinator will review chart and outreach to facility staff every 4 weeks and as needed.     Keturah Nugent RN, Care Coordinator   [facility] -Care Coordination  Robyn Prakash  [phone]

## 2019-05-13 NOTE — PROGRESS NOTES
[place] [facility]  PRIMARY CARE PROVIDER AND CLINIC:  Destin oWlf MD, [address] [facility] [address]  Chief Complaint   Patient presents with     Hospital F/U     [facility] Medical Record Number:  [id number]  Place of Service where encounter took place:  [facility] ([facility]) [[id number]]    Staci Watt  is a 83 year old  (1935), admitted to the above facility from  [facility]. Hospital stay 5/7/19 through 5/10/19..  Admitted to this facility for  rehab, medical management and nursing care.    HPI:    HPI information obtained from: facility chart records, facility staff, patient report and [facility] chart review.   Brief Summary of Hospital Course:   Admitted to [facility] on 5/7/19 for right knee surgery. Patient did well post operatively.  Transitioned from iv medication to oral meds. She tolerated diet, resumed normal bowel and bladder function.  She was followed by medicine team who held her anti-hypertensive medication and recommended not resuming them until systolic blood pressure is greater than 120 systolic consistently.  Physical therapy and occupational therapy worked with pt. They felt she was making good progress with therapy but would benefit from additional supervised safe environment. Hemoglobin stable. Pt was deemed appropriate for discharge on post op day #3.     Updates on Status Since Skilled nursing Admission:     Met with patient who denies any chest pain, palpitations, shortness of breath, JACKSON, lightheadedness, dizziness, or cough. Denies any abdominal discomfort. Denies N&V. Denies B&B concerns. Denies dysuria or frequency. Denies loose or constipation. Appetite good. Sleeping well. Endorse pain continues to right knee with activity but gets good relief from scheduled tylenol, PRN oxycodone, and ice application.     CODE STATUS/ADVANCE DIRECTIVES DISCUSSION:   CPR/Full code   Patient's living condition: lives alone  ALLERGIES: Contrast  dye and Seasonal allergies  PAST MEDICAL HISTORY:  has a past medical history of Allergic rhinitis, cause unspecified, CKD (chronic kidney disease) stage 3, GFR 30-59 ml/min (H), Gout, unspecified, Hyperlipidemia LDL goal <100 (7/9/2004), Hypertension goal BP (blood pressure) < 140/90 (7/9/2004), Iron deficiency anemia, Moderate major depression (H) (10/19/2006), Moderate persistent asthma (12/4/2005), OA (osteoarthritis) of knee (2/2/2012), Obese (2/2/2012), Retinal detachment with retinal defect, unspecified, and Unspecified cataract.  PAST SURGICAL HISTORY:   has a past surgical history that includes NONSPECIFIC PROCEDURE; NONSPECIFIC PROCEDURE; NONSPECIFIC PROCEDURE; NONSPECIFIC PROCEDURE; NONSPECIFIC PROCEDURE; NONSPECIFIC PROCEDURE; Bunionectomy karen bilateral; and Arthroplasty knee (Right, 5/7/2019).  FAMILY HISTORY: family history includes Breast Cancer in her maternal aunt; Hypertension in her mother; Respiratory in her maternal aunt, maternal aunt, and mother.  SOCIAL HISTORY:   reports that she has never smoked. She has never used smokeless tobacco. She reports that she drinks alcohol. She reports that she does not use drugs.    Post Discharge Medication Reconciliation Status: discharge medications reconciled and changed, per note/orders (see AVS)    Current Outpatient Medications   Medication Sig Dispense Refill     acetaminophen (TYLENOL) 500 MG tablet Take 2 tablets (1,000 mg) by mouth 3 times daily AND 1000mg daily PRN 90 tablet 1     albuterol (PROAIR HFA/PROVENTIL HFA/VENTOLIN HFA) 108 (90 Base) MCG/ACT inhaler Inhale 2 puffs into the lungs every 4 hours as needed for shortness of breath / dyspnea or wheezing       aspirin (ASA) 81 MG EC tablet Take 1 tablet (81 mg) by mouth 2 times daily 56 tablet 0     atorvastatin (LIPITOR) 80 MG tablet Take 0.5 tablets (40 mg) by mouth daily For high cholesterol. 45 tablet 3     ferrous gluconate (FERGON) 324 (38 Fe) MG tablet Take 324 mg by mouth daily (with  breakfast)       FLUoxetine (PROZAC) 20 MG capsule Take 1 capsule (20 mg) by mouth daily 90 capsule 1     fluticasone-salmeterol (ADVAIR DISKUS) 500-50 MCG/DOSE diskus inhaler Inhale 1 puff into the lungs 2 times daily 3 Inhaler 3     multivitamin w/minerals (MULTI-VITAMIN) tablet Take 1 tablet by mouth daily 30 tablet 0     order for DME Equipment being ordered: 2 wheeled walker 1 Product 0     order for DME Equipment being ordered: Digital home blood pressure monitor kit 1 kit 0     oxyCODONE (ROXICODONE) 5 MG tablet Take 1 tablet (5 mg) by mouth every 4 hours as needed for pain 90 tablet 0     ranitidine (ZANTAC) 150 MG tablet Take 1 tablet (150 mg) by mouth 2 times daily 60 tablet 1     vitamin B-12 (CYANOCOBALAMIN) 1000 MCG tablet Take 1,000 mcg by mouth daily       vitamin D3 (CHOLECALCIFEROL) 2000 units tablet Take 1 tablet by mouth daily       ROS:  10 point ROS of systems including Constitutional, Eyes, Respiratory, Cardiovascular, Gastroenterology, Genitourinary, Integumentary, Musculoskeletal, Psychiatric were all negative except for pertinent positives noted in my HPI.    Vitals:  /76   Pulse 101   Temp 98.5  F (36.9  C)   Resp 16   SpO2 99%   Exam:  GENERAL APPEARANCE:  Alert, in no distress, oriented, cooperative  ENT:  Mouth and posterior oropharynx normal, moist mucous membranes, normal hearing acuity  EYES:  EOM, conjunctivae, lids, pupils and irises normal  RESP:  respiratory effort and palpation of chest normal, lungs clear to auscultation , no respiratory distress  CV:  Palpation and auscultation of heart done , regular rate and rhythm, no murmur, rub, or gallop, no edema  ABDOMEN:  normal bowel sounds, soft, nontender, no hepatosplenomegaly or other masses, no guarding or rebound  M/S:   Good strength 5/5 in UE. Weakness noted to BLE. Good ROM to right knee. CMS+  SKIN:  Inspection of skin and subcutaneous tissue baseline, Palpation of skin and subcutaneous tissue baseline, wound  healing well, no signs of infection no drainage. aquacel dressing intact  NEURO:   Cranial nerves 2-12 are normal tested and grossly at patient's baseline, no purposeful movement in upper and lower extremities  PSYCH:  oriented X 3, normal insight, judgement and memory, affect and mood normal    Lab/Diagnostic data:    Most Recent 3 CBC's:  Recent Labs   Lab Test 05/09/19  0708 05/08/19  0642 04/29/19  1051 04/17/19  1336 04/12/19  1138   WBC  --   --  6.1 8.1 7.1   HGB 8.3* 8.4* 9.5* 10.2* 10.3*   MCV  --   --  91 88 88   PLT  --   --  486* 477* 606*     Most Recent 3 BMP's:  Recent Labs   Lab Test 05/09/19  0708 05/08/19  0642 05/07/19  1138 04/29/19  1051    134  --  137   POTASSIUM 4.2 3.7 3.4 4.1   CHLORIDE 105 103  --  105   CO2 23 26  --  23   BUN 14 15  --  12   CR 0.86 0.94  --  0.76   ANIONGAP 8 5  --  9   ELIDA 8.5 7.9*  --  9.8   GLC 84 101* 96 81     Most Recent TSH and T4:  Recent Labs   Lab Test 04/12/19  1138   TSH 1.52       ASSESSMENT/PLAN:    (Z98.890) Status post knee surgery  (primary encounter diagnosis)  (M17.0) Primary osteoarthritis of both knees  (M25.561,  M25.562,  G89.29) Chronic pain of both knees  (R53.81) Debility  Comment: Not at goal. Stable  Plan:  -Continue Physical therapy, and Occupational therapy  -Continue Tylenol 1000mg TID and daily PRN  -Continue oxycodone 5mg every 4 hours PRN for pain control  -Continue Aspirin 81mg BID as directed by ortho  -Continue vitamin D supplemention as directed  -Continue ice application for good relief  -Follow up with ortho as directed---pending 5/24/19    (K21.9) Gastroesophageal reflux disease without esophagitis  Comment:Chronic. Stable.   Plan:  -Continue to monitor worsening s/sx of concerns  -Continue zantac BID   -CBC and BMP pending today, will f/u labs in 1 week    (D64.9) Anemia, unspecified type  Comment: Acute on chronic.   Plan:   -Continue daily vitamin D, B12, and MVI supplementation  -Continue iron daily as directed  -CBC  pending today, will monitor weekly    (I10) HTN, goal below 150/90  Comment: Chronic. Stable. Based on JNC-8 goals,  patients age of 83 year old, no presence of diabetes or CKD, and goals of care goal BP is <150/90 mm Hg. Patient is stable with current plan of care and routine assessment..Elevated today r/t pain vs ? Hydrochlorothiazide and lisinopril held on hospital  Plan:   -Continue no pharmacological interventions as this time  -Monitor BP and HR daily  -CBC and BMP pending today, follow up in 1 week  --may restart hydrochlorothiazide next week pending BP readings if SBP>120.     (N18.3) CKD (chronic kidney disease) stage 3, GFR 30-59 ml/min (H)  Comment: acute on chronic. Creatinine baseline 0.74.   Plan:   -Continue to monitor  -CBC and BMP pending today; follow in 1 week  -Avoid ibuprofen    (F33.1) Major depressive disorder, recurrent episode, moderate (H)  Comment: Chronic. Stable  Plan:   -Continue Prozac as directed  -Monitor for worsening moods and behaviors  -CBC and BMP pending today; follow up in 1 week.     Orders written by provider at facility    Electronically signed by:  Jeannine Perales DNP, APRN

## 2019-05-20 NOTE — LETTER
5/20/2019        RE: Staci Watt  [address]  [facility] [place] [address]        [facility] DISCHARGE SUMMARY  PATIENT'S NAME: Staci Watt  YOB: 1935  MEDICAL RECORD NUMBER:  [id number]  Place of Service where encounter took place:  [facility] [[id number]]    PRIMARY CARE PROVIDER AND CLINIC RESPONSIBLE AFTER TRANSFER:   Destin Wolf MD, [address] / [facility] [address]    Non-[facility] Provider     Transferring providers: JAIRON Restrepo CNP, Marianela Coyne MD  Recent Hospitalization/ED:  [facility] stay 5/7/19 to 5/10/19.  Date of SNF Admission: May / 10 / 2019  Date of SNF (anticipated) Discharge: May / 22 or 23 / 2019  Discharged to: previous independent home with daughter  Cognitive Scores: BIMS: 8/15 and SLUMS: 15/30  Physical Function: Ambulating  ft with SBA and walker  DME: Walker    CODE STATUS/ADVANCE DIRECTIVES DISCUSSION:  Full Code   ALLERGIES: Contrast dye and Seasonal allergies     BP Readings from Last 3 Encounters:   05/20/19 141/73   05/13/19 178/76   05/10/19 121/54     Wt Readings from Last 5 Encounters:   05/20/19 71.8 kg (158 lb 6.4 oz)   05/07/19 71.2 kg (156 lb 15.5 oz)   04/29/19 72.3 kg (159 lb 8 oz)   04/17/19 73.9 kg (163 lb)   04/10/19 73.9 kg (163 lb)     Last week BP readings range: 134//76    DISCHARGE DIAGNOSIS/NURSING FACILITY COURSE:     ASSESSMENT/PLAN:     (Z98.890) Status post knee surgery  (primary encounter diagnosis)  (M17.0) Primary osteoarthritis of both knees  (M25.561,  M25.562,  G89.29) Chronic pain of both knees  (R53.81) Debility  Comment:  Progressing well with therapies. Not at goal. Stable  Plan:  -Continue Physical therapy, and Occupational therapy  -Continue Tylenol 1000mg TID and daily PRN  -Continue oxycodone 5mg every 4 hours PRN for pain control  -Continue Aspirin 81mg BID as directed by ortho  -Continue vitamin D  supplemention as directed  -Continue ice application for good relief  -Follow up with ortho as directed---pending 5/24/19  -Tubi  and/or ace wrap PRN if edema  -Encourage elevation when in bed--pillow under calf and NOT knee     (K21.9) Gastroesophageal reflux disease without esophagitis  Comment:Chronic. Stable.   Plan:  -Continue to monitor worsening s/sx of concerns  -Continue zantac BID   -Follow up with PCP post TCU     (D64.9) Anemia, unspecified type  Comment: Acute on chronic.   Plan:   -Continue daily vitamin D, B12, and MVI supplementation  -Continue iron daily as directed  -Follow up with PCP post TCU     (I10) HTN, goal below 150/90  Comment: Chronic. Stable. Based on JNC-8 goals,  patients age of 83 year old, no presence of diabetes or CKD, and goals of care goal BP is <150/90 mm Hg. Patient is stable with current plan of care and routine assessment..Elevated today r/t pain vs ? Hydrochlorothiazide and lisinopril held on hospital  Plan:   -Continue no pharmacological interventions as this time  -Monitor BP and HR daily  -Will restart hydrochlorothiazide today  -Follow up with PCP post TCU     (N18.3) CKD (chronic kidney disease) stage 3, GFR 30-59 ml/min (H)  Comment: acute on chronic. Creatinine baseline 0.74.   Plan:   -Continue to monitor  -Follow up with PCP post TCU  -Avoid ibuprofen     (F33.1) Major depressive disorder, recurrent episode, moderate (H)  Comment: Chronic. Stable  Plan:   -Continue Prozac as directed  -Monitor for worsening moods and behaviors  -Follow up with PCP post TCU    Past Medical History:  has a past medical history of Allergic rhinitis, cause unspecified, CKD (chronic kidney disease) stage 3, GFR 30-59 ml/min (H), Gout, unspecified, Hyperlipidemia LDL goal <100 (7/9/2004), Hypertension goal BP (blood pressure) < 140/90 (7/9/2004), Iron deficiency anemia, Moderate major depression (H) (10/19/2006), Moderate persistent asthma (12/4/2005), OA (osteoarthritis) of knee  (2/2/2012), Obese (2/2/2012), Retinal detachment with retinal defect, unspecified, and Unspecified cataract.    Discharge Medications:  Current Outpatient Medications   Medication Sig Dispense Refill     acetaminophen (TYLENOL) 500 MG tablet Take 2 tablets (1,000 mg) by mouth 3 times daily AND 1000mg daily PRN 90 tablet 1     albuterol (PROAIR HFA/PROVENTIL HFA/VENTOLIN HFA) 108 (90 Base) MCG/ACT inhaler Inhale 2 puffs into the lungs every 4 hours as needed for shortness of breath / dyspnea or wheezing       aspirin (ASA) 81 MG EC tablet Take 1 tablet (81 mg) by mouth 2 times daily 56 tablet 0     atorvastatin (LIPITOR) 80 MG tablet Take 0.5 tablets (40 mg) by mouth daily For high cholesterol. 45 tablet 3     ferrous gluconate (FERGON) 324 (38 Fe) MG tablet Take 324 mg by mouth daily (with breakfast)       FLUoxetine (PROZAC) 20 MG capsule Take 1 capsule (20 mg) by mouth daily 90 capsule 1     fluticasone-salmeterol (ADVAIR DISKUS) 500-50 MCG/DOSE diskus inhaler Inhale 1 puff into the lungs 2 times daily 3 Inhaler 3     multivitamin w/minerals (MULTI-VITAMIN) tablet Take 1 tablet by mouth daily 30 tablet 0     order for DME Equipment being ordered: 2 wheeled walker 1 Product 0     order for DME Equipment being ordered: Digital home blood pressure monitor kit 1 kit 0     oxyCODONE (ROXICODONE) 5 MG tablet Take 1 tablet (5 mg) by mouth every 4 hours as needed for pain 90 tablet 0     polyethylene glycol (MIRALAX/GLYCOLAX) powder Take 17 g (1 capful) by mouth daily 500 g 0     ranitidine (ZANTAC) 150 MG tablet Take 1 tablet (150 mg) by mouth 2 times daily 60 tablet 1     vitamin B-12 (CYANOCOBALAMIN) 1000 MCG tablet Take 1,000 mcg by mouth daily       vitamin D3 (CHOLECALCIFEROL) 2000 units tablet Take 1 tablet by mouth daily 90 tablet 0     Medication Changes/Rationale:     Miralax added daily for BM relief    Controlled medications sent with patient:   Script for Oxycodone 5mg medication for 30 tabs and 0 refills  "given to patient at discharge to have them fill at their out patient pharmacy     ROS:   10 point ROS of systems including Constitutional, Eyes, Respiratory, Cardiovascular, Gastroenterology, Genitourinary, Integumentary, Musculoskeletal, Psychiatric were all negative except for pertinent positives noted in my HPI.    Physical Exam:   Vitals: /73   Pulse 104   Temp 98.6  F (37  C)   Resp 18   Ht 1.565 m (5' 1.6\")   Wt 71.8 kg (158 lb 6.4 oz)   SpO2 99%   BMI 29.35 kg/m     BMI= Body mass index is 29.35 kg/m .  GENERAL APPEARANCE:  Alert, in no distress, oriented, cooperative  ENT:  Mouth and posterior oropharynx normal, moist mucous membranes, normal hearing acuity  EYES:  EOM, conjunctivae, lids, pupils and irises normal  RESP:  respiratory effort and palpation of chest normal, lungs clear to auscultation , no respiratory distress  CV:  Palpation and auscultation of heart done , regular rate and rhythm, no murmur, rub, or gallop, Trace peripheral edema BLE  ABDOMEN:  normal bowel sounds, soft, nontender, no hepatosplenomegaly or other masses, no guarding or rebound  M/S:   Good strength 5/5 in all extremities. Ambulates with walker assistance  SKIN:  Inspection of skin and subcutaneous tissue baseline, Palpation of skin and subcutaneous tissue baseline, wound healing well, no signs of infection no drainage. Steri strips intact. Mild edema  NEURO:   Cranial nerves 2-12 are normal tested and grossly at patient's baseline, no purposeful movement in upper and lower extremities  PSYCH:  oriented X 3, memory impaired , affect and mood normal     SNF labs:   Most Recent 3 CBC's:  Recent Labs   Lab Test 05/13/19 05/09/19  0708 05/08/19  0642 04/29/19  1051 04/17/19  1336   WBC 8.6  --   --  6.1 8.1   HGB 8.2* 8.3* 8.4* 9.5* 10.2*   MCV 97  --   --  91 88     --   --  486* 477*     Most Recent 3 BMP's:  Recent Labs   Lab Test 05/13/19 05/09/19  0708 05/08/19  0642    136 134   POTASSIUM 4.0 4.2 3.7 "   CHLORIDE 109* 105 103   CO2 22 23 26   BUN 11 14 15   CR 0.75 0.86 0.94   ANIONGAP 8 8 5   ELIDA 9.0 8.5 7.9*   GLC 82 84 101*     Most Recent 2 LFT's:  Recent Labs   Lab Test 04/12/19  1138 01/03/19  1007   AST 17 18   ALT 20 9   ALKPHOS 76 82   BILITOTAL 0.3 0.5     Most Recent TSH and T4:  Recent Labs   Lab Test 04/12/19  1138   TSH 1.52     Most Recent Anemia Panel:  Recent Labs   Lab Test 05/13/19 04/17/19  1336  04/10/19  1437   WBC 8.6   < > 8.1   < > 7.8   HGB 8.2*   < > 10.2*   < > 9.7*   HCT 27.1*   < > 30.7*   < > 31.9*   MCV 97   < > 88   < > 90      < > 477*   < > 584*   IRON  --   --  15*  --   --    IRONSAT  --   --  6*  --   --    RETICABSCT  --   --  48.6   < >  --    RETP  --   --  1.4   < >  --    FEB  --   --  261  --   --    JOE  --   --  274*  --  307*   B12  --   --   --   --  4,033*   FOLIC  --   --   --   --  39.2    < > = values in this interval not displayed.       DISCHARGE PLAN:    Follow up labs: BMP and CBC within 2-4 weeks post TCU    Medical Follow Up:      Follow up with primary care provider in 1-2 weeks  Follow up with specialist Ortho on 5/24/19     [facility] referral needed and placed by this provider: No    Current [place] scheduled appointments:    Discharge Services: Home Care:  Occupational Therapy, Physical Therapy, Registered Nurse, Home Health Aide and From:  [facility]    Discharge Instructions Verbalized to Patient at Discharge:     Elevation: During times on inactivity throughout the first two weeks after surgery, to decrease swelling by elevating by lying down and placing several pillows lengthwise under your thigh and calf to raise your toes above the level of your nose.do not place pillows under your knee, if you begin to experience worsening swelling or calf pain contact [facility] Ellis's office.     Exercises: three times daily for the first four weeks, to prevent blood clots in your legs, point and flex your feet move your ankle around in big circles wiggle  "your toes, also perform thigh muscle tightening exercise for 10 to 15 minutes at least three times per day.     Ice pack to control swelling and discomfort, place a thin towel on your skin and apply the ice pack over top, you may apply ice for 20 minutes as often as two times per hour.     ROM: continue to work on knee range of motion to prevent stiffness, when sitting \"bridge the leg\" keep the leg fully straight with a bump under the heel to ensure that you can fully straighten the knee, also work on getting your leg fully straight while walking, landing on your heel and progressing over the knee normally to prevent a flexion contracture.       OK to shower but no bathing or soaking until approved by surgeon.     Incision may be kept open to air.     DO NOT DRIVE while taking narcotic pain medications.     contact your physician if you experience symptoms: pain in your knee that persist or worsens in the first few days after surgery, excessive redness or drainage of cloudy or bloody material from wound, a temperature elevation greater than 101.5, pain swelling or redness in calf numbness or weakness in your leg or foot     TOTAL DISCHARGE TIME:   Greater than 30 minutes  Electronically signed by:  JAIRON Restrepo CNP     Documentation of Face to Face and Certification for Home Health Services    I certify that patient: Staci Watt is under my care and that I, or a nurse practitioner or physician's assistant working with me, had a face-to-face encounter that meets the physician face-to-face encounter requirements with this patient on: 5/20/2019.    This encounter with the patient was in whole, or in part, for the following medical condition, which is the primary reason for home health care:   1. Status post knee surgery  2. Primary osteoarthritis of both knees  3. Chronic pain of both knees  4. Gastroesophageal reflux disease without esophagitis  5. Anemia, unspecified type  6. HTN, goal below 150/90  7. " CKD (chronic kidney disease) stage 3, GFR 30-59 ml/min (H)  8. Major depressive disorder, recurrent episode, moderate (H)    I certify that, based on my findings, the following services are medically necessary home health services: Nursing, Occupational Therapy and Physical Therapy.    My clinical findings support the need for the above services because: Nurse is needed: To provide assessment and oversight required in the home to assure adherence to the medical plan due to: above diagnosis.., Occupational Therapy Services are needed to assess and treat cognitive ability and address ADL safety due to impairment in deconditioning d/t status post right knee. and Physical Therapy Services are needed to assess and treat the following functional impairments: Deconditioning d/t status post right knee.    Further, I certify that my clinical findings support that this patient is homebound (i.e. absences from home require considerable and taxing effort and are for medical reasons or [facility] services or infrequently or of short duration when for other reasons) because: Requires assistance of another person or specialized equipment to access medical services because patient: Is unable to walk greater than 100 feet without rest. and Requires supervision of another for safe transfer...    Based on the above findings. I certify that this patient is confined to the home and needs intermittent skilled nursing care, physical therapy and/or speech therapy.  The patient is under my care, and I have initiated the establishment of the plan of care.  This patient will be followed by a physician who will periodically review the plan of care.  Physician/Provider to provide follow up care: Destin Wolf    Attending hospital physician (the Medicare certified [facility] provider): Electronically signed by Dr. Marianela Coyne MD, and only signing for initial order. Please send all follow up questions and concerns or needed follow up  signatures to the PCP Destin Wolf.    Physician Signature: See electronic signature associated with these discharge orders.  Date: 5/20/2019                    Sincerely,        JAIRON Restrepo CNP

## 2019-05-20 NOTE — PROGRESS NOTES
[facility] DISCHARGE SUMMARY  PATIENT'S NAME: Staci Watt  YOB: 1935  MEDICAL RECORD NUMBER:  [id number]  Place of Service where encounter took place:  [facility] [[id number]]    PRIMARY CARE PROVIDER AND CLINIC RESPONSIBLE AFTER TRANSFER:   Destin Wolf MD, [address] / [facility] [address]    Non-[facility] Provider     Transferring providers: JAIRON Restrepo CNP, Marianela Coyne MD  Recent Hospitalization/ED:  [facility] stay 5/7/19 to 5/10/19.  Date of SNF Admission: May / 10 / 2019  Date of SNF (anticipated) Discharge: May / 22 or 23 / 2019  Discharged to: previous independent home with daughter  Cognitive Scores: BIMS: 8/15 and SLUMS: 15/30  Physical Function: Ambulating  ft with SBA and walker  DME: Walker    CODE STATUS/ADVANCE DIRECTIVES DISCUSSION:  Full Code   ALLERGIES: Contrast dye and Seasonal allergies     BP Readings from Last 3 Encounters:   05/20/19 141/73   05/13/19 178/76   05/10/19 121/54     Wt Readings from Last 5 Encounters:   05/20/19 71.8 kg (158 lb 6.4 oz)   05/07/19 71.2 kg (156 lb 15.5 oz)   04/29/19 72.3 kg (159 lb 8 oz)   04/17/19 73.9 kg (163 lb)   04/10/19 73.9 kg (163 lb)     Last week BP readings range: 134//76    DISCHARGE DIAGNOSIS/NURSING FACILITY COURSE:     ASSESSMENT/PLAN:     (Z98.890) Status post knee surgery  (primary encounter diagnosis)  (M17.0) Primary osteoarthritis of both knees  (M25.561,  M25.562,  G89.29) Chronic pain of both knees  (R53.81) Debility  Comment: Progressing well with therapies. Not at goal. Stable  Plan:  -Continue Physical therapy, and Occupational therapy  -Continue Tylenol 1000mg TID and daily PRN  -Continue oxycodone 5mg every 4 hours PRN for pain control  -Continue Aspirin 81mg BID as directed by ortho  -Continue vitamin D supplemention as directed  -Continue ice application for good relief  -Follow up with ortho as  directed---pending 5/24/19  -Tubi  and/or ace wrap PRN if edema  -Encourage elevation when in bed--pillow under calf and NOT knee     (K21.9) Gastroesophageal reflux disease without esophagitis  Comment:Chronic. Stable.   Plan:  -Continue to monitor worsening s/sx of concerns  -Continue zantac BID   -Follow up with PCP post TCU     (D64.9) Anemia, unspecified type  Comment: Acute on chronic.   Plan:   -Continue daily vitamin D, B12, and MVI supplementation  -Continue iron daily as directed  -Follow up with PCP post TCU     (I10) HTN, goal below 150/90  Comment: Chronic. Stable. Based on JNC-8 goals,  patients age of 83 year old, no presence of diabetes or CKD, and goals of care goal BP is <150/90 mm Hg. Patient is stable with current plan of care and routine assessment..Elevated today r/t pain vs ? Hydrochlorothiazide and lisinopril held on hospital  Plan:   -Continue no pharmacological interventions as this time  -Monitor BP and HR daily  -Will restart hydrochlorothiazide today  -Follow up with PCP post TCU     (N18.3) CKD (chronic kidney disease) stage 3, GFR 30-59 ml/min (H)  Comment: acute on chronic. Creatinine baseline 0.74.   Plan:   -Continue to monitor  -Follow up with PCP post TCU  -Avoid ibuprofen     (F33.1) Major depressive disorder, recurrent episode, moderate (H)  Comment: Chronic. Stable  Plan:   -Continue Prozac as directed  -Monitor for worsening moods and behaviors  -Follow up with PCP post TCU    Past Medical History:  has a past medical history of Allergic rhinitis, cause unspecified, CKD (chronic kidney disease) stage 3, GFR 30-59 ml/min (H), Gout, unspecified, Hyperlipidemia LDL goal <100 (7/9/2004), Hypertension goal BP (blood pressure) < 140/90 (7/9/2004), Iron deficiency anemia, Moderate major depression (H) (10/19/2006), Moderate persistent asthma (12/4/2005), OA (osteoarthritis) of knee (2/2/2012), Obese (2/2/2012), Retinal detachment with retinal defect, unspecified, and Unspecified  cataract.    Discharge Medications:  Current Outpatient Medications   Medication Sig Dispense Refill     acetaminophen (TYLENOL) 500 MG tablet Take 2 tablets (1,000 mg) by mouth 3 times daily AND 1000mg daily PRN 90 tablet 1     albuterol (PROAIR HFA/PROVENTIL HFA/VENTOLIN HFA) 108 (90 Base) MCG/ACT inhaler Inhale 2 puffs into the lungs every 4 hours as needed for shortness of breath / dyspnea or wheezing       aspirin (ASA) 81 MG EC tablet Take 1 tablet (81 mg) by mouth 2 times daily 56 tablet 0     atorvastatin (LIPITOR) 80 MG tablet Take 0.5 tablets (40 mg) by mouth daily For high cholesterol. 45 tablet 3     ferrous gluconate (FERGON) 324 (38 Fe) MG tablet Take 324 mg by mouth daily (with breakfast)       FLUoxetine (PROZAC) 20 MG capsule Take 1 capsule (20 mg) by mouth daily 90 capsule 1     fluticasone-salmeterol (ADVAIR DISKUS) 500-50 MCG/DOSE diskus inhaler Inhale 1 puff into the lungs 2 times daily 3 Inhaler 3     multivitamin w/minerals (MULTI-VITAMIN) tablet Take 1 tablet by mouth daily 30 tablet 0     order for DME Equipment being ordered: 2 wheeled walker 1 Product 0     order for DME Equipment being ordered: Digital home blood pressure monitor kit 1 kit 0     oxyCODONE (ROXICODONE) 5 MG tablet Take 1 tablet (5 mg) by mouth every 4 hours as needed for pain 90 tablet 0     polyethylene glycol (MIRALAX/GLYCOLAX) powder Take 17 g (1 capful) by mouth daily 500 g 0     ranitidine (ZANTAC) 150 MG tablet Take 1 tablet (150 mg) by mouth 2 times daily 60 tablet 1     vitamin B-12 (CYANOCOBALAMIN) 1000 MCG tablet Take 1,000 mcg by mouth daily       vitamin D3 (CHOLECALCIFEROL) 2000 units tablet Take 1 tablet by mouth daily 90 tablet 0     Medication Changes/Rationale:     Miralax added daily for BM relief    Controlled medications sent with patient:   Script for Oxycodone 5mg medication for 30 tabs and 0 refills given to patient at discharge to have them fill at their out patient pharmacy     ROS:   10 point ROS  "of systems including Constitutional, Eyes, Respiratory, Cardiovascular, Gastroenterology, Genitourinary, Integumentary, Musculoskeletal, Psychiatric were all negative except for pertinent positives noted in my HPI.    Physical Exam:   Vitals: /73   Pulse 104   Temp 98.6  F (37  C)   Resp 18   Ht 1.565 m (5' 1.6\")   Wt 71.8 kg (158 lb 6.4 oz)   SpO2 99%   BMI 29.35 kg/m    BMI= Body mass index is 29.35 kg/m .  GENERAL APPEARANCE:  Alert, in no distress, oriented, cooperative  ENT:  Mouth and posterior oropharynx normal, moist mucous membranes, normal hearing acuity  EYES:  EOM, conjunctivae, lids, pupils and irises normal  RESP:  respiratory effort and palpation of chest normal, lungs clear to auscultation , no respiratory distress  CV:  Palpation and auscultation of heart done , regular rate and rhythm, no murmur, rub, or gallop, Trace peripheral edema BLE  ABDOMEN:  normal bowel sounds, soft, nontender, no hepatosplenomegaly or other masses, no guarding or rebound  M/S:   Good strength 5/5 in all extremities. Ambulates with walker assistance  SKIN:  Inspection of skin and subcutaneous tissue baseline, Palpation of skin and subcutaneous tissue baseline, wound healing well, no signs of infection no drainage. Steri strips intact. Mild edema  NEURO:   Cranial nerves 2-12 are normal tested and grossly at patient's baseline, no purposeful movement in upper and lower extremities  PSYCH:  oriented X 3, memory impaired , affect and mood normal     SNF labs:   Most Recent 3 CBC's:  Recent Labs   Lab Test 05/13/19 05/09/19  0708 05/08/19  0642 04/29/19  1051 04/17/19  1336   WBC 8.6  --   --  6.1 8.1   HGB 8.2* 8.3* 8.4* 9.5* 10.2*   MCV 97  --   --  91 88     --   --  486* 477*     Most Recent 3 BMP's:  Recent Labs   Lab Test 05/13/19 05/09/19  0708 05/08/19  0642    136 134   POTASSIUM 4.0 4.2 3.7   CHLORIDE 109* 105 103   CO2 22 23 26   BUN 11 14 15   CR 0.75 0.86 0.94   ANIONGAP 8 8 5   ELIDA 9.0 " 8.5 7.9*   GLC 82 84 101*     Most Recent 2 LFT's:  Recent Labs   Lab Test 04/12/19  1138 01/03/19  1007   AST 17 18   ALT 20 9   ALKPHOS 76 82   BILITOTAL 0.3 0.5     Most Recent TSH and T4:  Recent Labs   Lab Test 04/12/19  1138   TSH 1.52     Most Recent Anemia Panel:  Recent Labs   Lab Test 05/13/19 04/17/19  1336  04/10/19  1437   WBC 8.6   < > 8.1   < > 7.8   HGB 8.2*   < > 10.2*   < > 9.7*   HCT 27.1*   < > 30.7*   < > 31.9*   MCV 97   < > 88   < > 90      < > 477*   < > 584*   IRON  --   --  15*  --   --    IRONSAT  --   --  6*  --   --    RETICABSCT  --   --  48.6   < >  --    RETP  --   --  1.4   < >  --    FEB  --   --  261  --   --    JOE  --   --  274*  --  307*   B12  --   --   --   --  4,033*   FOLIC  --   --   --   --  39.2    < > = values in this interval not displayed.       DISCHARGE PLAN:    Follow up labs: BMP and CBC within 2-4 weeks post TCU    Medical Follow Up:      Follow up with primary care provider in 1-2 weeks  Follow up with specialist Ortho on 5/24/19     [facility] referral needed and placed by this provider: No    Current [facility] scheduled appointments:    Discharge Services: Home Care:  Occupational Therapy, Physical Therapy, Registered Nurse, Home Health Aide and From:  [facility]    Discharge Instructions Verbalized to Patient at Discharge:     Elevation: During times on inactivity throughout the first two weeks after surgery, to decrease swelling by elevating by lying down and placing several pillows lengthwise under your thigh and calf to raise your toes above the level of your nose.do not place pillows under your knee, if you begin to experience worsening swelling or calf pain contact [facility]'s office.     Exercises: three times daily for the first four weeks, to prevent blood clots in your legs, point and flex your feet move your ankle around in big circles wiggle your toes, also perform thigh muscle tightening exercise for 10 to 15 minutes at least three times  "per day.     Ice pack to control swelling and discomfort, place a thin towel on your skin and apply the ice pack over top, you may apply ice for 20 minutes as often as two times per hour.     ROM: continue to work on knee range of motion to prevent stiffness, when sitting \"bridge the leg\" keep the leg fully straight with a bump under the heel to ensure that you can fully straighten the knee, also work on getting your leg fully straight while walking, landing on your heel and progressing over the knee normally to prevent a flexion contracture.       OK to shower but no bathing or soaking until approved by surgeon.     Incision may be kept open to air.     DO NOT DRIVE while taking narcotic pain medications.     contact your physician if you experience symptoms: pain in your knee that persist or worsens in the first few days after surgery, excessive redness or drainage of cloudy or bloody material from wound, a temperature elevation greater than 101.5, pain swelling or redness in calf numbness or weakness in your leg or foot     TOTAL DISCHARGE TIME:   Greater than 30 minutes  Electronically signed by:  JAIRON Restrepo CNP     Documentation of Face to Face and Certification for Home Health Services    I certify that patient: Staci Watt is under my care and that I, or a nurse practitioner or physician's assistant working with me, had a face-to-face encounter that meets the physician face-to-face encounter requirements with this patient on: 5/20/2019.    This encounter with the patient was in whole, or in part, for the following medical condition, which is the primary reason for home health care:   1. Status post knee surgery  2. Primary osteoarthritis of both knees  3. Chronic pain of both knees  4. Gastroesophageal reflux disease without esophagitis  5. Anemia, unspecified type  6. HTN, goal below 150/90  7. CKD (chronic kidney disease) stage 3, GFR 30-59 ml/min (H)  8. Major depressive disorder, " recurrent episode, moderate (H)    I certify that, based on my findings, the following services are medically necessary home health services: Nursing, Occupational Therapy and Physical Therapy.    My clinical findings support the need for the above services because: Nurse is needed: To provide assessment and oversight required in the home to assure adherence to the medical plan due to: above diagnosis.., Occupational Therapy Services are needed to assess and treat cognitive ability and address ADL safety due to impairment in deconditioning d/t status post right knee. and Physical Therapy Services are needed to assess and treat the following functional impairments: Deconditioning d/t status post right knee.    Further, I certify that my clinical findings support that this patient is homebound (i.e. absences from home require considerable and taxing effort and are for medical reasons or [employer] services or infrequently or of short duration when for other reasons) because: Requires assistance of another person or specialized equipment to access medical services because patient: Is unable to walk greater than 100 feet without rest. and Requires supervision of another for safe transfer...    Based on the above findings. I certify that this patient is confined to the home and needs intermittent skilled nursing care, physical therapy and/or speech therapy.  The patient is under my care, and I have initiated the establishment of the plan of care.  This patient will be followed by a physician who will periodically review the plan of care.  Physician/Provider to provide follow up care: Destin Wolf    Attending [facility] physician (the Medicare certified [facility] provider): Electronically signed by Dr. Marianela Coyne MD, and only signing for initial order. Please send all follow up questions and concerns or needed follow up signatures to the PCP Destin Wolf.    Physician Signature: See electronic signature  associated with these discharge orders.  Date: 5/20/2019

## 2019-05-21 NOTE — PROGRESS NOTES
Subjective     Staci Watt is a 83 year old female who presents to clinic today for the following health issues:  Here with son    [facility]     Hospital Follow-up Visit:    Hospital/Nursing Home/ Rehab Facility: [facility]  Date of Admission: 5/10/2019  Date of Discharge: 5/20/2019  Reason(S) for Admission: Status post knee surgery              Problems taking medications regularly:  Yes, until today       Medication changes since discharge: yes       Problems adhering to non-medication therapy:  None    Summary of hospitalization:  [facility] discharge summary reviewed  Diagnostic Tests/Treatments reviewed.  Follow up needed: yes  Other Healthcare Providers Involved in Patient S Care:         None  Update since discharge: improved.     Post Discharge Medication Reconciliation: discharge medications reconciled, continue medications without change.  Plan of care communicated with patient     Coding guidelines for this visit:  Type of Medical   Decision Making Face-to-Face Visit       within 7 Days of discharge Face-to-Face Visit        within 14 days of discharge   Moderate Complexity [id number]   High Complexity [id number]          Hx of obesity, HTN on asa, hydrochlorothiazide & lisinopril, HLD on Lipitor, CKD3, microalbuminuria, allergic rhinitis, moderate persistent asthma on albuterol and Advair, MDD on Prozac, ganglion cyst, gout , OA, rotator cuff syndrome, B/L knee pain prior left knee surgery, tramadol and ibuprofen and kenalog shots prn, hx of electrolyte abnormalities, prior noted anemia, prior retinal detachment S/p surgery, cataract surgery, prior FLASK, NISSA BSO, hernia repair, tonsillectomy, B/L bunionectomy,on B 12 & vit d. Allergic to contrast. She was admitted 1/3 to 1/5 for fatigue & found to have low sodium, low potassium & low magnesium, GERD 7 constipation. Thought secondary to poor intake & hydrochlorothiazide. Lytes were replaced, lisinopril increased to 40 mg &  hydrochlorothiazide discontinued & given Protonix for 2 weeks. Seen 1/9/19 for hospital follow up & hydrochlorothiazide continued to be held. On 1/28 kidney function improved & hydrochlorothiazide resumed as BP was running high off it. Seen by PCP 3/4/19 for weight loss, tramadol stopped & resumed ibuprofen prn. Seen 2/29 for knee pain & referred to sports. Seen by sports 4/10 & then by preop assessment as above.MRSA screen was negative & BMP showed sodium was 131, hb was 9.7 with platelets 584, ferritin 307, folate 39 & Vit B 12 was more than adequate. Hb 11 to 12 in past, recently in jan was 9.3. PAC saw her 4/10/19 for risk assessment and optimization for anesthesia with comorbid conditions of Hypertension, hyperlipidemia, CRI, and gout: assessed she does not exert herself and uses walker at home.   intermediate risk surgery with 0.4% risk of major adverse cardiac event. Airway feasible.  MILDRED risk: intermediate 3/8 risk factors. VTE risk: 0.5%. hypertension treated with hydrochlorothiazide and lisinopril.  Advised to hold on day f surgery & hold ASA 7 days prior to surgery.  Denied CP, SOB, JACKSON, palpitations.  Uses walker at home and reported very little activity. Had well controlled moderate persistent asthma on Advair inhaler daily and albuterol prn. No recent exacerbations. Denied cough or wheezing. Noted stable chronic renal insufficiency  & Creatinine was 0.82 (3/4/2019). Preop labs showed sodium at 133 and hemoglobin at 9.7.  & was advised to see her PCP, Dr. Wolf and patient was contacted to follow up with Dr. Wolf to correct these values before her surgery on 5/7/2019. Hb  Low. Reports No bleed. On tylenol a while. Now on ibuprofen for pain. No black or blood in stools. On iron pills ferrous gluconate, & vit d. Has hx of GERD. No longer on Protonix. Used Tums occasionally. Felt weak, Lost appetite a while ago. Noted weight loss, used to be 170's in late dec, then noted 188 on 1/3/19 , 170 something  1/4/19 then 163 lbs on 4/12.     Seen 4/12 first time by self to address her low hemoglobin and her low sodium.  Exam was unremarkable.  Seemed euvolemic.  Advised to decrease hydrochlorothiazide to 1/2 pill of 25 mg daily. Drink 3.5 bottles of water a day instead of 4.  Sodium was low but better than before magnesium was normal LFT's were normal calcium was mildly elevated glucose was normal TSH was normal hemoglobin globin looked better stable.  Advised Zantac due to history of NSAID use and referred to heme and GI for further evaluation.  Advised repeat labs in 1 week on decreased dose hydrochlorothiazide.  Recommended if sodium still low may have to remove her off the hydrochlorothiazide and try something different for BP if it made up on lisinopril alone.  Fit test ordered but never done.  On 4/15 vitamin D and BMP were normal including sodium & calcium, parathyroid was normal as was ionized calcium.  Hemoglobin was stable at 10.2.  Reticulocyte was normal.  Peripheral morphology showed mild normochromic normocytic anemia normal leukocyte count with slight increase in large granulated lymphocytes and mild to moderate thrombocytosis.  And sodium was back to normal, her blood pressure remained stable.  Patient did report she had a fall the day she saw me on her way home but had no adverse effect from it.  Seen by hematology Dr. Monsivais on 4/17 thought most likely has proliferative normocytic anemia in the setting of having iron deficiency.  She had a total iron of 15 with an iron saturation of 66%.  Her ferritin was elevated, but was relatively low in the context of a sed rate of 89. Her thrombocythemia is also probably secondary to an elevated sed rate along with iron deficiency which made her elevated B12 level of greater than 4000 somewhat less accurate.  An increased B12 level could raise suspicion of a myelodysplastic disorder however the patient did not have leukopenia and her MCV was not elevated. Protein  evaluation which returned with two very small monoclonal proteins (each 0.1 G/dL) seen in the gamma fraction.  She also had a slight elevation of her kappa free light chains at 2.10 with a normal lambda free light chain of 1.5 to Cristina ratio that was normal at 1.38.  Overall she had a slightly low albumin and with an increased ESR this pattern suggested an acute phase reaction as well.  Hematology felt a bone marrow was not currently indicated but suspected Ms. Watt had MGUS & recommended need for monitoring every 6-12 months to ensure this process did not evolve to multiple myeloma. Hematology did think she would benefit from intravenous iron given that she had been taking oral iron without substantial benefit.  Suspected she was not absorbing iron due to prior use of PPI for GERD. She also had history of polyps on her colonoscopies in the past--- so agreed with GI evaluation. Given the patient had an anticipated surgical procedure  Hematology advised giving her 2 doses of Injectafer 750 mg x2 which would replete her iron deficit of about 1.4 G.     She had first iron infusion on 25 April. Seen 4/29 & CMP & Hb was stable. Na had improved with fluid restriction & lowering the hydrochlorothiazide to 12.5 while continuing the lisinopril. BP was stable. Her appetite had improved. Weight was stable. Mild hypercalcemia had improved. Suspected to have MGUS. Continued on zantac for GERD. Was to see GI. ast was stable. Had second iron in fusion on 5/2. Had right TKA on 5/7/19. Reports had a third iron infusion while in the hospital.   Did well post operatively.  Transitioned from iv medication to oral meds. She tolerated diet, resumed normal bowel and bladder function.  She was followed by medicine team who held her anti-hypertensive medication and recommended not resuming them until systolic blood pressure was greater than 120 systolic consistently.  Physical therapy and occupational therapy worked with pt. They felt she  was made good progress with therapy but would benefit from additional supervised safe environment. Hemoglobin remained stable. She was discharged on post op day #3  to tcu on 5/10. Was at TCU till yesterday. Seen by geriatrician while there on 5/13 & hydrochlorothiazide 125 mg resumed. Discharged from TCU on 5/20/19. Stay at [facility] currently. Has apt with ortho tomorrow. Has PT, OT, home health ordered, advised asa 81 mg twice a day till saw ortho, vit d, zantac & resumed hydrochlorothiazide 12.5 but lisinopril not resumed    Son brought her here. Living at [facility] for now. Home health to start   Pain under control  Stopped oxycodone, taking tylenol instead  No constipation  Has right knee & le swelling  Was getting it wrapped at tcu  To see ortho tomorrow  appetite & weight stable.   Didn't enjoy staying at the TCU, glad she is out.   Here for hospital discharge follow up   Brought in three bags of meds, one bag  Of empty pill packs , one of partially filled pill packs and one bag of prior to admission med bottles which include hydrochlorothiazide 50 mg tab and lisinopril 40 mg tab which she has not required and was taken off a while ago.( puts into question if really taking 12.5 mg hydrochlorothiazide prior to admission if cutting tab in half given what she had would have been getting 25 mg dosing) . Reports not restarted home meds yet . Using pills packs which includes 12.5 mg hydrochlorothiazide pill.  Cannot recall when apt with gi is  Has upcoming apt's with oncology & ortho and pt and PCP.     No fever or chills, no headache or dizziness, no double or blurry vision, no facial pain, earache, sore throat, runny nose, post nasal drip, no trouble hearing, smelling, tasting or swallowing, no cough , no chest pain, trouble breathing or palpitations, No abdominal pain, heart burn, reflux, nausea or vomiting or diarrhea or constipation, no blood in stools or black stools, no weight loss or night sweats. No  dysuria, hematuria, frequency, urgency, hesitancy, incontinence, No pelvic complaints. No rash.    {  Patient Active Problem List   Diagnosis     Allergic rhinitis     Moderate Persistent Asthma     Gout     Hyperlipidemia LDL goal <100     CKD (chronic kidney disease) stage 3, GFR 30-59 ml/min (H)     Obese     Microalbuminuria     Ganglion cyst     HTN, goal below 150/90     Major depressive disorder, recurrent episode, moderate (H)     Primary osteoarthritis of both knees     Anemia, unspecified type     Rotator cuff syndrome, left     Gastroesophageal reflux disease without esophagitis     NSAID long-term use     Status post knee surgery     MGUS (monoclonal gammopathy of unknown significance)     Past Surgical History:   Procedure Laterality Date     ARTHROPLASTY KNEE Right 5/7/2019    Procedure: Right Total Knee Arthroplasty;  Surgeon: Diego Hi MD;  Location: [facility] OR     BUNIONECTOMY JASWANT BILATERAL       C NONSPECIFIC PROCEDURE      (B) detatched retina     C NONSPECIFIC PROCEDURE      LASIK surgery     C NONSPECIFIC PROCEDURE      NISSA/BSO     C NONSPECIFIC PROCEDURE      Hernia     C NONSPECIFIC PROCEDURE      Tonsillectomy     C NONSPECIFIC PROCEDURE      Arthroscopic knee surgery       Social History     Tobacco Use     Smoking status: Never Smoker     Smokeless tobacco: Never Used     Tobacco comment: Once in awhile when goes to [employer].   Substance Use Topics     Alcohol use: Yes     Comment: has a drink every night before she goes to bed     Family History   Problem Relation Age of Onset     Hypertension Mother      Respiratory Mother      Breast Cancer Maternal Aunt      Respiratory Maternal Aunt      Respiratory Maternal Aunt          Current Outpatient Medications   Medication Sig Dispense Refill     acetaminophen (TYLENOL) 500 MG tablet Take 2 tablets (1,000 mg) by mouth 3 times daily AND 1000mg daily PRN 90 tablet 1     albuterol (PROAIR HFA/PROVENTIL HFA/VENTOLIN HFA) 108 (90 Base)  MCG/ACT inhaler Inhale 2 puffs into the lungs every 4 hours as needed for shortness of breath / dyspnea or wheezing       aspirin (ASA) 81 MG EC tablet Take 1 tablet (81 mg) by mouth 2 times daily 56 tablet 0     atorvastatin (LIPITOR) 80 MG tablet Take 0.5 tablets (40 mg) by mouth daily For high cholesterol. 45 tablet 3     ferrous gluconate (FERGON) 324 (38 Fe) MG tablet Take 324 mg by mouth daily (with breakfast)       FLUoxetine (PROZAC) 20 MG capsule Take 1 capsule (20 mg) by mouth daily 90 capsule 1     fluticasone-salmeterol (ADVAIR DISKUS) 500-50 MCG/DOSE diskus inhaler Inhale 1 puff into the lungs 2 times daily 3 Inhaler 3     lisinopril (PRINIVIL/ZESTRIL) 20 MG tablet Take 1 tablet (20 mg) by mouth daily 30 tablet 1     multivitamin w/minerals (MULTI-VITAMIN) tablet Take 1 tablet by mouth daily 30 tablet 0     order for DME Equipment being ordered: Digital home blood pressure monitor kit 1 kit 0     order for DME Equipment being ordered: 2 wheeled walker 1 Product 0     ranitidine (ZANTAC) 150 MG tablet Take 1 tablet (150 mg) by mouth 2 times daily 60 tablet 1     vitamin B-12 (CYANOCOBALAMIN) 1000 MCG tablet Take 1,000 mcg by mouth daily       vitamin D3 (CHOLECALCIFEROL) 2000 units tablet Take 1 tablet by mouth daily 90 tablet 0     oxyCODONE (ROXICODONE) 5 MG tablet Take 1 tablet (5 mg) by mouth every 4 hours as needed for pain (Patient not taking: Reported on 5/23/2019) 90 tablet 0     polyethylene glycol (MIRALAX/GLYCOLAX) powder Take 17 g (1 capful) by mouth daily (Patient not taking: Reported on 5/23/2019) 500 g 0     Allergies   Allergen Reactions     Contrast Dye Itching     Seasonal Allergies      Recent Labs   Lab Test 05/23/19  1516 05/20/19 04/12/19  1138  01/04/19  0555  01/03/19  1007 12/24/18  0953  01/02/18  1411  05/02/17  1533   LDL  --   --   --   --   --   --   --   --  76  --  72  --  72   HDL  --   --   --   --   --   --   --   --  77  --  101  --  117   TRIG  --   --   --   --   --    --   --   --  78  --  96  --  79   ALT 23  --   --  20  --   --   --  9 14   < >  --   --  17   CR 0.90 0.74   < > 0.84   < > 0.70   < > 0.71 1.29*   < > 0.97   < > 0.91   GFRESTIMATED 59* >60   < > 64   < > 80   < > 79 38*   < > 55*   < > 59*   GFRESTBLACK 69 >60   < > 74   < > >90   < > >90 45*   < > 66   < > 72   POTASSIUM 3.8 3.7   < > 3.6   < > 4.0   < > 2.5* 3.7   < > 3.5   < > 4.0   TSH  --   --   --  1.52  --  1.53  --   --   --    < >  --   --   --     < > = values in this interval not displayed.      BP Readings from Last 3 Encounters:   05/23/19 126/63   05/20/19 141/73   05/13/19 178/76    Wt Readings from Last 3 Encounters:   05/23/19 71.4 kg (157 lb 8 oz)   05/20/19 71.8 kg (158 lb 6.4 oz)   05/07/19 71.2 kg (156 lb 15.5 oz)                    Reviewed and updated as needed this visit by Provider         Review of Systems   ROS COMP: Constitutional, HEENT, cardiovascular, pulmonary, GI, , musculoskeletal, neuro, skin, endocrine and psych systems are negative, except as otherwise noted.      Objective    /63 (BP Location: Left arm, Patient Position: Sitting, Cuff Size: Adult Regular)   Pulse 111   Temp 97.9  F (36.6  C) (Oral)   Resp 16   Wt 71.4 kg (157 lb 8 oz)   BMI 29.18 kg/m    Body mass index is 29.18 kg/m .  Physical Exam   GENERAL: healthy, alert, no distress, over weight, elderly and fatigued  EYES: Eyes grossly normal to inspection, PERRL and conjunctivae and sclerae normal  HENT: ear canals and TM's normal, nose and mouth without ulcers or lesions  NECK: no adenopathy, no asymmetry, masses, or scars and thyroid normal to palpation  RESP: lungs clear to auscultation - no rales, rhonchi or wheezes  CV: regular rate and rhythm, normal S1 S2, no S3 or S4, no murmur, click or rub, no peripheral edema and peripheral pulses strong  ABDOMEN: soft, non tender, no hepatosplenomegaly, no masses and bowel sounds normal  MS: right knee swelling , mild warmth and redness, no cellulitis seen,  midline vertical surgical scar, 2 plus swelling to dorsum mid foot, calf soft non tender, homans negative  SKIN: no suspicious lesions or rashes  NEURO: Normal strength and tone, mentation intact and speech normal  PSYCH: mentation appears normal, affect normal/bright    Diagnostic Test Results:  Labs reviewed in Epic  No results found for this or any previous visit (from the past 24 hour(s)).        Assessment & Plan       ICD-10-CM    1. Hospital discharge follow-up Z09 CBC with platelets differential     Comprehensive metabolic panel   2. History of total knee arthroplasty, right Z96.651 CBC with platelets differential     Comprehensive metabolic panel   3. HTN, goal below 150/90 I10 CBC with platelets differential     Comprehensive metabolic panel     lisinopril (PRINIVIL/ZESTRIL) 20 MG tablet   4. CKD (chronic kidney disease) stage 3, GFR 30-59 ml/min (H) N18.3 CBC with platelets differential     Comprehensive metabolic panel   5. Anemia, unspecified type D64.9 CBC with platelets differential     Comprehensive metabolic panel   6. Microalbuminuria R80.9 CBC with platelets differential     Comprehensive metabolic panel   7. Obesity with serious comorbidity, unspecified classification, unspecified obesity type E66.9 CBC with platelets differential     Comprehensive metabolic panel   8. Gastroesophageal reflux disease without esophagitis K21.9 ranitidine (ZANTAC) 150 MG tablet   9. MGUS (monoclonal gammopathy of unknown significance) D47.2      Doing well post op. Just got out of TCU and at daughters to get home health, see ortho tomorrow and start home PT, OT and get a home health aide. Had right knee and leg swelling without signs of DVT. Labs today. Resume ranitidine 150 twice a day. Discontinue hydrochlorothiazide ( had wrong dose at home). Take lisinopril 1/2 of 40 mg daily for Blood pressure instead. New script will be one of 20 mg tab. Weaning off oxycodone. Hold the B12 for now. Ask ortho about aspirin  "twice a day. Was supposed to do that all along but didn't realize but was getting bid at TCU and just got out yesterday so advised to take bid and ask ortho tomorrow for how long. No sign of bleeding noted and hb stable. No need to do B 12. To keep apt with ortho, oncology, GI, PT  Continue routine care with PCP Dr Mora back in July.    BMI:   Estimated body mass index is 29.18 kg/m  as calculated from the following:    Height as of 5/20/19: 1.565 m (5' 1.6\").    Weight as of this encounter: 71.4 kg (157 lb 8 oz).   Weight management plan: Discussed healthy diet and exercise guidelines  Regular exercise  See Patient Instructions    Return in about 1 month (around 6/23/2019) for Physical Exam with PCP.    Sri Bernstein MD  [facility]    "

## 2019-05-21 NOTE — PROGRESS NOTES
Clinic Care Coordination Contact  [facility]    Referral Source: SNF/TCU   [facility] 5/7-5/10/2019 then discharged to [facility] 5/10-5/22 or 5/23/2019  Status Post knee surgery   Clinical Data: Care Coordinator Outreach   Plan:  Care Coordinator will try to reach patient again in 1-2 weeks  Keturah Nugent RN, Care Coordinator   [facility] -Care Coordination  Robyn Prakash  [phone]

## 2019-05-23 NOTE — PATIENT INSTRUCTIONS
Labs today  Resume ranitidine 150 twice a day  Discontinue hydrochlorothiazide ( had wrong dose at home)   Take lisinopril 1/2 of 40 mg daily for Blood pressure instead  Hold the b12 for now  Ask ortho about aspirin twice a day   Keep apt with ortho, oncology, GI, PT  Continue routine care with PCP Dr Abby escalante in July

## 2019-05-23 NOTE — LETTER
[place] [id number] [place] [id number] [phone]          May 24, 2019    Staci Watt                                                                                                                     [address] [facility] [address]          Dear Staci,    Kidney function (GFR) is decreased.  ADVISE: stop hydrochlorothiazide as discussed, take lisinopril 20 mg instead   Recheck bp and bmp in lab/ ma only apt in 2 weeks   -Sodium is normal.   -Potassium is normal.   -Calcium is normal.   -Glucose is normal.     Results for orders placed or performed in visit on 05/23/19   CBC with platelets differential   Result Value Ref Range    WBC 7.6 4.0 - 11.0 10e9/L    RBC Count 3.42 (L) 3.8 - 5.2 10e12/L    Hemoglobin 10.5 (L) 11.7 - 15.7 g/dL    Hematocrit 33.1 (L) 35.0 - 47.0 %    MCV 97 78 - 100 fl    MCH 30.7 26.5 - 33.0 pg    MCHC 31.7 31.5 - 36.5 g/dL    RDW 18.9 (H) 10.0 - 15.0 %    Platelet Count 531 (H) 150 - 450 10e9/L    % Neutrophils 60.3 %    % Lymphocytes 29.6 %    % Monocytes 6.5 %    % Eosinophils 2.9 %    % Basophils 0.7 %    Absolute Neutrophil 4.6 1.6 - 8.3 10e9/L    Absolute Lymphocytes 2.3 0.8 - 5.3 10e9/L    Absolute Monocytes 0.5 0.0 - 1.3 10e9/L    Absolute Eosinophils 0.2 0.0 - 0.7 10e9/L    Absolute Basophils 0.1 0.0 - 0.2 10e9/L    Diff Method Automated Method    Comprehensive metabolic panel   Result Value Ref Range    Sodium 137 133 - 144 mmol/L    Potassium 3.8 3.4 - 5.3 mmol/L    Chloride 106 94 - 109 mmol/L    Carbon Dioxide 22 20 - 32 mmol/L    Anion Gap 9 3 - 14 mmol/L    Glucose 89 70 - 99 mg/dL    Urea Nitrogen 10 7 - 30 mg/dL    Creatinine 0.90 0.52 - 1.04 mg/dL    GFR Estimate 59 (L) >60 mL/min/[1.73_m2]    GFR Estimate If Black 69 >60 mL/min/[1.73_m2]    Calcium 9.7 8.5 - 10.1 mg/dL    Bilirubin Total 0.3 0.2 - 1.3 mg/dL    Albumin 3.2 (L) 3.4 - 5.0 g/dL    Protein Total 7.4 6.8 - 8.8 g/dL    Alkaline Phosphatase 122 40 -  150 U/L    ALT 23 0 - 50 U/L    AST 22 0 - 45 U/L       Sincerely,       Sri Bernstein MD.

## 2019-05-23 NOTE — TELEPHONE ENCOUNTER
Patient is asking for delay in start of care to Saturday 5/25/19.  Authorization given.  Oralia Persaud RN

## 2019-05-23 NOTE — TELEPHONE ENCOUNTER
Reason for Call:  Other call back    Detailed comments: Caitie nurse coordinator called and said that the patient will like a delay of Care until Saturday 5/2. She is waiting for verbal consent her line is secure. [place] number is [phone]. Please Advise    Phone Number Patient can be reached at: Other phone number:  anytime    Best Time: [phone]    Can we leave a detailed message on this number? YES    Call taken on 5/23/2019 at 12:00 PM by Bhumika Newsome

## 2019-05-24 PROBLEM — M25.561 BILATERAL KNEE PAIN: Status: RESOLVED | Noted: 2018-08-07 | Resolved: 2019-01-01

## 2019-05-24 PROBLEM — M25.562 BILATERAL KNEE PAIN: Status: RESOLVED | Noted: 2018-08-07 | Resolved: 2019-01-01

## 2019-05-24 NOTE — TELEPHONE ENCOUNTER
Hb stable at 10  Sodium normal   kidney function decreased  Stop the hydrochlorothiazide as discussed yesterday   Use the lisinopril only 20 mg ( 1/2 of 40 0 or new script of 20 one a day )daily for Bp control  keep pat with nurse for BP apt in 2 weeks and repeat bmp at that time too  Can close encounter when done

## 2019-05-24 NOTE — PROGRESS NOTES
DISCHARGE SUMMARY    Staci Watt was seen 2 times for evaluation and treatment.  Patient did not return for further treatment and current status is unknown.  Due to short treatment duration, no objective or functional changes were made.  Please see goal flow sheet from episode noted date below and initial evaluation for further information.  Patient is discharged from therapy and therapy episode is resolved as of 05/24/19.      Linked Episodes   Type: Episode: Status: Noted: Resolved: Last update: Updated by:   PHYSICAL THERAPY Knees 8/7/18 Active 8/7/2018 8/14/2018 12:44 PM Gurwinder Owens, PT      Comments:

## 2019-05-24 NOTE — TELEPHONE ENCOUNTER
Writer called patient left non detailed message requesting return call to clinic and ask to speak with nurse    Stephanie Hedrick RN

## 2019-05-24 NOTE — RESULT ENCOUNTER NOTE
-Kidney function (GFR) is decreased.  ADVISE: stop hydrochlorothiazide as discussed, take lisinopril 20 mg instead  Recheck bp and bmp in lab/ ma only apt in 2 weeks  -Sodium is normal.  -Potassium is normal.  -Calcium is normal.  -Glucose is normal.

## 2019-05-25 NOTE — PROGRESS NOTES
Dear Dr. Wolf,   Medicare Home Health regulations requires [facility] to provide an initial assessment visit either within 48 hours of the patient's return home, or on the physician ordered Start of Care date.    There will be a delay in the Initial Assessment for Staci WILSON Shukri; MRN [id number]  We will update you when the assessment is completed       Sincerely [facility]  Maryann Linares  [phone]

## 2019-05-26 PROBLEM — D47.2 MGUS (MONOCLONAL GAMMOPATHY OF UNKNOWN SIGNIFICANCE): Status: ACTIVE | Noted: 2019-01-01

## 2019-05-28 NOTE — PROGRESS NOTES
Clinic Care Coordination Contact  [facility]/Voicemail    Referral Source: SNF/TCU  Clinical Data:   Date of Admission:              5/7/2019  Date of Discharge::             5/10/2019   Discharge To:                     TCU          Admission Diagnoses:   Osteoarthritis  Status post knee surgery  Hx of Hypertention          Procedures Performed:   Right Total Knee Arthroplasty       Clinical Data: Care Coordinator Outreach  Outreach attempted x 1.  Left message on voicemail with call back information and requested return call.  Plan:. Care Coordinator will try to reach patient again in 1-2 business days.  Keturah Nugent RN, Care Coordinator   [facility] -Care Coordination  Robyn Prakash  [phone]

## 2019-05-29 NOTE — TELEPHONE ENCOUNTER
BMP ordered per Dr. Bernstein.    Writer called patient and reviewed message per Dr. Bernstein.    Patient verbalized understanding and in agreement with plan.    Reviewed Lisinopril 20 mg sent to [facility].    Lab appt scheduled on 6/17/19, after MA only BP check.  JEMMA Ham, JUNGN, RN

## 2019-05-29 NOTE — PROGRESS NOTES
Clinic Care Coordination Contact  [facility]/Voicemail    Referral Source: SNF/TCU  Clinical Data:   Date of Admission:              5/7/2019  Date of Discharge::             5/10/2019   Discharge To:                     TCU          Admission Diagnoses:   Osteoarthritis  Status post knee surgery  Hx of Hypertention          Procedures Performed:   Right Total Knee Arthroplasty    Clinical Data: Care Coordinator Outreach  Outreach attempted x 2.  Left message on voicemail with call back information and requested return call.  Plan:. Care Coordinator will Collaborate with home care staff   Keturah Nugent RN, Care Coordinator   [facility] -Care Coordination  Robyn Prakash  [phone]

## 2019-05-29 NOTE — TELEPHONE ENCOUNTER
[facility] Call Center    Phone Message    May a detailed message be left on voicemail: yes    Reason for Call: Order(s): Home Care Orders: Skilled Nursing: starting next week - 3x per week for 1 weeks, 2x per week for 2 weeks.     Action Taken: Message routed to:  [facility] ([facility] Ortho

## 2019-05-29 NOTE — TELEPHONE ENCOUNTER
Called viral back and gave verbal authorization for Physical therapy for the times and amount stated below. It is physical therapy orders not skilled nursing, verified with viral

## 2019-05-31 NOTE — PROGRESS NOTES
REASON FOR VIST/CC: Follow-up appointment following left total knee arthroplasty with Dr. Hi on 5/7/2019.      HISTORY OF PRESENT ILLNESS:  Staci Watt is a 83 year old female who is approximately 2 weeks status post left total knee arthroplasty.  Overall, Staci reports that she is doing well at this point in her recovery.  She is overjoyed to be discharged from the TCU.  She reports that she is currently ambulating with the assistance of a walker, and tolerating it well.  Her pain is minimal, and she reports consistently  needing only a single oxycodone tablet at night.  She will occasionally use one oxycodone tablet around the time of physical therapy.  The patient is currently involved in a formal physical therapy program and seems to be doing well.  She reports that her knee range of motion has greatly improved since beginning physical therapy.    The patient endorses some swelling around the distal aspect of the incision, however she denies excessive surrounding redness.  The incision has been dry and the patient  denies discharge. Staci denies recent fevers and chills and other symptoms concerning for infection.     Staci is currently taking aspirin 81 mg twice daily for DVT prophylaxis. Patient denies calf pain or tenderness.      MEDICATIONS:   Current Outpatient Rx   Medication Sig Dispense Refill     acetaminophen (TYLENOL) 500 MG tablet Take 2 tablets (1,000 mg) by mouth 3 times daily AND 1000mg daily PRN 90 tablet 1     albuterol (PROAIR HFA/PROVENTIL HFA/VENTOLIN HFA) 108 (90 Base) MCG/ACT inhaler Inhale 2 puffs into the lungs every 4 hours as needed for shortness of breath / dyspnea or wheezing       aspirin (ASA) 81 MG EC tablet Take 1 tablet (81 mg) by mouth 2 times daily 56 tablet 0     atorvastatin (LIPITOR) 80 MG tablet Take 0.5 tablets (40 mg) by mouth daily For high cholesterol. 45 tablet 3     ferrous gluconate (FERGON) 324 (38 Fe) MG tablet Take 324 mg by mouth daily (with  breakfast)       FLUoxetine (PROZAC) 20 MG capsule Take 1 capsule (20 mg) by mouth daily 90 capsule 1     fluticasone-salmeterol (ADVAIR DISKUS) 500-50 MCG/DOSE diskus inhaler Inhale 1 puff into the lungs 2 times daily 3 Inhaler 3     lisinopril (PRINIVIL/ZESTRIL) 20 MG tablet Take 1 tablet (20 mg) by mouth daily 30 tablet 1     multivitamin w/minerals (MULTI-VITAMIN) tablet Take 1 tablet by mouth daily 30 tablet 0     order for DME Equipment being ordered: Digital home blood pressure monitor kit 1 kit 0     order for DME Equipment being ordered: 2 wheeled walker 1 Product 0     oxyCODONE (ROXICODONE) 5 MG tablet Take 1 tablet (5 mg) by mouth every 4 hours as needed for pain 90 tablet 0     ranitidine (ZANTAC) 150 MG tablet Take 1 tablet (150 mg) by mouth 2 times daily 60 tablet 1     vitamin B-12 (CYANOCOBALAMIN) 1000 MCG tablet Take 1,000 mcg by mouth daily       vitamin D3 (CHOLECALCIFEROL) 2000 units tablet Take 1 tablet by mouth daily 90 tablet 0     polyethylene glycol (MIRALAX/GLYCOLAX) powder Take 17 g (1 capful) by mouth daily (Patient not taking: Reported on 5/23/2019) 500 g 0         ALLERGIES: Contrast dye and Seasonal allergies       PHYSICAL EXAMINATION:   On physical examination the patient is comfortable and is in no acute distress. The affect is appropriate and breathing is non-labored.    Surgical wound: The surgical wound was exposed and found to be clean and dry.  There is an area approximately 3 cm in length at the distal aspect of the incision that is darkened in color, most likely to normal interaction of incisional bleeding with Dermabond application at the time of surgery. There is mild edema around the incision. There is no erythema. The skin was appropriately warm to touch.     ROM: 0 to 105 degrees    ASSESSMENT/PLAN:  Staci Watt is a 83 year old female status post left total knee arthroplasty with Dr. Hi.  Staci is progressing well and is appropriately tapering from pain  medications.     Basic wound cares were performed at today's visit. We spent time discussing future wound/incision cares.  I instructed her to avoid picking or scratching at the incision, and reassured her that the darkened area at the distal aspect of her incision would likely improve with time.  At this time, the area is not concerning for infection.    The patient made a request for a refill of narcotic medications.  A brief chart review suggested a recent refill of oxycodone 5 mg 90 tablets on 5/17/2019.  According to the patient, she never received this medication.  Given that oxycodone is a controlled medication with great potential for diversion, I called the pharmacy ([facility] [id number], [facility]) to confirm the patient's story. The prescription that was filled by JAIRON Mejia was never filled at the pharmacy and thus, was never picked up.  This was reassuring, and I elected to provide the patient with a small refill of oxycodone that will be taken once daily at night.      The patient will see Dr. Hi at six weeks post op. Staci has our clinic number and will call with any questions or concerns.    Diego Sears PA-C  Orthopaedic Surgery       Answers for HPI/ROS submitted by the patient on 5/31/2019   General Symptoms: Yes  Skin Symptoms: No  HENT Symptoms: No  EYE SYMPTOMS: No  HEART SYMPTOMS: No  LUNG SYMPTOMS: No  INTESTINAL SYMPTOMS: Yes  URINARY SYMPTOMS: Yes  GYNECOLOGIC SYMPTOMS: No  BREAST SYMPTOMS: No  SKELETAL SYMPTOMS: No  BLOOD SYMPTOMS: No  NERVOUS SYSTEM SYMPTOMS: Yes  MENTAL HEALTH SYMPTOMS: Yes  Fever: No  Loss of appetite: Yes  Weight loss: Yes  Weight gain: No  Fatigue: Yes  Excessive thirst: No  Feeling hot or cold when others believe the temperature is normal: No  Loss of height: No  Post-operative complications: No  Surgical site pain: Yes  Hallucinations: No  Change in or Loss of Energy: Yes  Hyperactivity: No  Confusion: No  Heart burn or indigestion:  No  Nausea: Yes  Vomiting: No  Abdominal pain: No  Bloating: No  Constipation: Yes  Diarrhea: No  Blood in stool: No  Black stools: No  Rectal or Anal pain: No  Fecal incontinence: Yes  Yellowing of skin or eyes: No  Vomit with blood: No  Change in stools: No  Trouble holding urine or incontinence: No  Pain or burning: No  Trouble starting or stopping: No  Increased frequency of urination: No  Blood in urine: No  Decreased frequency of urination: No  Frequent nighttime urination: No  Flank pain: No  Difficulty emptying bladder: No  Trouble with coordination: No  Dizziness or trouble with balance: No  Fainting or black-out spells: No  Memory loss: No  Headache: No  Seizures: No  Speech problems: No  Tingling: No  Tremor: Yes  Weakness: No  Difficulty walking: No  Paralysis: No  Numbness: No  Nervous or Anxious: Yes  Depression: Yes  Trouble sleeping: Yes  Trouble thinking or concentrating: No  Mood changes: Yes  Panic attacks: No

## 2019-05-31 NOTE — NURSING NOTE
Reason For Visit:   Chief Complaint   Patient presents with     Surgical Followup     2 week pop right TKA.  DOS: 5/7/2019 with Dr. Hi       There were no vitals taken for this visit.    Pain Assessment  Patient Currently in Pain: Yes  Primary Pain Location: Knee(Right)  Aggravating Factors: Other (comment)(Transitioning from sitting to standing)    Jennifer Hameed, ATC

## 2019-05-31 NOTE — LETTER
5/31/2019       RE: Staci Watt  [address]  [place] [address]     Dear Colleague,    Thank you for referring your patient, Staci Watt, to [facility]. Please see a copy of my visit note below.    REASON FOR VIST/CC: Follow-up appointment following left total knee arthroplasty with Dr. Hi on 5/7/2019.    HISTORY OF PRESENT ILLNESS:  Staci Watt is a 83 year old female who is approximately 2 weeks status post left total knee arthroplasty.  Overall, Staci reports that she is doing well at this point in her recovery.  She is overjoyed to be discharged from the TCU.  She reports that she is currently ambulating with the assistance of a walker, and tolerating it well.  Her pain is minimal, and she reports consistently  needing only a single oxycodone tablet at night.  She will occasionally use one oxycodone tablet around the time of physical therapy.  The patient is currently involved in a formal physical therapy program and seems to be doing well.  She reports that her knee range of motion has greatly improved since beginning physical therapy.    The patient endorses some swelling around the distal aspect of the incision, however she denies excessive surrounding redness.  The incision has been dry and the patient  denies discharge. Staci denies recent fevers and chills and other symptoms concerning for infection.     Staci is currently taking aspirin 81 mg twice daily for DVT prophylaxis. Patient denies calf pain or tenderness.    MEDICATIONS:   Current Outpatient Rx   Medication Sig Dispense Refill     acetaminophen (TYLENOL) 500 MG tablet Take 2 tablets (1,000 mg) by mouth 3 times daily AND 1000mg daily PRN 90 tablet 1     albuterol (PROAIR HFA/PROVENTIL HFA/VENTOLIN HFA) 108 (90 Base) MCG/ACT inhaler Inhale 2 puffs into the lungs every 4 hours as needed for shortness of breath / dyspnea or wheezing        aspirin (ASA) 81 MG EC tablet Take 1 tablet (81 mg) by mouth 2 times daily 56 tablet 0     atorvastatin (LIPITOR) 80 MG tablet Take 0.5 tablets (40 mg) by mouth daily For high cholesterol. 45 tablet 3     ferrous gluconate (FERGON) 324 (38 Fe) MG tablet Take 324 mg by mouth daily (with breakfast)       FLUoxetine (PROZAC) 20 MG capsule Take 1 capsule (20 mg) by mouth daily 90 capsule 1     fluticasone-salmeterol (ADVAIR DISKUS) 500-50 MCG/DOSE diskus inhaler Inhale 1 puff into the lungs 2 times daily 3 Inhaler 3     lisinopril (PRINIVIL/ZESTRIL) 20 MG tablet Take 1 tablet (20 mg) by mouth daily 30 tablet 1     multivitamin w/minerals (MULTI-VITAMIN) tablet Take 1 tablet by mouth daily 30 tablet 0     order for DME Equipment being ordered: Digital home blood pressure monitor kit 1 kit 0     order for DME Equipment being ordered: 2 wheeled walker 1 Product 0     oxyCODONE (ROXICODONE) 5 MG tablet Take 1 tablet (5 mg) by mouth every 4 hours as needed for pain 90 tablet 0     ranitidine (ZANTAC) 150 MG tablet Take 1 tablet (150 mg) by mouth 2 times daily 60 tablet 1     vitamin B-12 (CYANOCOBALAMIN) 1000 MCG tablet Take 1,000 mcg by mouth daily       vitamin D3 (CHOLECALCIFEROL) 2000 units tablet Take 1 tablet by mouth daily 90 tablet 0     polyethylene glycol (MIRALAX/GLYCOLAX) powder Take 17 g (1 capful) by mouth daily (Patient not taking: Reported on 5/23/2019) 500 g 0       ALLERGIES: Contrast dye and Seasonal allergies     PHYSICAL EXAMINATION:   On physical examination the patient is comfortable and is in no acute distress. The affect is appropriate and breathing is non-labored.    Surgical wound: The surgical wound was exposed and found to be clean and dry.  There is an area approximately 3 cm in length at the distal aspect of the incision that is darkened in color, most likely to normal interaction of incisional bleeding with Dermabond application at the time of surgery. There is mild edema around the  incision. There is no erythema. The skin was appropriately warm to touch.     ROM: 0 to 105 degrees    ASSESSMENT/PLAN:  Staci Watt is a 83 year old female status post left total knee arthroplasty with Dr. Hi.  Staci is progressing well and is appropriately tapering from pain medications.     Basic wound cares were performed at today's visit. We spent time discussing future wound/incision cares.  I instructed her to avoid picking or scratching at the incision, and reassured her that the darkened area at the distal aspect of her incision would likely improve with time.  At this time, the area is not concerning for infection.    The patient made a request for a refill of narcotic medications.  A brief chart review suggested a recent refill of oxycodone 5 mg 90 tablets on 5/17/2019.  According to the patient, she never received this medication.  Given that oxycodone is a controlled medication with great potential for diversion, I called the pharmacy ([facility] [id number], [facility]) to confirm the patient's story. The prescription that was filled by JAIRON Mejia was never filled at the pharmacy and thus, was never picked up.  This was reassuring, and I elected to provide the patient with a small refill of oxycodone that will be taken once daily at night.      The patient will see Dr. Hi at six weeks post op. Staci has our clinic number and will call with any questions or concerns.    Diego Sears PA-C  Orthopaedic Surgery

## 2019-06-04 NOTE — PATIENT INSTRUCTIONS
Continue with current medications.  Okay to check blood pressure once a day    Obtain nurse visit for blood pressure check in clinic  Staci to follow up with Primary Care provider regarding elevated blood pressure.

## 2019-06-04 NOTE — PROGRESS NOTES
SUBJECTIVE:   Staci Watt is a 83 year old female presenting with a chief complaint of elevated BP.    Patient is currently staying with daughter for another week, is currently receiving physical and occupational therapy in the home, has nurses that come and check on her also.  Today while sitting and turned her head, got very lightheaded and almost passed out.  Patient did obtain her BP check and was elevated, states that 's.  Patient is currently taking lisinopril 20 mg daily and has not missed the medication.  Endorsed more stressors recently due to surgery and trying to get her home in order before getting back.    Patient has follow up with primary provider in July, has appointment with nurse for BP check on 6/17.    Past Medical History:   Diagnosis Date     Allergic rhinitis, cause unspecified      CKD (chronic kidney disease) stage 3, GFR 30-59 ml/min (H)      Gout, unspecified      Hyperlipidemia LDL goal <100 7/9/2004     Hypertension goal BP (blood pressure) < 140/90 7/9/2004     Iron deficiency anemia      Moderate major depression (H) 10/19/2006     Moderate persistent asthma 12/4/2005     OA (osteoarthritis) of knee 2/2/2012     Obese 2/2/2012     Retinal detachment with retinal defect, unspecified      Unspecified cataract     s/p cataract surgery     Current Outpatient Medications   Medication Sig Dispense Refill     acetaminophen (TYLENOL) 500 MG tablet Take 2 tablets (1,000 mg) by mouth 3 times daily AND 1000mg daily PRN 90 tablet 1     albuterol (PROAIR HFA/PROVENTIL HFA/VENTOLIN HFA) 108 (90 Base) MCG/ACT inhaler Inhale 2 puffs into the lungs every 4 hours as needed for shortness of breath / dyspnea or wheezing       aspirin (ASA) 81 MG EC tablet Take 1 tablet (81 mg) by mouth 2 times daily 56 tablet 0     atorvastatin (LIPITOR) 80 MG tablet Take 0.5 tablets (40 mg) by mouth daily For high cholesterol. 45 tablet 3     ferrous gluconate (FERGON) 324 (38 Fe) MG tablet Take 324 mg by  mouth daily (with breakfast)       FLUoxetine (PROZAC) 20 MG capsule Take 1 capsule (20 mg) by mouth daily 90 capsule 1     fluticasone-salmeterol (ADVAIR DISKUS) 500-50 MCG/DOSE diskus inhaler Inhale 1 puff into the lungs 2 times daily 3 Inhaler 3     lisinopril (PRINIVIL/ZESTRIL) 20 MG tablet Take 1 tablet (20 mg) by mouth daily 30 tablet 1     multivitamin w/minerals (MULTI-VITAMIN) tablet Take 1 tablet by mouth daily 30 tablet 0     order for DME Equipment being ordered: Digital home blood pressure monitor kit 1 kit 0     order for DME Equipment being ordered: 2 wheeled walker 1 Product 0     oxyCODONE (ROXICODONE) 5 MG tablet Take 1 tablet (5 mg) by mouth every 12 hours as needed for severe pain 12 tablet 0     oxyCODONE (ROXICODONE) 5 MG tablet Take 1 tablet (5 mg) by mouth every 4 hours as needed for pain 90 tablet 0     polyethylene glycol (MIRALAX/GLYCOLAX) powder Take 17 g (1 capful) by mouth daily 500 g 0     ranitidine (ZANTAC) 150 MG tablet Take 1 tablet (150 mg) by mouth 2 times daily 60 tablet 1     vitamin B-12 (CYANOCOBALAMIN) 1000 MCG tablet Take 1,000 mcg by mouth daily       vitamin D3 (CHOLECALCIFEROL) 2000 units tablet Take 1 tablet by mouth daily 90 tablet 0     Social History     Tobacco Use     Smoking status: Never Smoker     Smokeless tobacco: Never Used     Tobacco comment: Once in awhile when goes to [facility].   Substance Use Topics     Alcohol use: Yes     Comment: has a drink every night before she goes to bed       ROS:  Review of systems negative except as stated above.    OBJECTIVE:  /72   Pulse 115   Temp 99.1  F (37.3  C) (Tympanic)   Wt 70.7 kg (155 lb 14.4 oz)   SpO2 100%   BMI 28.89 kg/m    GENERAL APPEARANCE: healthy, alert and no distress  RESP: lungs clear to auscultation - no rales, rhonchi or wheezes  CV: regular rates and rhythm, normal S1 S2  Extremities: no peripheral edema, peripheral pulses normal, right knee with healing incision  PSYCH: mentation appears  normal and affect normal/bright    ASSESSMENT/PLAN:  (I10) Hypertension, unspecified type  (primary encounter diagnosis)  Comment: mildly elevated  Plan: reassurance, continue with current medication      Reassurance given to patient regarding BP check here in clinic.  Discussed that would be more concerned about low BP due to her feeling lightheaded, reviewed that due to anxiety afterwards that may have contributed to her elevated BP.  Encourage to continue with current medication at this time.    Will have her follow up with nurse visit for BP check in clinic within 1 week    Hadley Yeboah MD  June 4, 2019 5:42 PM

## 2019-06-10 NOTE — PROGRESS NOTES
Staci Watt is a 83 year old patient who comes in today for a Blood Pressure check.  Initial BP:  /73 (BP Location: Right arm, Patient Position: Sitting, Cuff Size: Adult Regular)   Pulse 105   SpO2 98%      105  Disposition: results routed to provider    She has questions about her BP medication       Dora Mayo MA

## 2019-06-11 NOTE — TELEPHONE ENCOUNTER
Writer called Madeleine, but voicemail box not set up so unable to leave message.    Recall at another time.  JUNG RenteriaN, RN

## 2019-06-11 NOTE — TELEPHONE ENCOUNTER
Reason for Call: Request for an order or referral:    Order or referral being requested: Home Safety Assessment     Date needed: as soon as possible    Has the patient been seen by the PCP for this problem? YES    Additional comments: Verbal ok     Phone number Patient can be reached at:  Home number on file [phone] (home)    Best Time:  Any     Can we leave a detailed message on this number?  YES    Call taken on 6/11/2019 at 4:31 PM by Sammy Carrero

## 2019-06-12 NOTE — NURSING NOTE
Reason For Visit:   Chief Complaint   Patient presents with     Surgical Followup     DOS 5/7/19 S/P Right TKA       Primary MD: Destin Wolf  Referring MD: Diego Hi        Pain Assessment  Patient Currently in Pain: Denies(Pain only at night)    Current Outpatient Medications   Medication     acetaminophen (TYLENOL) 500 MG tablet     albuterol (PROAIR HFA/PROVENTIL HFA/VENTOLIN HFA) 108 (90 Base) MCG/ACT inhaler     aspirin (ASA) 81 MG EC tablet     atorvastatin (LIPITOR) 80 MG tablet     ferrous gluconate (FERGON) 324 (38 Fe) MG tablet     FLUoxetine (PROZAC) 20 MG capsule     fluticasone-salmeterol (ADVAIR DISKUS) 500-50 MCG/DOSE diskus inhaler     lisinopril (PRINIVIL/ZESTRIL) 20 MG tablet     multivitamin w/minerals (MULTI-VITAMIN) tablet     order for DME     order for DME     oxyCODONE (ROXICODONE) 5 MG tablet     oxyCODONE (ROXICODONE) 5 MG tablet     polyethylene glycol (MIRALAX/GLYCOLAX) powder     ranitidine (ZANTAC) 150 MG tablet     vitamin B-12 (CYANOCOBALAMIN) 1000 MCG tablet     vitamin D3 (CHOLECALCIFEROL) 2000 units tablet     No current facility-administered medications for this visit.           Allergies   Allergen Reactions     Contrast Dye Itching     Seasonal Allergies            Lissett Otero LPN

## 2019-06-12 NOTE — TELEPHONE ENCOUNTER
Jenna OT, called back.  The phone # was transposed. [phone].    Directed OT to get this order from ortho, Kolton Pak's office.  I am not seeing PCP office ordering home care since ortho surgery.  She will call ortho clinic for this order.  SEEMA Caraballo

## 2019-06-12 NOTE — LETTER
6/12/2019       RE: Staci Watt  [address]  [facility] [address]     Dear Colleague,    Thank you for referring your patient, Staci Watt, to the [facility]. Please see a copy of my visit note below.           Interval History:   Staci Watt is a 83 year old female who is here follow up for:   Right TKA - 5/7/2019    She has been making good progress since discharge from the hospital.  She has discharge from the transitional care facility is now living with her daughter.  Her right knee pain has been steadily improving.  She is no longer taking any opioids or stronger pain medication.  She is taking Tylenol at night to help with sleep.  She has had a scab on the anterior aspect of the incision that has been improving.  There is no redness, warmth, or drainage.  Her knee range of motion has been excellent.         Physical Exam:     NAD  AOx3  Interactive and cooperative with the exam.  She has a distal scab on the incision as below in the picture.  She has no knee tenderness palpation.  There is no redness or drainage.  The scab appears healthy and to be improving.  Her knee range of motion is from 0 to 120 degrees.  Her knee is stable to varus and valgus stress.           Imaging:      X-rays demonstrate well fixed and well-positioned cemented knee replacement implants.       Assessment and Plan:        Staci is making good progress following the total knee replacement.  She does have an eschar of the distal incision that seems to be steadily improving.  We will keep a close eye on this.  We will try some Edi honey over the area of the scab and hope that this will help it heal more quickly.  I will see her back in clinic in 1 month to make sure that the scab is improving.  She will let us know if there is any redness, drainage, or other problems with the incision in the meantime.  Otherwise she will continue to  progress with activities and strengthening and exercise as tolerated.      Diego Hi M.D.     Arthritis and Joint Replacement  Department of Orthopaedic Surgery, [facility]  [email or web address]  [phone] (pager)

## 2019-06-12 NOTE — TELEPHONE ENCOUNTER
[facility] Call Center    Phone Message    May a detailed message be left on voicemail: yes    Reason for Call: Order(s): Home Care Orders: Other: 1 visit for home safety assessment. Verbal orders ok.     Action Taken: Message routed to:  [facility] ([facility] ortho

## 2019-06-12 NOTE — TELEPHONE ENCOUNTER
Tried to call Madeleine- VM not set up.  I called [facility] office. They will send Madeleine a page to call [facility] clinic.    5/23/19 was last OV with primary for hosp f/u.    1-Ask home care- Are home care orders coming from ortho at this time?  2- Have [facility] staff set up her VM on her phone.  SEEMA Caraballo

## 2019-06-12 NOTE — TELEPHONE ENCOUNTER
"-Verbal \"OK\" given to Gita at  Home Care for 1 visit for home safety assessment. (per her request).     Claudette Villalobos RN  6/12/2019 9:38 AM     "

## 2019-06-12 NOTE — PROGRESS NOTES
Interval History:   Staci Watt is a 83 year old female who is here follow up for:   Right TKA - 5/7/2019    She has been making good progress since discharge from the hospital.  She has discharge from the transitional care facility is now living with her daughter.  Her right knee pain has been steadily improving.  She is no longer taking any opioids or stronger pain medication.  She is taking Tylenol at night to help with sleep.  She has had a scab on the anterior aspect of the incision that has been improving.  There is no redness, warmth, or drainage.  Her knee range of motion has been excellent.         Physical Exam:     NAD  AOx3  Interactive and cooperative with the exam.  She has a distal scab on the incision as below in the picture.  She has no knee tenderness palpation.  There is no redness or drainage.  The scab appears healthy and to be improving.  Her knee range of motion is from 0 to 120 degrees.  Her knee is stable to varus and valgus stress.           Imaging:      X-rays demonstrate well fixed and well-positioned cemented knee replacement implants.       Assessment and Plan:        Staci is making good progress following the total knee replacement.  She does have an eschar of the distal incision that seems to be steadily improving.  We will keep a close eye on this.  We will try some Edi honey over the area of the scab and hope that this will help it heal more quickly.  I will see her back in clinic in 1 month to make sure that the scab is improving.  She will let us know if there is any redness, drainage, or other problems with the incision in the meantime.  Otherwise she will continue to progress with activities and strengthening and exercise as tolerated.      Diego Hi M.D.     Arthritis and Joint Replacement  Department of Orthopaedic Surgery, [facility]  [email or web address]  [phone] (pager)        Answers for HPI/ROS submitted by the  patient on 5/31/2019   General Symptoms: Yes  Skin Symptoms: No  HENT Symptoms: No  EYE SYMPTOMS: No  HEART SYMPTOMS: No  LUNG SYMPTOMS: No  INTESTINAL SYMPTOMS: Yes  URINARY SYMPTOMS: Yes  GYNECOLOGIC SYMPTOMS: No  BREAST SYMPTOMS: No  SKELETAL SYMPTOMS: No  BLOOD SYMPTOMS: No  NERVOUS SYSTEM SYMPTOMS: Yes  MENTAL HEALTH SYMPTOMS: Yes  Fever: No  Loss of appetite: Yes  Weight loss: Yes  Weight gain: No  Fatigue: Yes  Excessive thirst: No  Feeling hot or cold when others believe the temperature is normal: No  Loss of height: No  Post-operative complications: No  Surgical site pain: Yes  Hallucinations: No  Change in or Loss of Energy: Yes  Hyperactivity: No  Confusion: No  Heart burn or indigestion: No  Nausea: Yes  Vomiting: No  Abdominal pain: No  Bloating: No  Constipation: Yes  Diarrhea: No  Blood in stool: No  Black stools: No  Rectal or Anal pain: No  Fecal incontinence: Yes  Yellowing of skin or eyes: No  Vomit with blood: No  Change in stools: No  Trouble holding urine or incontinence: No  Pain or burning: No  Trouble starting or stopping: No  Increased frequency of urination: No  Blood in urine: No  Decreased frequency of urination: No  Frequent nighttime urination: No  Flank pain: No  Difficulty emptying bladder: No  Trouble with coordination: No  Dizziness or trouble with balance: No  Fainting or black-out spells: No  Memory loss: No  Headache: No  Seizures: No  Speech problems: No  Tingling: No  Tremor: Yes  Weakness: No  Difficulty walking: No  Paralysis: No  Numbness: No  Nervous or Anxious: Yes  Depression: Yes  Trouble sleeping: Yes  Trouble thinking or concentrating: No  Mood changes: Yes  Panic attacks: No

## 2019-06-17 NOTE — PROGRESS NOTES
Staci Watt is a 83 year old patient who comes in today for a Blood Pressure check.  Initial BP:  /62 (BP Location: Right arm, Patient Position: Sitting, Cuff Size: Adult Regular)   Pulse 98   SpO2 100%        Disposition: follow-up as previously indicated by provider and results routed to provider    Jami Isbell MA on 6/17/2019 at 11:07 AM

## 2019-06-18 NOTE — RESULT ENCOUNTER NOTE
Rancho Ms. Watt,  Your results came back and are within acceptable limits. -Kidney function is normal (Cr, GFR), Sodium is normal, Potassium is normal, Calcium is normal, Glucose is normal. . If you have any further concerns please do not hesitate to contact us by message, phone or making an appointment.  Have a good day   Dr Amandeep FELIZ

## 2019-06-19 NOTE — NURSING NOTE
"Oncology Rooming Note    June 19, 2019 6:01 PM   Staci Watt is a 83 year old female who presents for:    Chief Complaint   Patient presents with     Blood Draw     Venipuncture labs collected by RN.      Oncology Clinic Visit     RETURN VISIT; 2 MONTH FOLLOW UP MGUS      Initial Vitals: /80 (BP Location: Right arm, Patient Position: Sitting)   Pulse 113   Temp 99.1  F (37.3  C) (Oral)   Resp 20   Ht 1.565 m (5' 1.6\")   SpO2 98%   BMI 28.89 kg/m   Estimated body mass index is 28.89 kg/m  as calculated from the following:    Height as of this encounter: 1.565 m (5' 1.6\").    Weight as of 6/4/19: 70.7 kg (155 lb 14.4 oz). Body surface area is 1.75 meters squared.  No Pain (0) Comment: Data Unavailable   No LMP recorded. Patient has had a hysterectomy.  Allergies reviewed: Yes  Medications reviewed: Yes    Medications: MEDICATION REFILLS NEEDED TODAY. Provider was notified. Patient needs a refill on Lisinopril 20 mg tablet     Pharmacy name entered into [employer]:    [facility] [place], [place] - [address] [facility] - [place], [place] - [address] [place] [address] [facility] - [place], [place] - [address] AT [facility] [address]  [id number] - [place], [place] - [address] [facility]    Clinical concerns: Patient complains of feeling very fatigued.  Dr. Alberto was notified.      Usha Saenz              "

## 2019-06-19 NOTE — LETTER
2019       RE: Staci Watt  [address]  [facility] [address]     Dear Colleague,    Thank you for referring your patient, Staci Watt, to the [facility]. Please see a copy of my visit note below.    [facility] Hematology Consultation    Outpatient Visit Note:    Patient: Staci Watt  MRN: [id number]  : 1935  Date of Initial Visit: 2019  Date of visit: 19      Reason for Consultation:  Staci Watt is a referred for evaluation and treatment of anemia and thrombocythemia    Assessment:  In summary, Staci Watt is a 83 year old woman with PMHx of CKD3, gout, MDD on Prozac, asthma who presents for anemia with thrombocythemia detected on krystin-operative evaluation.     1). Hypoproliferative normocytic anemia  2). Thrombocythemia  3). Hypercalcemia  4). 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  Right lower extremity swelling, need to rule out provoked venous thromboembolism    Ms. Watt returns to clinic after having received her knee replacement.  She is recovering but continues to have a large wound that has a black eschar over it.  She received her iron infusions and has had a modest improvement in her hemoglobin.  In the postoperative setting she dropped down to 8.2 is her fariba and has recovered up to a value of 10.1 g/dL today in clinic.  Her iron labs are notable for ferritin of 942 today along with an elevated ESR of 65.  Her iron studies demonstrate an iron saturation of 16% total iron of 30 and an iron by binding capacity of 184.  This is most likely consistent with chronic and/or acute inflammatory process most likely related to her recent surgery and her wound was as sharp.  Her MCV is slightly prolonged at 99 and her reticulocyte absolute count is around 52.  Her thrombocythemia has resolved and her platelet count is now normal at 360.  During her prior evaluation we discussed the possibility that she  may have a myelodysplastic disorder or multiple myeloma.  During that evaluation she did have detection of a biclonal process with two very small monoclonal proteins (each 0.1 g/dL) seen in the gamma fraction.  She also had a slight elevation of her kappa free light chains at 2.10 with a normal lambda free light chain of 1.5 and ratio that is normal at 1.38.  Given that the patient is experiencing profound fatigue I do think that being vigilant for infections is warranted.  I will not check her thyroid levels at this time but given that her MCV is widening this would be a reasonable thing to do in several months if this is not improved.  Given that she had a recent right knee surgery and currently has unilateral right lower extremity swelling I have ordered a venous ultrasound for her to get done.  If this is positive for any acute venous thromboembolism we will treat with a direct oral anticoagulant, favor apixaban given patient's age.  If she does have an acute venous thromboembolism I will discuss whether she needs to continue on twice daily aspirin with her orthopedic surgeon.  As this does place her at increased risk for bleeding which could contribute to further anemia.  I would like to see the patient back with repeat myeloma labs in 3 months.     Plan:  1. Majority of today's visit was spent counseling the patient regarding anemia and thrombocytopenia.  2.   Orders Placed This Encounter   Procedures     US Lower Extremity Venous Duplex RT     CBC with platelets differential     Comprehensive metabolic panel     Erythrocyte sedimentation rate auto     CRP inflammation     Erythropoietin     Ferritin     Iron and iron binding capacity     Kappa and lambda light chain     Protein electrophoresis     Protein Immunofixation Serum     Free Kappa and Lambda Light Chains Urine     Protein electrophoresis random urine     Protein immunofixation urine     The patient is given our center's contact information and is  instructed to call if she should have any further questions or concerns.  Otherwise, we will plan on seeing her back Follow up with Provider -3 months    Flora Alberto MD/PhD   of Medicine  [facility] [employer]   ----------------------------------------------------------------------------------------------------------------------    Interval history:  Staci Watt is a 83 year old woman who first presented in consultation ue to a preoperative workup finding anemia and thrombocythemia.  Please refer to the my note from April 17, 2019 for full H&P.    Ms. Watt reports that she received her iron infusions without complications.  She underwent knee replacement.  She states that she did not feel acute pain during the surgery but that the day or so afterwards she did have considerable amounts of pain.  She currently is requiring only oxycodone every 6 few nights to handle pain.  Otherwise she is down to just extra Tylenol.  She reports that she feels overall very fatigued.  Denies having excess daytime sleepiness.  States she is sleeping well.  Denies having any night sweats.  Has not noted any fevers but does state that she feels overall cold.  Is wearing sweaters when she is to not have to.  She denies having any chest pain or shortness of breath.  She does note that she has persistent right lower extremity swelling.  This is been only since her surgery.  She has not noticed any increased pain or purple toes in this foot.  She currently is staying with her daughter but is preparing her house to return home.  She is working towards being ambulatory with a cane.      Past Medical History:  Past Medical History:   Diagnosis Date     Allergic rhinitis, cause unspecified      CKD (chronic kidney disease) stage 3, GFR 30-59 ml/min (H)      Gout, unspecified      Hyperlipidemia LDL goal <100 7/9/2004     Hypertension goal BP (blood pressure) < 140/90 7/9/2004     Iron  deficiency anemia      Moderate major depression (H) 10/19/2006     Moderate persistent asthma 12/4/2005     OA (osteoarthritis) of knee 2/2/2012     Obese 2/2/2012     Retinal detachment with retinal defect, unspecified      Unspecified cataract     s/p cataract surgery       Past Surgical History:  Past Surgical History:   Procedure Laterality Date     ARTHROPLASTY KNEE Right 5/7/2019    Procedure: Right Total Knee Arthroplasty;  Surgeon: Diego Hi MD;  Location: [facility] OR     BUNIONECTOMY JASWANT BILATERAL       C NONSPECIFIC PROCEDURE      (B) detatched retina     C NONSPECIFIC PROCEDURE      LASIK surgery     C NONSPECIFIC PROCEDURE      NISSA/BSO     C NONSPECIFIC PROCEDURE      Hernia     C NONSPECIFIC PROCEDURE      Tonsillectomy     C NONSPECIFIC PROCEDURE      Arthroscopic knee surgery       Medications:  Current Outpatient Medications   Medication Sig Dispense Refill     acetaminophen (TYLENOL) 500 MG tablet Take 2 tablets (1,000 mg) by mouth 3 times daily AND 1000mg daily PRN 90 tablet 1     albuterol (PROAIR HFA/PROVENTIL HFA/VENTOLIN HFA) 108 (90 Base) MCG/ACT inhaler Inhale 2 puffs into the lungs every 4 hours as needed for shortness of breath / dyspnea or wheezing       aspirin (ASA) 81 MG EC tablet Take 1 tablet (81 mg) by mouth 2 times daily 56 tablet 0     atorvastatin (LIPITOR) 80 MG tablet Take 0.5 tablets (40 mg) by mouth daily For high cholesterol. 45 tablet 3     ferrous gluconate (FERGON) 324 (38 Fe) MG tablet Take 324 mg by mouth daily (with breakfast)       FLUoxetine (PROZAC) 20 MG capsule Take 1 capsule (20 mg) by mouth daily 90 capsule 1     fluticasone-salmeterol (ADVAIR DISKUS) 500-50 MCG/DOSE diskus inhaler Inhale 1 puff into the lungs 2 times daily 3 Inhaler 3     lisinopril (PRINIVIL/ZESTRIL) 20 MG tablet Take 1 tablet (20 mg) by mouth daily 30 tablet 1     multivitamin w/minerals (MULTI-VITAMIN) tablet Take 1 tablet by mouth daily 30 tablet 0     oxyCODONE (ROXICODONE) 5 MG  "tablet Take 1 tablet (5 mg) by mouth every 12 hours as needed for severe pain 12 tablet 0     polyethylene glycol (MIRALAX/GLYCOLAX) powder Take 17 g (1 capful) by mouth daily 500 g 0     ranitidine (ZANTAC) 150 MG tablet Take 1 tablet (150 mg) by mouth 2 times daily 60 tablet 1     vitamin B-12 (CYANOCOBALAMIN) 1000 MCG tablet Take 1,000 mcg by mouth daily       vitamin D3 (CHOLECALCIFEROL) 2000 units tablet Take 1 tablet by mouth daily 90 tablet 0        Allergies:  Allergies   Allergen Reactions     Contrast Dye Itching     Seasonal Allergies        ROS:  A 7 point ROS is negative except as stated in the HPI    Social History:  Denies any tobacco use. No significant alcohol use. Retired nurse.     Family History:  3 grand children  1 great grandchildren  3 living children  1 child  from complications from pneumonia and seizures  3 living siblings  2 brothers - one from lung cancer- both smoker    Objective:  Vitals: /80 (BP Location: Right arm, Patient Position: Sitting)   Pulse 113   Temp 99.1  F (37.3  C) (Oral)   Resp 20   Ht 1.565 m (5' 1.6\")   SpO2 98%   BMI 28.89 kg/m       Exam:   Constitutional: Well-groomed elderly woman in wheelchair, appears well, no distress  HEENT: Pupils equal and reactive to light. No scleral icterus or hemorrhage. Nares without evidence of telangiectasia. Mucous membranes moist with no wet purpura. Dentition overall ok with no signs of decay. No pharyngeal exudates. No lymphadenopathy, no thyromeagaly  CV: regular rate and rhythm, no murmurs  Respiratory: clear  GI: examined in wheelchair. abdomen soft, nontender, without guarding or rebound. Unable to discern HSM.  Mus/Skele: Right knee with 5 x 5 cm black eschar present over underlying scar.  Right leg with 2+ pitting edema to level of ankle.    Skin: no petechiae, no ecchymosis.  Neuro: CN II-XII intact. AOx3    Labs: personally reviewed with relevant trends annotated below  Results for orders placed " or performed in visit on 06/19/19   CBC with platelets differential   Result Value Ref Range    WBC 7.3 4.0 - 11.0 10e9/L    RBC Count 3.20 (L) 3.8 - 5.2 10e12/L    Hemoglobin 10.1 (L) 11.7 - 15.7 g/dL    Hematocrit 31.7 (L) 35.0 - 47.0 %    MCV 99 78 - 100 fl    MCH 31.6 26.5 - 33.0 pg    MCHC 31.9 31.5 - 36.5 g/dL    RDW 16.5 (H) 10.0 - 15.0 %    Platelet Count 360 150 - 450 10e9/L    Diff Method Automated Method     % Neutrophils 56.6 %    % Lymphocytes 30.5 %    % Monocytes 10.2 %    % Eosinophils 2.3 %    % Basophils 0.3 %    % Immature Granulocytes 0.1 %    Nucleated RBCs 0 0 /100    Absolute Neutrophil 4.1 1.6 - 8.3 10e9/L    Absolute Lymphocytes 2.2 0.8 - 5.3 10e9/L    Absolute Monocytes 0.7 0.0 - 1.3 10e9/L    Absolute Eosinophils 0.2 0.0 - 0.7 10e9/L    Absolute Basophils 0.0 0.0 - 0.2 10e9/L    Abs Immature Granulocytes 0.0 0 - 0.4 10e9/L    Absolute Nucleated RBC 0.0    Iron and iron binding capacity   Result Value Ref Range    Iron 30 (L) 35 - 180 ug/dL    Iron Binding Cap 184 (L) 240 - 430 ug/dL    Iron Saturation Index 16 15 - 46 %   Comprehensive metabolic panel   Result Value Ref Range    Sodium 139 133 - 144 mmol/L    Potassium 4.1 3.4 - 5.3 mmol/L    Chloride 106 94 - 109 mmol/L    Carbon Dioxide 26 20 - 32 mmol/L    Anion Gap 7 3 - 14 mmol/L    Glucose 97 70 - 99 mg/dL    Urea Nitrogen 16 7 - 30 mg/dL    Creatinine 0.86 0.52 - 1.04 mg/dL    GFR Estimate 62 >60 mL/min/[1.73_m2]    GFR Estimate If Black 72 >60 mL/min/[1.73_m2]    Calcium 9.4 8.5 - 10.1 mg/dL    Bilirubin Total 0.2 0.2 - 1.3 mg/dL    Albumin 3.1 (L) 3.4 - 5.0 g/dL    Protein Total 7.5 6.8 - 8.8 g/dL    Alkaline Phosphatase 99 40 - 150 U/L    ALT 15 0 - 50 U/L    AST 14 0 - 45 U/L   Erythrocyte sedimentation rate auto   Result Value Ref Range    Sed Rate 65 (H) 0 - 30 mm/h   Lactate Dehydrogenase   Result Value Ref Range    Lactate Dehydrogenase 182 81 - 234 U/L   Ferritin   Result Value Ref Range    Ferritin 942 (H) 8 - 252 ng/mL    Reticulocyte count   Result Value Ref Range    % Retic 1.6 0.5 - 2.0 %    Absolute Retic 51.5 25 - 95 10e9/L

## 2019-06-19 NOTE — PATIENT INSTRUCTIONS
Your iron saturation is normal. You ferritin (iron savings) is high, but this may be due to your wound still healing on your leg.     I would like to see you back in 3 months with repeat labs.       Due to leg swelling will order utlrasound.       Flora Alberto MD/PhD   of Medicine  Division of Hematology, Oncology and Transplantation    [facility] [address], Mail [address]  [place] [address]    Clinic [phone]  Nurse Line: [phone]    RN Care Coordinator:   RAKESH Ferrer  [facility]  (P) [phone]  (F) [phone]

## 2019-06-20 NOTE — PROGRESS NOTES
[facility] Hematology Consultation    Outpatient Visit Note:    Patient: Staci Watt  MRN: [id number]  : 1935  Date of Initial Visit: 2019  Date of visit: 19      Reason for Consultation:  Staci Watt is a referred for evaluation and treatment of anemia and thrombocythemia    Assessment:  In summary, Staci Watt is a 83 year old woman with PMHx of CKD3, gout, MDD on Prozac, asthma who presents for anemia with thrombocythemia detected on krystin-operative evaluation.     1). Hypoproliferative normocytic anemia  2). Thrombocythemia  3). Hypercalcemia  4). 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  Right lower extremity swelling, need to rule out provoked venous thromboembolism    Ms. Watt returns to clinic after having received her knee replacement.  She is recovering but continues to have a large wound that has a black eschar over it.  She received her iron infusions and has had a modest improvement in her hemoglobin.  In the postoperative setting she dropped down to 8.2 is her fariba and has recovered up to a value of 10.1 g/dL today in clinic.  Her iron labs are notable for ferritin of 942 today along with an elevated ESR of 65.  Her iron studies demonstrate an iron saturation of 16% total iron of 30 and an iron by binding capacity of 184.  This is most likely consistent with chronic and/or acute inflammatory process most likely related to her recent surgery and her wound was as sharp.  Her MCV is slightly prolonged at 99 and her reticulocyte absolute count is around 52.  Her thrombocythemia has resolved and her platelet count is now normal at 360.  During her prior evaluation we discussed the possibility that she may have a myelodysplastic disorder or multiple myeloma.  During that evaluation she did have detection of a biclonal process with two very small monoclonal proteins (each 0.1 g/dL) seen in the gamma fraction.  She also had a slight elevation of  her kappa free light chains at 2.10 with a normal lambda free light chain of 1.5 and ratio that is normal at 1.38.  Given that the patient is experiencing profound fatigue I do think that being vigilant for infections is warranted.  I will not check her thyroid levels at this time but given that her MCV is widening this would be a reasonable thing to do in several months if this is not improved.  Given that she had a recent right knee surgery and currently has unilateral right lower extremity swelling I have ordered a venous ultrasound for her to get done.  If this is positive for any acute venous thromboembolism we will treat with a direct oral anticoagulant, favor apixaban given patient's age.  If she does have an acute venous thromboembolism I will discuss whether she needs to continue on twice daily aspirin with her orthopedic surgeon.  As this does place her at increased risk for bleeding which could contribute to further anemia.  I would like to see the patient back with repeat myeloma labs in 3 months.     Plan:  1. Majority of today's visit was spent counseling the patient regarding anemia and thrombocytopenia.  2.   Orders Placed This Encounter   Procedures     US Lower Extremity Venous Duplex RT     CBC with platelets differential     Comprehensive metabolic panel     Erythrocyte sedimentation rate auto     CRP inflammation     Erythropoietin     Ferritin     Iron and iron binding capacity     Kappa and lambda light chain     Protein electrophoresis     Protein Immunofixation Serum     Free Kappa and Lambda Light Chains Urine     Protein electrophoresis random urine     Protein immunofixation urine     The patient is given our center's contact information and is instructed to call if she should have any further questions or concerns.  Otherwise, we will plan on seeing her back Follow up with Provider -3 months    Flora Alberto MD/PhD   of Medicine  [facility] [employer]  [employer]   ----------------------------------------------------------------------------------------------------------------------    Interval history:  Staci Watt is a 83 year old woman who first presented in consultation ue to a preoperative workup finding anemia and thrombocythemia.  Please refer to the my note from April 17, 2019 for full H&P.    Ms. Watt reports that she received her iron infusions without complications.  She underwent knee replacement.  She states that she did not feel acute pain during the surgery but that the day or so afterwards she did have considerable amounts of pain.  She currently is requiring only oxycodone every 6 few nights to handle pain.  Otherwise she is down to just extra Tylenol.  She reports that she feels overall very fatigued.  Denies having excess daytime sleepiness.  States she is sleeping well.  Denies having any night sweats.  Has not noted any fevers but does state that she feels overall cold.  Is wearing sweaters when she is to not have to.  She denies having any chest pain or shortness of breath.  She does note that she has persistent right lower extremity swelling.  This is been only since her surgery.  She has not noticed any increased pain or purple toes in this foot.  She currently is staying with her daughter but is preparing her house to return home.  She is working towards being ambulatory with a cane.      Past Medical History:  Past Medical History:   Diagnosis Date     Allergic rhinitis, cause unspecified      CKD (chronic kidney disease) stage 3, GFR 30-59 ml/min (H)      Gout, unspecified      Hyperlipidemia LDL goal <100 7/9/2004     Hypertension goal BP (blood pressure) < 140/90 7/9/2004     Iron deficiency anemia      Moderate major depression (H) 10/19/2006     Moderate persistent asthma 12/4/2005     OA (osteoarthritis) of knee 2/2/2012     Obese 2/2/2012     Retinal detachment with retinal defect, unspecified      Unspecified cataract     s/p  cataract surgery       Past Surgical History:  Past Surgical History:   Procedure Laterality Date     ARTHROPLASTY KNEE Right 5/7/2019    Procedure: Right Total Knee Arthroplasty;  Surgeon: Diego Hi MD;  Location: [facility] OR     BUNIONECTOMY JASWANT BILATERAL       C NONSPECIFIC PROCEDURE      (B) detatched retina     C NONSPECIFIC PROCEDURE      LASIK surgery     C NONSPECIFIC PROCEDURE      NISSA/BSO     C NONSPECIFIC PROCEDURE      Hernia     C NONSPECIFIC PROCEDURE      Tonsillectomy     C NONSPECIFIC PROCEDURE      Arthroscopic knee surgery       Medications:  Current Outpatient Medications   Medication Sig Dispense Refill     acetaminophen (TYLENOL) 500 MG tablet Take 2 tablets (1,000 mg) by mouth 3 times daily AND 1000mg daily PRN 90 tablet 1     albuterol (PROAIR HFA/PROVENTIL HFA/VENTOLIN HFA) 108 (90 Base) MCG/ACT inhaler Inhale 2 puffs into the lungs every 4 hours as needed for shortness of breath / dyspnea or wheezing       aspirin (ASA) 81 MG EC tablet Take 1 tablet (81 mg) by mouth 2 times daily 56 tablet 0     atorvastatin (LIPITOR) 80 MG tablet Take 0.5 tablets (40 mg) by mouth daily For high cholesterol. 45 tablet 3     ferrous gluconate (FERGON) 324 (38 Fe) MG tablet Take 324 mg by mouth daily (with breakfast)       FLUoxetine (PROZAC) 20 MG capsule Take 1 capsule (20 mg) by mouth daily 90 capsule 1     fluticasone-salmeterol (ADVAIR DISKUS) 500-50 MCG/DOSE diskus inhaler Inhale 1 puff into the lungs 2 times daily 3 Inhaler 3     lisinopril (PRINIVIL/ZESTRIL) 20 MG tablet Take 1 tablet (20 mg) by mouth daily 30 tablet 1     multivitamin w/minerals (MULTI-VITAMIN) tablet Take 1 tablet by mouth daily 30 tablet 0     oxyCODONE (ROXICODONE) 5 MG tablet Take 1 tablet (5 mg) by mouth every 12 hours as needed for severe pain 12 tablet 0     polyethylene glycol (MIRALAX/GLYCOLAX) powder Take 17 g (1 capful) by mouth daily 500 g 0     ranitidine (ZANTAC) 150 MG tablet Take 1 tablet (150 mg) by mouth 2  "times daily 60 tablet 1     vitamin B-12 (CYANOCOBALAMIN) 1000 MCG tablet Take 1,000 mcg by mouth daily       vitamin D3 (CHOLECALCIFEROL) 2000 units tablet Take 1 tablet by mouth daily 90 tablet 0        Allergies:  Allergies   Allergen Reactions     Contrast Dye Itching     Seasonal Allergies        ROS:  A 7 point ROS is negative except as stated in the HPI    Social History:  Denies any tobacco use. No significant alcohol use. Retired nurse.     Family History:  3 grand children  1 great grandchildren  3 living children  1 child  from complications from pneumonia and seizures  3 living siblings  2 brothers - one from lung cancer- both smoker    Objective:  Vitals: /80 (BP Location: Right arm, Patient Position: Sitting)   Pulse 113   Temp 99.1  F (37.3  C) (Oral)   Resp 20   Ht 1.565 m (5' 1.6\")   SpO2 98%   BMI 28.89 kg/m      Exam:   Constitutional: Well-groomed elderly woman in wheelchair, appears well, no distress  HEENT: Pupils equal and reactive to light. No scleral icterus or hemorrhage. Nares without evidence of telangiectasia. Mucous membranes moist with no wet purpura. Dentition overall ok with no signs of decay. No pharyngeal exudates. No lymphadenopathy, no thyromeagaly  CV: regular rate and rhythm, no murmurs  Respiratory: clear  GI: examined in wheelchair. abdomen soft, nontender, without guarding or rebound. Unable to discern HSM.  Mus/Skele: Right knee with 5 x 5 cm black eschar present over underlying scar.  Right leg with 2+ pitting edema to level of ankle.    Skin: no petechiae, no ecchymosis.  Neuro: CN II-XII intact. AOx3    Labs: personally reviewed with relevant trends annotated below  Results for orders placed or performed in visit on 19   CBC with platelets differential   Result Value Ref Range    WBC 7.3 4.0 - 11.0 10e9/L    RBC Count 3.20 (L) 3.8 - 5.2 10e12/L    Hemoglobin 10.1 (L) 11.7 - 15.7 g/dL    Hematocrit 31.7 (L) 35.0 - 47.0 %    MCV 99 78 - " 100 fl    MCH 31.6 26.5 - 33.0 pg    MCHC 31.9 31.5 - 36.5 g/dL    RDW 16.5 (H) 10.0 - 15.0 %    Platelet Count 360 150 - 450 10e9/L    Diff Method Automated Method     % Neutrophils 56.6 %    % Lymphocytes 30.5 %    % Monocytes 10.2 %    % Eosinophils 2.3 %    % Basophils 0.3 %    % Immature Granulocytes 0.1 %    Nucleated RBCs 0 0 /100    Absolute Neutrophil 4.1 1.6 - 8.3 10e9/L    Absolute Lymphocytes 2.2 0.8 - 5.3 10e9/L    Absolute Monocytes 0.7 0.0 - 1.3 10e9/L    Absolute Eosinophils 0.2 0.0 - 0.7 10e9/L    Absolute Basophils 0.0 0.0 - 0.2 10e9/L    Abs Immature Granulocytes 0.0 0 - 0.4 10e9/L    Absolute Nucleated RBC 0.0    Iron and iron binding capacity   Result Value Ref Range    Iron 30 (L) 35 - 180 ug/dL    Iron Binding Cap 184 (L) 240 - 430 ug/dL    Iron Saturation Index 16 15 - 46 %   Comprehensive metabolic panel   Result Value Ref Range    Sodium 139 133 - 144 mmol/L    Potassium 4.1 3.4 - 5.3 mmol/L    Chloride 106 94 - 109 mmol/L    Carbon Dioxide 26 20 - 32 mmol/L    Anion Gap 7 3 - 14 mmol/L    Glucose 97 70 - 99 mg/dL    Urea Nitrogen 16 7 - 30 mg/dL    Creatinine 0.86 0.52 - 1.04 mg/dL    GFR Estimate 62 >60 mL/min/[1.73_m2]    GFR Estimate If Black 72 >60 mL/min/[1.73_m2]    Calcium 9.4 8.5 - 10.1 mg/dL    Bilirubin Total 0.2 0.2 - 1.3 mg/dL    Albumin 3.1 (L) 3.4 - 5.0 g/dL    Protein Total 7.5 6.8 - 8.8 g/dL    Alkaline Phosphatase 99 40 - 150 U/L    ALT 15 0 - 50 U/L    AST 14 0 - 45 U/L   Erythrocyte sedimentation rate auto   Result Value Ref Range    Sed Rate 65 (H) 0 - 30 mm/h   Lactate Dehydrogenase   Result Value Ref Range    Lactate Dehydrogenase 182 81 - 234 U/L   Ferritin   Result Value Ref Range    Ferritin 942 (H) 8 - 252 ng/mL   Reticulocyte count   Result Value Ref Range    % Retic 1.6 0.5 - 2.0 %    Absolute Retic 51.5 25 - 95 10e9/L

## 2019-06-28 NOTE — TELEPHONE ENCOUNTER
LOV: 5.23.19    Prescription approved per [facility] Refill Protocol.  Thanks! Quiana Mujica RN

## 2019-06-28 NOTE — TELEPHONE ENCOUNTER
"Requested Prescriptions   Pending Prescriptions Disp Refills     atorvastatin (LIPITOR) 80 MG tablet [Pharmacy Med Name: ATORVASTATIN CALCIUM 80MG TABS]  Last Written Prescription Date:  5/14/2018  Last Fill Quantity: 45 tabs,  # refills: 3   Last office visit: 5/23/2019 with prescribing provider:  Amandeep Dawn Office Visit:   Next 5 appointments (look out 90 days)    Jul 01, 2019 10:20 AM CDT  Office Visit with Destin Wolf MD  [facility] ([facility]) [address] [place] [id number] [phone]          45 tablet 3     Sig: TAKE ONE-HALF TABLET BY MOUTH DAILY FOR HIGH CHOLESTEROL       Statins Protocol Passed - 6/28/2019  8:47 AM        Passed - LDL on file in past 12 months     Recent Labs   Lab Test 12/24/18  0953   LDL 76             Passed - No abnormal creatine kinase in past 12 months     Recent Labs   Lab Test 01/03/19  2026                   Passed - Recent (12 mo) or future (30 days) visit within the authorizing provider's specialty     Patient had office visit in the last 12 months or has a visit in the next 30 days with authorizing provider or within the authorizing provider's specialty.  See \"Patient Info\" tab in inbasket, or \"Choose Columns\" in Meds & Orders section of the refill encounter.              Passed - Medication is active on med list        Passed - Patient is age 18 or older        Passed - No active pregnancy on record        Passed - No positive pregnancy test in past 12 months          "

## 2019-06-28 NOTE — PROGRESS NOTES
Subjective     Staci Watt is a 83 year old female who presents to clinic today for the following health issues:    HPI   Medication follow up       S/p tka and using lashae honey to help with eschar. She is using lashae honey.  She notes that she has right leg pain which started after the surgery. Swelling has improved. She had a recent US which was negative for DVT. She has a hospital bed and elevates. No chest pain or shortness of breath.   She has been experiencing chest congestion. She has productive cough (clear) around two to three times/day.  She has mild rhinorrhea.   No sneezing, itchy eyes, wheezing, dyspnea, fever, chills, nasal congestion, sinus pressure, headache, ear pain or sore throat.     Reviewed and updated as needed this visit by Provider         Review of Systems   ROS COMP: Constitutional, HEENT, cardiovascular, pulmonary, gi and gu systems are negative, except as otherwise noted.      Objective    There were no vitals taken for this visit.  There is no height or weight on file to calculate BMI.  Physical Exam   /66   Pulse 103   Temp 98.3  F (36.8  C) (Tympanic)   Resp 12   Wt 68 kg (150 lb)   SpO2 96%   BMI 27.79 kg/m    GENERAL: healthy, alert and no distress  EYES: Eyes grossly normal to inspection  HENT: nose and mouth without ulcers or lesions  PSYCH: mentation appears normal, affect normal    Diagnostic Test Results:  none         Assessment & Plan     ## Status post knee surgery  - advised below  Elevate legs above heart level while at home.  Call clinic if leg swelling continues to persist or you are experiencing chest pain or shortness of breath.   Ok to take 1/2 oxycodone at night as needed for pain  - oxyCODONE (ROXICODONE) 5 MG tablet; Take 1 tablet (5 mg) by mouth every 12 hours as needed for severe pain  Dispense: 6 tablet; Refill: 0    ## Cough   - recommended symptomatic tx  - avoid OTC decongestant  - Follow if symptoms worsen or fail to improve.    Return in  about 1 week (around 7/8/2019) for sympotms are not improving.    Destin Wolf MD  [facility] [place][note 2]

## 2019-07-01 NOTE — PATIENT INSTRUCTIONS
Elevate legs above heart level while at home.  Call clinic if leg swelling continues to persist or you are experiencing chest pain or shortness of breath.   Ok to take 1/2 oxycodone at night as needed for pain.

## 2019-07-21 NOTE — TELEPHONE ENCOUNTER
"Requested Prescriptions   Pending Prescriptions Disp Refills     FLUoxetine (PROZAC) 20 MG capsule [Pharmacy Med Name: FLUOXETINE HCL 20MG CAPS] 90 capsule 1     Sig: TAKE ONE CAPSULE BY MOUTH ONCE DAILY       SSRIs Protocol Passed - 7/19/2019 12:51 PM        Passed - PHQ-9 score less than 5 in past 6 months     Please review last PHQ-9 score.    PHQ-9 SCORE 3/4/2019 4/12/2019 4/29/2019   PHQ-9 Total Score - - -   PHQ-9 Total Score MyChart 2 (Minimal depression) - 4 (Minimal depression)   PHQ-9 Total Score 2 2 4                 Passed - Medication is active on med list        Passed - Patient is age 18 or older        Passed - No active pregnancy on record        Passed - No positive pregnancy test in last 12 months        Passed - Recent (6 mo) or future (30 days) visit within the authorizing provider's specialty     Patient had office visit in the last 6 months or has a visit in the next 30 days with authorizing provider or within the authorizing provider's specialty.  See \"Patient Info\" tab in inbasket, or \"Choose Columns\" in Meds & Orders section of the refill encounter.            Signed Prescriptions:                        Disp   Refills    FLUoxetine (PROZAC) 20 MG capsule          90 cap*0        Sig: TAKE ONE CAPSULE BY MOUTH ONCE DAILY  Authorizing Provider: MARK MANNING  Ordering User: MARISOL WAN      "

## 2019-07-23 NOTE — PROGRESS NOTES
Clinic Care Coordination Contact    Situation: Patient chart reviewed by care coordinator.    Background: [facility] admission 5/7-5/10/[facility]  Status Post knee surgery.     Assessment: Per chart, patient discharged from [facility] services on 7/3/19 with no concerns noted. She continues live with son and DIL and is getting assistance from them. Patient never returned Jaja Nugent RN Care Coordinator's messages.    Plan/Recommendations: No further Care Coordination needs identified at this time. Patient may be referred to Care Coordination in the future if additional needs arise.  Pt encouraged to contact Care Coordinator through the clinic if situation changes and assistance is needed. No follow-up planned.      Quiana HENLEY, RN, PHN, [facility]  Primary Care Clinic RN Care Coordinator  [facility]   [email or web address]  Office:  [phone]

## 2019-07-24 NOTE — LETTER
7/24/2019       RE: Staci Watt  [address]  [place] [address]     Dear Colleague,    Thank you for referring your patient, Staci Watt, to the [facility]. Please see a copy of my visit note below.           Interval History:   Staci Watt is a 83 year old female who is here follow up for:   Right TKA - 5/7/2019    She returns today for ongoing management of her right knee replacement.  I had asked her to come back for a repeat wound check.  She notes that the knee continues to feel very good.  She is getting around quite well.  The range of motion has been good.  The scab has been healing without any issues.  She has not had any drainage or redness or problems with the knee.         Physical Exam:     NAD  AOx3  Interactive and cooperative with the exam.  She has a distal scab on the incision the scab is slightly smaller than the prior visit.  There is no surrounding redness or drainage.  The scab appears healthy and to be improving.  Her knee range of motion is from 0 to 120 degrees.  Her knee is stable to varus and valgus stress.           Imaging:      X-rays demonstrate well fixed and well-positioned cemented knee replacement implants.       Assessment and Plan:        Staci is making good progress following the total knee replacement.  The distal scab seems to be steadily improving.  We will continue with the Medihoney.  I will see her back in clinic in 1 month to ensure that things are steadily improving.  She will let me know if she has any questions or concerns in the meantime or if she were to develop any drainage.      Diego Hi M.D.     Arthritis and Joint Replacement  Department of Orthopaedic Surgery, [facility]  [email or web address]  [phone] (pager)

## 2019-07-24 NOTE — NURSING NOTE
Reason For Visit:   Chief Complaint   Patient presents with     Surgical Followup     DOS 5/7/19 S/P Right TKA        Primary MD: Destin Wolf  Referring MD: Diego Hi      Pain Assessment  Patient Currently in Pain: Yes(pain is only at night)  0-10 Pain Scale: 5  Primary Pain Location: Knee    Current Outpatient Medications   Medication     acetaminophen (TYLENOL) 500 MG tablet     albuterol (PROAIR HFA/PROVENTIL HFA/VENTOLIN HFA) 108 (90 Base) MCG/ACT inhaler     aspirin (ASA) 81 MG EC tablet     atorvastatin (LIPITOR) 80 MG tablet     ferrous gluconate (FERGON) 324 (38 Fe) MG tablet     FLUoxetine (PROZAC) 20 MG capsule     fluticasone-salmeterol (ADVAIR DISKUS) 500-50 MCG/DOSE diskus inhaler     lisinopril (PRINIVIL/ZESTRIL) 20 MG tablet     multivitamin w/minerals (MULTI-VITAMIN) tablet     oxyCODONE (ROXICODONE) 5 MG tablet     polyethylene glycol (MIRALAX/GLYCOLAX) powder     ranitidine (ZANTAC) 150 MG tablet     vitamin B-12 (CYANOCOBALAMIN) 1000 MCG tablet     vitamin D3 (CHOLECALCIFEROL) 2000 units tablet     No current facility-administered medications for this visit.           Allergies   Allergen Reactions     Contrast Dye Itching     Seasonal Allergies            Lissett Otero LPN

## 2019-07-31 NOTE — PROGRESS NOTES
Interval History:   Staci Watt is a 83 year old female who is here follow up for:   Right TKA - 5/7/2019    She returns today for ongoing management of her right knee replacement.  I had asked her to come back for a repeat wound check.  She notes that the knee continues to feel very good.  She is getting around quite well.  The range of motion has been good.  The scab has been healing without any issues.  She has not had any drainage or redness or problems with the knee.         Physical Exam:     NAD  AOx3  Interactive and cooperative with the exam.  She has a distal scab on the incision the scab is slightly smaller than the prior visit.  There is no surrounding redness or drainage.  The scab appears healthy and to be improving.  Her knee range of motion is from 0 to 120 degrees.  Her knee is stable to varus and valgus stress.           Imaging:      X-rays demonstrate well fixed and well-positioned cemented knee replacement implants.       Assessment and Plan:        Staci is making good progress following the total knee replacement.  The distal scab seems to be steadily improving.  We will continue with the Medihoney.  I will see her back in clinic in 1 month to ensure that things are steadily improving.  She will let me know if she has any questions or concerns in the meantime or if she were to develop any drainage.      Diego Hi M.D.     Arthritis and Joint Replacement  Department of Orthopaedic Surgery, [facility]  [email or web address]  [phone] (pager)      Answers for HPI/ROS submitted by the patient on 7/24/2019   General Symptoms: Yes  Skin Symptoms: No  HENT Symptoms: No  EYE SYMPTOMS: No  HEART SYMPTOMS: No  LUNG SYMPTOMS: No  INTESTINAL SYMPTOMS: No  URINARY SYMPTOMS: No  GYNECOLOGIC SYMPTOMS: No  BREAST SYMPTOMS: No  SKELETAL SYMPTOMS: Yes  BLOOD SYMPTOMS: No  NERVOUS SYSTEM SYMPTOMS: No  MENTAL HEALTH SYMPTOMS: Yes  Fever: No  Loss of appetite:  No  Weight loss: No  Weight gain: No  Fatigue: Yes  Night sweats: No  Chills: No  Increased stress: Yes  Excessive thirst: No  Feeling hot or cold when others believe the temperature is normal: No  Loss of height: No  Post-operative complications: No  Surgical site pain: Yes  Hallucinations: No  Change in or Loss of Energy: Yes  Hyperactivity: No  Confusion: Yes  Back pain: Yes  Muscle aches: Yes  Neck pain: Yes  Swollen joints: Yes  Joint pain: Yes  Bone pain: Yes  Muscle cramps: No  Muscle weakness: Yes  Joint stiffness: Yes  Bone fracture: No  Nervous or Anxious: Yes  Depression: Yes  Trouble sleeping: Yes  Trouble thinking or concentrating: Yes  Mood changes: No  Panic attacks: No

## 2019-08-01 NOTE — TELEPHONE ENCOUNTER
"Requested Prescriptions   Pending Prescriptions Disp Refills     lisinopril (PRINIVIL/ZESTRIL) 40 MG tablet [Pharmacy Med Name: LISINOPRIL 40MG TABS] 90 tablet 3     Sig: TAKE ONE TABLET BY MOUTH EVERY DAY  Not on current med list  Last Office Visit: 7/1/2019   Future Office Visit:            ACE Inhibitors (Including Combos) Protocol Passed - 7/31/2019  3:29 PM        Passed - Blood pressure under 140/90 in past 12 months     BP Readings from Last 3 Encounters:   07/01/19 135/66   06/19/19 145/80   06/17/19 126/62           Passed - Recent (12 mo) or future (30 days) visit within the authorizing provider's specialty     Patient had office visit in the last 12 months or has a visit in the next 30 days with authorizing provider or within the authorizing provider's specialty.  See \"Patient Info\" tab in inbasket, or \"Choose Columns\" in Meds & Orders section of the refill encounter.            Passed - Medication is active on med list        Passed - Patient is age 18 or older        Passed - No active pregnancy on record        Passed - Normal serum creatinine on file in past 12 months     Recent Labs   Lab Test 06/19/19  1723   CR 0.86           Passed - Normal serum potassium on file in past 12 months     Recent Labs   Lab Test 06/19/19  1723   POTASSIUM 4.1           Passed - No positive pregnancy test within past 12 months          "

## 2019-08-06 NOTE — TELEPHONE ENCOUNTER
Staci Vickers stopped in today and she was confused regarding her lisinopril dosing. A refill request was called in for the 40mg but she said she has been doing 20mg every day. If this should be changed can we please get the correct lisinopril dose and directions?     Thank you,  Samantha Adams, [facility]- [place]

## 2019-08-06 NOTE — TELEPHONE ENCOUNTER
"Requested Prescriptions   Pending Prescriptions Disp Refills     ranitidine (ZANTAC) 150 MG tablet [Pharmacy Med Name: RANITIDINE HCL 150MG TABS] 60 tablet 1     Sig: TAKE ONE TABLET BY MOUTH TWICE A DAY  Last Written Prescription Date:  5/23/2019  Last Fill Quantity: 60 tablet,  # refills: 1   Last Office Visit: 7/1/2019   Future Office Visit:            H2 Blockers Protocol Passed - 8/6/2019 11:19 AM        Passed - Patient is age 12 or older        Passed - Recent (12 mo) or future (30 days) visit within the authorizing provider's specialty     Patient had office visit in the last 12 months or has a visit in the next 30 days with authorizing provider or within the authorizing provider's specialty.  See \"Patient Info\" tab in inbasket, or \"Choose Columns\" in Meds & Orders section of the refill encounter.              Passed - Medication is active on med list          "

## 2019-08-06 NOTE — TELEPHONE ENCOUNTER
"Requested Prescriptions   Pending Prescriptions Disp Refills     lisinopril (PRINIVIL/ZESTRIL) 20 MG tablet [Pharmacy Med Name: LISINOPRIL 20MG TABS] 30 tablet 1     Sig: TAKE ONE TABLET BY MOUTH ONCE DAILY  Last Written Prescription Date:  8/5/2019  Last Fill Quantity: 90 tablet,  # refills: 1   Last Office Visit: 7/1/2019   Future Office Visit:            ACE Inhibitors (Including Combos) Protocol Passed - 8/6/2019 12:59 PM        Passed - Blood pressure under 140/90 in past 12 months     BP Readings from Last 3 Encounters:   07/01/19 135/66   06/19/19 145/80   06/17/19 126/62           Passed - Recent (12 mo) or future (30 days) visit within the authorizing provider's specialty     Patient had office visit in the last 12 months or has a visit in the next 30 days with authorizing provider or within the authorizing provider's specialty.  See \"Patient Info\" tab in inbasket, or \"Choose Columns\" in Meds & Orders section of the refill encounter.            Passed - Medication is active on med list        Passed - Patient is age 18 or older        Passed - No active pregnancy on record        Passed - Normal serum creatinine on file in past 12 months     Recent Labs   Lab Test 06/19/19  1723   CR 0.86           Passed - Normal serum potassium on file in past 12 months     Recent Labs   Lab Test 06/19/19  1723   POTASSIUM 4.1           Passed - No positive pregnancy test within past 12 months          "

## 2019-08-07 NOTE — TELEPHONE ENCOUNTER
"Requested Prescriptions   Pending Prescriptions Disp Refills     lisinopril (PRINIVIL/ZESTRIL) 20 MG tablet 30 tablet 1     Sig: Take 1 tablet (20 mg) by mouth daily  Last Written Prescription Date:  6/19/2019  Last Fill Quantity: 30 tablet,  # refills: 1   Last Office Visit: 7/1/2019   Future Office Visit:            ACE Inhibitors (Including Combos) Protocol Passed - 8/7/2019 11:16 AM        Passed - Blood pressure under 140/90 in past 12 months     BP Readings from Last 3 Encounters:   07/01/19 135/66   06/19/19 145/80   06/17/19 126/62           Passed - Recent (12 mo) or future (30 days) visit within the authorizing provider's specialty     Patient had office visit in the last 12 months or has a visit in the next 30 days with authorizing provider or within the authorizing provider's specialty.  See \"Patient Info\" tab in inbasket, or \"Choose Columns\" in Meds & Orders section of the refill encounter.            Passed - Medication is active on med list        Passed - Patient is age 18 or older        Passed - No active pregnancy on record        Passed - Normal serum creatinine on file in past 12 months     Recent Labs   Lab Test 06/19/19  1723   CR 0.86           Passed - Normal serum potassium on file in past 12 months     Recent Labs   Lab Test 06/19/19  1723   POTASSIUM 4.1           Passed - No positive pregnancy test within past 12 months          "

## 2019-08-08 NOTE — TELEPHONE ENCOUNTER
Refused Prescriptions:                       Disp   Refills    lisinopril (PRINIVIL/ZESTRIL) 20 MG tablet*0.1 ta*0        Sig: TAKE ONE TABLET BY MOUTH ONCE DAILY  Refused By: MARISOL WAN  Reason for Refusal: Duplicate

## 2019-08-08 NOTE — TELEPHONE ENCOUNTER
Signed Prescriptions:                        Disp   Refills    ranitidine (ZANTAC) 150 MG tablet          180 ta*3        Sig: TAKE ONE TABLET BY MOUTH TWICE A DAY  Authorizing Provider: SARA BALL  Ordering User: MARISOL WAN

## 2019-08-08 NOTE — TELEPHONE ENCOUNTER
Patient/pharmacy has made excessive duplicate requests which results in stacking of prescriptions at pharmacy - requested pharmacy discontinue

## 2019-08-08 NOTE — TELEPHONE ENCOUNTER
Signed Prescriptions:                        Disp   Refills    lisinopril (PRINIVIL/ZESTRIL) 20 MG tablet 90 tab*3        Sig: Take 1 tablet (20 mg) by mouth daily  Authorizing Provider: MARK MANNING  Ordering User: MARISOL WAN

## 2019-08-21 NOTE — PROGRESS NOTES
Interval History:   Staci Watt is a 83 year old female who is here follow up for:   Right TKA - 5/7/2019    Staci returns today following a right knee replacement.  She is seen today roughly 1 month after the last visit for a wound check.  Since her last visit the scab has totally fallen off.  She has no scab on the knee and the area is well-healed.  Her pain is steadily improving.  She is using a walker when outside of the house but around the house not using anything and is very happy with the progress she has made.         Physical Exam:     NAD  AOx3  Interactive and cooperative with the exam.  The scab has totally fallen off and the knee incision is well-healed.  There is no surrounding redness.  No drainage.  Her range of motion is from 0-1 20.  She has no extensor lag.         Imaging:      X-rays demonstrate well fixed and well-positioned cemented knee replacement implants.       Assessment and Plan:      She is doing very well following the knee replacement.  The knee incision is well-healed at this point.  She will continue to progress with activities as tolerated.  I will see her back in clinic for a one-year postoperative visit.  She will let me know if she has any problems or concerns in the meantime.    Diego Hi M.D.     Arthritis and Joint Replacement  Department of Orthopaedic Surgery, [facility]  [email or web address]  [phone] (pager)    Answers for HPI/ROS submitted by the patient on 8/21/2019   General Symptoms: No  Skin Symptoms: No  HENT Symptoms: No  EYE SYMPTOMS: No  HEART SYMPTOMS: No  LUNG SYMPTOMS: No  INTESTINAL SYMPTOMS: No  URINARY SYMPTOMS: No  GYNECOLOGIC SYMPTOMS: No  BREAST SYMPTOMS: No  SKELETAL SYMPTOMS: No  BLOOD SYMPTOMS: No  NERVOUS SYSTEM SYMPTOMS: No  MENTAL HEALTH SYMPTOMS: No

## 2019-08-21 NOTE — NURSING NOTE
Reason For Visit:   Chief Complaint   Patient presents with     Surgical Followup     DOS 5/7/19 S/P right TKA        Primary MD: Destin Wolf  Referring MD: Diego Hi        Pain Assessment  Patient Currently in Pain: Denies    Current Outpatient Medications   Medication     acetaminophen (TYLENOL) 500 MG tablet     albuterol (PROAIR HFA/PROVENTIL HFA/VENTOLIN HFA) 108 (90 Base) MCG/ACT inhaler     aspirin (ASA) 81 MG EC tablet     atorvastatin (LIPITOR) 80 MG tablet     ferrous gluconate (FERGON) 324 (38 Fe) MG tablet     FLUoxetine (PROZAC) 20 MG capsule     fluticasone-salmeterol (ADVAIR DISKUS) 500-50 MCG/DOSE diskus inhaler     lisinopril (PRINIVIL/ZESTRIL) 20 MG tablet     multivitamin w/minerals (MULTI-VITAMIN) tablet     oxyCODONE (ROXICODONE) 5 MG tablet     polyethylene glycol (MIRALAX/GLYCOLAX) powder     ranitidine (ZANTAC) 150 MG tablet     vitamin B-12 (CYANOCOBALAMIN) 1000 MCG tablet     vitamin D3 (CHOLECALCIFEROL) 2000 units tablet     No current facility-administered medications for this visit.           Allergies   Allergen Reactions     Contrast Dye Itching     Seasonal Allergies            Lissett Otero LPN

## 2019-09-18 NOTE — PROGRESS NOTES
[facility] Hematology Consultation    Outpatient Visit Note:    Patient: Staci Watt  MRN: [id number]  : 1935  Date of Initial Visit: 2019  Date of Last visit: 19  Date of Visit: 19      Reason for Consultation:  Staci Watt is a referred for evaluation and treatment of anemia and thrombocythemia    Assessment:  In summary, Staci Watt is a 83 year old woman with PMHx of CKD3, gout, MDD on Prozac, asthma who first presented for anemia with thrombocythemia detected on krystin-operative evaluation.     1). Hypoproliferative normocytic anemia  2). Elevated CRP  3). Elevated ferritin  4). Monoclonal protein on uncertain significance      Ms. Watt returns to clinic for follow up. She continues to have some mild pain in her replaced knee. She has no other constitutional symptoms. She also has not symptoms of anemia. Prior to OR she received her iron infusions and has had a modest improvement in her hemoglobin.  In the postoperative setting she dropped down to 8.2 and recovered as high as 10.1 g/dL. Today she is at 9.6 g/dL. Her iron labs are notable for continued increase in ferritin (867, prior 942), continued elevation of ESR (92, previously 65) and CRP (100).  Her iron studies demonstrate an iron saturation of 10% total iron of 22 and an iron by binding capacity of 212.  Unsure where her inflammation is located as no localizing s/s or constitutional symptoms. Reluctant to give IV iron without signs. Her EPO is low at 15. Consider anemia management referral if she becomes more symptomatic.     During her prior evaluation we discussed the possibility that she may have a myelodysplastic disorder or multiple myeloma.  During previous evaluation she had a biclonal process with two very small monoclonal proteins (each 0.1 g/dL) seen in the gamma fraction.  She also had a slight elevation of her kappa free light chains at 2.10 with a normal lambda free light chain of  1.5 and ratio that is normal at 1.38.  Currently she has elevated kappa free light chain of 4.31 with normal lambda free light chain of 2.48 and ratio of 1.74 (increased). She also has monoclonal protein of 0.2 g/dL. Given absence of other CRAB symptoms beyond anemia will continue to monitor without bone marrow.       Plan:  1. Majority of today's visit was spent counseling the patient regarding anemia and thrombocytopenia.  2.   Orders Placed This Encounter   Procedures     Free kappa and lambda light chains urine     Protein electrophoresis     CBC with platelets differential     Comprehensive metabolic panel     Lactate Dehydrogenase     Ferritin     Iron and iron binding capacity     Reticulocyte count     Protein electrophoresis random urine     Protein immunofixation urine     Protein Immunofixation Serum     Free Kappa and Lambda Light Chains Urine     [place] and lambda light chain     The patient is given our center's contact information and is instructed to call if she should have any further questions or concerns.  Otherwise, we will plan on seeing her back Follow up with Provider -6 months    Flora Alberto MD/PhD   of Medicine  [facility] [employer]   ----------------------------------------------------------------------------------------------------------------------    Interval history:  Staci Watt is a 84 year old woman who first presented in consultation ue to a preoperative workup finding anemia and thrombocythemia.  Please refer to the my note from April 17, 2019 for full H&P.    Ms. Watt reports that overall she is doing well. She recently went to casino trip with her cousin. Did retire early. States she continues to have some pain in her replaced right knee. Also endorses some joint stiffness in the mornings. Lasts several minutes. She denies having fevers or chills. She also has not noted unintentional weight loss. She denies having leg swelling.  No new rashes. She also does not get swelling of her hands.      Past Medical History:  Past Medical History:   Diagnosis Date     Allergic rhinitis, cause unspecified      CKD (chronic kidney disease) stage 3, GFR 30-59 ml/min (H)      Gout, unspecified      Hyperlipidemia LDL goal <100 7/9/2004     Hypertension goal BP (blood pressure) < 140/90 7/9/2004     Iron deficiency anemia      Moderate major depression (H) 10/19/2006     Moderate persistent asthma 12/4/2005     OA (osteoarthritis) of knee 2/2/2012     Obese 2/2/2012     Retinal detachment with retinal defect, unspecified      Unspecified cataract     s/p cataract surgery       Past Surgical History:  Past Surgical History:   Procedure Laterality Date     ARTHROPLASTY KNEE Right 5/7/2019    Procedure: Right Total Knee Arthroplasty;  Surgeon: Diego Hi MD;  Location: [facility] OR     BUNIONECTOMY JASWANT BILATERAL       C NONSPECIFIC PROCEDURE      (B) detatched retina     C NONSPECIFIC PROCEDURE      LASIK surgery     C NONSPECIFIC PROCEDURE      NISSA/BSO     C NONSPECIFIC PROCEDURE      Hernia     C NONSPECIFIC PROCEDURE      Tonsillectomy     C NONSPECIFIC PROCEDURE      Arthroscopic knee surgery       Medications:  Current Outpatient Medications   Medication Sig Dispense Refill     acetaminophen (TYLENOL) 500 MG tablet Take 2 tablets (1,000 mg) by mouth 3 times daily AND 1000mg daily PRN 90 tablet 1     albuterol (PROAIR HFA/PROVENTIL HFA/VENTOLIN HFA) 108 (90 Base) MCG/ACT inhaler Inhale 2 puffs into the lungs every 4 hours as needed for shortness of breath / dyspnea or wheezing       aspirin (ASA) 81 MG EC tablet Take 1 tablet (81 mg) by mouth 2 times daily 56 tablet 0     atorvastatin (LIPITOR) 80 MG tablet TAKE ONE-HALF TABLET BY MOUTH DAILY FOR HIGH CHOLESTEROL 45 tablet 2     ferrous gluconate (FERGON) 324 (38 Fe) MG tablet Take 324 mg by mouth daily (with breakfast)       FLUoxetine (PROZAC) 20 MG capsule TAKE ONE CAPSULE BY MOUTH ONCE DAILY 90  capsule 0     fluticasone-salmeterol (ADVAIR DISKUS) 500-50 MCG/DOSE diskus inhaler Inhale 1 puff into the lungs 2 times daily 3 Inhaler 3     lisinopril (PRINIVIL/ZESTRIL) 20 MG tablet Take 1 tablet (20 mg) by mouth daily 90 tablet 1     multivitamin w/minerals (MULTI-VITAMIN) tablet Take 1 tablet by mouth daily 30 tablet 0     ranitidine (ZANTAC) 150 MG tablet TAKE ONE TABLET BY MOUTH TWICE A  tablet 3     vitamin D3 (CHOLECALCIFEROL) 2000 units tablet Take 1 tablet by mouth daily 90 tablet 0     oxyCODONE (ROXICODONE) 5 MG tablet Take 1 tablet (5 mg) by mouth every 12 hours as needed for severe pain (Patient not taking: Reported on 2019) 6 tablet 0     vitamin B-12 (CYANOCOBALAMIN) 1000 MCG tablet Take 1,000 mcg by mouth daily          Allergies:  Allergies   Allergen Reactions     Contrast Dye Itching     Seasonal Allergies        ROS:  A 7 point ROS is negative except as stated in the HPI    Social History:  Denies any tobacco use. No significant alcohol use. Retired nurse.     Family History:  3 grand children  1 great grandchildren  3 living children  1 child  from complications from pneumonia and seizures  3 living siblings  2 brothers - one from lung cancer- both smoker    Objective:  Vitals: BP (!) 159/70 (BP Location: Right arm, Patient Position: Sitting, Cuff Size: Adult Regular)   Pulse 92   Temp 98.6  F (37  C) (Oral)   Resp 16   Wt 66.7 kg (147 lb 1.6 oz)   SpO2 99%   BMI 27.26 kg/m      Exam:   Constitutional: Well-groomed elderly woman in wheelchair, appears well, no distress  HEENT: Pupils equal and reactive to light. No scleral icterus or hemorrhage. Nares without evidence of telangiectasia. Mucous membranes moist with no wet purpura. Dentition overall ok with no signs of decay. No pharyngeal exudates. No lymphadenopathy, no thyromeagaly  CV: regular rate and rhythm, no murmurs  Respiratory: clear  GI: examined in wheelchair. abdomen soft, nontender, without  guarding or rebound. Unable to discern HSM.  Mus/Skele: Right knee with 5 x 5 cm scar.  No swelling.     Skin: no petechiae, no ecchymosis.  Neuro: CN II-XII intact. AOx3    Labs: personally reviewed with relevant trends annotated below  Results for orders placed or performed in visit on 09/18/19   Protein electrophoresis   Result Value Ref Range    Albumin Fraction PENDING 3.7 - 5.1 g/dL    Alpha 1 Fraction PENDING 0.2 - 0.4 g/dL    Alpha 2 Fraction PENDING 0.5 - 0.9 g/dL    Beta Fraction PENDING 0.6 - 1.0 g/dL    Gamma Fraction PENDING 0.7 - 1.6 g/dL    Monoclonal Peak PENDING 0.0 g/dL    ELP Interpretation: PENDING    Iron and iron binding capacity   Result Value Ref Range    Iron 22 (L) 35 - 180 ug/dL    Iron Binding Cap 212 (L) 240 - 430 ug/dL    Iron Saturation Index 10 (L) 15 - 46 %   Ferritin   Result Value Ref Range    Ferritin 867 (H) 8 - 252 ng/mL   CRP inflammation   Result Value Ref Range    CRP Inflammation 100.0 (H) 0.0 - 8.0 mg/L   Erythrocyte sedimentation rate auto   Result Value Ref Range    Sed Rate 92 (H) 0 - 30 mm/h   Comprehensive metabolic panel   Result Value Ref Range    Sodium 136 133 - 144 mmol/L    Potassium 3.5 3.4 - 5.3 mmol/L    Chloride 106 94 - 109 mmol/L    Carbon Dioxide 25 20 - 32 mmol/L    Anion Gap 5 3 - 14 mmol/L    Glucose 79 70 - 99 mg/dL    Urea Nitrogen 14 7 - 30 mg/dL    Creatinine 0.75 0.52 - 1.04 mg/dL    GFR Estimate 74 >60 mL/min/[1.73_m2]    GFR Estimate If Black 86 >60 mL/min/[1.73_m2]    Calcium 9.6 8.5 - 10.1 mg/dL    Bilirubin Total 0.4 0.2 - 1.3 mg/dL    Albumin 3.5 3.4 - 5.0 g/dL    Protein Total 8.1 6.8 - 8.8 g/dL    Alkaline Phosphatase 71 40 - 150 U/L    ALT 16 0 - 50 U/L    AST 8 0 - 45 U/L   CBC with platelets differential   Result Value Ref Range    WBC 5.7 4.0 - 11.0 10e9/L    RBC Count 3.19 (L) 3.8 - 5.2 10e12/L    Hemoglobin 9.6 (L) 11.7 - 15.7 g/dL    Hematocrit 30.2 (L) 35.0 - 47.0 %    MCV 95 78 - 100 fl    MCH 30.1 26.5 - 33.0 pg    MCHC 31.8 31.5  - 36.5 g/dL    RDW 13.2 10.0 - 15.0 %    Platelet Count 404 150 - 450 10e9/L    Diff Method Automated Method     % Neutrophils 48.1 %    % Lymphocytes 40.2 %    % Monocytes 9.3 %    % Eosinophils 1.7 %    % Basophils 0.5 %    % Immature Granulocytes 0.2 %    Nucleated RBCs 0 0 /100    Absolute Neutrophil 2.8 1.6 - 8.3 10e9/L    Absolute Lymphocytes 2.3 0.8 - 5.3 10e9/L    Absolute Monocytes 0.5 0.0 - 1.3 10e9/L    Absolute Eosinophils 0.1 0.0 - 0.7 10e9/L    Absolute Basophils 0.0 0.0 - 0.2 10e9/L    Abs Immature Granulocytes 0.0 0 - 0.4 10e9/L    Absolute Nucleated RBC 0.0        Small monoclonal protein (0.2 g/dL) seen in the gamma fraction. See immunofixation report   on same specimen. Hypoalbuminemia with increased alpha 1 globulins. Pathologic   significance requires clinical correlation.  TRACEE Ridley M.D., Ph.D., Pathologist.     Faint monoclonal IgG immunoglobulin of kappa light chain type.     Kappa free light chain 4.31  Lambda free light chain 2.48  Kappa lambda ratio 1.74

## 2019-09-18 NOTE — NURSING NOTE
"Oncology Rooming Note    September 18, 2019 3:36 PM   Staci Watt is a 84 year old female who presents for:    Chief Complaint   Patient presents with     Blood Draw     Labs drawn via  by RN in lab. VS taken. Patient checked in for next appt.     Oncology Clinic Visit     Return Monoclonal Gammopathy of Unknown Significance     Initial Vitals: BP (!) 159/70 (BP Location: Right arm, Patient Position: Sitting, Cuff Size: Adult Regular)   Pulse 92   Temp 98.6  F (37  C) (Oral)   Resp 16   Wt 66.7 kg (147 lb 1.6 oz)   SpO2 99%   BMI 27.26 kg/m   Estimated body mass index is 27.26 kg/m  as calculated from the following:    Height as of 6/19/19: 1.565 m (5' 1.6\").    Weight as of this encounter: 66.7 kg (147 lb 1.6 oz). Body surface area is 1.7 meters squared.  Severe Pain (7) Comment: Data Unavailable   No LMP recorded. Patient has had a hysterectomy.  Allergies reviewed: Yes  Medications reviewed: Yes    Medications: Medication refills not needed today.  Pharmacy name entered into [employer]:    [facility] [place], [place] - [id number]  [facility] - [place], [place] - [id number]    Clinical concerns: fatigue; getting up and around from knee surgery.      Karen Martinez [facility]              "

## 2019-09-18 NOTE — NURSING NOTE
Chief Complaint   Patient presents with     Blood Draw     Labs drawn via  by RN in lab. VS taken. Patient checked in for next appt.     Labs collected from venipuncture by RN. Vitals taken. Checked in for appointment. Patient unable to provide urine specimen, orders credited.    Homa Amaya RN

## 2019-09-18 NOTE — LETTER
2019       RE: Staci Watt  [address]  [facility] [address]     Dear Colleague,    Thank you for referring your patient, Staci Watt, to the [facility]. Please see a copy of my visit note below.        [facility] Hematology Consultation    Outpatient Visit Note:    Patient: Staci Watt  MRN: [id number]  : 1935  Date of Initial Visit: 2019  Date of Last visit: 19  Date of Visit: 19    Reason for Consultation:  Staci Watt is a referred for evaluation and treatment of anemia and thrombocythemia    Assessment:  In summary, Staci Watt is a 83 year old woman with PMHx of CKD3, gout, MDD on Prozac, asthma who first presented for anemia with thrombocythemia detected on krystin-operative evaluation.     1). Hypoproliferative normocytic anemia  2). Elevated CRP  3). Elevated ferritin  4). Monoclonal protein on uncertain significance      Ms. Watt returns to clinic for follow up. She continues to have some mild pain in her replaced knee. She has no other constitutional symptoms. She also has not symptoms of anemia. Prior to OR she received her iron infusions and has had a modest improvement in her hemoglobin.  In the postoperative setting she dropped down to 8.2 and recovered as high as 10.1 g/dL. Today she is at 9.6 g/dL. Her iron labs are notable for continued increase in ferritin (867, prior 942), continued elevation of ESR (92, previously 65) and CRP (100).  Her iron studies demonstrate an iron saturation of 10% total iron of 22 and an iron by binding capacity of 212.  Unsure where her inflammation is located as no localizing s/s or constitutional symptoms. Reluctant to give IV iron without signs. Her EPO is low at 15. Consider anemia management referral if she becomes more symptomatic.     During her prior evaluation we discussed the possibility that she may have a myelodysplastic disorder or multiple myeloma.  During  previous evaluation she had a biclonal process with two very small monoclonal proteins (each 0.1 g/dL) seen in the gamma fraction.  She also had a slight elevation of her kappa free light chains at 2.10 with a normal lambda free light chain of 1.5 and ratio that is normal at 1.38.  Currently she has elevated kappa free light chain of 4.31 with normal lambda free light chain of 2.48 and ratio of 1.74 (increased). She also has monoclonal protein of 0.2 g/dL. Given absence of other CRAB symptoms beyond anemia will continue to monitor without bone marrow.       Plan:  1. Majority of today's visit was spent counseling the patient regarding anemia and thrombocytopenia.  2.   Orders Placed This Encounter   Procedures     Free kappa and lambda light chains urine     Protein electrophoresis     CBC with platelets differential     Comprehensive metabolic panel     Lactate Dehydrogenase     Ferritin     Iron and iron binding capacity     Reticulocyte count     Protein electrophoresis random urine     Protein immunofixation urine     Protein Immunofixation Serum     Free Kappa and Lambda Light Chains Urine     Coronita and lambda light chain     The patient is given our center's contact information and is instructed to call if she should have any further questions or concerns.  Otherwise, we will plan on seeing her back Follow up with Provider -6 months    Flora Alberto MD/PhD   of Medicine  [facility]   ----------------------------------------------------------------------------------------------------------------------    Interval history:  Staci Watt is a 84 year old woman who first presented in consultation ue to a preoperative workup finding anemia and thrombocythemia.  Please refer to the my note from April 17, 2019 for full H&P.    Ms. Watt reports that overall she is doing well. She recently went to casino trip with her cousin. Did retire early. States she  continues to have some pain in her replaced right knee. Also endorses some joint stiffness in the mornings. Lasts several minutes. She denies having fevers or chills. She also has not noted unintentional weight loss. She denies having leg swelling. No new rashes. She also does not get swelling of her hands.      Past Medical History:  Past Medical History:   Diagnosis Date     Allergic rhinitis, cause unspecified      CKD (chronic kidney disease) stage 3, GFR 30-59 ml/min (H)      Gout, unspecified      Hyperlipidemia LDL goal <100 7/9/2004     Hypertension goal BP (blood pressure) < 140/90 7/9/2004     Iron deficiency anemia      Moderate major depression (H) 10/19/2006     Moderate persistent asthma 12/4/2005     OA (osteoarthritis) of knee 2/2/2012     Obese 2/2/2012     Retinal detachment with retinal defect, unspecified      Unspecified cataract     s/p cataract surgery       Past Surgical History:  Past Surgical History:   Procedure Laterality Date     ARTHROPLASTY KNEE Right 5/7/2019    Procedure: Right Total Knee Arthroplasty;  Surgeon: Diego Hi MD;  Location: [facility] OR     BUNIONECTOMY JASWANT BILATERAL       C NONSPECIFIC PROCEDURE      (B) detatched retina     C NONSPECIFIC PROCEDURE      LASIK surgery     C NONSPECIFIC PROCEDURE      NISSA/BSO     C NONSPECIFIC PROCEDURE      Hernia     C NONSPECIFIC PROCEDURE      Tonsillectomy     C NONSPECIFIC PROCEDURE      Arthroscopic knee surgery       Medications:  Current Outpatient Medications   Medication Sig Dispense Refill     acetaminophen (TYLENOL) 500 MG tablet Take 2 tablets (1,000 mg) by mouth 3 times daily AND 1000mg daily PRN 90 tablet 1     albuterol (PROAIR HFA/PROVENTIL HFA/VENTOLIN HFA) 108 (90 Base) MCG/ACT inhaler Inhale 2 puffs into the lungs every 4 hours as needed for shortness of breath / dyspnea or wheezing       aspirin (ASA) 81 MG EC tablet Take 1 tablet (81 mg) by mouth 2 times daily 56 tablet 0     atorvastatin (LIPITOR) 80 MG  tablet TAKE ONE-HALF TABLET BY MOUTH DAILY FOR HIGH CHOLESTEROL 45 tablet 2     ferrous gluconate (FERGON) 324 (38 Fe) MG tablet Take 324 mg by mouth daily (with breakfast)       FLUoxetine (PROZAC) 20 MG capsule TAKE ONE CAPSULE BY MOUTH ONCE DAILY 90 capsule 0     fluticasone-salmeterol (ADVAIR DISKUS) 500-50 MCG/DOSE diskus inhaler Inhale 1 puff into the lungs 2 times daily 3 Inhaler 3     lisinopril (PRINIVIL/ZESTRIL) 20 MG tablet Take 1 tablet (20 mg) by mouth daily 90 tablet 1     multivitamin w/minerals (MULTI-VITAMIN) tablet Take 1 tablet by mouth daily 30 tablet 0     ranitidine (ZANTAC) 150 MG tablet TAKE ONE TABLET BY MOUTH TWICE A  tablet 3     vitamin D3 (CHOLECALCIFEROL) 2000 units tablet Take 1 tablet by mouth daily 90 tablet 0     oxyCODONE (ROXICODONE) 5 MG tablet Take 1 tablet (5 mg) by mouth every 12 hours as needed for severe pain (Patient not taking: Reported on 2019) 6 tablet 0     vitamin B-12 (CYANOCOBALAMIN) 1000 MCG tablet Take 1,000 mcg by mouth daily          Allergies:  Allergies   Allergen Reactions     Contrast Dye Itching     Seasonal Allergies        ROS:  A 7 point ROS is negative except as stated in the HPI    Social History:  Denies any tobacco use. No significant alcohol use. Retired nurse.     Family History:  3 grand children  1 great grandchildren  3 living children  1 child  from complications from pneumonia and seizures  3 living siblings  2 brothers - one from lung cancer- both smoker    Objective:  Vitals: BP (!) 159/70 (BP Location: Right arm, Patient Position: Sitting, Cuff Size: Adult Regular)   Pulse 92   Temp 98.6  F (37  C) (Oral)   Resp 16   Wt 66.7 kg (147 lb 1.6 oz)   SpO2 99%   BMI 27.26 kg/m       Exam:   Constitutional: Well-groomed elderly woman in wheelchair, appears well, no distress  HEENT: Pupils equal and reactive to light. No scleral icterus or hemorrhage. Nares without evidence of telangiectasia. Mucous membranes moist  with no wet purpura. Dentition overall ok with no signs of decay. No pharyngeal exudates. No lymphadenopathy, no thyromeagaly  CV: regular rate and rhythm, no murmurs  Respiratory: clear  GI: examined in wheelchair. abdomen soft, nontender, without guarding or rebound. Unable to discern HSM.  Mus/Skele: Right knee with 5 x 5 cm scar.  No swelling.     Skin: no petechiae, no ecchymosis.  Neuro: CN II-XII intact. AOx3    Labs: personally reviewed with relevant trends annotated below  Results for orders placed or performed in visit on 09/18/19   Protein electrophoresis   Result Value Ref Range    Albumin Fraction PENDING 3.7 - 5.1 g/dL    Alpha 1 Fraction PENDING 0.2 - 0.4 g/dL    Alpha 2 Fraction PENDING 0.5 - 0.9 g/dL    Beta Fraction PENDING 0.6 - 1.0 g/dL    Gamma Fraction PENDING 0.7 - 1.6 g/dL    Monoclonal Peak PENDING 0.0 g/dL    ELP Interpretation: PENDING    Iron and iron binding capacity   Result Value Ref Range    Iron 22 (L) 35 - 180 ug/dL    Iron Binding Cap 212 (L) 240 - 430 ug/dL    Iron Saturation Index 10 (L) 15 - 46 %   Ferritin   Result Value Ref Range    Ferritin 867 (H) 8 - 252 ng/mL   CRP inflammation   Result Value Ref Range    CRP Inflammation 100.0 (H) 0.0 - 8.0 mg/L   Erythrocyte sedimentation rate auto   Result Value Ref Range    Sed Rate 92 (H) 0 - 30 mm/h   Comprehensive metabolic panel   Result Value Ref Range    Sodium 136 133 - 144 mmol/L    Potassium 3.5 3.4 - 5.3 mmol/L    Chloride 106 94 - 109 mmol/L    Carbon Dioxide 25 20 - 32 mmol/L    Anion Gap 5 3 - 14 mmol/L    Glucose 79 70 - 99 mg/dL    Urea Nitrogen 14 7 - 30 mg/dL    Creatinine 0.75 0.52 - 1.04 mg/dL    GFR Estimate 74 >60 mL/min/[1.73_m2]    GFR Estimate If Black 86 >60 mL/min/[1.73_m2]    Calcium 9.6 8.5 - 10.1 mg/dL    Bilirubin Total 0.4 0.2 - 1.3 mg/dL    Albumin 3.5 3.4 - 5.0 g/dL    Protein Total 8.1 6.8 - 8.8 g/dL    Alkaline Phosphatase 71 40 - 150 U/L    ALT 16 0 - 50 U/L    AST 8 0 - 45 U/L   CBC with platelets  differential   Result Value Ref Range    WBC 5.7 4.0 - 11.0 10e9/L    RBC Count 3.19 (L) 3.8 - 5.2 10e12/L    Hemoglobin 9.6 (L) 11.7 - 15.7 g/dL    Hematocrit 30.2 (L) 35.0 - 47.0 %    MCV 95 78 - 100 fl    MCH 30.1 26.5 - 33.0 pg    MCHC 31.8 31.5 - 36.5 g/dL    RDW 13.2 10.0 - 15.0 %    Platelet Count 404 150 - 450 10e9/L    Diff Method Automated Method     % Neutrophils 48.1 %    % Lymphocytes 40.2 %    % Monocytes 9.3 %    % Eosinophils 1.7 %    % Basophils 0.5 %    % Immature Granulocytes 0.2 %    Nucleated RBCs 0 0 /100    Absolute Neutrophil 2.8 1.6 - 8.3 10e9/L    Absolute Lymphocytes 2.3 0.8 - 5.3 10e9/L    Absolute Monocytes 0.5 0.0 - 1.3 10e9/L    Absolute Eosinophils 0.1 0.0 - 0.7 10e9/L    Absolute Basophils 0.0 0.0 - 0.2 10e9/L    Abs Immature Granulocytes 0.0 0 - 0.4 10e9/L    Absolute Nucleated RBC 0.0        Small monoclonal protein (0.2 g/dL) seen in the gamma fraction. See immunofixation report   on same specimen. Hypoalbuminemia with increased alpha 1 globulins. Pathologic   significance requires clinical correlation.  TRACEE Ridley M.D., Ph.D., Pathologist.     Faint monoclonal IgG immunoglobulin of kappa light chain type.     Kappa free light chain 4.31  Lambda free light chain 2.48  Kappa lambda ratio 1.74    Flora Alberto MD

## 2019-09-18 NOTE — PATIENT INSTRUCTIONS
We are monitoring your blood for a monoclonal protein. This lab will come back in about a week. We will contact you with results. If the monoclonal protein increases we would consider doing a bone marrow biopsy.     Your iron is low we would consider more iron infusions. I am also checking a hormone called erythropoietin (EPO). This may be why you are still anemic.       Follow up in 6 months. Get your labs done ~ 1 week prior so we can discuss in clinic.     Flora Alberto MD/PhD   of Medicine  Division of Hematology, Oncology and Transplantation    [facility] [address], Mail [id number]  [place] [address]    Clinic Scheduling and Nurse Line: [phone]      RN Care Coordinator:   RAKESH Ferrer  [facility]  (P) [phone]  (F) [phone]

## 2019-10-16 NOTE — TELEPHONE ENCOUNTER
"Requested Prescriptions   Pending Prescriptions Disp Refills     ADVAIR DISKUS 500-50 MCG/DOSE inhaler [Pharmacy Med Name: ADVAIR DISKUS 500-50MCG/DOSE AEPB]  3     Sig: INHALE ONE PUFF BY MOUTH TWO TIMES A DAY  Last Written Prescription Date:  7/9/018  Last Fill Quantity: 3,  # refills: 3   Last Office Visit: 7/1/2019   Future Office Visit:            Inhaled Steroids Protocol Failed - 10/16/2019  9:02 AM        Failed - Asthma control assessment score within normal limits in last 6 months     Please review ACT score.   ACT Total Scores 1/2/2018 7/9/2018 3/4/2019   ACT TOTAL SCORE - - -   ASTHMA ER VISITS - - -   ASTHMA HOSPITALIZATIONS - - -   ACT TOTAL SCORE (Goal Greater than or Equal to 20) 25 22 25   In the past 12 months, how many times did you visit the emergency room for your asthma without being admitted to the hospital? 0 0 0   In the past 12 months, how many times were you hospitalized overnight because of your asthma? 0 0 0             Passed - Patient is age 12 or older        Passed - Medication is active on med list        Passed - Recent (6 mo) or future (30 days) visit within the authorizing provider's specialty     Patient had office visit in the last 6 months or has a visit in the next 30 days with authorizing provider or within the authorizing provider's specialty.  See \"Patient Info\" tab in inbasket, or \"Choose Columns\" in Meds & Orders section of the refill encounter.              "

## 2019-12-13 NOTE — TELEPHONE ENCOUNTER
Dr. Wolf-Dr. Bernstein received recall notification for this patient.  Do you recommend patient switch to Famotidine, or another alternative?  Med and pharmacy pended.    Thank you!  JUNG RenteriaN, RN

## 2020-01-01 ENCOUNTER — APPOINTMENT (OUTPATIENT)
Dept: GENERAL RADIOLOGY | Facility: CLINIC | Age: 85
DRG: 270 | End: 2020-01-01
Attending: STUDENT IN AN ORGANIZED HEALTH CARE EDUCATION/TRAINING PROGRAM
Payer: COMMERCIAL

## 2020-01-01 ENCOUNTER — ALLIED HEALTH/NURSE VISIT (OUTPATIENT)
Dept: NEUROLOGY | Facility: CLINIC | Age: 85
DRG: 270 | End: 2020-01-01
Attending: PSYCHIATRY & NEUROLOGY
Payer: COMMERCIAL

## 2020-01-01 ENCOUNTER — APPOINTMENT (OUTPATIENT)
Dept: GENERAL RADIOLOGY | Facility: CLINIC | Age: 85
DRG: 270 | End: 2020-01-01
Attending: EMERGENCY MEDICINE
Payer: COMMERCIAL

## 2020-01-01 ENCOUNTER — VIRTUAL VISIT (OUTPATIENT)
Dept: FAMILY MEDICINE | Facility: CLINIC | Age: 85
End: 2020-01-01
Payer: COMMERCIAL

## 2020-01-01 ENCOUNTER — VIRTUAL VISIT (OUTPATIENT)
Dept: ONCOLOGY | Facility: CLINIC | Age: 85
End: 2020-01-01
Attending: INTERNAL MEDICINE
Payer: COMMERCIAL

## 2020-01-01 ENCOUNTER — APPOINTMENT (OUTPATIENT)
Dept: CT IMAGING | Facility: CLINIC | Age: 85
DRG: 270 | End: 2020-01-01
Attending: EMERGENCY MEDICINE
Payer: COMMERCIAL

## 2020-01-01 ENCOUNTER — HOSPITAL ENCOUNTER (INPATIENT)
Facility: CLINIC | Age: 85
LOS: 8 days | DRG: 270 | End: 2020-04-07
Attending: EMERGENCY MEDICINE | Admitting: INTERNAL MEDICINE
Payer: COMMERCIAL

## 2020-01-01 ENCOUNTER — NURSE TRIAGE (OUTPATIENT)
Dept: NURSING | Facility: CLINIC | Age: 85
End: 2020-01-01

## 2020-01-01 ENCOUNTER — HEALTH MAINTENANCE LETTER (OUTPATIENT)
Age: 85
End: 2020-01-01

## 2020-01-01 ENCOUNTER — APPOINTMENT (OUTPATIENT)
Dept: GENERAL RADIOLOGY | Facility: CLINIC | Age: 85
DRG: 270 | End: 2020-01-01
Attending: PSYCHIATRY & NEUROLOGY
Payer: COMMERCIAL

## 2020-01-01 ENCOUNTER — APPOINTMENT (OUTPATIENT)
Dept: GENERAL RADIOLOGY | Facility: CLINIC | Age: 85
DRG: 270 | End: 2020-01-01
Attending: INTERNAL MEDICINE
Payer: COMMERCIAL

## 2020-01-01 ENCOUNTER — OFFICE VISIT (OUTPATIENT)
Dept: FAMILY MEDICINE | Facility: CLINIC | Age: 85
End: 2020-01-01
Payer: COMMERCIAL

## 2020-01-01 ENCOUNTER — APPOINTMENT (OUTPATIENT)
Dept: CARDIOLOGY | Facility: CLINIC | Age: 85
DRG: 270 | End: 2020-01-01
Attending: STUDENT IN AN ORGANIZED HEALTH CARE EDUCATION/TRAINING PROGRAM
Payer: COMMERCIAL

## 2020-01-01 ENCOUNTER — APPOINTMENT (OUTPATIENT)
Dept: CARDIOLOGY | Facility: CLINIC | Age: 85
DRG: 270 | End: 2020-01-01
Attending: EMERGENCY MEDICINE
Payer: COMMERCIAL

## 2020-01-01 ENCOUNTER — APPOINTMENT (OUTPATIENT)
Dept: PHYSICAL THERAPY | Facility: CLINIC | Age: 85
DRG: 270 | End: 2020-01-01
Attending: PSYCHIATRY & NEUROLOGY
Payer: COMMERCIAL

## 2020-01-01 VITALS
TEMPERATURE: 99.3 F | SYSTOLIC BLOOD PRESSURE: 116 MMHG | RESPIRATION RATE: 14 BRPM | DIASTOLIC BLOOD PRESSURE: 72 MMHG | HEIGHT: 61 IN | OXYGEN SATURATION: 100 % | BODY MASS INDEX: 33.96 KG/M2 | HEART RATE: 69 BPM | WEIGHT: 179.9 LBS

## 2020-01-01 VITALS
OXYGEN SATURATION: 99 % | HEART RATE: 103 BPM | DIASTOLIC BLOOD PRESSURE: 60 MMHG | SYSTOLIC BLOOD PRESSURE: 140 MMHG | TEMPERATURE: 98.5 F

## 2020-01-01 DIAGNOSIS — G40.909 SEIZURE DISORDER (H): Primary | ICD-10-CM

## 2020-01-01 DIAGNOSIS — R56.9 SEIZURES (H): Primary | ICD-10-CM

## 2020-01-01 DIAGNOSIS — I46.9 CARDIAC ARREST (H): ICD-10-CM

## 2020-01-01 DIAGNOSIS — D47.2 MGUS (MONOCLONAL GAMMOPATHY OF UNKNOWN SIGNIFICANCE): ICD-10-CM

## 2020-01-01 DIAGNOSIS — M25.50 ARTHRALGIA, UNSPECIFIED JOINT: Primary | ICD-10-CM

## 2020-01-01 DIAGNOSIS — N18.30 CKD (CHRONIC KIDNEY DISEASE) STAGE 3, GFR 30-59 ML/MIN (H): ICD-10-CM

## 2020-01-01 DIAGNOSIS — F33.0 MILD EPISODE OF RECURRENT MAJOR DEPRESSIVE DISORDER (H): ICD-10-CM

## 2020-01-01 DIAGNOSIS — F33.1 MAJOR DEPRESSIVE DISORDER, RECURRENT EPISODE, MODERATE (H): ICD-10-CM

## 2020-01-01 DIAGNOSIS — R09.2 RESPIRATORY ARREST (H): ICD-10-CM

## 2020-01-01 DIAGNOSIS — D62 ANEMIA DUE TO BLOOD LOSS, ACUTE: ICD-10-CM

## 2020-01-01 DIAGNOSIS — D47.2 MGUS (MONOCLONAL GAMMOPATHY OF UNKNOWN SIGNIFICANCE): Primary | ICD-10-CM

## 2020-01-01 DIAGNOSIS — I10 ESSENTIAL HYPERTENSION: ICD-10-CM

## 2020-01-01 DIAGNOSIS — M25.50 ARTHRALGIA, UNSPECIFIED JOINT: ICD-10-CM

## 2020-01-01 DIAGNOSIS — I46.9 CARDIAC ARREST (H): Primary | ICD-10-CM

## 2020-01-01 DIAGNOSIS — R41.82 ALTERED MENTAL STATUS: Primary | ICD-10-CM

## 2020-01-01 DIAGNOSIS — R53.81 PHYSICAL DECONDITIONING: ICD-10-CM

## 2020-01-01 DIAGNOSIS — E78.5 HYPERLIPIDEMIA, UNSPECIFIED HYPERLIPIDEMIA TYPE: ICD-10-CM

## 2020-01-01 DIAGNOSIS — D64.9 ANEMIA, UNSPECIFIED TYPE: Primary | ICD-10-CM

## 2020-01-01 DIAGNOSIS — R41.3 MEMORY CHANGE: ICD-10-CM

## 2020-01-01 LAB
ABO + RH BLD: NORMAL
ALBUMIN MFR UR ELPH: 54.7 %
ALBUMIN SERPL ELPH-MCNC: 3.8 G/DL (ref 3.7–5.1)
ALBUMIN SERPL-MCNC: 1.6 G/DL (ref 3.4–5)
ALBUMIN SERPL-MCNC: 1.6 G/DL (ref 3.4–5)
ALBUMIN SERPL-MCNC: 1.7 G/DL (ref 3.4–5)
ALBUMIN SERPL-MCNC: 1.7 G/DL (ref 3.4–5)
ALBUMIN SERPL-MCNC: 1.8 G/DL (ref 3.4–5)
ALBUMIN SERPL-MCNC: 1.9 G/DL (ref 3.4–5)
ALBUMIN SERPL-MCNC: 2 G/DL (ref 3.4–5)
ALBUMIN SERPL-MCNC: 2.3 G/DL (ref 3.4–5)
ALBUMIN SERPL-MCNC: 2.4 G/DL (ref 3.4–5)
ALBUMIN SERPL-MCNC: 2.6 G/DL (ref 3.4–5)
ALBUMIN SERPL-MCNC: 2.6 G/DL (ref 3.4–5)
ALBUMIN SERPL-MCNC: 3.3 G/DL (ref 3.4–5)
ALP SERPL-CCNC: 103 U/L (ref 40–150)
ALP SERPL-CCNC: 107 U/L (ref 40–150)
ALP SERPL-CCNC: 108 U/L (ref 40–150)
ALP SERPL-CCNC: 115 U/L (ref 40–150)
ALP SERPL-CCNC: 68 U/L (ref 40–150)
ALP SERPL-CCNC: 69 U/L (ref 40–150)
ALP SERPL-CCNC: 73 U/L (ref 40–150)
ALP SERPL-CCNC: 74 U/L (ref 40–150)
ALP SERPL-CCNC: 75 U/L (ref 40–150)
ALP SERPL-CCNC: 76 U/L (ref 40–150)
ALP SERPL-CCNC: 79 U/L (ref 40–150)
ALP SERPL-CCNC: 82 U/L (ref 40–150)
ALP SERPL-CCNC: 85 U/L (ref 40–150)
ALP SERPL-CCNC: 87 U/L (ref 40–150)
ALP SERPL-CCNC: 87 U/L (ref 40–150)
ALP SERPL-CCNC: 88 U/L (ref 40–150)
ALP SERPL-CCNC: 89 U/L (ref 40–150)
ALPHA1 GLOB MFR UR ELPH: 7.2 %
ALPHA1 GLOB SERPL ELPH-MCNC: 0.5 G/DL (ref 0.2–0.4)
ALPHA2 GLOB MFR UR ELPH: 5.6 %
ALPHA2 GLOB SERPL ELPH-MCNC: 0.8 G/DL (ref 0.5–0.9)
ALT SERPL W P-5'-P-CCNC: 104 U/L (ref 0–50)
ALT SERPL W P-5'-P-CCNC: 129 U/L (ref 0–50)
ALT SERPL W P-5'-P-CCNC: 150 U/L (ref 0–50)
ALT SERPL W P-5'-P-CCNC: 164 U/L (ref 0–50)
ALT SERPL W P-5'-P-CCNC: 173 U/L (ref 0–50)
ALT SERPL W P-5'-P-CCNC: 188 U/L (ref 0–50)
ALT SERPL W P-5'-P-CCNC: 189 U/L (ref 0–50)
ALT SERPL W P-5'-P-CCNC: 194 U/L (ref 0–50)
ALT SERPL W P-5'-P-CCNC: 201 U/L (ref 0–50)
ALT SERPL W P-5'-P-CCNC: 202 U/L (ref 0–50)
ALT SERPL W P-5'-P-CCNC: 203 U/L (ref 0–50)
ALT SERPL W P-5'-P-CCNC: 203 U/L (ref 0–50)
ALT SERPL W P-5'-P-CCNC: 22 U/L (ref 0–50)
ALT SERPL W P-5'-P-CCNC: 223 U/L (ref 0–50)
ALT SERPL W P-5'-P-CCNC: 64 U/L (ref 0–50)
ALT SERPL W P-5'-P-CCNC: 74 U/L (ref 0–50)
ALT SERPL W P-5'-P-CCNC: 84 U/L (ref 0–50)
ALT SERPL W P-5'-P-CCNC: 85 U/L (ref 0–50)
ALT SERPL W P-5'-P-CCNC: 86 U/L (ref 0–50)
ANION GAP SERPL CALCULATED.3IONS-SCNC: 10 MMOL/L (ref 3–14)
ANION GAP SERPL CALCULATED.3IONS-SCNC: 10 MMOL/L (ref 3–14)
ANION GAP SERPL CALCULATED.3IONS-SCNC: 11 MMOL/L (ref 3–14)
ANION GAP SERPL CALCULATED.3IONS-SCNC: 12 MMOL/L (ref 3–14)
ANION GAP SERPL CALCULATED.3IONS-SCNC: 16 MMOL/L (ref 3–14)
ANION GAP SERPL CALCULATED.3IONS-SCNC: 5 MMOL/L (ref 3–14)
ANION GAP SERPL CALCULATED.3IONS-SCNC: 6 MMOL/L (ref 3–14)
ANION GAP SERPL CALCULATED.3IONS-SCNC: 7 MMOL/L (ref 3–14)
ANION GAP SERPL CALCULATED.3IONS-SCNC: 8 MMOL/L (ref 3–14)
ANION GAP SERPL CALCULATED.3IONS-SCNC: 9 MMOL/L (ref 3–14)
ANISOCYTOSIS BLD QL SMEAR: SLIGHT
APTT PPP: 23 SEC (ref 22–37)
APTT PPP: 35 SEC (ref 22–37)
APTT PPP: 38 SEC (ref 22–37)
APTT PPP: 42 SEC (ref 22–37)
AST SERPL W P-5'-P-CCNC: 110 U/L (ref 0–45)
AST SERPL W P-5'-P-CCNC: 127 U/L (ref 0–45)
AST SERPL W P-5'-P-CCNC: 150 U/L (ref 0–45)
AST SERPL W P-5'-P-CCNC: 16 U/L (ref 0–45)
AST SERPL W P-5'-P-CCNC: 170 U/L (ref 0–45)
AST SERPL W P-5'-P-CCNC: 219 U/L (ref 0–45)
AST SERPL W P-5'-P-CCNC: 223 U/L (ref 0–45)
AST SERPL W P-5'-P-CCNC: 250 U/L (ref 0–45)
AST SERPL W P-5'-P-CCNC: 296 U/L (ref 0–45)
AST SERPL W P-5'-P-CCNC: 306 U/L (ref 0–45)
AST SERPL W P-5'-P-CCNC: 312 U/L (ref 0–45)
AST SERPL W P-5'-P-CCNC: 33 U/L (ref 0–45)
AST SERPL W P-5'-P-CCNC: 371 U/L (ref 0–45)
AST SERPL W P-5'-P-CCNC: 38 U/L (ref 0–45)
AST SERPL W P-5'-P-CCNC: 40 U/L (ref 0–45)
AST SERPL W P-5'-P-CCNC: 42 U/L (ref 0–45)
AST SERPL W P-5'-P-CCNC: 43 U/L (ref 0–45)
AST SERPL W P-5'-P-CCNC: 62 U/L (ref 0–45)
AST SERPL W P-5'-P-CCNC: 80 U/L (ref 0–45)
B-GLOBULIN MFR UR ELPH: 10.6 %
B-GLOBULIN SERPL ELPH-MCNC: 1 G/DL (ref 0.6–1)
BACTERIA SPEC CULT: NO GROWTH
BACTERIA SPEC CULT: NORMAL
BASE DEFICIT BLDA-SCNC: 10 MMOL/L
BASE DEFICIT BLDA-SCNC: 11.1 MMOL/L
BASE DEFICIT BLDA-SCNC: 15 MMOL/L
BASE DEFICIT BLDA-SCNC: 3.5 MMOL/L
BASE DEFICIT BLDA-SCNC: 3.7 MMOL/L
BASE DEFICIT BLDA-SCNC: 4.2 MMOL/L
BASE DEFICIT BLDA-SCNC: 5.1 MMOL/L
BASE DEFICIT BLDA-SCNC: 5.3 MMOL/L
BASE DEFICIT BLDA-SCNC: 5.4 MMOL/L
BASE DEFICIT BLDA-SCNC: 5.7 MMOL/L
BASE DEFICIT BLDA-SCNC: 6.7 MMOL/L
BASE DEFICIT BLDA-SCNC: 6.8 MMOL/L
BASE DEFICIT BLDA-SCNC: 6.9 MMOL/L
BASE DEFICIT BLDA-SCNC: 7 MMOL/L
BASE DEFICIT BLDV-SCNC: 10.2 MMOL/L
BASE DEFICIT BLDV-SCNC: 13.7 MMOL/L
BASE DEFICIT BLDV-SCNC: 6.3 MMOL/L
BASE DEFICIT BLDV-SCNC: 9.5 MMOL/L
BASOPHILS # BLD AUTO: 0 10E9/L (ref 0–0.2)
BASOPHILS # BLD AUTO: 0.1 10E9/L (ref 0–0.2)
BASOPHILS # BLD AUTO: 0.2 10E9/L (ref 0–0.2)
BASOPHILS NFR BLD AUTO: 0.6 %
BASOPHILS NFR BLD AUTO: 1 %
BASOPHILS NFR BLD AUTO: 1.8 %
BILIRUB SERPL-MCNC: 0.3 MG/DL (ref 0.2–1.3)
BILIRUB SERPL-MCNC: 0.4 MG/DL (ref 0.2–1.3)
BILIRUB SERPL-MCNC: 0.5 MG/DL (ref 0.2–1.3)
BILIRUB SERPL-MCNC: 0.6 MG/DL (ref 0.2–1.3)
BILIRUB SERPL-MCNC: 0.6 MG/DL (ref 0.2–1.3)
BILIRUB SERPL-MCNC: 0.7 MG/DL (ref 0.2–1.3)
BILIRUB SERPL-MCNC: 1 MG/DL (ref 0.2–1.3)
BILIRUB SERPL-MCNC: 1 MG/DL (ref 0.2–1.3)
BILIRUB SERPL-MCNC: 1.1 MG/DL (ref 0.2–1.3)
BILIRUB SERPL-MCNC: 1.1 MG/DL (ref 0.2–1.3)
BILIRUB SERPL-MCNC: 1.2 MG/DL (ref 0.2–1.3)
BILIRUB SERPL-MCNC: 1.3 MG/DL (ref 0.2–1.3)
BLD GP AB SCN SERPL QL: NORMAL
BLD GP AB SCN SERPL QL: NORMAL
BLOOD BANK CMNT PATIENT-IMP: NORMAL
BLOOD BANK CMNT PATIENT-IMP: NORMAL
BUN SERPL-MCNC: 12 MG/DL (ref 7–30)
BUN SERPL-MCNC: 12 MG/DL (ref 7–30)
BUN SERPL-MCNC: 13 MG/DL (ref 7–30)
BUN SERPL-MCNC: 13 MG/DL (ref 7–30)
BUN SERPL-MCNC: 14 MG/DL (ref 7–30)
BUN SERPL-MCNC: 15 MG/DL (ref 7–30)
BUN SERPL-MCNC: 16 MG/DL (ref 7–30)
BUN SERPL-MCNC: 18 MG/DL (ref 7–30)
BUN SERPL-MCNC: 18 MG/DL (ref 7–30)
BUN SERPL-MCNC: 20 MG/DL (ref 7–30)
BUN SERPL-MCNC: 21 MG/DL (ref 7–30)
BUN SERPL-MCNC: 24 MG/DL (ref 7–30)
BUN SERPL-MCNC: 29 MG/DL (ref 7–30)
BUN SERPL-MCNC: 33 MG/DL (ref 7–30)
BUN SERPL-MCNC: 37 MG/DL (ref 7–30)
BUN SERPL-MCNC: 38 MG/DL (ref 7–30)
BUN SERPL-MCNC: 40 MG/DL (ref 7–30)
BURR CELLS BLD QL SMEAR: ABNORMAL
CA-I BLD-MCNC: 4.3 MG/DL (ref 4.4–5.2)
CA-I BLD-MCNC: 4.4 MG/DL (ref 4.4–5.2)
CA-I BLD-SCNC: 4.5 MG/DL (ref 4.4–5.2)
CA-I BLD-SCNC: 4.7 MG/DL (ref 4.4–5.2)
CALCIUM SERPL-MCNC: 7.4 MG/DL (ref 8.5–10.1)
CALCIUM SERPL-MCNC: 7.5 MG/DL (ref 8.5–10.1)
CALCIUM SERPL-MCNC: 7.6 MG/DL (ref 8.5–10.1)
CALCIUM SERPL-MCNC: 7.6 MG/DL (ref 8.5–10.1)
CALCIUM SERPL-MCNC: 7.7 MG/DL (ref 8.5–10.1)
CALCIUM SERPL-MCNC: 7.8 MG/DL (ref 8.5–10.1)
CALCIUM SERPL-MCNC: 7.9 MG/DL (ref 8.5–10.1)
CALCIUM SERPL-MCNC: 8.2 MG/DL (ref 8.5–10.1)
CALCIUM SERPL-MCNC: 8.4 MG/DL (ref 8.5–10.1)
CALCIUM SERPL-MCNC: 8.5 MG/DL (ref 8.5–10.1)
CALCIUM SERPL-MCNC: 8.6 MG/DL (ref 8.5–10.1)
CALCIUM SERPL-MCNC: 8.6 MG/DL (ref 8.5–10.1)
CALCIUM SERPL-MCNC: 9.1 MG/DL (ref 8.5–10.1)
CHLORIDE SERPL-SCNC: 111 MMOL/L (ref 94–109)
CHLORIDE SERPL-SCNC: 111 MMOL/L (ref 94–109)
CHLORIDE SERPL-SCNC: 112 MMOL/L (ref 94–109)
CHLORIDE SERPL-SCNC: 113 MMOL/L (ref 94–109)
CHLORIDE SERPL-SCNC: 113 MMOL/L (ref 94–109)
CHLORIDE SERPL-SCNC: 114 MMOL/L (ref 94–109)
CHLORIDE SERPL-SCNC: 115 MMOL/L (ref 94–109)
CHLORIDE SERPL-SCNC: 116 MMOL/L (ref 94–109)
CHLORIDE SERPL-SCNC: 116 MMOL/L (ref 94–109)
CHLORIDE SERPL-SCNC: 118 MMOL/L (ref 94–109)
CHLORIDE SERPL-SCNC: 118 MMOL/L (ref 94–109)
CHLORIDE SERPL-SCNC: 119 MMOL/L (ref 94–109)
CHLORIDE SERPL-SCNC: 120 MMOL/L (ref 94–109)
CHLORIDE SERPL-SCNC: 120 MMOL/L (ref 94–109)
CHLORIDE SERPL-SCNC: 121 MMOL/L (ref 94–109)
CHLORIDE SERPL-SCNC: 122 MMOL/L (ref 94–109)
CHLORIDE SERPL-SCNC: 123 MMOL/L (ref 94–109)
CHOLEST SERPL-MCNC: 148 MG/DL
CK SERPL-CCNC: 25 U/L (ref 30–225)
CK SERPL-CCNC: 28 U/L (ref 30–225)
CK SERPL-CCNC: 41 U/L (ref 30–225)
CK SERPL-CCNC: 69 U/L (ref 30–225)
CO2 BLDCOV-SCNC: 19 MMOL/L (ref 21–28)
CO2 BLDCOV-SCNC: 21 MMOL/L (ref 21–28)
CO2 SERPL-SCNC: 12 MMOL/L (ref 20–32)
CO2 SERPL-SCNC: 14 MMOL/L (ref 20–32)
CO2 SERPL-SCNC: 15 MMOL/L (ref 20–32)
CO2 SERPL-SCNC: 15 MMOL/L (ref 20–32)
CO2 SERPL-SCNC: 16 MMOL/L (ref 20–32)
CO2 SERPL-SCNC: 17 MMOL/L (ref 20–32)
CO2 SERPL-SCNC: 18 MMOL/L (ref 20–32)
CO2 SERPL-SCNC: 18 MMOL/L (ref 20–32)
CO2 SERPL-SCNC: 19 MMOL/L (ref 20–32)
CO2 SERPL-SCNC: 20 MMOL/L (ref 20–32)
CO2 SERPL-SCNC: 20 MMOL/L (ref 20–32)
CO2 SERPL-SCNC: 21 MMOL/L (ref 20–32)
CO2 SERPL-SCNC: 21 MMOL/L (ref 20–32)
CO2 SERPL-SCNC: 22 MMOL/L (ref 20–32)
COPATH REPORT: NORMAL
CREAT SERPL-MCNC: 0.69 MG/DL (ref 0.52–1.04)
CREAT SERPL-MCNC: 0.75 MG/DL (ref 0.52–1.04)
CREAT SERPL-MCNC: 0.75 MG/DL (ref 0.52–1.04)
CREAT SERPL-MCNC: 0.76 MG/DL (ref 0.52–1.04)
CREAT SERPL-MCNC: 0.76 MG/DL (ref 0.52–1.04)
CREAT SERPL-MCNC: 0.79 MG/DL (ref 0.52–1.04)
CREAT SERPL-MCNC: 0.8 MG/DL (ref 0.52–1.04)
CREAT SERPL-MCNC: 0.82 MG/DL (ref 0.52–1.04)
CREAT SERPL-MCNC: 0.84 MG/DL (ref 0.52–1.04)
CREAT SERPL-MCNC: 0.85 MG/DL (ref 0.52–1.04)
CREAT SERPL-MCNC: 0.88 MG/DL (ref 0.52–1.04)
CREAT SERPL-MCNC: 0.91 MG/DL (ref 0.52–1.04)
CREAT SERPL-MCNC: 1 MG/DL (ref 0.52–1.04)
CREAT SERPL-MCNC: 1 MG/DL (ref 0.52–1.04)
CREAT SERPL-MCNC: 1.06 MG/DL (ref 0.52–1.04)
CREAT SERPL-MCNC: 1.16 MG/DL (ref 0.52–1.04)
CREAT SERPL-MCNC: 1.16 MG/DL (ref 0.52–1.04)
CREAT SERPL-MCNC: 1.21 MG/DL (ref 0.52–1.04)
CREAT SERPL-MCNC: 1.22 MG/DL (ref 0.52–1.04)
CREAT SERPL-MCNC: 1.27 MG/DL (ref 0.52–1.04)
CREAT SERPL-MCNC: 1.29 MG/DL (ref 0.52–1.04)
DIFFERENTIAL METHOD BLD: ABNORMAL
ELLIPTOCYTES BLD QL SMEAR: SLIGHT
EOSINOPHIL # BLD AUTO: 0.1 10E9/L (ref 0–0.7)
EOSINOPHIL # BLD AUTO: 0.3 10E9/L (ref 0–0.7)
EOSINOPHIL # BLD AUTO: 0.4 10E9/L (ref 0–0.7)
EOSINOPHIL NFR BLD AUTO: 1.9 %
EOSINOPHIL NFR BLD AUTO: 3.6 %
EOSINOPHIL NFR BLD AUTO: 5.6 %
ERYTHROCYTE [DISTWIDTH] IN BLOOD BY AUTOMATED COUNT: 15.1 % (ref 10–15)
ERYTHROCYTE [DISTWIDTH] IN BLOOD BY AUTOMATED COUNT: 15.7 % (ref 10–15)
ERYTHROCYTE [DISTWIDTH] IN BLOOD BY AUTOMATED COUNT: 15.9 % (ref 10–15)
ERYTHROCYTE [DISTWIDTH] IN BLOOD BY AUTOMATED COUNT: 16 % (ref 10–15)
ERYTHROCYTE [DISTWIDTH] IN BLOOD BY AUTOMATED COUNT: 16.1 % (ref 10–15)
ERYTHROCYTE [DISTWIDTH] IN BLOOD BY AUTOMATED COUNT: 16.3 % (ref 10–15)
ERYTHROCYTE [DISTWIDTH] IN BLOOD BY AUTOMATED COUNT: 16.5 % (ref 10–15)
ERYTHROCYTE [DISTWIDTH] IN BLOOD BY AUTOMATED COUNT: 17.1 % (ref 10–15)
ERYTHROCYTE [DISTWIDTH] IN BLOOD BY AUTOMATED COUNT: 17.1 % (ref 10–15)
ERYTHROCYTE [DISTWIDTH] IN BLOOD BY AUTOMATED COUNT: 17.2 % (ref 10–15)
ERYTHROCYTE [DISTWIDTH] IN BLOOD BY AUTOMATED COUNT: 17.2 % (ref 10–15)
ERYTHROCYTE [DISTWIDTH] IN BLOOD BY AUTOMATED COUNT: 17.8 % (ref 10–15)
ERYTHROCYTE [DISTWIDTH] IN BLOOD BY AUTOMATED COUNT: 18 % (ref 10–15)
ERYTHROCYTE [DISTWIDTH] IN BLOOD BY AUTOMATED COUNT: 18.3 % (ref 10–15)
ERYTHROCYTE [DISTWIDTH] IN BLOOD BY AUTOMATED COUNT: 18.3 % (ref 10–15)
ERYTHROCYTE [DISTWIDTH] IN BLOOD BY AUTOMATED COUNT: 18.8 % (ref 10–15)
ERYTHROCYTE [DISTWIDTH] IN BLOOD BY AUTOMATED COUNT: 18.9 % (ref 10–15)
ERYTHROCYTE [DISTWIDTH] IN BLOOD BY AUTOMATED COUNT: 19 % (ref 10–15)
ERYTHROCYTE [DISTWIDTH] IN BLOOD BY AUTOMATED COUNT: 19.1 % (ref 10–15)
FERRITIN SERPL-MCNC: 507 NG/ML (ref 8–252)
FIBRINOGEN PPP-MCNC: 297 MG/DL (ref 200–420)
FIBRINOGEN PPP-MCNC: 306 MG/DL (ref 200–420)
FIBRINOGEN PPP-MCNC: 334 MG/DL (ref 200–420)
GAMMA GLOB MFR UR ELPH: 21.9 %
GAMMA GLOB SERPL ELPH-MCNC: 1.3 G/DL (ref 0.7–1.6)
GFR SERPL CREATININE-BSD FRML MDRD: 38 ML/MIN/{1.73_M2}
GFR SERPL CREATININE-BSD FRML MDRD: 39 ML/MIN/{1.73_M2}
GFR SERPL CREATININE-BSD FRML MDRD: 41 ML/MIN/{1.73_M2}
GFR SERPL CREATININE-BSD FRML MDRD: 41 ML/MIN/{1.73_M2}
GFR SERPL CREATININE-BSD FRML MDRD: 43 ML/MIN/{1.73_M2}
GFR SERPL CREATININE-BSD FRML MDRD: 43 ML/MIN/{1.73_M2}
GFR SERPL CREATININE-BSD FRML MDRD: 48 ML/MIN/{1.73_M2}
GFR SERPL CREATININE-BSD FRML MDRD: 52 ML/MIN/{1.73_M2}
GFR SERPL CREATININE-BSD FRML MDRD: 52 ML/MIN/{1.73_M2}
GFR SERPL CREATININE-BSD FRML MDRD: 58 ML/MIN/{1.73_M2}
GFR SERPL CREATININE-BSD FRML MDRD: 60 ML/MIN/{1.73_M2}
GFR SERPL CREATININE-BSD FRML MDRD: 63 ML/MIN/{1.73_M2}
GFR SERPL CREATININE-BSD FRML MDRD: 64 ML/MIN/{1.73_M2}
GFR SERPL CREATININE-BSD FRML MDRD: 66 ML/MIN/{1.73_M2}
GFR SERPL CREATININE-BSD FRML MDRD: 68 ML/MIN/{1.73_M2}
GFR SERPL CREATININE-BSD FRML MDRD: 69 ML/MIN/{1.73_M2}
GFR SERPL CREATININE-BSD FRML MDRD: 72 ML/MIN/{1.73_M2}
GFR SERPL CREATININE-BSD FRML MDRD: 72 ML/MIN/{1.73_M2}
GFR SERPL CREATININE-BSD FRML MDRD: 73 ML/MIN/{1.73_M2}
GFR SERPL CREATININE-BSD FRML MDRD: 73 ML/MIN/{1.73_M2}
GFR SERPL CREATININE-BSD FRML MDRD: 80 ML/MIN/{1.73_M2}
GLUCOSE BLD-MCNC: 121 MG/DL (ref 70–99)
GLUCOSE BLD-MCNC: 141 MG/DL (ref 70–99)
GLUCOSE BLD-MCNC: 162 MG/DL (ref 70–99)
GLUCOSE BLDC GLUCOMTR-MCNC: 100 MG/DL (ref 70–99)
GLUCOSE BLDC GLUCOMTR-MCNC: 101 MG/DL (ref 70–99)
GLUCOSE BLDC GLUCOMTR-MCNC: 103 MG/DL (ref 70–99)
GLUCOSE BLDC GLUCOMTR-MCNC: 103 MG/DL (ref 70–99)
GLUCOSE BLDC GLUCOMTR-MCNC: 106 MG/DL (ref 70–99)
GLUCOSE BLDC GLUCOMTR-MCNC: 106 MG/DL (ref 70–99)
GLUCOSE BLDC GLUCOMTR-MCNC: 110 MG/DL (ref 70–99)
GLUCOSE BLDC GLUCOMTR-MCNC: 111 MG/DL (ref 70–99)
GLUCOSE BLDC GLUCOMTR-MCNC: 112 MG/DL (ref 70–99)
GLUCOSE BLDC GLUCOMTR-MCNC: 113 MG/DL (ref 70–99)
GLUCOSE BLDC GLUCOMTR-MCNC: 114 MG/DL (ref 70–99)
GLUCOSE BLDC GLUCOMTR-MCNC: 115 MG/DL (ref 70–99)
GLUCOSE BLDC GLUCOMTR-MCNC: 117 MG/DL (ref 70–99)
GLUCOSE BLDC GLUCOMTR-MCNC: 118 MG/DL (ref 70–99)
GLUCOSE BLDC GLUCOMTR-MCNC: 121 MG/DL (ref 70–99)
GLUCOSE BLDC GLUCOMTR-MCNC: 122 MG/DL (ref 70–99)
GLUCOSE BLDC GLUCOMTR-MCNC: 123 MG/DL (ref 70–99)
GLUCOSE BLDC GLUCOMTR-MCNC: 129 MG/DL (ref 70–99)
GLUCOSE BLDC GLUCOMTR-MCNC: 135 MG/DL (ref 70–99)
GLUCOSE BLDC GLUCOMTR-MCNC: 137 MG/DL (ref 70–99)
GLUCOSE BLDC GLUCOMTR-MCNC: 137 MG/DL (ref 70–99)
GLUCOSE BLDC GLUCOMTR-MCNC: 142 MG/DL (ref 70–99)
GLUCOSE BLDC GLUCOMTR-MCNC: 150 MG/DL (ref 70–99)
GLUCOSE BLDC GLUCOMTR-MCNC: 154 MG/DL (ref 70–99)
GLUCOSE BLDC GLUCOMTR-MCNC: 156 MG/DL (ref 70–99)
GLUCOSE BLDC GLUCOMTR-MCNC: 157 MG/DL (ref 70–99)
GLUCOSE BLDC GLUCOMTR-MCNC: 159 MG/DL (ref 70–99)
GLUCOSE BLDC GLUCOMTR-MCNC: 183 MG/DL (ref 70–99)
GLUCOSE BLDC GLUCOMTR-MCNC: 72 MG/DL (ref 70–99)
GLUCOSE BLDC GLUCOMTR-MCNC: 75 MG/DL (ref 70–99)
GLUCOSE BLDC GLUCOMTR-MCNC: 78 MG/DL (ref 70–99)
GLUCOSE BLDC GLUCOMTR-MCNC: 86 MG/DL (ref 70–99)
GLUCOSE BLDC GLUCOMTR-MCNC: 87 MG/DL (ref 70–99)
GLUCOSE BLDC GLUCOMTR-MCNC: 88 MG/DL (ref 70–99)
GLUCOSE BLDC GLUCOMTR-MCNC: 88 MG/DL (ref 70–99)
GLUCOSE BLDC GLUCOMTR-MCNC: 89 MG/DL (ref 70–99)
GLUCOSE BLDC GLUCOMTR-MCNC: 89 MG/DL (ref 70–99)
GLUCOSE BLDC GLUCOMTR-MCNC: 91 MG/DL (ref 70–99)
GLUCOSE BLDC GLUCOMTR-MCNC: 94 MG/DL (ref 70–99)
GLUCOSE BLDC GLUCOMTR-MCNC: 94 MG/DL (ref 70–99)
GLUCOSE BLDC GLUCOMTR-MCNC: 96 MG/DL (ref 70–99)
GLUCOSE BLDC GLUCOMTR-MCNC: 96 MG/DL (ref 70–99)
GLUCOSE BLDC GLUCOMTR-MCNC: 97 MG/DL (ref 70–99)
GLUCOSE BLDC GLUCOMTR-MCNC: 98 MG/DL (ref 70–99)
GLUCOSE BLDC GLUCOMTR-MCNC: 99 MG/DL (ref 70–99)
GLUCOSE BLDC GLUCOMTR-MCNC: 99 MG/DL (ref 70–99)
GLUCOSE SERPL-MCNC: 104 MG/DL (ref 70–99)
GLUCOSE SERPL-MCNC: 110 MG/DL (ref 70–99)
GLUCOSE SERPL-MCNC: 110 MG/DL (ref 70–99)
GLUCOSE SERPL-MCNC: 118 MG/DL (ref 70–99)
GLUCOSE SERPL-MCNC: 122 MG/DL (ref 70–99)
GLUCOSE SERPL-MCNC: 124 MG/DL (ref 70–99)
GLUCOSE SERPL-MCNC: 124 MG/DL (ref 70–99)
GLUCOSE SERPL-MCNC: 127 MG/DL (ref 70–99)
GLUCOSE SERPL-MCNC: 129 MG/DL (ref 70–99)
GLUCOSE SERPL-MCNC: 135 MG/DL (ref 70–99)
GLUCOSE SERPL-MCNC: 139 MG/DL (ref 70–99)
GLUCOSE SERPL-MCNC: 140 MG/DL (ref 70–99)
GLUCOSE SERPL-MCNC: 142 MG/DL (ref 70–99)
GLUCOSE SERPL-MCNC: 152 MG/DL (ref 70–99)
GLUCOSE SERPL-MCNC: 160 MG/DL (ref 70–99)
GLUCOSE SERPL-MCNC: 179 MG/DL (ref 70–99)
GLUCOSE SERPL-MCNC: 184 MG/DL (ref 70–99)
GLUCOSE SERPL-MCNC: 72 MG/DL (ref 70–99)
GLUCOSE SERPL-MCNC: 88 MG/DL (ref 70–99)
GLUCOSE SERPL-MCNC: 99 MG/DL (ref 70–99)
GLUCOSE SERPL-MCNC: 99 MG/DL (ref 70–99)
HCO3 BLD-SCNC: 11 MMOL/L (ref 21–28)
HCO3 BLD-SCNC: 12 MMOL/L (ref 21–28)
HCO3 BLD-SCNC: 12 MMOL/L (ref 21–28)
HCO3 BLD-SCNC: 15 MMOL/L (ref 21–28)
HCO3 BLD-SCNC: 15 MMOL/L (ref 21–28)
HCO3 BLD-SCNC: 17 MMOL/L (ref 21–28)
HCO3 BLD-SCNC: 18 MMOL/L (ref 21–28)
HCO3 BLD-SCNC: 19 MMOL/L (ref 21–28)
HCO3 BLD-SCNC: 19 MMOL/L (ref 21–28)
HCO3 BLD-SCNC: 21 MMOL/L (ref 21–28)
HCO3 BLDV-SCNC: 14 MMOL/L (ref 21–28)
HCO3 BLDV-SCNC: 15 MMOL/L (ref 21–28)
HCO3 BLDV-SCNC: 16 MMOL/L (ref 21–28)
HCO3 BLDV-SCNC: 19 MMOL/L (ref 21–28)
HCT VFR BLD AUTO: 24.8 % (ref 35–47)
HCT VFR BLD AUTO: 25.5 % (ref 35–47)
HCT VFR BLD AUTO: 25.8 % (ref 35–47)
HCT VFR BLD AUTO: 25.8 % (ref 35–47)
HCT VFR BLD AUTO: 26 % (ref 35–47)
HCT VFR BLD AUTO: 26.6 % (ref 35–47)
HCT VFR BLD AUTO: 27 % (ref 35–47)
HCT VFR BLD AUTO: 27.4 % (ref 35–47)
HCT VFR BLD AUTO: 28 % (ref 35–47)
HCT VFR BLD AUTO: 28 % (ref 35–47)
HCT VFR BLD AUTO: 28.1 % (ref 35–47)
HCT VFR BLD AUTO: 28.7 % (ref 35–47)
HCT VFR BLD AUTO: 29.8 % (ref 35–47)
HCT VFR BLD AUTO: 30.1 % (ref 35–47)
HCT VFR BLD AUTO: 30.6 % (ref 35–47)
HCT VFR BLD AUTO: 31.5 % (ref 35–47)
HCT VFR BLD AUTO: 32.2 % (ref 35–47)
HCT VFR BLD AUTO: 32.3 % (ref 35–47)
HCT VFR BLD AUTO: 32.9 % (ref 35–47)
HCT VFR BLD CALC: 28 %PCV (ref 35–47)
HCT VFR BLD CALC: 29 %PCV (ref 35–47)
HDLC SERPL-MCNC: 83 MG/DL
HGB BLD CALC-MCNC: 9.5 G/DL (ref 11.7–15.7)
HGB BLD CALC-MCNC: 9.9 G/DL (ref 11.7–15.7)
HGB BLD-MCNC: 10.2 G/DL (ref 11.7–15.7)
HGB BLD-MCNC: 7.4 G/DL (ref 11.7–15.7)
HGB BLD-MCNC: 7.5 G/DL (ref 11.7–15.7)
HGB BLD-MCNC: 7.5 G/DL (ref 11.7–15.7)
HGB BLD-MCNC: 7.6 G/DL (ref 11.7–15.7)
HGB BLD-MCNC: 8 G/DL (ref 11.7–15.7)
HGB BLD-MCNC: 8 G/DL (ref 11.7–15.7)
HGB BLD-MCNC: 8.1 G/DL (ref 11.7–15.7)
HGB BLD-MCNC: 8.1 G/DL (ref 11.7–15.7)
HGB BLD-MCNC: 8.2 G/DL (ref 11.7–15.7)
HGB BLD-MCNC: 8.4 G/DL (ref 11.7–15.7)
HGB BLD-MCNC: 8.4 G/DL (ref 11.7–15.7)
HGB BLD-MCNC: 8.6 G/DL (ref 11.7–15.7)
HGB BLD-MCNC: 9.2 G/DL (ref 11.7–15.7)
HGB BLD-MCNC: 9.3 G/DL (ref 11.7–15.7)
HGB BLD-MCNC: 9.3 G/DL (ref 11.7–15.7)
HGB BLD-MCNC: 9.6 G/DL (ref 11.7–15.7)
HGB BLD-MCNC: 9.7 G/DL (ref 11.7–15.7)
HGB BLD-MCNC: 9.8 G/DL (ref 11.7–15.7)
HGB BLD-MCNC: 9.9 G/DL (ref 11.7–15.7)
IGA SERPL-MCNC: 483 MG/DL (ref 84–499)
IGG SERPL-MCNC: 1231 MG/DL (ref 610–1616)
IGM SERPL-MCNC: 75 MG/DL (ref 35–242)
IMM GRANULOCYTES # BLD: 0 10E9/L (ref 0–0.4)
IMM GRANULOCYTES NFR BLD: 0.4 %
INR PPP: 1.48 (ref 0.86–1.14)
INR PPP: 1.8 (ref 0.86–1.14)
INR PPP: 1.83 (ref 0.86–1.14)
INR PPP: 1.9 (ref 0.86–1.14)
INTERPRETATION ECG - MUSE: NORMAL
IRON SATN MFR SERPL: 8 % (ref 15–46)
IRON SERPL-MCNC: 20 UG/DL (ref 35–180)
KAPPA LC UR-MCNC: 3.76 MG/DL (ref 0.33–1.94)
KAPPA LC/LAMBDA SER: 2.22 {RATIO} (ref 0.26–1.65)
LAB SCANNED RESULT: ABNORMAL
LACTATE BLD-SCNC: 0.5 MMOL/L (ref 0.7–2)
LACTATE BLD-SCNC: 0.6 MMOL/L (ref 0.7–2)
LACTATE BLD-SCNC: 0.6 MMOL/L (ref 0.7–2)
LACTATE BLD-SCNC: 0.7 MMOL/L (ref 0.7–2)
LACTATE BLD-SCNC: 0.9 MMOL/L (ref 0.7–2)
LACTATE BLD-SCNC: 1 MMOL/L (ref 0.7–2)
LACTATE BLD-SCNC: 1 MMOL/L (ref 0.7–2)
LACTATE BLD-SCNC: 1.1 MMOL/L (ref 0.7–2)
LACTATE BLD-SCNC: 1.8 MMOL/L (ref 0.7–2)
LACTATE BLD-SCNC: 1.9 MMOL/L (ref 0.7–2)
LACTATE BLD-SCNC: 1.9 MMOL/L (ref 0.7–2)
LACTATE BLD-SCNC: 2.1 MMOL/L (ref 0.7–2)
LACTATE BLD-SCNC: 2.5 MMOL/L (ref 0.7–2)
LACTATE BLD-SCNC: 2.6 MMOL/L (ref 0.7–2)
LACTATE BLD-SCNC: 2.7 MMOL/L (ref 0.7–2)
LACTATE BLD-SCNC: 3.2 MMOL/L (ref 0.7–2)
LACTATE BLD-SCNC: 3.6 MMOL/L (ref 0.7–2)
LACTATE BLD-SCNC: 4.5 MMOL/L (ref 0.7–2)
LACTATE BLD-SCNC: 4.8 MMOL/L (ref 0.7–2)
LACTATE BLD-SCNC: 7.2 MMOL/L (ref 0.7–2)
LAMBDA LC SERPL-MCNC: 1.69 MG/DL (ref 0.57–2.63)
LDH SERPL L TO P-CCNC: 219 U/L (ref 81–234)
LDH SERPL L TO P-CCNC: 456 U/L (ref 81–234)
LDH SERPL L TO P-CCNC: 462 U/L (ref 81–234)
LDH SERPL L TO P-CCNC: 515 U/L (ref 81–234)
LDH SERPL L TO P-CCNC: 536 U/L (ref 81–234)
LDLC SERPL CALC-MCNC: 53 MG/DL
LIPASE SERPL-CCNC: 53 U/L (ref 73–393)
LYMPHOCYTES # BLD AUTO: 1.6 10E9/L (ref 0.8–5.3)
LYMPHOCYTES # BLD AUTO: 2.2 10E9/L (ref 0.8–5.3)
LYMPHOCYTES # BLD AUTO: 6.6 10E9/L (ref 0.8–5.3)
LYMPHOCYTES NFR BLD AUTO: 33.4 %
LYMPHOCYTES NFR BLD AUTO: 43.1 %
LYMPHOCYTES NFR BLD AUTO: 56.2 %
M PROTEIN MFR UR ELPH: 0 %
M PROTEIN SERPL ELPH-MCNC: 0.2 G/DL
MAGNESIUM SERPL-MCNC: 1.4 MG/DL (ref 1.6–2.3)
MAGNESIUM SERPL-MCNC: 1.8 MG/DL (ref 1.6–2.3)
MAGNESIUM SERPL-MCNC: 1.8 MG/DL (ref 1.6–2.3)
MAGNESIUM SERPL-MCNC: 1.9 MG/DL (ref 1.6–2.3)
MAGNESIUM SERPL-MCNC: 2 MG/DL (ref 1.6–2.3)
MAGNESIUM SERPL-MCNC: 2.1 MG/DL (ref 1.6–2.3)
MAGNESIUM SERPL-MCNC: 2.2 MG/DL (ref 1.6–2.3)
MAGNESIUM SERPL-MCNC: 2.5 MG/DL (ref 1.6–2.3)
MAGNESIUM SERPL-MCNC: 2.6 MG/DL (ref 1.6–2.3)
MCH RBC QN AUTO: 27.1 PG (ref 26.5–33)
MCH RBC QN AUTO: 27.1 PG (ref 26.5–33)
MCH RBC QN AUTO: 27.2 PG (ref 26.5–33)
MCH RBC QN AUTO: 27.5 PG (ref 26.5–33)
MCH RBC QN AUTO: 27.6 PG (ref 26.5–33)
MCH RBC QN AUTO: 27.8 PG (ref 26.5–33)
MCH RBC QN AUTO: 28.1 PG (ref 26.5–33)
MCH RBC QN AUTO: 28.1 PG (ref 26.5–33)
MCH RBC QN AUTO: 28.2 PG (ref 26.5–33)
MCHC RBC AUTO-ENTMCNC: 28.5 G/DL (ref 31.5–36.5)
MCHC RBC AUTO-ENTMCNC: 28.9 G/DL (ref 31.5–36.5)
MCHC RBC AUTO-ENTMCNC: 29.1 G/DL (ref 31.5–36.5)
MCHC RBC AUTO-ENTMCNC: 29.2 G/DL (ref 31.5–36.5)
MCHC RBC AUTO-ENTMCNC: 29.4 G/DL (ref 31.5–36.5)
MCHC RBC AUTO-ENTMCNC: 29.6 G/DL (ref 31.5–36.5)
MCHC RBC AUTO-ENTMCNC: 29.6 G/DL (ref 31.5–36.5)
MCHC RBC AUTO-ENTMCNC: 29.8 G/DL (ref 31.5–36.5)
MCHC RBC AUTO-ENTMCNC: 29.8 G/DL (ref 31.5–36.5)
MCHC RBC AUTO-ENTMCNC: 29.9 G/DL (ref 31.5–36.5)
MCHC RBC AUTO-ENTMCNC: 30 G/DL (ref 31.5–36.5)
MCHC RBC AUTO-ENTMCNC: 30 G/DL (ref 31.5–36.5)
MCHC RBC AUTO-ENTMCNC: 30.1 G/DL (ref 31.5–36.5)
MCHC RBC AUTO-ENTMCNC: 30.8 G/DL (ref 31.5–36.5)
MCHC RBC AUTO-ENTMCNC: 30.9 G/DL (ref 31.5–36.5)
MCHC RBC AUTO-ENTMCNC: 31 G/DL (ref 31.5–36.5)
MCHC RBC AUTO-ENTMCNC: 31.1 G/DL (ref 31.5–36.5)
MCHC RBC AUTO-ENTMCNC: 31.2 G/DL (ref 31.5–36.5)
MCHC RBC AUTO-ENTMCNC: 31.7 G/DL (ref 31.5–36.5)
MCV RBC AUTO: 88 FL (ref 78–100)
MCV RBC AUTO: 88 FL (ref 78–100)
MCV RBC AUTO: 89 FL (ref 78–100)
MCV RBC AUTO: 89 FL (ref 78–100)
MCV RBC AUTO: 90 FL (ref 78–100)
MCV RBC AUTO: 91 FL (ref 78–100)
MCV RBC AUTO: 92 FL (ref 78–100)
MCV RBC AUTO: 93 FL (ref 78–100)
MCV RBC AUTO: 93 FL (ref 78–100)
MCV RBC AUTO: 94 FL (ref 78–100)
MCV RBC AUTO: 95 FL (ref 78–100)
MCV RBC AUTO: 95 FL (ref 78–100)
MCV RBC AUTO: 97 FL (ref 78–100)
MONOCYTES # BLD AUTO: 0.3 10E9/L (ref 0–1.3)
MONOCYTES # BLD AUTO: 0.4 10E9/L (ref 0–1.3)
MONOCYTES # BLD AUTO: 0.4 10E9/L (ref 0–1.3)
MONOCYTES NFR BLD AUTO: 2.7 %
MONOCYTES NFR BLD AUTO: 7.5 %
MONOCYTES NFR BLD AUTO: 8.5 %
MRSA DNA SPEC QL NAA+PROBE: NEGATIVE
MYELOCYTES # BLD: 0.1 10E9/L
MYELOCYTES NFR BLD MANUAL: 0.9 %
NEUTROPHILS # BLD AUTO: 2.3 10E9/L (ref 1.6–8.3)
NEUTROPHILS # BLD AUTO: 2.6 10E9/L (ref 1.6–8.3)
NEUTROPHILS # BLD AUTO: 4.1 10E9/L (ref 1.6–8.3)
NEUTROPHILS NFR BLD AUTO: 34.8 %
NEUTROPHILS NFR BLD AUTO: 45.1 %
NEUTROPHILS NFR BLD AUTO: 52.9 %
NONHDLC SERPL-MCNC: 65 MG/DL
NRBC # BLD AUTO: 0 10*3/UL
NRBC # BLD AUTO: 0.2 10*3/UL
NRBC BLD AUTO-RTO: 0 /100
NRBC BLD AUTO-RTO: 2 /100
O2/TOTAL GAS SETTING VFR VENT: 100 %
O2/TOTAL GAS SETTING VFR VENT: 30 %
O2/TOTAL GAS SETTING VFR VENT: 35 %
O2/TOTAL GAS SETTING VFR VENT: 35 %
O2/TOTAL GAS SETTING VFR VENT: 50 %
OXYHGB MFR BLD: 97 % (ref 92–100)
OXYHGB MFR BLD: 98 % (ref 92–100)
OXYHGB MFR BLD: 99 % (ref 92–100)
OXYHGB MFR BLDV: 28 %
OXYHGB MFR BLDV: 50 %
OXYHGB MFR BLDV: 55 %
OXYHGB MFR BLDV: 55 %
PCO2 BLD: 18 MM HG (ref 35–45)
PCO2 BLD: 19 MM HG (ref 35–45)
PCO2 BLD: 19 MM HG (ref 35–45)
PCO2 BLD: 21 MM HG (ref 35–45)
PCO2 BLD: 21 MM HG (ref 35–45)
PCO2 BLD: 23 MM HG (ref 35–45)
PCO2 BLD: 23 MM HG (ref 35–45)
PCO2 BLD: 24 MM HG (ref 35–45)
PCO2 BLD: 27 MM HG (ref 35–45)
PCO2 BLD: 27 MM HG (ref 35–45)
PCO2 BLD: 30 MM HG (ref 35–45)
PCO2 BLD: 30 MM HG (ref 35–45)
PCO2 BLD: 32 MM HG (ref 35–45)
PCO2 BLD: 35 MM HG (ref 35–45)
PCO2 BLDV: 27 MM HG (ref 40–50)
PCO2 BLDV: 33 MM HG (ref 40–50)
PCO2 BLDV: 34 MM HG (ref 40–50)
PCO2 BLDV: 35 MM HG (ref 40–50)
PCO2 BLDV: 37 MM HG (ref 40–50)
PCO2 BLDV: 66 MM HG (ref 40–50)
PH BLD: 7.23 PH (ref 7.35–7.45)
PH BLD: 7.37 PH (ref 7.35–7.45)
PH BLD: 7.39 PH (ref 7.35–7.45)
PH BLD: 7.4 PH (ref 7.35–7.45)
PH BLD: 7.4 PH (ref 7.35–7.45)
PH BLD: 7.43 PH (ref 7.35–7.45)
PH BLD: 7.46 PH (ref 7.35–7.45)
PH BLD: 7.47 PH (ref 7.35–7.45)
PH BLD: 7.48 PH (ref 7.35–7.45)
PH BLD: 7.49 PH (ref 7.35–7.45)
PH BLD: 7.52 PH (ref 7.35–7.45)
PH BLD: 7.57 PH (ref 7.35–7.45)
PH BLDV: 7.07 PH (ref 7.32–7.43)
PH BLDV: 7.18 PH (ref 7.32–7.43)
PH BLDV: 7.28 PH (ref 7.32–7.43)
PH BLDV: 7.35 PH (ref 7.32–7.43)
PH BLDV: 7.35 PH (ref 7.32–7.43)
PH BLDV: 7.38 PH (ref 7.32–7.43)
PHOSPHATE SERPL-MCNC: 2.2 MG/DL (ref 2.5–4.5)
PHOSPHATE SERPL-MCNC: 2.4 MG/DL (ref 2.5–4.5)
PHOSPHATE SERPL-MCNC: 2.6 MG/DL (ref 2.5–4.5)
PHOSPHATE SERPL-MCNC: 2.6 MG/DL (ref 2.5–4.5)
PHOSPHATE SERPL-MCNC: 2.9 MG/DL (ref 2.5–4.5)
PHOSPHATE SERPL-MCNC: 3 MG/DL (ref 2.5–4.5)
PHOSPHATE SERPL-MCNC: 3.2 MG/DL (ref 2.5–4.5)
PHOSPHATE SERPL-MCNC: 3.2 MG/DL (ref 2.5–4.5)
PHOSPHATE SERPL-MCNC: 3.4 MG/DL (ref 2.5–4.5)
PHOSPHATE SERPL-MCNC: 3.9 MG/DL (ref 2.5–4.5)
PLATELET # BLD AUTO: 116 10E9/L (ref 150–450)
PLATELET # BLD AUTO: 117 10E9/L (ref 150–450)
PLATELET # BLD AUTO: 121 10E9/L (ref 150–450)
PLATELET # BLD AUTO: 128 10E9/L (ref 150–450)
PLATELET # BLD AUTO: 128 10E9/L (ref 150–450)
PLATELET # BLD AUTO: 130 10E9/L (ref 150–450)
PLATELET # BLD AUTO: 135 10E9/L (ref 150–450)
PLATELET # BLD AUTO: 137 10E9/L (ref 150–450)
PLATELET # BLD AUTO: 140 10E9/L (ref 150–450)
PLATELET # BLD AUTO: 152 10E9/L (ref 150–450)
PLATELET # BLD AUTO: 153 10E9/L (ref 150–450)
PLATELET # BLD AUTO: 156 10E9/L (ref 150–450)
PLATELET # BLD AUTO: 187 10E9/L (ref 150–450)
PLATELET # BLD AUTO: 189 10E9/L (ref 150–450)
PLATELET # BLD AUTO: 198 10E9/L (ref 150–450)
PLATELET # BLD AUTO: 233 10E9/L (ref 150–450)
PLATELET # BLD AUTO: 249 10E9/L (ref 150–450)
PLATELET # BLD AUTO: 260 10E9/L (ref 150–450)
PLATELET # BLD AUTO: 330 10E9/L (ref 150–450)
PLATELET # BLD AUTO: 376 10E9/L (ref 150–450)
PLATELET # BLD EST: ABNORMAL 10*3/UL
PO2 BLD: 111 MM HG (ref 80–105)
PO2 BLD: 112 MM HG (ref 80–105)
PO2 BLD: 113 MM HG (ref 80–105)
PO2 BLD: 116 MM HG (ref 80–105)
PO2 BLD: 120 MM HG (ref 80–105)
PO2 BLD: 125 MM HG (ref 80–105)
PO2 BLD: 126 MM HG (ref 80–105)
PO2 BLD: 133 MM HG (ref 80–105)
PO2 BLD: 150 MM HG (ref 80–105)
PO2 BLD: 159 MM HG (ref 80–105)
PO2 BLD: 167 MM HG (ref 80–105)
PO2 BLD: 167 MM HG (ref 80–105)
PO2 BLD: 204 MM HG (ref 80–105)
PO2 BLD: 439 MM HG (ref 80–105)
PO2 BLDV: 25 MM HG (ref 25–47)
PO2 BLDV: 34 MM HG (ref 25–47)
PO2 BLDV: 35 MM HG (ref 25–47)
PO2 BLDV: 36 MM HG (ref 25–47)
PO2 BLDV: 36 MM HG (ref 25–47)
PO2 BLDV: 40 MM HG (ref 25–47)
POIKILOCYTOSIS BLD QL SMEAR: SLIGHT
POLYCHROMASIA BLD QL SMEAR: SLIGHT
POTASSIUM BLD-SCNC: 2.6 MMOL/L (ref 3.4–5.3)
POTASSIUM BLD-SCNC: 2.9 MMOL/L (ref 3.4–5.3)
POTASSIUM BLD-SCNC: 3.2 MMOL/L (ref 3.4–5.3)
POTASSIUM BLD-SCNC: 4.2 MMOL/L (ref 3.4–5.3)
POTASSIUM SERPL-SCNC: 2.9 MMOL/L (ref 3.4–5.3)
POTASSIUM SERPL-SCNC: 3 MMOL/L (ref 3.4–5.3)
POTASSIUM SERPL-SCNC: 3 MMOL/L (ref 3.4–5.3)
POTASSIUM SERPL-SCNC: 3.3 MMOL/L (ref 3.4–5.3)
POTASSIUM SERPL-SCNC: 3.6 MMOL/L (ref 3.4–5.3)
POTASSIUM SERPL-SCNC: 3.7 MMOL/L (ref 3.4–5.3)
POTASSIUM SERPL-SCNC: 3.8 MMOL/L (ref 3.4–5.3)
POTASSIUM SERPL-SCNC: 3.9 MMOL/L (ref 3.4–5.3)
POTASSIUM SERPL-SCNC: 3.9 MMOL/L (ref 3.4–5.3)
POTASSIUM SERPL-SCNC: 4 MMOL/L (ref 3.4–5.3)
POTASSIUM SERPL-SCNC: 4 MMOL/L (ref 3.4–5.3)
POTASSIUM SERPL-SCNC: 4.1 MMOL/L (ref 3.4–5.3)
POTASSIUM SERPL-SCNC: 4.2 MMOL/L (ref 3.4–5.3)
POTASSIUM SERPL-SCNC: 4.2 MMOL/L (ref 3.4–5.3)
POTASSIUM SERPL-SCNC: 4.3 MMOL/L (ref 3.4–5.3)
PROT ELPH PNL UR ELPH: NORMAL
PROT PATTERN SERPL ELPH-IMP: ABNORMAL
PROT PATTERN SERPL IFE-IMP: NORMAL
PROT PATTERN UR ELPH-IMP: ABNORMAL
PROT SERPL-MCNC: 4.8 G/DL (ref 6.8–8.8)
PROT SERPL-MCNC: 5 G/DL (ref 6.8–8.8)
PROT SERPL-MCNC: 5 G/DL (ref 6.8–8.8)
PROT SERPL-MCNC: 5.1 G/DL (ref 6.8–8.8)
PROT SERPL-MCNC: 5.1 G/DL (ref 6.8–8.8)
PROT SERPL-MCNC: 5.2 G/DL (ref 6.8–8.8)
PROT SERPL-MCNC: 5.3 G/DL (ref 6.8–8.8)
PROT SERPL-MCNC: 5.3 G/DL (ref 6.8–8.8)
PROT SERPL-MCNC: 5.4 G/DL (ref 6.8–8.8)
PROT SERPL-MCNC: 5.5 G/DL (ref 6.8–8.8)
PROT SERPL-MCNC: 5.5 G/DL (ref 6.8–8.8)
PROT SERPL-MCNC: 5.6 G/DL (ref 6.8–8.8)
PROT SERPL-MCNC: 5.7 G/DL (ref 6.8–8.8)
PROT SERPL-MCNC: 5.7 G/DL (ref 6.8–8.8)
PROT SERPL-MCNC: 5.9 G/DL (ref 6.8–8.8)
PROT SERPL-MCNC: 6.2 G/DL (ref 6.8–8.8)
PROT SERPL-MCNC: 7.8 G/DL (ref 6.8–8.8)
RBC # BLD AUTO: 2.63 10E12/L (ref 3.8–5.2)
RBC # BLD AUTO: 2.72 10E12/L (ref 3.8–5.2)
RBC # BLD AUTO: 2.73 10E12/L (ref 3.8–5.2)
RBC # BLD AUTO: 2.75 10E12/L (ref 3.8–5.2)
RBC # BLD AUTO: 2.85 10E12/L (ref 3.8–5.2)
RBC # BLD AUTO: 2.91 10E12/L (ref 3.8–5.2)
RBC # BLD AUTO: 2.94 10E12/L (ref 3.8–5.2)
RBC # BLD AUTO: 2.98 10E12/L (ref 3.8–5.2)
RBC # BLD AUTO: 2.98 10E12/L (ref 3.8–5.2)
RBC # BLD AUTO: 3.06 10E12/L (ref 3.8–5.2)
RBC # BLD AUTO: 3.1 10E12/L (ref 3.8–5.2)
RBC # BLD AUTO: 3.17 10E12/L (ref 3.8–5.2)
RBC # BLD AUTO: 3.3 10E12/L (ref 3.8–5.2)
RBC # BLD AUTO: 3.33 10E12/L (ref 3.8–5.2)
RBC # BLD AUTO: 3.37 10E12/L (ref 3.8–5.2)
RBC # BLD AUTO: 3.49 10E12/L (ref 3.8–5.2)
RBC # BLD AUTO: 3.49 10E12/L (ref 3.8–5.2)
RBC # BLD AUTO: 3.55 10E12/L (ref 3.8–5.2)
RBC # BLD AUTO: 3.59 10E12/L (ref 3.8–5.2)
RETICS # AUTO: 48.9 10E9/L (ref 25–95)
RETICS/RBC NFR AUTO: 1.5 % (ref 0.5–2)
SAO2 % BLDV FROM PO2: 42 %
SAO2 % BLDV FROM PO2: 67 %
SARS-COV-2 RNA SPEC QL NAA+PROBE: NOT DETECTED
SODIUM BLD-SCNC: 140 MMOL/L (ref 133–144)
SODIUM BLD-SCNC: 144 MMOL/L (ref 133–144)
SODIUM BLD-SCNC: 145 MMOL/L (ref 133–144)
SODIUM SERPL-SCNC: 140 MMOL/L (ref 133–144)
SODIUM SERPL-SCNC: 141 MMOL/L (ref 133–144)
SODIUM SERPL-SCNC: 141 MMOL/L (ref 133–144)
SODIUM SERPL-SCNC: 142 MMOL/L (ref 133–144)
SODIUM SERPL-SCNC: 143 MMOL/L (ref 133–144)
SODIUM SERPL-SCNC: 144 MMOL/L (ref 133–144)
SODIUM SERPL-SCNC: 144 MMOL/L (ref 133–144)
SODIUM SERPL-SCNC: 145 MMOL/L (ref 133–144)
SODIUM SERPL-SCNC: 146 MMOL/L (ref 133–144)
SODIUM SERPL-SCNC: 146 MMOL/L (ref 133–144)
SODIUM SERPL-SCNC: 147 MMOL/L (ref 133–144)
SODIUM SERPL-SCNC: 148 MMOL/L (ref 133–144)
SODIUM SERPL-SCNC: 148 MMOL/L (ref 133–144)
SPECIMEN EXP DATE BLD: NORMAL
SPECIMEN EXP DATE BLD: NORMAL
SPECIMEN SOURCE: NORMAL
TIBC SERPL-MCNC: 255 UG/DL (ref 240–430)
TRIGL SERPL-MCNC: 58 MG/DL
TRIGL SERPL-MCNC: 82 MG/DL
TROPONIN I BLD-MCNC: 0.03 UG/L (ref 0–0.08)
TROPONIN I SERPL-MCNC: 0.04 UG/L (ref 0–0.04)
TROPONIN I SERPL-MCNC: 0.16 UG/L (ref 0–0.04)
TROPONIN I SERPL-MCNC: 0.18 UG/L (ref 0–0.04)
TROPONIN I SERPL-MCNC: 0.22 UG/L (ref 0–0.04)
WBC # BLD AUTO: 10.2 10E9/L (ref 4–11)
WBC # BLD AUTO: 10.5 10E9/L (ref 4–11)
WBC # BLD AUTO: 11.4 10E9/L (ref 4–11)
WBC # BLD AUTO: 11.7 10E9/L (ref 4–11)
WBC # BLD AUTO: 13 10E9/L (ref 4–11)
WBC # BLD AUTO: 13.4 10E9/L (ref 4–11)
WBC # BLD AUTO: 4.8 10E9/L (ref 4–11)
WBC # BLD AUTO: 5.2 10E9/L (ref 4–11)
WBC # BLD AUTO: 7.4 10E9/L (ref 4–11)
WBC # BLD AUTO: 7.9 10E9/L (ref 4–11)
WBC # BLD AUTO: 8 10E9/L (ref 4–11)
WBC # BLD AUTO: 8.1 10E9/L (ref 4–11)
WBC # BLD AUTO: 8.8 10E9/L (ref 4–11)
WBC # BLD AUTO: 8.9 10E9/L (ref 4–11)
WBC # BLD AUTO: 9 10E9/L (ref 4–11)
WBC # BLD AUTO: 9 10E9/L (ref 4–11)
WBC # BLD AUTO: 9.4 10E9/L (ref 4–11)
WBC # BLD AUTO: 9.4 10E9/L (ref 4–11)
WBC # BLD AUTO: 9.7 10E9/L (ref 4–11)

## 2020-01-01 PROCEDURE — 00000146 ZZHCL STATISTIC GLUCOSE BY METER IP

## 2020-01-01 PROCEDURE — 80053 COMPREHEN METABOLIC PANEL: CPT | Performed by: FAMILY MEDICINE

## 2020-01-01 PROCEDURE — 25000128 H RX IP 250 OP 636: Performed by: STUDENT IN AN ORGANIZED HEALTH CARE EDUCATION/TRAINING PROGRAM

## 2020-01-01 PROCEDURE — 80053 COMPREHEN METABOLIC PANEL: CPT | Performed by: INTERNAL MEDICINE

## 2020-01-01 PROCEDURE — 93010 ELECTROCARDIOGRAM REPORT: CPT | Performed by: INTERNAL MEDICINE

## 2020-01-01 PROCEDURE — 85027 COMPLETE CBC AUTOMATED: CPT | Performed by: STUDENT IN AN ORGANIZED HEALTH CARE EDUCATION/TRAINING PROGRAM

## 2020-01-01 PROCEDURE — 25800030 ZZH RX IP 258 OP 636: Performed by: STUDENT IN AN ORGANIZED HEALTH CARE EDUCATION/TRAINING PROGRAM

## 2020-01-01 PROCEDURE — 25000132 ZZH RX MED GY IP 250 OP 250 PS 637

## 2020-01-01 PROCEDURE — 27210437 ZZH NUTRITION PRODUCT SEMIELEM INTERMED LITER

## 2020-01-01 PROCEDURE — 93325 DOPPLER ECHO COLOR FLOW MAPG: CPT | Mod: 26 | Performed by: INTERNAL MEDICINE

## 2020-01-01 PROCEDURE — 20000004 ZZH R&B ICU UMMC

## 2020-01-01 PROCEDURE — 83605 ASSAY OF LACTIC ACID: CPT | Performed by: STUDENT IN AN ORGANIZED HEALTH CARE EDUCATION/TRAINING PROGRAM

## 2020-01-01 PROCEDURE — 40000556 ZZH STATISTIC PERIPHERAL IV START W US GUIDANCE

## 2020-01-01 PROCEDURE — 84100 ASSAY OF PHOSPHORUS: CPT | Performed by: INTERNAL MEDICINE

## 2020-01-01 PROCEDURE — 84132 ASSAY OF SERUM POTASSIUM: CPT

## 2020-01-01 PROCEDURE — 36415 COLL VENOUS BLD VENIPUNCTURE: CPT | Performed by: FAMILY MEDICINE

## 2020-01-01 PROCEDURE — 40000275 ZZH STATISTIC RCP TIME EA 10 MIN

## 2020-01-01 PROCEDURE — 25000132 ZZH RX MED GY IP 250 OP 250 PS 637: Performed by: STUDENT IN AN ORGANIZED HEALTH CARE EDUCATION/TRAINING PROGRAM

## 2020-01-01 PROCEDURE — 40000986 XR CHEST PORT 1 VW

## 2020-01-01 PROCEDURE — 86901 BLOOD TYPING SEROLOGIC RH(D): CPT | Performed by: INTERNAL MEDICINE

## 2020-01-01 PROCEDURE — 83735 ASSAY OF MAGNESIUM: CPT | Performed by: INTERNAL MEDICINE

## 2020-01-01 PROCEDURE — 86850 RBC ANTIBODY SCREEN: CPT | Performed by: STUDENT IN AN ORGANIZED HEALTH CARE EDUCATION/TRAINING PROGRAM

## 2020-01-01 PROCEDURE — 82550 ASSAY OF CK (CPK): CPT | Performed by: INTERNAL MEDICINE

## 2020-01-01 PROCEDURE — 95715 VEEG EA 12-26HR INTMT MNTR: CPT | Mod: ZF

## 2020-01-01 PROCEDURE — 25000128 H RX IP 250 OP 636: Performed by: EMERGENCY MEDICINE

## 2020-01-01 PROCEDURE — 27210794 ZZH OR GENERAL SUPPLY STERILE: Performed by: INTERNAL MEDICINE

## 2020-01-01 PROCEDURE — 40000281 ZZH STATISTIC TRANSPORT TIME EA 15 MIN

## 2020-01-01 PROCEDURE — 84484 ASSAY OF TROPONIN QUANT: CPT | Performed by: STUDENT IN AN ORGANIZED HEALTH CARE EDUCATION/TRAINING PROGRAM

## 2020-01-01 PROCEDURE — 99291 CRITICAL CARE FIRST HOUR: CPT | Mod: GC | Performed by: INTERNAL MEDICINE

## 2020-01-01 PROCEDURE — 83550 IRON BINDING TEST: CPT | Performed by: FAMILY MEDICINE

## 2020-01-01 PROCEDURE — B2111ZZ FLUOROSCOPY OF MULTIPLE CORONARY ARTERIES USING LOW OSMOLAR CONTRAST: ICD-10-PCS | Performed by: INTERNAL MEDICINE

## 2020-01-01 PROCEDURE — 83605 ASSAY OF LACTIC ACID: CPT | Performed by: INTERNAL MEDICINE

## 2020-01-01 PROCEDURE — 25000128 H RX IP 250 OP 636: Performed by: INTERNAL MEDICINE

## 2020-01-01 PROCEDURE — 99291 CRITICAL CARE FIRST HOUR: CPT | Mod: 25 | Performed by: EMERGENCY MEDICINE

## 2020-01-01 PROCEDURE — 82803 BLOOD GASES ANY COMBINATION: CPT

## 2020-01-01 PROCEDURE — 82805 BLOOD GASES W/O2 SATURATION: CPT | Performed by: STUDENT IN AN ORGANIZED HEALTH CARE EDUCATION/TRAINING PROGRAM

## 2020-01-01 PROCEDURE — 85025 COMPLETE CBC W/AUTO DIFF WBC: CPT | Performed by: INTERNAL MEDICINE

## 2020-01-01 PROCEDURE — 83615 LACTATE (LD) (LDH) ENZYME: CPT | Performed by: INTERNAL MEDICINE

## 2020-01-01 PROCEDURE — 31500 INSERT EMERGENCY AIRWAY: CPT | Performed by: EMERGENCY MEDICINE

## 2020-01-01 PROCEDURE — 25000128 H RX IP 250 OP 636

## 2020-01-01 PROCEDURE — 40000498 ZZHCL STATISTIC POTASSIUM ED POCT

## 2020-01-01 PROCEDURE — 31500 INSERT EMERGENCY AIRWAY: CPT | Mod: Z6 | Performed by: EMERGENCY MEDICINE

## 2020-01-01 PROCEDURE — 85045 AUTOMATED RETICULOCYTE COUNT: CPT | Performed by: FAMILY MEDICINE

## 2020-01-01 PROCEDURE — 84484 ASSAY OF TROPONIN QUANT: CPT | Performed by: INTERNAL MEDICINE

## 2020-01-01 PROCEDURE — 85049 AUTOMATED PLATELET COUNT: CPT | Performed by: STUDENT IN AN ORGANIZED HEALTH CARE EDUCATION/TRAINING PROGRAM

## 2020-01-01 PROCEDURE — 85027 COMPLETE CBC AUTOMATED: CPT | Performed by: INTERNAL MEDICINE

## 2020-01-01 PROCEDURE — 83605 ASSAY OF LACTIC ACID: CPT | Performed by: EMERGENCY MEDICINE

## 2020-01-01 PROCEDURE — 84295 ASSAY OF SERUM SODIUM: CPT

## 2020-01-01 PROCEDURE — 36415 COLL VENOUS BLD VENIPUNCTURE: CPT | Performed by: STUDENT IN AN ORGANIZED HEALTH CARE EDUCATION/TRAINING PROGRAM

## 2020-01-01 PROCEDURE — 82803 BLOOD GASES ANY COMBINATION: CPT | Performed by: INTERNAL MEDICINE

## 2020-01-01 PROCEDURE — 80053 COMPREHEN METABOLIC PANEL: CPT | Performed by: STUDENT IN AN ORGANIZED HEALTH CARE EDUCATION/TRAINING PROGRAM

## 2020-01-01 PROCEDURE — 40000076 ZZH STATISTIC IABP MONITORING

## 2020-01-01 PROCEDURE — 82805 BLOOD GASES W/O2 SATURATION: CPT | Performed by: INTERNAL MEDICINE

## 2020-01-01 PROCEDURE — 93308 TTE F-UP OR LMTD: CPT | Mod: 26 | Performed by: INTERNAL MEDICINE

## 2020-01-01 PROCEDURE — 40000276 ZZH STATISTIC RCP TIME ED VENT EA 10 MIN

## 2020-01-01 PROCEDURE — 83735 ASSAY OF MAGNESIUM: CPT | Performed by: STUDENT IN AN ORGANIZED HEALTH CARE EDUCATION/TRAINING PROGRAM

## 2020-01-01 PROCEDURE — 86334 IMMUNOFIX E-PHORESIS SERUM: CPT | Performed by: FAMILY MEDICINE

## 2020-01-01 PROCEDURE — 94003 VENT MGMT INPAT SUBQ DAY: CPT

## 2020-01-01 PROCEDURE — 27210305 ZZH CATH BALLOON IABP

## 2020-01-01 PROCEDURE — 93005 ELECTROCARDIOGRAM TRACING: CPT

## 2020-01-01 PROCEDURE — 85730 THROMBOPLASTIN TIME PARTIAL: CPT | Performed by: STUDENT IN AN ORGANIZED HEALTH CARE EDUCATION/TRAINING PROGRAM

## 2020-01-01 PROCEDURE — 80061 LIPID PANEL: CPT | Performed by: FAMILY MEDICINE

## 2020-01-01 PROCEDURE — 40000014 ZZH STATISTIC ARTERIAL MONITORING DAILY

## 2020-01-01 PROCEDURE — 96366 THER/PROPH/DIAG IV INF ADDON: CPT | Performed by: EMERGENCY MEDICINE

## 2020-01-01 PROCEDURE — C9254 INJECTION, LACOSAMIDE: HCPCS | Performed by: STUDENT IN AN ORGANIZED HEALTH CARE EDUCATION/TRAINING PROGRAM

## 2020-01-01 PROCEDURE — 83540 ASSAY OF IRON: CPT | Performed by: FAMILY MEDICINE

## 2020-01-01 PROCEDURE — 80048 BASIC METABOLIC PNL TOTAL CA: CPT | Performed by: STUDENT IN AN ORGANIZED HEALTH CARE EDUCATION/TRAINING PROGRAM

## 2020-01-01 PROCEDURE — 83690 ASSAY OF LIPASE: CPT | Performed by: EMERGENCY MEDICINE

## 2020-01-01 PROCEDURE — 84484 ASSAY OF TROPONIN QUANT: CPT

## 2020-01-01 PROCEDURE — 5A02210 ASSISTANCE WITH CARDIAC OUTPUT USING BALLOON PUMP, CONTINUOUS: ICD-10-PCS | Performed by: INTERNAL MEDICINE

## 2020-01-01 PROCEDURE — 40000561 ZZH STATISTIC CODE BLUE NO ACCESS REQUIRED

## 2020-01-01 PROCEDURE — 85060 BLOOD SMEAR INTERPRETATION: CPT | Performed by: INTERNAL MEDICINE

## 2020-01-01 PROCEDURE — 87640 STAPH A DNA AMP PROBE: CPT | Performed by: STUDENT IN AN ORGANIZED HEALTH CARE EDUCATION/TRAINING PROGRAM

## 2020-01-01 PROCEDURE — 85025 COMPLETE CBC W/AUTO DIFF WBC: CPT | Performed by: EMERGENCY MEDICINE

## 2020-01-01 PROCEDURE — 82803 BLOOD GASES ANY COMBINATION: CPT | Performed by: EMERGENCY MEDICINE

## 2020-01-01 PROCEDURE — 84484 ASSAY OF TROPONIN QUANT: CPT | Performed by: EMERGENCY MEDICINE

## 2020-01-01 PROCEDURE — 40000502 ZZHCL STATISTIC GLUCOSE ED POCT

## 2020-01-01 PROCEDURE — 99214 OFFICE O/P EST MOD 30 MIN: CPT | Mod: TEL | Performed by: INTERNAL MEDICINE

## 2020-01-01 PROCEDURE — 99291 CRITICAL CARE FIRST HOUR: CPT | Mod: 25 | Performed by: INTERNAL MEDICINE

## 2020-01-01 PROCEDURE — 93306 TTE W/DOPPLER COMPLETE: CPT | Mod: 26 | Performed by: INTERNAL MEDICINE

## 2020-01-01 PROCEDURE — 40000264 ECHOCARDIOGRAM COMPLETE

## 2020-01-01 PROCEDURE — 83605 ASSAY OF LACTIC ACID: CPT

## 2020-01-01 PROCEDURE — 99000 SPECIMEN HANDLING OFFICE-LAB: CPT | Performed by: FAMILY MEDICINE

## 2020-01-01 PROCEDURE — 84100 ASSAY OF PHOSPHORUS: CPT | Performed by: STUDENT IN AN ORGANIZED HEALTH CARE EDUCATION/TRAINING PROGRAM

## 2020-01-01 PROCEDURE — 71045 X-RAY EXAM CHEST 1 VIEW: CPT

## 2020-01-01 PROCEDURE — 25800030 ZZH RX IP 258 OP 636: Performed by: INTERNAL MEDICINE

## 2020-01-01 PROCEDURE — 40000986 XR ABDOMEN PORT 1 VW

## 2020-01-01 PROCEDURE — 25000125 ZZHC RX 250: Performed by: STUDENT IN AN ORGANIZED HEALTH CARE EDUCATION/TRAINING PROGRAM

## 2020-01-01 PROCEDURE — 82330 ASSAY OF CALCIUM: CPT

## 2020-01-01 PROCEDURE — 40000501 ZZHCL STATISTIC HEMATOCRIT ED POCT

## 2020-01-01 PROCEDURE — 33967 INSERT I-AORT PERCUT DEVICE: CPT | Performed by: INTERNAL MEDICINE

## 2020-01-01 PROCEDURE — 86900 BLOOD TYPING SEROLOGIC ABO: CPT | Performed by: STUDENT IN AN ORGANIZED HEALTH CARE EDUCATION/TRAINING PROGRAM

## 2020-01-01 PROCEDURE — 95712 VEEG 2-12 HR INTMT MNTR: CPT | Mod: ZF

## 2020-01-01 PROCEDURE — 83883 ASSAY NEPHELOMETRY NOT SPEC: CPT | Mod: 90 | Performed by: FAMILY MEDICINE

## 2020-01-01 PROCEDURE — 25000131 ZZH RX MED GY IP 250 OP 636 PS 637: Performed by: STUDENT IN AN ORGANIZED HEALTH CARE EDUCATION/TRAINING PROGRAM

## 2020-01-01 PROCEDURE — 83883 ASSAY NEPHELOMETRY NOT SPEC: CPT | Performed by: FAMILY MEDICINE

## 2020-01-01 PROCEDURE — 95714 VEEG EA 12-26 HR UNMNTR: CPT | Mod: ZF

## 2020-01-01 PROCEDURE — 99223 1ST HOSP IP/OBS HIGH 75: CPT | Mod: 25 | Performed by: INTERNAL MEDICINE

## 2020-01-01 PROCEDURE — 86901 BLOOD TYPING SEROLOGIC RH(D): CPT | Performed by: STUDENT IN AN ORGANIZED HEALTH CARE EDUCATION/TRAINING PROGRAM

## 2020-01-01 PROCEDURE — 86850 RBC ANTIBODY SCREEN: CPT | Performed by: INTERNAL MEDICINE

## 2020-01-01 PROCEDURE — 3E043XZ INTRODUCTION OF VASOPRESSOR INTO CENTRAL VEIN, PERCUTANEOUS APPROACH: ICD-10-PCS | Performed by: EMERGENCY MEDICINE

## 2020-01-01 PROCEDURE — 4A023N7 MEASUREMENT OF CARDIAC SAMPLING AND PRESSURE, LEFT HEART, PERCUTANEOUS APPROACH: ICD-10-PCS | Performed by: INTERNAL MEDICINE

## 2020-01-01 PROCEDURE — 87070 CULTURE OTHR SPECIMN AEROBIC: CPT | Performed by: STUDENT IN AN ORGANIZED HEALTH CARE EDUCATION/TRAINING PROGRAM

## 2020-01-01 PROCEDURE — 93321 DOPPLER ECHO F-UP/LMTD STD: CPT | Mod: 26 | Performed by: INTERNAL MEDICINE

## 2020-01-01 PROCEDURE — 93325 DOPPLER ECHO COLOR FLOW MAPG: CPT

## 2020-01-01 PROCEDURE — 82784 ASSAY IGA/IGD/IGG/IGM EACH: CPT | Performed by: FAMILY MEDICINE

## 2020-01-01 PROCEDURE — 40000114 ZZH STATISTIC NO CHARGE CLINIC VISIT

## 2020-01-01 PROCEDURE — 95711 VEEG 2-12 HR UNMONITORED: CPT | Mod: ZF

## 2020-01-01 PROCEDURE — 84132 ASSAY OF SERUM POTASSIUM: CPT | Performed by: STUDENT IN AN ORGANIZED HEALTH CARE EDUCATION/TRAINING PROGRAM

## 2020-01-01 PROCEDURE — 70450 CT HEAD/BRAIN W/O DYE: CPT

## 2020-01-01 PROCEDURE — 82330 ASSAY OF CALCIUM: CPT | Performed by: EMERGENCY MEDICINE

## 2020-01-01 PROCEDURE — 87040 BLOOD CULTURE FOR BACTERIA: CPT | Performed by: STUDENT IN AN ORGANIZED HEALTH CARE EDUCATION/TRAINING PROGRAM

## 2020-01-01 PROCEDURE — 97110 THERAPEUTIC EXERCISES: CPT | Mod: GP

## 2020-01-01 PROCEDURE — 84166 PROTEIN E-PHORESIS/URINE/CSF: CPT | Performed by: INTERNAL MEDICINE

## 2020-01-01 PROCEDURE — 82803 BLOOD GASES ANY COMBINATION: CPT | Performed by: STUDENT IN AN ORGANIZED HEALTH CARE EDUCATION/TRAINING PROGRAM

## 2020-01-01 PROCEDURE — 99233 SBSQ HOSP IP/OBS HIGH 50: CPT | Performed by: INTERNAL MEDICINE

## 2020-01-01 PROCEDURE — 82810 BLOOD GASES O2 SAT ONLY: CPT

## 2020-01-01 PROCEDURE — 40000894 ZZH STATISTIC OT IP EVAL DEFER: Performed by: OCCUPATIONAL THERAPIST

## 2020-01-01 PROCEDURE — 86335 IMMUNFIX E-PHORSIS/URINE/CSF: CPT | Performed by: INTERNAL MEDICINE

## 2020-01-01 PROCEDURE — 93010 ELECTROCARDIOGRAM REPORT: CPT | Mod: 59 | Performed by: EMERGENCY MEDICINE

## 2020-01-01 PROCEDURE — 99214 OFFICE O/P EST MOD 30 MIN: CPT | Performed by: FAMILY MEDICINE

## 2020-01-01 PROCEDURE — 85610 PROTHROMBIN TIME: CPT | Performed by: STUDENT IN AN ORGANIZED HEALTH CARE EDUCATION/TRAINING PROGRAM

## 2020-01-01 PROCEDURE — 85025 COMPLETE CBC W/AUTO DIFF WBC: CPT | Performed by: FAMILY MEDICINE

## 2020-01-01 PROCEDURE — 94002 VENT MGMT INPAT INIT DAY: CPT

## 2020-01-01 PROCEDURE — 80053 COMPREHEN METABOLIC PANEL: CPT | Performed by: EMERGENCY MEDICINE

## 2020-01-01 PROCEDURE — 93005 ELECTROCARDIOGRAM TRACING: CPT | Performed by: EMERGENCY MEDICINE

## 2020-01-01 PROCEDURE — 82728 ASSAY OF FERRITIN: CPT | Performed by: FAMILY MEDICINE

## 2020-01-01 PROCEDURE — 00000402 ZZHCL STATISTIC TOTAL PROTEIN: Performed by: FAMILY MEDICINE

## 2020-01-01 PROCEDURE — 97162 PT EVAL MOD COMPLEX 30 MIN: CPT | Mod: GP

## 2020-01-01 PROCEDURE — 84132 ASSAY OF SERUM POTASSIUM: CPT | Performed by: INTERNAL MEDICINE

## 2020-01-01 PROCEDURE — 25000125 ZZHC RX 250: Performed by: INTERNAL MEDICINE

## 2020-01-01 PROCEDURE — 96127 BRIEF EMOTIONAL/BEHAV ASSMT: CPT | Performed by: FAMILY MEDICINE

## 2020-01-01 PROCEDURE — 93010 ELECTROCARDIOGRAM REPORT: CPT | Mod: 76 | Performed by: INTERNAL MEDICINE

## 2020-01-01 PROCEDURE — 99232 SBSQ HOSP IP/OBS MODERATE 35: CPT | Mod: GC | Performed by: INTERNAL MEDICINE

## 2020-01-01 PROCEDURE — 5A1955Z RESPIRATORY VENTILATION, GREATER THAN 96 CONSECUTIVE HOURS: ICD-10-PCS | Performed by: EMERGENCY MEDICINE

## 2020-01-01 PROCEDURE — 25500064 ZZH RX 255 OP 636: Performed by: INTERNAL MEDICINE

## 2020-01-01 PROCEDURE — 99238 HOSP IP/OBS DSCHRG MGMT 30/<: CPT | Mod: GC | Performed by: INTERNAL MEDICINE

## 2020-01-01 PROCEDURE — 86900 BLOOD TYPING SEROLOGIC ABO: CPT | Performed by: INTERNAL MEDICINE

## 2020-01-01 PROCEDURE — 99153 MOD SED SAME PHYS/QHP EA: CPT | Performed by: INTERNAL MEDICINE

## 2020-01-01 PROCEDURE — 99221 1ST HOSP IP/OBS SF/LOW 40: CPT | Performed by: INTERNAL MEDICINE

## 2020-01-01 PROCEDURE — 83615 LACTATE (LD) (LDH) ENZYME: CPT | Performed by: FAMILY MEDICINE

## 2020-01-01 PROCEDURE — 99152 MOD SED SAME PHYS/QHP 5/>YRS: CPT | Performed by: INTERNAL MEDICINE

## 2020-01-01 PROCEDURE — 40000497 ZZHCL STATISTIC SODIUM ED POCT

## 2020-01-01 PROCEDURE — 87641 MR-STAPH DNA AMP PROBE: CPT | Performed by: STUDENT IN AN ORGANIZED HEALTH CARE EDUCATION/TRAINING PROGRAM

## 2020-01-01 PROCEDURE — 96365 THER/PROPH/DIAG IV INF INIT: CPT | Mod: 59 | Performed by: EMERGENCY MEDICINE

## 2020-01-01 PROCEDURE — G2012 BRIEF CHECK IN BY MD/QHP: HCPCS | Performed by: FAMILY MEDICINE

## 2020-01-01 PROCEDURE — C1894 INTRO/SHEATH, NON-LASER: HCPCS | Performed by: INTERNAL MEDICINE

## 2020-01-01 PROCEDURE — 84478 ASSAY OF TRIGLYCERIDES: CPT | Performed by: INTERNAL MEDICINE

## 2020-01-01 PROCEDURE — 84165 PROTEIN E-PHORESIS SERUM: CPT | Performed by: FAMILY MEDICINE

## 2020-01-01 PROCEDURE — 85384 FIBRINOGEN ACTIVITY: CPT | Performed by: STUDENT IN AN ORGANIZED HEALTH CARE EDUCATION/TRAINING PROGRAM

## 2020-01-01 PROCEDURE — 6A4Z0ZZ HYPOTHERMIA, SINGLE: ICD-10-PCS | Performed by: INTERNAL MEDICINE

## 2020-01-01 PROCEDURE — 25800030 ZZH RX IP 258 OP 636: Performed by: EMERGENCY MEDICINE

## 2020-01-01 PROCEDURE — 93458 L HRT ARTERY/VENTRICLE ANGIO: CPT | Performed by: INTERNAL MEDICINE

## 2020-01-01 PROCEDURE — 82947 ASSAY GLUCOSE BLOOD QUANT: CPT

## 2020-01-01 PROCEDURE — 27210429 ZZH NUTRITION PRODUCT INTERMEDIATE LITER

## 2020-01-01 RX ORDER — DEXTROSE MONOHYDRATE 100 MG/ML
INJECTION, SOLUTION INTRAVENOUS CONTINUOUS PRN
Status: DISCONTINUED | OUTPATIENT
Start: 2020-01-01 | End: 2020-01-01 | Stop reason: HOSPADM

## 2020-01-01 RX ORDER — AMPICILLIN AND SULBACTAM 2; 1 G/1; G/1
3 INJECTION, POWDER, FOR SOLUTION INTRAMUSCULAR; INTRAVENOUS EVERY 6 HOURS
Status: DISCONTINUED | OUTPATIENT
Start: 2020-01-01 | End: 2020-01-01

## 2020-01-01 RX ORDER — EPTIFIBATIDE 2 MG/ML
2 INJECTION, SOLUTION INTRAVENOUS CONTINUOUS PRN
Status: DISCONTINUED | OUTPATIENT
Start: 2020-01-01 | End: 2020-01-01 | Stop reason: HOSPADM

## 2020-01-01 RX ORDER — CLOBAZAM 2.5 MG/ML
10 SUSPENSION ORAL 2 TIMES DAILY
Status: DISCONTINUED | OUTPATIENT
Start: 2020-01-01 | End: 2020-01-01

## 2020-01-01 RX ORDER — MORPHINE SULFATE 10 MG/ML
5-10 INJECTION, SOLUTION INTRAMUSCULAR; INTRAVENOUS EVERY 30 MIN PRN
Status: DISCONTINUED | OUTPATIENT
Start: 2020-01-01 | End: 2020-01-01 | Stop reason: HOSPADM

## 2020-01-01 RX ORDER — ARGATROBAN 1 MG/ML
150 INJECTION, SOLUTION INTRAVENOUS
Status: DISCONTINUED | OUTPATIENT
Start: 2020-01-01 | End: 2020-01-01 | Stop reason: HOSPADM

## 2020-01-01 RX ORDER — PROPOFOL 10 MG/ML
10-20 INJECTION, EMULSION INTRAVENOUS EVERY 30 MIN PRN
Status: DISCONTINUED | OUTPATIENT
Start: 2020-01-01 | End: 2020-01-01 | Stop reason: HOSPADM

## 2020-01-01 RX ORDER — HYDRALAZINE HYDROCHLORIDE 20 MG/ML
10 INJECTION INTRAMUSCULAR; INTRAVENOUS EVERY 4 HOURS PRN
Status: DISCONTINUED | OUTPATIENT
Start: 2020-01-01 | End: 2020-01-01 | Stop reason: HOSPADM

## 2020-01-01 RX ORDER — PROPOFOL 10 MG/ML
5-75 INJECTION, EMULSION INTRAVENOUS CONTINUOUS
Status: DISCONTINUED | OUTPATIENT
Start: 2020-01-01 | End: 2020-01-01 | Stop reason: HOSPADM

## 2020-01-01 RX ORDER — NICOTINE POLACRILEX 4 MG
15-30 LOZENGE BUCCAL
Status: DISCONTINUED | OUTPATIENT
Start: 2020-01-01 | End: 2020-01-01 | Stop reason: HOSPADM

## 2020-01-01 RX ORDER — POTASSIUM CHLORIDE 750 MG/1
20-40 TABLET, EXTENDED RELEASE ORAL
Status: DISCONTINUED | OUTPATIENT
Start: 2020-01-01 | End: 2020-01-01

## 2020-01-01 RX ORDER — TIROFIBAN HYDROCHLORIDE 50 UG/ML
0.07 INJECTION INTRAVENOUS CONTINUOUS PRN
Status: DISCONTINUED | OUTPATIENT
Start: 2020-01-01 | End: 2020-01-01 | Stop reason: HOSPADM

## 2020-01-01 RX ORDER — VECURONIUM BROMIDE 1 MG/ML
5 INJECTION, POWDER, LYOPHILIZED, FOR SOLUTION INTRAVENOUS
Status: DISCONTINUED | OUTPATIENT
Start: 2020-01-01 | End: 2020-01-01 | Stop reason: CLARIF

## 2020-01-01 RX ORDER — SODIUM CHLORIDE 9 MG/ML
1000 INJECTION, SOLUTION INTRAVENOUS CONTINUOUS
Status: DISCONTINUED | OUTPATIENT
Start: 2020-01-01 | End: 2020-01-01

## 2020-01-01 RX ORDER — ARGATROBAN 1 MG/ML
350 INJECTION, SOLUTION INTRAVENOUS
Status: DISCONTINUED | OUTPATIENT
Start: 2020-01-01 | End: 2020-01-01 | Stop reason: HOSPADM

## 2020-01-01 RX ORDER — PROPOFOL 10 MG/ML
10-20 INJECTION, EMULSION INTRAVENOUS EVERY 30 MIN PRN
Status: DISCONTINUED | OUTPATIENT
Start: 2020-01-01 | End: 2020-01-01

## 2020-01-01 RX ORDER — MEPERIDINE HYDROCHLORIDE 25 MG/ML
25-50 INJECTION INTRAMUSCULAR; INTRAVENOUS; SUBCUTANEOUS
Status: DISCONTINUED | OUTPATIENT
Start: 2020-01-01 | End: 2020-01-01

## 2020-01-01 RX ORDER — METOPROLOL TARTRATE 1 MG/ML
2.5 INJECTION, SOLUTION INTRAVENOUS ONCE
Status: COMPLETED | OUTPATIENT
Start: 2020-01-01 | End: 2020-01-01

## 2020-01-01 RX ORDER — MIDAZOLAM (PF) 1 MG/ML IN 0.9 % SODIUM CHLORIDE INTRAVENOUS SOLUTION
1-8 CONTINUOUS
Status: DISCONTINUED | OUTPATIENT
Start: 2020-01-01 | End: 2020-01-01

## 2020-01-01 RX ORDER — PROPOFOL 10 MG/ML
5-75 INJECTION, EMULSION INTRAVENOUS CONTINUOUS
Status: DISCONTINUED | OUTPATIENT
Start: 2020-01-01 | End: 2020-01-01

## 2020-01-01 RX ORDER — MAGNESIUM SULFATE HEPTAHYDRATE 40 MG/ML
4 INJECTION, SOLUTION INTRAVENOUS EVERY 4 HOURS PRN
Status: DISCONTINUED | OUTPATIENT
Start: 2020-01-01 | End: 2020-01-01 | Stop reason: HOSPADM

## 2020-01-01 RX ORDER — DEXTROSE MONOHYDRATE 25 G/50ML
25-50 INJECTION, SOLUTION INTRAVENOUS
Status: DISCONTINUED | OUTPATIENT
Start: 2020-01-01 | End: 2020-01-01 | Stop reason: HOSPADM

## 2020-01-01 RX ORDER — FAMOTIDINE 40 MG/5ML
20 POWDER, FOR SUSPENSION ORAL DAILY
Status: DISCONTINUED | OUTPATIENT
Start: 2020-01-01 | End: 2020-01-01 | Stop reason: HOSPADM

## 2020-01-01 RX ORDER — NOREPINEPHRINE BITARTRATE 0.06 MG/ML
INJECTION, SOLUTION INTRAVENOUS
Status: DISCONTINUED
Start: 2020-01-01 | End: 2020-01-01 | Stop reason: CLARIF

## 2020-01-01 RX ORDER — POTASSIUM CHLORIDE 29.8 MG/ML
20 INJECTION INTRAVENOUS
Status: COMPLETED | OUTPATIENT
Start: 2020-01-01 | End: 2020-01-01

## 2020-01-01 RX ORDER — POTASSIUM CHLORIDE 29.8 MG/ML
20 INJECTION INTRAVENOUS
Status: DISCONTINUED | OUTPATIENT
Start: 2020-01-01 | End: 2020-01-01

## 2020-01-01 RX ORDER — NALOXONE HYDROCHLORIDE 0.4 MG/ML
.1-.4 INJECTION, SOLUTION INTRAMUSCULAR; INTRAVENOUS; SUBCUTANEOUS
Status: DISCONTINUED | OUTPATIENT
Start: 2020-01-01 | End: 2020-01-01

## 2020-01-01 RX ORDER — HEPARIN SODIUM 5000 [USP'U]/.5ML
5000 INJECTION, SOLUTION INTRAVENOUS; SUBCUTANEOUS EVERY 12 HOURS
Status: DISCONTINUED | OUTPATIENT
Start: 2020-01-01 | End: 2020-01-01 | Stop reason: HOSPADM

## 2020-01-01 RX ORDER — EPTIFIBATIDE 2 MG/ML
180 INJECTION, SOLUTION INTRAVENOUS EVERY 10 MIN PRN
Status: DISCONTINUED | OUTPATIENT
Start: 2020-01-01 | End: 2020-01-01 | Stop reason: HOSPADM

## 2020-01-01 RX ORDER — PIPERACILLIN SODIUM, TAZOBACTAM SODIUM 3; .375 G/15ML; G/15ML
3.38 INJECTION, POWDER, LYOPHILIZED, FOR SOLUTION INTRAVENOUS EVERY 6 HOURS
Status: DISCONTINUED | OUTPATIENT
Start: 2020-01-01 | End: 2020-01-01

## 2020-01-01 RX ORDER — VECURONIUM BROMIDE 1 MG/ML
5 INJECTION, POWDER, LYOPHILIZED, FOR SOLUTION INTRAVENOUS EVERY 30 MIN PRN
Status: DISCONTINUED | OUTPATIENT
Start: 2020-01-01 | End: 2020-01-01 | Stop reason: CLARIF

## 2020-01-01 RX ORDER — POTASSIUM CHLORIDE 1.5 G/1.58G
20-40 POWDER, FOR SOLUTION ORAL
Status: DISCONTINUED | OUTPATIENT
Start: 2020-01-01 | End: 2020-01-01

## 2020-01-01 RX ORDER — VECURONIUM BROMIDE 1 MG/ML
0.1 INJECTION, POWDER, LYOPHILIZED, FOR SOLUTION INTRAVENOUS
Status: DISCONTINUED | OUTPATIENT
Start: 2020-01-01 | End: 2020-01-01

## 2020-01-01 RX ORDER — DOPAMINE HYDROCHLORIDE 160 MG/100ML
2-20 INJECTION, SOLUTION INTRAVENOUS CONTINUOUS PRN
Status: DISCONTINUED | OUTPATIENT
Start: 2020-01-01 | End: 2020-01-01 | Stop reason: HOSPADM

## 2020-01-01 RX ORDER — DOBUTAMINE HYDROCHLORIDE 200 MG/100ML
2-20 INJECTION INTRAVENOUS CONTINUOUS PRN
Status: DISCONTINUED | OUTPATIENT
Start: 2020-01-01 | End: 2020-01-01 | Stop reason: HOSPADM

## 2020-01-01 RX ORDER — FENTANYL CITRATE 50 UG/ML
50-100 INJECTION, SOLUTION INTRAMUSCULAR; INTRAVENOUS EVERY 10 MIN PRN
Status: DISCONTINUED | OUTPATIENT
Start: 2020-01-01 | End: 2020-01-01 | Stop reason: HOSPADM

## 2020-01-01 RX ORDER — FUROSEMIDE 10 MG/ML
20 INJECTION INTRAMUSCULAR; INTRAVENOUS ONCE
Status: COMPLETED | OUTPATIENT
Start: 2020-01-01 | End: 2020-01-01

## 2020-01-01 RX ORDER — POLYETHYLENE GLYCOL 3350 17 G/17G
17 POWDER, FOR SOLUTION ORAL DAILY PRN
Status: DISCONTINUED | OUTPATIENT
Start: 2020-01-01 | End: 2020-01-01 | Stop reason: HOSPADM

## 2020-01-01 RX ORDER — AMOXICILLIN 250 MG
2 CAPSULE ORAL 2 TIMES DAILY PRN
Status: DISCONTINUED | OUTPATIENT
Start: 2020-01-01 | End: 2020-01-01 | Stop reason: HOSPADM

## 2020-01-01 RX ORDER — ASPIRIN 81 MG/1
81 TABLET, CHEWABLE ORAL DAILY
Status: DISCONTINUED | OUTPATIENT
Start: 2020-01-01 | End: 2020-01-01

## 2020-01-01 RX ORDER — NOREPINEPHRINE BITARTRATE 0.06 MG/ML
.01-.4 INJECTION, SOLUTION INTRAVENOUS CONTINUOUS
Status: DISCONTINUED | OUTPATIENT
Start: 2020-01-01 | End: 2020-01-01

## 2020-01-01 RX ORDER — MAGNESIUM SULFATE HEPTAHYDRATE 40 MG/ML
2 INJECTION, SOLUTION INTRAVENOUS DAILY PRN
Status: DISCONTINUED | OUTPATIENT
Start: 2020-01-01 | End: 2020-01-01

## 2020-01-01 RX ORDER — MAGNESIUM SULFATE HEPTAHYDRATE 40 MG/ML
4 INJECTION, SOLUTION INTRAVENOUS ONCE
Status: COMPLETED | OUTPATIENT
Start: 2020-01-01 | End: 2020-01-01

## 2020-01-01 RX ORDER — NOREPINEPHRINE BITARTRATE 0.06 MG/ML
0.03-0.4 INJECTION, SOLUTION INTRAVENOUS CONTINUOUS
Status: DISCONTINUED | OUTPATIENT
Start: 2020-01-01 | End: 2020-01-01

## 2020-01-01 RX ORDER — HYDROMORPHONE HYDROCHLORIDE 1 MG/ML
.3-.5 INJECTION, SOLUTION INTRAMUSCULAR; INTRAVENOUS; SUBCUTANEOUS EVERY 30 MIN PRN
Status: DISCONTINUED | OUTPATIENT
Start: 2020-01-01 | End: 2020-01-01 | Stop reason: HOSPADM

## 2020-01-01 RX ORDER — MAGNESIUM SULFATE HEPTAHYDRATE 40 MG/ML
2 INJECTION, SOLUTION INTRAVENOUS ONCE
Status: COMPLETED | OUTPATIENT
Start: 2020-01-01 | End: 2020-01-01

## 2020-01-01 RX ORDER — NITROGLYCERIN 20 MG/100ML
.07-2 INJECTION INTRAVENOUS CONTINUOUS PRN
Status: DISCONTINUED | OUTPATIENT
Start: 2020-01-01 | End: 2020-01-01 | Stop reason: HOSPADM

## 2020-01-01 RX ORDER — FUROSEMIDE 10 MG/ML
INJECTION INTRAMUSCULAR; INTRAVENOUS
Status: DISCONTINUED | OUTPATIENT
Start: 2020-01-01 | End: 2020-01-01 | Stop reason: HOSPADM

## 2020-01-01 RX ORDER — AMOXICILLIN 250 MG
1 CAPSULE ORAL 2 TIMES DAILY PRN
Status: DISCONTINUED | OUTPATIENT
Start: 2020-01-01 | End: 2020-01-01 | Stop reason: HOSPADM

## 2020-01-01 RX ORDER — FUROSEMIDE 10 MG/ML
40 INJECTION INTRAMUSCULAR; INTRAVENOUS ONCE
Status: COMPLETED | OUTPATIENT
Start: 2020-01-01 | End: 2020-01-01

## 2020-01-01 RX ORDER — POTASSIUM CHLORIDE 1.5 G/1.58G
40 POWDER, FOR SOLUTION ORAL ONCE
Status: COMPLETED | OUTPATIENT
Start: 2020-01-01 | End: 2020-01-01

## 2020-01-01 RX ORDER — MAGNESIUM SULFATE HEPTAHYDRATE 40 MG/ML
2 INJECTION, SOLUTION INTRAVENOUS DAILY PRN
Status: DISCONTINUED | OUTPATIENT
Start: 2020-01-01 | End: 2020-01-01 | Stop reason: HOSPADM

## 2020-01-01 RX ORDER — MORPHINE SULFATE 10 MG/ML
5-10 INJECTION, SOLUTION INTRAMUSCULAR; INTRAVENOUS EVERY 10 MIN PRN
Status: DISCONTINUED | OUTPATIENT
Start: 2020-01-01 | End: 2020-01-01 | Stop reason: HOSPADM

## 2020-01-01 RX ORDER — LEVETIRACETAM 10 MG/ML
1000 INJECTION INTRAVASCULAR EVERY 12 HOURS
Status: DISCONTINUED | OUTPATIENT
Start: 2020-01-01 | End: 2020-01-01

## 2020-01-01 RX ORDER — HYDROMORPHONE HYDROCHLORIDE 1 MG/ML
.3-.5 INJECTION, SOLUTION INTRAMUSCULAR; INTRAVENOUS; SUBCUTANEOUS EVERY 10 MIN PRN
Status: DISCONTINUED | OUTPATIENT
Start: 2020-01-01 | End: 2020-01-01 | Stop reason: HOSPADM

## 2020-01-01 RX ORDER — NALOXONE HYDROCHLORIDE 0.4 MG/ML
.1-.4 INJECTION, SOLUTION INTRAMUSCULAR; INTRAVENOUS; SUBCUTANEOUS
Status: DISCONTINUED | OUTPATIENT
Start: 2020-01-01 | End: 2020-01-01 | Stop reason: HOSPADM

## 2020-01-01 RX ORDER — EPTIFIBATIDE 2 MG/ML
1 INJECTION, SOLUTION INTRAVENOUS CONTINUOUS PRN
Status: DISCONTINUED | OUTPATIENT
Start: 2020-01-01 | End: 2020-01-01 | Stop reason: HOSPADM

## 2020-01-01 RX ORDER — LIDOCAINE HYDROCHLORIDE 20 MG/ML
5 SOLUTION OROPHARYNGEAL ONCE
Status: DISCONTINUED | OUTPATIENT
Start: 2020-01-01 | End: 2020-01-01 | Stop reason: CLARIF

## 2020-01-01 RX ORDER — CLOBAZAM 2.5 MG/ML
20 SUSPENSION ORAL 2 TIMES DAILY
Status: DISCONTINUED | OUTPATIENT
Start: 2020-01-01 | End: 2020-01-01 | Stop reason: HOSPADM

## 2020-01-01 RX ORDER — POTASSIUM CHLORIDE 1.5 G/1.58G
20 POWDER, FOR SOLUTION ORAL ONCE
Status: DISCONTINUED | OUTPATIENT
Start: 2020-01-01 | End: 2020-01-01

## 2020-01-01 RX ORDER — FENTANYL CITRATE 50 UG/ML
50-100 INJECTION, SOLUTION INTRAMUSCULAR; INTRAVENOUS EVERY 30 MIN PRN
Status: DISCONTINUED | OUTPATIENT
Start: 2020-01-01 | End: 2020-01-01 | Stop reason: HOSPADM

## 2020-01-01 RX ORDER — FENTANYL CITRATE 50 UG/ML
50 INJECTION, SOLUTION INTRAMUSCULAR; INTRAVENOUS EVERY 30 MIN PRN
Status: DISCONTINUED | OUTPATIENT
Start: 2020-01-01 | End: 2020-01-01 | Stop reason: HOSPADM

## 2020-01-01 RX ORDER — DOBUTAMINE HCL IN DEXTROSE 5 % 500MG/250
2.5-2 INTRAVENOUS SOLUTION INTRAVENOUS CONTINUOUS
Status: DISCONTINUED | OUTPATIENT
Start: 2020-01-01 | End: 2020-01-01

## 2020-01-01 RX ORDER — IOPAMIDOL 755 MG/ML
INJECTION, SOLUTION INTRAVASCULAR
Status: DISCONTINUED | OUTPATIENT
Start: 2020-01-01 | End: 2020-01-01 | Stop reason: HOSPADM

## 2020-01-01 RX ORDER — DULOXETIN HYDROCHLORIDE 30 MG/1
CAPSULE, DELAYED RELEASE ORAL
Qty: 74 CAPSULE | Refills: 0 | Status: SHIPPED | OUTPATIENT
Start: 2020-01-01 | End: 2020-01-01

## 2020-01-01 RX ORDER — HEPARIN SODIUM 10000 [USP'U]/100ML
100-1000 INJECTION, SOLUTION INTRAVENOUS CONTINUOUS PRN
Status: DISCONTINUED | OUTPATIENT
Start: 2020-01-01 | End: 2020-01-01 | Stop reason: HOSPADM

## 2020-01-01 RX ORDER — MEPERIDINE HYDROCHLORIDE 25 MG/ML
25-50 INJECTION INTRAMUSCULAR; INTRAVENOUS; SUBCUTANEOUS
Status: ACTIVE | OUTPATIENT
Start: 2020-01-01 | End: 2020-01-01

## 2020-01-01 RX ORDER — VECURONIUM BROMIDE 1 MG/ML
0.05 INJECTION, POWDER, LYOPHILIZED, FOR SOLUTION INTRAVENOUS EVERY 30 MIN PRN
Status: DISCONTINUED | OUTPATIENT
Start: 2020-01-01 | End: 2020-01-01

## 2020-01-01 RX ORDER — POTASSIUM CL/LIDO/0.9 % NACL 10MEQ/0.1L
10 INTRAVENOUS SOLUTION, PIGGYBACK (ML) INTRAVENOUS
Status: DISCONTINUED | OUTPATIENT
Start: 2020-01-01 | End: 2020-01-01

## 2020-01-01 RX ORDER — METOPROLOL TARTRATE 1 MG/ML
5 INJECTION, SOLUTION INTRAVENOUS EVERY 4 HOURS PRN
Status: DISCONTINUED | OUTPATIENT
Start: 2020-01-01 | End: 2020-01-01

## 2020-01-01 RX ORDER — MAGNESIUM SULFATE HEPTAHYDRATE 40 MG/ML
4 INJECTION, SOLUTION INTRAVENOUS EVERY 4 HOURS PRN
Status: DISCONTINUED | OUTPATIENT
Start: 2020-01-01 | End: 2020-01-01

## 2020-01-01 RX ORDER — POTASSIUM CHLORIDE 7.45 MG/ML
10 INJECTION INTRAVENOUS
Status: DISCONTINUED | OUTPATIENT
Start: 2020-01-01 | End: 2020-01-01

## 2020-01-01 RX ORDER — HYDRALAZINE HYDROCHLORIDE 20 MG/ML
10 INJECTION INTRAMUSCULAR; INTRAVENOUS EVERY 8 HOURS SCHEDULED
Status: DISCONTINUED | OUTPATIENT
Start: 2020-01-01 | End: 2020-01-01

## 2020-01-01 RX ORDER — FENTANYL CITRATE 50 UG/ML
50 INJECTION, SOLUTION INTRAMUSCULAR; INTRAVENOUS ONCE
Status: COMPLETED | OUTPATIENT
Start: 2020-01-01 | End: 2020-01-01

## 2020-01-01 RX ORDER — POTASSIUM CHLORIDE 7.45 MG/ML
10 INJECTION INTRAVENOUS CONTINUOUS
Status: DISCONTINUED | OUTPATIENT
Start: 2020-01-01 | End: 2020-01-01

## 2020-01-01 RX ORDER — AMPICILLIN AND SULBACTAM 2; 1 G/1; G/1
3 INJECTION, POWDER, FOR SOLUTION INTRAMUSCULAR; INTRAVENOUS EVERY 12 HOURS
Status: DISCONTINUED | OUTPATIENT
Start: 2020-01-01 | End: 2020-01-01

## 2020-01-01 RX ORDER — AMPICILLIN AND SULBACTAM 2; 1 G/1; G/1
3 INJECTION, POWDER, FOR SOLUTION INTRAMUSCULAR; INTRAVENOUS EVERY 12 HOURS
Status: COMPLETED | OUTPATIENT
Start: 2020-01-01 | End: 2020-01-01

## 2020-01-01 RX ORDER — AMOXICILLIN 250 MG
1 CAPSULE ORAL AT BEDTIME
Status: DISCONTINUED | OUTPATIENT
Start: 2020-01-01 | End: 2020-01-01 | Stop reason: HOSPADM

## 2020-01-01 RX ORDER — LEVETIRACETAM 10 MG/ML
1000 INJECTION INTRAVASCULAR ONCE
Status: COMPLETED | OUTPATIENT
Start: 2020-01-01 | End: 2020-01-01

## 2020-01-01 RX ADMIN — Medication: at 14:21

## 2020-01-01 RX ADMIN — SODIUM CHLORIDE 200 MG: 9 INJECTION, SOLUTION INTRAVENOUS at 21:18

## 2020-01-01 RX ADMIN — SENNOSIDES AND DOCUSATE SODIUM 1 TABLET: 8.6; 5 TABLET ORAL at 21:59

## 2020-01-01 RX ADMIN — PROPOFOL 10 MCG/KG/MIN: 10 INJECTION, EMULSION INTRAVENOUS at 04:28

## 2020-01-01 RX ADMIN — Medication 50 MCG/HR: at 13:05

## 2020-01-01 RX ADMIN — POTASSIUM CHLORIDE 20 MEQ: 29.8 INJECTION, SOLUTION INTRAVENOUS at 05:54

## 2020-01-01 RX ADMIN — AMPICILLIN AND SULBACTAM 3 G: 2; 1 INJECTION, POWDER, FOR SOLUTION INTRAVENOUS at 15:03

## 2020-01-01 RX ADMIN — POTASSIUM CHLORIDE 10 MEQ: 7.46 INJECTION, SOLUTION INTRAVENOUS at 16:19

## 2020-01-01 RX ADMIN — PROPOFOL 10 MCG/KG/MIN: 10 INJECTION, EMULSION INTRAVENOUS at 12:48

## 2020-01-01 RX ADMIN — PIPERACILLIN AND TAZOBACTAM 3.38 G: 3; .375 INJECTION, POWDER, FOR SOLUTION INTRAVENOUS at 02:07

## 2020-01-01 RX ADMIN — FAMOTIDINE 20 MG: 20 INJECTION, SOLUTION INTRAVENOUS at 00:33

## 2020-01-01 RX ADMIN — LEVETIRACETAM 2000 MG: 100 INJECTION, SOLUTION INTRAVENOUS at 19:49

## 2020-01-01 RX ADMIN — FAMOTIDINE 20 MG: 20 INJECTION, SOLUTION INTRAVENOUS at 22:31

## 2020-01-01 RX ADMIN — SODIUM CHLORIDE 200 MG: 9 INJECTION, SOLUTION INTRAVENOUS at 08:30

## 2020-01-01 RX ADMIN — AMPICILLIN AND SULBACTAM 3 G: 2; 1 INJECTION, POWDER, FOR SOLUTION INTRAVENOUS at 03:25

## 2020-01-01 RX ADMIN — PROPOFOL 20 MG: 10 INJECTION, EMULSION INTRAVENOUS at 18:33

## 2020-01-01 RX ADMIN — CLOBAZAM 20 MG: 2.5 SUSPENSION ORAL at 09:09

## 2020-01-01 RX ADMIN — Medication 50 MCG/HR: at 01:24

## 2020-01-01 RX ADMIN — Medication 100 MCG/HR: at 02:07

## 2020-01-01 RX ADMIN — CLOBAZAM 10 MG: 2.5 SUSPENSION ORAL at 22:20

## 2020-01-01 RX ADMIN — PROPOFOL 20 MCG/KG/MIN: 10 INJECTION, EMULSION INTRAVENOUS at 21:05

## 2020-01-01 RX ADMIN — PIPERACILLIN AND TAZOBACTAM 3.38 G: 3; .375 INJECTION, POWDER, FOR SOLUTION INTRAVENOUS at 21:04

## 2020-01-01 RX ADMIN — FAMOTIDINE 20 MG: 40 POWDER, FOR SUSPENSION ORAL at 11:42

## 2020-01-01 RX ADMIN — LEVETIRACETAM 2000 MG: 100 INJECTION, SOLUTION INTRAVENOUS at 20:59

## 2020-01-01 RX ADMIN — VASOPRESSIN 1 UNITS/HR: 20 INJECTION INTRAVENOUS at 10:36

## 2020-01-01 RX ADMIN — SODIUM CHLORIDE 100 MG: 9 INJECTION, SOLUTION INTRAVENOUS at 21:52

## 2020-01-01 RX ADMIN — DOBUTAMINE 5 MCG/KG/MIN: 12.5 INJECTION, SOLUTION INTRAVENOUS at 03:09

## 2020-01-01 RX ADMIN — SODIUM CHLORIDE 200 MG: 9 INJECTION, SOLUTION INTRAVENOUS at 09:10

## 2020-01-01 RX ADMIN — LEVETIRACETAM 2000 MG: 100 INJECTION, SOLUTION INTRAVENOUS at 19:03

## 2020-01-01 RX ADMIN — FAMOTIDINE 20 MG: 20 INJECTION, SOLUTION INTRAVENOUS at 21:54

## 2020-01-01 RX ADMIN — MAGNESIUM SULFATE 2 G: 2 INJECTION INTRAVENOUS at 11:54

## 2020-01-01 RX ADMIN — PROPOFOL 30 MCG/KG/MIN: 10 INJECTION, EMULSION INTRAVENOUS at 20:49

## 2020-01-01 RX ADMIN — AMIODARONE HYDROCHLORIDE 1 MG/MIN: 50 INJECTION, SOLUTION INTRAVENOUS at 23:04

## 2020-01-01 RX ADMIN — PROPOFOL 10 MCG/KG/MIN: 10 INJECTION, EMULSION INTRAVENOUS at 23:13

## 2020-01-01 RX ADMIN — LEVETIRACETAM 2000 MG: 100 INJECTION, SOLUTION INTRAVENOUS at 07:56

## 2020-01-01 RX ADMIN — Medication 15 ML: at 07:56

## 2020-01-01 RX ADMIN — MIDAZOLAM (PF) 1 MG/ML IN 0.9 % SODIUM CHLORIDE INTRAVENOUS SOLUTION 8 MG/HR: at 05:00

## 2020-01-01 RX ADMIN — Medication 5000 UNITS: at 04:17

## 2020-01-01 RX ADMIN — LEVETIRACETAM 2000 MG: 100 INJECTION, SOLUTION INTRAVENOUS at 20:22

## 2020-01-01 RX ADMIN — Medication 15 ML: at 07:45

## 2020-01-01 RX ADMIN — PROPOFOL 15 MG: 10 INJECTION, EMULSION INTRAVENOUS at 09:00

## 2020-01-01 RX ADMIN — PROPOFOL 20 MCG/KG/MIN: 10 INJECTION, EMULSION INTRAVENOUS at 05:39

## 2020-01-01 RX ADMIN — AMIODARONE HYDROCHLORIDE 150 MG: 1.5 INJECTION, SOLUTION INTRAVENOUS at 22:56

## 2020-01-01 RX ADMIN — POTASSIUM CHLORIDE 20 MEQ: 400 INJECTION, SOLUTION INTRAVENOUS at 14:20

## 2020-01-01 RX ADMIN — POTASSIUM CHLORIDE 20 MEQ: 29.8 INJECTION, SOLUTION INTRAVENOUS at 21:59

## 2020-01-01 RX ADMIN — LEVETIRACETAM 2000 MG: 100 INJECTION, SOLUTION INTRAVENOUS at 07:51

## 2020-01-01 RX ADMIN — MIDAZOLAM (PF) 1 MG/ML IN 0.9 % SODIUM CHLORIDE INTRAVENOUS SOLUTION 1 MG/HR: at 15:55

## 2020-01-01 RX ADMIN — PROPOFOL 20 MG: 10 INJECTION, EMULSION INTRAVENOUS at 20:07

## 2020-01-01 RX ADMIN — Medication: at 21:34

## 2020-01-01 RX ADMIN — PROPOFOL 20 MCG/KG/MIN: 10 INJECTION, EMULSION INTRAVENOUS at 20:31

## 2020-01-01 RX ADMIN — SODIUM CHLORIDE 100 MG: 9 INJECTION, SOLUTION INTRAVENOUS at 08:31

## 2020-01-01 RX ADMIN — Medication 5000 UNITS: at 15:31

## 2020-01-01 RX ADMIN — POTASSIUM CHLORIDE 20 MEQ: 1.5 POWDER, FOR SOLUTION ORAL at 17:38

## 2020-01-01 RX ADMIN — CLOBAZAM 20 MG: 2.5 SUSPENSION ORAL at 19:50

## 2020-01-01 RX ADMIN — SENNOSIDES AND DOCUSATE SODIUM 1 TABLET: 8.6; 5 TABLET ORAL at 22:34

## 2020-01-01 RX ADMIN — CLOBAZAM 10 MG: 2.5 SUSPENSION ORAL at 09:57

## 2020-01-01 RX ADMIN — FAMOTIDINE 20 MG: 20 INJECTION, SOLUTION INTRAVENOUS at 22:23

## 2020-01-01 RX ADMIN — HUMAN ALBUMIN MICROSPHERES AND PERFLUTREN 2 ML: 10; .22 INJECTION, SOLUTION INTRAVENOUS at 19:12

## 2020-01-01 RX ADMIN — LEVETIRACETAM 1000 MG: 10 INJECTION INTRAVENOUS at 08:05

## 2020-01-01 RX ADMIN — Medication 5000 UNITS: at 03:46

## 2020-01-01 RX ADMIN — PROPOFOL 20 MCG/KG/MIN: 10 INJECTION, EMULSION INTRAVENOUS at 05:54

## 2020-01-01 RX ADMIN — Medication: at 06:00

## 2020-01-01 RX ADMIN — PIPERACILLIN AND TAZOBACTAM 3.38 G: 3; .375 INJECTION, POWDER, FOR SOLUTION INTRAVENOUS at 08:05

## 2020-01-01 RX ADMIN — FAMOTIDINE 20 MG: 40 POWDER, FOR SUSPENSION ORAL at 07:45

## 2020-01-01 RX ADMIN — Medication: at 06:41

## 2020-01-01 RX ADMIN — PROPOFOL 30 MCG/KG/MIN: 10 INJECTION, EMULSION INTRAVENOUS at 08:32

## 2020-01-01 RX ADMIN — AMIODARONE HYDROCHLORIDE 0.5 MG/MIN: 50 INJECTION, SOLUTION INTRAVENOUS at 05:28

## 2020-01-01 RX ADMIN — PROPOFOL 10 MCG/KG/MIN: 10 INJECTION, EMULSION INTRAVENOUS at 21:15

## 2020-01-01 RX ADMIN — Medication 5000 UNITS: at 16:30

## 2020-01-01 RX ADMIN — SODIUM CHLORIDE 100 MG: 9 INJECTION, SOLUTION INTRAVENOUS at 20:46

## 2020-01-01 RX ADMIN — SODIUM CHLORIDE 200 MG: 9 INJECTION, SOLUTION INTRAVENOUS at 11:31

## 2020-01-01 RX ADMIN — LEVETIRACETAM 2000 MG: 100 INJECTION, SOLUTION INTRAVENOUS at 08:51

## 2020-01-01 RX ADMIN — PROPOFOL 20 MG: 10 INJECTION, EMULSION INTRAVENOUS at 02:12

## 2020-01-01 RX ADMIN — Medication 15 ML: at 07:51

## 2020-01-01 RX ADMIN — CLOBAZAM 20 MG: 2.5 SUSPENSION ORAL at 20:14

## 2020-01-01 RX ADMIN — PROPOFOL 30 MCG/KG/MIN: 10 INJECTION, EMULSION INTRAVENOUS at 02:20

## 2020-01-01 RX ADMIN — FAMOTIDINE 20 MG: 20 INJECTION, SOLUTION INTRAVENOUS at 21:05

## 2020-01-01 RX ADMIN — POTASSIUM CHLORIDE 20 MEQ: 1.5 POWDER, FOR SOLUTION ORAL at 06:09

## 2020-01-01 RX ADMIN — AMPICILLIN AND SULBACTAM 3 G: 2; 1 INJECTION, POWDER, FOR SOLUTION INTRAVENOUS at 08:24

## 2020-01-01 RX ADMIN — FUROSEMIDE 40 MG: 10 INJECTION, SOLUTION INTRAMUSCULAR; INTRAVENOUS at 17:38

## 2020-01-01 RX ADMIN — PROPOFOL 10 MCG/KG/MIN: 10 INJECTION, EMULSION INTRAVENOUS at 18:00

## 2020-01-01 RX ADMIN — SENNOSIDES AND DOCUSATE SODIUM 1 TABLET: 8.6; 5 TABLET ORAL at 21:56

## 2020-01-01 RX ADMIN — PIPERACILLIN AND TAZOBACTAM 3.38 G: 3; .375 INJECTION, POWDER, FOR SOLUTION INTRAVENOUS at 08:30

## 2020-01-01 RX ADMIN — Medication 15 ML: at 08:52

## 2020-01-01 RX ADMIN — Medication 5000 UNITS: at 04:43

## 2020-01-01 RX ADMIN — LEVETIRACETAM 2000 MG: 100 INJECTION, SOLUTION INTRAVENOUS at 20:11

## 2020-01-01 RX ADMIN — Medication 5000 UNITS: at 16:20

## 2020-01-01 RX ADMIN — PROPOFOL 20 MG: 10 INJECTION, EMULSION INTRAVENOUS at 04:11

## 2020-01-01 RX ADMIN — FENTANYL CITRATE 100 MCG: 50 INJECTION, SOLUTION INTRAMUSCULAR; INTRAVENOUS at 13:33

## 2020-01-01 RX ADMIN — SODIUM CHLORIDE 200 MG: 9 INJECTION, SOLUTION INTRAVENOUS at 19:57

## 2020-01-01 RX ADMIN — Medication 0.03 MCG/KG/MIN: at 19:04

## 2020-01-01 RX ADMIN — PROPOFOL 20 MG: 10 INJECTION, EMULSION INTRAVENOUS at 14:13

## 2020-01-01 RX ADMIN — CLOBAZAM 20 MG: 2.5 SUSPENSION ORAL at 07:45

## 2020-01-01 RX ADMIN — AMIODARONE HYDROCHLORIDE 0.5 MG/MIN: 50 INJECTION, SOLUTION INTRAVENOUS at 13:22

## 2020-01-01 RX ADMIN — PROPOFOL 20 MCG/KG/MIN: 10 INJECTION, EMULSION INTRAVENOUS at 14:39

## 2020-01-01 RX ADMIN — Medication 5000 UNITS: at 04:09

## 2020-01-01 RX ADMIN — PROPOFOL 10 MCG/KG/MIN: 10 INJECTION, EMULSION INTRAVENOUS at 16:27

## 2020-01-01 RX ADMIN — PROPOFOL 20 MG: 10 INJECTION, EMULSION INTRAVENOUS at 14:04

## 2020-01-01 RX ADMIN — CLOBAZAM 20 MG: 2.5 SUSPENSION ORAL at 07:58

## 2020-01-01 RX ADMIN — SENNOSIDES AND DOCUSATE SODIUM 1 TABLET: 8.6; 5 TABLET ORAL at 22:28

## 2020-01-01 RX ADMIN — SENNOSIDES AND DOCUSATE SODIUM 1 TABLET: 8.6; 5 TABLET ORAL at 22:54

## 2020-01-01 RX ADMIN — DOBUTAMINE 5 MCG/KG/MIN: 12.5 INJECTION, SOLUTION INTRAVENOUS at 20:37

## 2020-01-01 RX ADMIN — SODIUM CHLORIDE 200 MG: 9 INJECTION, SOLUTION INTRAVENOUS at 09:07

## 2020-01-01 RX ADMIN — POTASSIUM CHLORIDE 40 MEQ: 1.5 POWDER, FOR SOLUTION ORAL at 09:40

## 2020-01-01 RX ADMIN — Medication 5000 UNITS: at 04:14

## 2020-01-01 RX ADMIN — LEVETIRACETAM 2000 MG: 100 INJECTION, SOLUTION INTRAVENOUS at 09:16

## 2020-01-01 RX ADMIN — Medication 50 MCG/HR: at 23:55

## 2020-01-01 RX ADMIN — Medication 5000 UNITS: at 16:14

## 2020-01-01 RX ADMIN — CLOBAZAM 20 MG: 2.5 SUSPENSION ORAL at 08:30

## 2020-01-01 RX ADMIN — Medication 50 MCG/HR: at 23:46

## 2020-01-01 RX ADMIN — FUROSEMIDE 20 MG: 10 INJECTION, SOLUTION INTRAVENOUS at 08:24

## 2020-01-01 RX ADMIN — Medication 15 ML: at 17:01

## 2020-01-01 RX ADMIN — HYDRALAZINE HYDROCHLORIDE 10 MG: 20 INJECTION INTRAMUSCULAR; INTRAVENOUS at 11:54

## 2020-01-01 RX ADMIN — LEVETIRACETAM 1000 MG: 10 INJECTION INTRAVENOUS at 21:04

## 2020-01-01 RX ADMIN — POTASSIUM CHLORIDE 20 MEQ: 29.8 INJECTION, SOLUTION INTRAVENOUS at 05:18

## 2020-01-01 RX ADMIN — PROPOFOL 15 MG: 10 INJECTION, EMULSION INTRAVENOUS at 11:50

## 2020-01-01 RX ADMIN — AMPICILLIN AND SULBACTAM 3 G: 2; 1 INJECTION, POWDER, FOR SOLUTION INTRAVENOUS at 02:01

## 2020-01-01 RX ADMIN — Medication 5000 UNITS: at 17:01

## 2020-01-01 RX ADMIN — SODIUM CHLORIDE 200 MG: 9 INJECTION, SOLUTION INTRAVENOUS at 21:03

## 2020-01-01 RX ADMIN — PROPOFOL 20 MCG/KG/MIN: 10 INJECTION, EMULSION INTRAVENOUS at 10:24

## 2020-01-01 RX ADMIN — LEVETIRACETAM 1000 MG: 10 INJECTION INTRAVENOUS at 08:29

## 2020-01-01 RX ADMIN — PROPOFOL 10 MCG/KG/MIN: 10 INJECTION, EMULSION INTRAVENOUS at 23:07

## 2020-01-01 RX ADMIN — POTASSIUM CHLORIDE 20 MEQ: 400 INJECTION, SOLUTION INTRAVENOUS at 15:35

## 2020-01-01 RX ADMIN — ASPIRIN 81 MG 81 MG: 81 TABLET ORAL at 08:37

## 2020-01-01 RX ADMIN — POTASSIUM CHLORIDE 40 MEQ: 1.5 POWDER, FOR SOLUTION ORAL at 22:30

## 2020-01-01 RX ADMIN — PROPOFOL 20 MG: 10 INJECTION, EMULSION INTRAVENOUS at 16:21

## 2020-01-01 RX ADMIN — PIPERACILLIN AND TAZOBACTAM 3.38 G: 3; .375 INJECTION, POWDER, FOR SOLUTION INTRAVENOUS at 17:44

## 2020-01-01 RX ADMIN — PROPOFOL 15 MG: 10 INJECTION, EMULSION INTRAVENOUS at 00:32

## 2020-01-01 RX ADMIN — SODIUM CHLORIDE 1000 ML: 9 INJECTION, SOLUTION INTRAVENOUS at 21:15

## 2020-01-01 RX ADMIN — SODIUM CHLORIDE 200 MG: 9 INJECTION, SOLUTION INTRAVENOUS at 20:50

## 2020-01-01 RX ADMIN — Medication 0.03 MCG/KG/MIN: at 23:24

## 2020-01-01 RX ADMIN — MIDAZOLAM 2 MG: 1 INJECTION INTRAMUSCULAR; INTRAVENOUS at 13:33

## 2020-01-01 RX ADMIN — PIPERACILLIN AND TAZOBACTAM 3.38 G: 3; .375 INJECTION, POWDER, FOR SOLUTION INTRAVENOUS at 14:25

## 2020-01-01 RX ADMIN — MAGNESIUM SULFATE HEPTAHYDRATE 4 G: 40 INJECTION, SOLUTION INTRAVENOUS at 22:26

## 2020-01-01 RX ADMIN — POTASSIUM CHLORIDE 20 MEQ: 1.5 POWDER, FOR SOLUTION ORAL at 05:24

## 2020-01-01 RX ADMIN — POTASSIUM CHLORIDE 20 MEQ: 29.8 INJECTION, SOLUTION INTRAVENOUS at 16:24

## 2020-01-01 RX ADMIN — LEVETIRACETAM 2000 MG: 100 INJECTION, SOLUTION INTRAVENOUS at 21:19

## 2020-01-01 RX ADMIN — MAGNESIUM SULFATE 2 G: 2 INJECTION INTRAVENOUS at 04:38

## 2020-01-01 RX ADMIN — INSULIN ASPART 1 UNITS: 100 INJECTION, SOLUTION INTRAVENOUS; SUBCUTANEOUS at 08:36

## 2020-01-01 RX ADMIN — PROPOFOL 20 MCG/KG/MIN: 10 INJECTION, EMULSION INTRAVENOUS at 10:20

## 2020-01-01 RX ADMIN — Medication 50 MCG/HR: at 11:00

## 2020-01-01 RX ADMIN — SODIUM CHLORIDE 200 MG: 9 INJECTION, SOLUTION INTRAVENOUS at 08:20

## 2020-01-01 RX ADMIN — METOPROLOL TARTRATE 2.5 MG: 5 INJECTION INTRAVENOUS at 22:00

## 2020-01-01 RX ADMIN — AMPICILLIN AND SULBACTAM 3 G: 2; 1 INJECTION, POWDER, FOR SOLUTION INTRAVENOUS at 20:13

## 2020-01-01 RX ADMIN — LEVETIRACETAM 2000 MG: 100 INJECTION, SOLUTION INTRAVENOUS at 07:45

## 2020-01-01 RX ADMIN — FAMOTIDINE 20 MG: 20 INJECTION, SOLUTION INTRAVENOUS at 21:45

## 2020-01-01 RX ADMIN — POTASSIUM CHLORIDE 20 MEQ: 29.8 INJECTION, SOLUTION INTRAVENOUS at 13:01

## 2020-01-01 RX ADMIN — MIDAZOLAM (PF) 1 MG/ML IN 0.9 % SODIUM CHLORIDE INTRAVENOUS SOLUTION 8 MG/HR: at 04:30

## 2020-01-01 RX ADMIN — FENTANYL CITRATE 50 MCG: 50 INJECTION INTRAMUSCULAR; INTRAVENOUS at 19:06

## 2020-01-01 RX ADMIN — MIDAZOLAM (PF) 1 MG/ML IN 0.9 % SODIUM CHLORIDE INTRAVENOUS SOLUTION 8 MG/HR: at 17:10

## 2020-01-01 RX ADMIN — AMPICILLIN AND SULBACTAM 3 G: 2; 1 INJECTION, POWDER, FOR SOLUTION INTRAVENOUS at 15:31

## 2020-01-01 RX ADMIN — Medication: at 22:18

## 2020-01-01 RX ADMIN — AMPICILLIN AND SULBACTAM 3 G: 2; 1 INJECTION, POWDER, FOR SOLUTION INTRAVENOUS at 02:56

## 2020-01-01 RX ADMIN — LEVETIRACETAM 2000 MG: 100 INJECTION, SOLUTION INTRAVENOUS at 23:32

## 2020-01-01 RX ADMIN — LEVETIRACETAM 2000 MG: 100 INJECTION, SOLUTION INTRAVENOUS at 09:28

## 2020-01-01 RX ADMIN — MAGNESIUM SULFATE 2 G: 2 INJECTION INTRAVENOUS at 06:50

## 2020-01-01 RX ADMIN — POTASSIUM CHLORIDE 10 MEQ: 7.46 INJECTION, SOLUTION INTRAVENOUS at 21:15

## 2020-01-01 RX ADMIN — Medication 15 ML: at 08:30

## 2020-01-01 RX ADMIN — Medication 5000 UNITS: at 15:06

## 2020-01-01 RX ADMIN — SODIUM CHLORIDE 1000 ML: 9 INJECTION, SOLUTION INTRAVENOUS at 18:00

## 2020-01-01 RX ADMIN — PROPOFOL 20 MG: 10 INJECTION, EMULSION INTRAVENOUS at 08:17

## 2020-01-01 RX ADMIN — SODIUM CHLORIDE 200 MG: 9 INJECTION, SOLUTION INTRAVENOUS at 14:12

## 2020-01-01 RX ADMIN — PROPOFOL 20 MG: 10 INJECTION, EMULSION INTRAVENOUS at 13:16

## 2020-01-01 RX ADMIN — FAMOTIDINE 20 MG: 20 INJECTION, SOLUTION INTRAVENOUS at 23:32

## 2020-01-01 RX ADMIN — VASOPRESSIN 2.4 UNITS/HR: 20 INJECTION INTRAVENOUS at 23:31

## 2020-01-01 RX ADMIN — MAGNESIUM SULFATE 2 G: 2 INJECTION INTRAVENOUS at 06:58

## 2020-01-01 RX ADMIN — POTASSIUM CHLORIDE 10 MEQ: 7.46 INJECTION, SOLUTION INTRAVENOUS at 17:25

## 2020-01-01 RX ADMIN — Medication 5000 UNITS: at 04:13

## 2020-01-01 RX ADMIN — LEVETIRACETAM 1000 MG: 10 INJECTION INTRAVENOUS at 13:47

## 2020-01-01 RX ADMIN — MIDAZOLAM 2 MG: 1 INJECTION INTRAMUSCULAR; INTRAVENOUS at 18:24

## 2020-01-01 RX ADMIN — CLOBAZAM 20 MG: 2.5 SUSPENSION ORAL at 21:06

## 2020-01-01 ASSESSMENT — ACTIVITIES OF DAILY LIVING (ADL)
ADLS_ACUITY_SCORE: 22
ADLS_ACUITY_SCORE: 20
ADLS_ACUITY_SCORE: 22
ADLS_ACUITY_SCORE: 22
ADLS_ACUITY_SCORE: 19
ADLS_ACUITY_SCORE: 22
ADLS_ACUITY_SCORE: 20
ADLS_ACUITY_SCORE: 22
ADLS_ACUITY_SCORE: 20
ADLS_ACUITY_SCORE: 22
ADLS_ACUITY_SCORE: 22
ADLS_ACUITY_SCORE: 17
ADLS_ACUITY_SCORE: 22
ADLS_ACUITY_SCORE: 20
ADLS_ACUITY_SCORE: 22
ADLS_ACUITY_SCORE: 22
ADLS_ACUITY_SCORE: 20
ADLS_ACUITY_SCORE: 20
ADLS_ACUITY_SCORE: 22
RETIRED_EATING: 0-->INDEPENDENT
ADLS_ACUITY_SCORE: 22
ADLS_ACUITY_SCORE: 20
ADLS_ACUITY_SCORE: 22
RETIRED_COMMUNICATION: 0-->UNDERSTANDS/COMMUNICATES WITHOUT DIFFICULTY
ADLS_ACUITY_SCORE: 22
ADLS_ACUITY_SCORE: 20
ADLS_ACUITY_SCORE: 22
ADLS_ACUITY_SCORE: 22
BATHING: 0-->INDEPENDENT
SWALLOWING: 0-->SWALLOWS FOODS/LIQUIDS WITHOUT DIFFICULTY
DRESS: 0-->INDEPENDENT
ADLS_ACUITY_SCORE: 20
ADLS_ACUITY_SCORE: 22
ADLS_ACUITY_SCORE: 22
WHICH_OF_THE_ABOVE_FUNCTIONAL_RISKS_HAD_A_RECENT_ONSET_OR_CHANGE?: AMBULATION;TRANSFERRING;TOILETING;BATHING;DRESSING;EATING;SWALLOWING;COGNITION;COMMUNICATION/SPEECH
ADLS_ACUITY_SCORE: 22
TOILETING: 0-->INDEPENDENT
ADLS_ACUITY_SCORE: 22
ADLS_ACUITY_SCORE: 20
ADLS_ACUITY_SCORE: 20
ADLS_ACUITY_SCORE: 22

## 2020-01-01 ASSESSMENT — MIFFLIN-ST. JEOR
SCORE: 1174.38
SCORE: 1183.38
SCORE: 1118.38
SCORE: 1140.38
SCORE: 1208.38
SCORE: 1197.38
SCORE: 1143.38
SCORE: 1154.38
SCORE: 1188.38
SCORE: 1192.38

## 2020-01-01 ASSESSMENT — PATIENT HEALTH QUESTIONNAIRE - PHQ9: SUM OF ALL RESPONSES TO PHQ QUESTIONS 1-9: 3

## 2020-01-01 ASSESSMENT — ASTHMA QUESTIONNAIRES
ACT_TOTALSCORE: 25
QUESTION_3 LAST FOUR WEEKS HOW OFTEN DID YOUR ASTHMA SYMPTOMS (WHEEZING, COUGHING, SHORTNESS OF BREATH, CHEST TIGHTNESS OR PAIN) WAKE YOU UP AT NIGHT OR EARLIER THAN USUAL IN THE MORNING: NOT AT ALL
QUESTION_1 LAST FOUR WEEKS HOW MUCH OF THE TIME DID YOUR ASTHMA KEEP YOU FROM GETTING AS MUCH DONE AT WORK, SCHOOL OR AT HOME: NONE OF THE TIME
QUESTION_5 LAST FOUR WEEKS HOW WOULD YOU RATE YOUR ASTHMA CONTROL: COMPLETELY CONTROLLED
QUESTION_4 LAST FOUR WEEKS HOW OFTEN HAVE YOU USED YOUR RESCUE INHALER OR NEBULIZER MEDICATION (SUCH AS ALBUTEROL): NOT AT ALL
ACT_TOTALSCORE: 25
QUESTION_2 LAST FOUR WEEKS HOW OFTEN HAVE YOU HAD SHORTNESS OF BREATH: NOT AT ALL

## 2020-01-27 NOTE — TELEPHONE ENCOUNTER
"Requested Prescriptions   Pending Prescriptions Disp Refills     FLUoxetine (PROZAC) 20 MG capsule [Pharmacy Med Name: FLUOXETINE HCL 20MG CAPS] 90 capsule 0     Sig: TAKE ONE CAPSULE BY MOUTH ONCE DAILY  Last Written Prescription Date:  10/21/2019  Last Fill Quantity: 90 capsule,  # refills: 0   Last Office Visit: 7/1/2019   Future Office Visit:            SSRIs Protocol Failed - 1/27/2020 12:31 PM        Failed - PHQ-9 score less than 5 in past 6 months     Please review last PHQ-9 score.   PHQ-9 SCORE 3/4/2019 4/12/2019 4/29/2019   PHQ-9 Total Score - - -   PHQ-9 Total Score MyChart 2 (Minimal depression) - 4 (Minimal depression)   PHQ-9 Total Score 2 2 4     AMARJIT-7 SCORE 8/2/2016 5/14/2018 4/29/2019   Total Score - - 10 (moderate anxiety)   Total Score 3 6 10           Failed - Recent (6 mo) or future (30 days) visit within the authorizing provider's specialty     Patient had office visit in the last 6 months or has a visit in the next 30 days with authorizing provider or within the authorizing provider's specialty.  See \"Patient Info\" tab in inbasket, or \"Choose Columns\" in Meds & Orders section of the refill encounter.            Passed - Medication is active on med list        Passed - Patient is age 18 or older        Passed - No active pregnancy on record        Passed - No positive pregnancy test in last 12 months          "

## 2020-01-30 NOTE — TELEPHONE ENCOUNTER
"  Reason for Disposition    Caller requesting a refill, no triage required, and triager able to refill per unit policy    Additional Information    Negative: Drug overdose and nurse unable to answer question    Negative: Caller requesting information not related to medicine    Negative: Caller requesting a prescription for Strep throat and has a positive culture result    Negative: Rash while taking a medication or within 3 days of stopping it    Negative: Immunization reaction suspected    Negative: [1] Asthma and [2] having symptoms of asthma (cough, wheezing, etc)    Negative: MORE THAN A DOUBLE DOSE of a prescription or over-the-counter (OTC) drug    Negative: [1] DOUBLE DOSE (an extra dose or lesser amount) of over-the-counter (OTC) drug AND [2] any symptoms (e.g., dizziness, nausea, pain, sleepiness)    Negative: [1] DOUBLE DOSE (an extra dose or lesser amount) of prescription drug AND [2] any symptoms (e.g., dizziness, nausea, pain, sleepiness)    Negative: Took another person's prescription drug    Negative: [1] DOUBLE DOSE (an extra dose or lesser amount) of prescription drug AND [2] NO symptoms (Exception: a double dose of antibiotics)    Negative: Diabetes drug error or overdose (e.g., insulin or extra dose)    Negative: [1] Request for URGENT new prescription or refill of \"essential\" medication (i.e., likelihood of harm to patient if not taken) AND [2] triager unable to fill per unit policy    Negative: [1] Prescription not at pharmacy AND [2] was prescribed today by PCP    Negative: Pharmacy calling with prescription questions and triager unable to answer question    Negative: Caller has URGENT medication question about med that PCP prescribed and triager unable to answer question    Negative: Caller has NON-URGENT medication question about med that PCP prescribed and triager unable to answer question    Negative: Caller requesting a NON-URGENT new prescription or refill and triager unable to refill per " unit policy    Negative: Caller has medication question about med not prescribed by PCP and triager unable to answer question (e.g., compatibility with other med, storage)    Negative: [1] DOUBLE DOSE (an extra dose or lesser amount) of over-the-counter (OTC) drug AND [2] NO symptoms    Negative: [1] DOUBLE DOSE (an extra dose or lesser amount) of antibiotic drug AND [2] NO symptoms    Negative: Caller has medication question only, adult not sick, and triager answers question    Negative: Caller has medication question, adult has minor symptoms, caller declines triage, and triager answers question    Negative: Caller requesting information about medication during pregnancy; adult is not ill and triager answers question    Negative: Caller requesting information about medication use with breastfeeding; neither adult nor infant is ill, and triager answers question    Protocols used: MEDICATION QUESTION CALL-A-

## 2020-01-30 NOTE — TELEPHONE ENCOUNTER
Patient is requesting a refill of Prozac.  Patient says she only has one tablet left.  Patient was last seen 5/23/19 and note instructs patient to be seen by PCP within one month.  FNA advised will give her 30 days supply but she needs to be seen before she runs out.  Caller verbalizes understanding.    Tin Leija RN on 1/30/2020 at 5:34 PM

## 2020-01-30 NOTE — TELEPHONE ENCOUNTER
Patient is requesting a refill of Prozac.  Patient says she only has one tablet left.  Patient was last seen 5/23/19 and note instructs patient to be seen by PCP within one month.  FNA advised will give her 30 days supply but she needs to be seen before she runs out.  Caller verbalizes understanding.

## 2020-02-24 NOTE — PROGRESS NOTES
Subjective     Staci Watt is a 84 year old female who presents to clinic today for the following health issues:    HPI   Depression Followup    How are you doing with your depression since your last visit? No change    Are you having other symptoms that might be associated with depression? No    Have you had a significant life event?  No     Are you feeling anxious or having panic attacks?   No    Do you have any concerns with your use of alcohol or other drugs? No    Social History     Tobacco Use     Smoking status: Never Smoker     Smokeless tobacco: Never Used     Tobacco comment: Once in awhile when goes to [employer].   Substance Use Topics     Alcohol use: Yes     Comment: has a drink every night before she goes to bed     Drug use: No     PHQ 3/4/2019 4/12/2019 4/29/2019   PHQ-9 Total Score 2 2 4   Q9: Thoughts of better off dead/self-harm past 2 weeks Not at all Not at all Not at all     AMARJIT-7 SCORE 8/2/2016 5/14/2018 4/29/2019   Total Score - - 10 (moderate anxiety)   Total Score 3 6 10     Two to three weeks she had swelling of the left ankle which has improved today. No dyspnea or orthopnea. No ankle pain. She is elevating her legs when she is at home. She is not monitoring salt intake.     She has chronic neck pain which is radiating to the shoulders. No paresthesias.     She has diffuse arthralgias - She is taking Ibuprofen 800 mg Qday to BID PRN. This helps with the pain.     She notes that mood is pretty good and notes that pain makes it a little worse.     Over the last few months she he noticed that she is forgetting things that she wants to say. She can start to do something and then she will forget what she will do.     She is fine with baking and cooking. She is mainly not cooking as her family is watching their diet. She     Reviewed and updated as needed this visit by Provider         Review of Systems   ROS COMP: Constitutional, HEENT, cardiovascular, pulmonary, gi and gu systems are  negative, except as otherwise noted.      Objective    There were no vitals taken for this visit.  There is no height or weight on file to calculate BMI.  Physical Exam   BP (!) 140/60   Pulse 103   Temp 98.5  F (36.9  C) (Oral)   SpO2 99%   GENERAL: healthy, alert and no distress  EYES: Eyes grossly normal to inspection  HENT: nose and mouth without ulcers or lesions  MS:  no edema  PSYCH: normal mood       Diagnostic Test Results:  Labs reviewed in Epic        Assessment & Plan     Arthralgia, MDD -   - discussed to avoid chronic NSAID use   - we discussed that symptoms might be better controlled with Cymbalta, advised below   - DULoxetine (CYMBALTA) 30 MG capsule; Take 1 capsule (30 mg) by mouth daily for 14 days, THEN 2 capsules (60 mg) daily.  Dispense: 74 capsule; Refill: 0    CKD (chronic kidney disease) stage 3, GFR 30-59 ml/min (H)    Hyperlipidemia, unspecified hyperlipidemia type  - Lipid Profile (Chol, Trig, HDL, LDL calc)    MGUS (monoclonal gammopathy of unknown significance)  - [facility] Miscellaneous Test  - Kappa and lambda light chain  - Protein Immunofixation Serum  - Protein immunofixation urine  - Protein electrophoresis random urine  - Reticulocyte count  - Iron and iron binding capacity  - Ferritin  - Lactate Dehydrogenase  - Comprehensive metabolic panel  - CBC with platelets differential  - Protein electrophoresis    Anemia due to blood loss, acute  - Reticulocyte count  - Iron and iron binding capacity  - Ferritin  - Blood Morphology Pathologist Review    Memory change  - pt declined neurology referral     Essential hypertension, CKD   - mildly elevated, continue to monitor next visit     Physical deconditioning  - advised to restart physical therapy exercises     Return in about 4 weeks (around 3/23/2020).    Destin Wolf MD  [facility]

## 2020-02-24 NOTE — PATIENT INSTRUCTIONS
- DULoxetine (CYMBALTA) 30 MG capsule; Take 1 capsule (30 mg) by mouth daily for 14 days, THEN 2 capsules (60 mg) daily.

## 2020-03-03 NOTE — TELEPHONE ENCOUNTER
Signed Prescriptions:                        Disp   Refills    FLUoxetine (PROZAC) 20 MG capsule          30 cap*0        Sig: TAKE ONE CAPSULE BY MOUTH ONCE DAILY (NEED TO BE SEEN           IN CLINIC FOR FURTHER REFILLS)  Authorizing Provider: MARK MANNING  Ordering User: MARISOL WAN

## 2020-03-03 NOTE — TELEPHONE ENCOUNTER
"Requested Prescriptions   Pending Prescriptions Disp Refills     FLUoxetine (PROZAC) 20 MG capsule [Pharmacy Med Name: FLUOXETINE HCL 20MG CAPS] 30 capsule 0     Sig: TAKE ONE CAPSULE BY MOUTH ONCE DAILY (NEED TO BE SEEN IN CLINIC FOR FURTHER REFILLS)  Last Written Prescription Date:  1/30/2020  Last Fill Quantity: 30 capsule,  # refills: 0   Last Office Visit: 2/24/2020   Future Office Visit:    Next 5 appointments (look out 90 days)    Mar 16, 2020 11:00 AM CDT  Office Visit with Destin Wolf MD  [facility] [address] [place] [id number] [phone]              SSRIs Protocol Passed - 3/3/2020  2:53 PM        Passed - PHQ-9 score less than 5 in past 6 months     Please review last PHQ-9 score.   PHQ-9 SCORE 4/12/2019 4/29/2019 2/24/2020   PHQ-9 Total Score - - -   PHQ-9 Total Score MyChart - 4 (Minimal depression) -   PHQ-9 Total Score 2 4 3     AMARJIT-7 SCORE 8/2/2016 5/14/2018 4/29/2019   Total Score - - 10 (moderate anxiety)   Total Score 3 6 10             Passed - Medication is active on med list        Passed - Patient is age 18 or older        Passed - No active pregnancy on record        Passed - No positive pregnancy test in last 12 months        Passed - Recent (6 mo) or future (30 days) visit within the authorizing provider's specialty     Patient had office visit in the last 6 months or has a visit in the next 30 days with authorizing provider or within the authorizing provider's specialty.  See \"Patient Info\" tab in inbasket, or \"Choose Columns\" in Meds & Orders section of the refill encounter.              "

## 2020-03-05 NOTE — TELEPHONE ENCOUNTER
"Requested Prescriptions   Pending Prescriptions Disp Refills     FLUoxetine (PROZAC) 20 MG capsule [Pharmacy Med Name: FLUOXETINE HCL 20MG CAPS]  Last Written Prescription Date:  3/3/2020  Last Fill Quantity: 30 caps,  # refills: 0   Last office visit: 2/24/2020 with prescribing provider:  Maryann   Future Office Visit:   Next 5 appointments (look out 90 days)    Mar 16, 2020 11:00 AM CDT  Office Visit with Destin Wolf MD  [facility] [address] [place] [id number] [phone]        30 capsule 0     Sig: TAKE ONE CAPSULE BY MOUTH ONCE DAILY (NEED TO BE SEEN IN CLINIC FOR FURTHER REFILLS)       SSRIs Protocol Passed - 3/5/2020  2:52 PM        Passed - PHQ-9 score less than 5 in past 6 months     Please review last PHQ-9 score.           Passed - Medication is active on med list        Passed - Patient is age 18 or older        Passed - No active pregnancy on record        Passed - No positive pregnancy test in last 12 months        Passed - Recent (6 mo) or future (30 days) visit within the authorizing provider's specialty     Patient had office visit in the last 6 months or has a visit in the next 30 days with authorizing provider or within the authorizing provider's specialty.  See \"Patient Info\" tab in inbasket, or \"Choose Columns\" in Meds & Orders section of the refill encounter.             "

## 2020-03-11 NOTE — NURSING NOTE
Chief Complaint   Patient presents with     Blood Draw     Labs drawn via  by RN in lab. Lab only appt.     Key Mathias RN

## 2020-03-18 NOTE — PROGRESS NOTES
"Staci Watt is a 84 year old female who is being evaluated via a billable telephone visit.      The patient has been notified of following:     \"This telephone visit will be conducted via a call between you and your physician/provider. We have found that certain health care needs can be provided without the need for a physical exam.  This service lets us provide the care you need with a short phone conversation.  If a prescription is necessary we can send it directly to your pharmacy.  If lab work is needed we can place an order for that and you can then stop by our lab to have the test done at a later time.    If during the course of the call the physician/provider feels a telephone visit is not appropriate, you will not be charged for this service.\"       Staci Watt complains of    Chief Complaint   Patient presents with     Telephone     Telephone visit; Anemia        I have reviewed and updated the patient's Past Medical History, Social History, Family History and Medication List.    ALLERGIES  Contrast dye and Seasonal allergies    Please call patient on Daughter's Mobile phone, [phone]    Allergies reviewed: Yes  Medications reviewed: Yes    Medications: Medication refills not needed today.  Pharmacy name entered into [employer] I/[employer]:    [facility] [place], [place] - [address]  [facility] [place], [place] - [address]    Patient has questions for you.  Usha Kohler CMA 2020  3:12 PM     Additional provider notes:   Patient: Staci Watt  MRN: [id number]  : 1935  Date of Initial Visit: 2019  Date of Last visit: 19  Date of Visit: 20       Reason for Consultation:  Staci Watt is a referred for evaluation and treatment of anemia and thrombocythemia     Assessment:  In summary, Staci Watt is a 83 year old woman with PMHx of CKD3, gout, MDD on Prozac, asthma who first presented for anemia with " thrombocythemia detected on krystin-operative evaluation.      1). 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 Elevated CRP  4). Elevated ferritin      Today I discussed with Staci that overall I am concerned that her ferritin remains elevated and she is having symptoms of joint pain and swelling. I will discuss with her PCP if evaluation for joint infection (recent knee replacement) or autoimmune workup is needed. Staci has a stable M-spike at 0.2 g/dL in her blood, however I am also concerned that her kappa and lambda light chains are increased and her urine has increased kappa and lambda. I discussed that overall, a majority of MGUS patients do not progress onto having overt myeloma, however given her protein and symptoms I do fee that a bone marrow biopsy would be warranted once the precautions for COVID-19 are over. Bone marrow would be able to assist in determining if patient has smoldering myeloma or if she has MDS. This will then assist in further surveillance and treatment.      Ms. Watt continues on oral iron. Overall, given her elevated ferritin and ESR that was elevated in the past, it is unlikely that she is absorbing. However, she would like to continue.     Assessment/Plan:  1. Bone marrow biopsy in 2-3 months (to be done at least 1 week prior to next visit with Dr. Alberto)  2. Continue oral iron  3. Discussion with PCP regarding additional workup  4. Follow up with Dr. Alberto in 3 months    I have reviewed the note as documented above.  This accurately captures the substance of my conversation with the patient.  Flora Alberto MD/PhD   of Medicine  Division of Hematology, Oncology and Transplantation  Pager [phone]    Subjective:  Patient is having weakness in her joints. Depression. Periods of weakness and dizziness. Is having decreased appetite. Increase in hand tremors and some periods of memory loss. And swelling in  her left ankles. Had a dizzy spell after going to bathroom and almost did not make into her room. This does not happen all the time. Patient denies any fevers. Temp was 96.9. Food tastes salty. No diarrhea or constipation- patient takes laxative every other day.   Sleep has been variable. Some nights she sleeps other nights she does not.       Phone call contact time  Call Started at 3:22 PM  Call Ended at 3:45 PM    Flora Alberto MD/PhD   of Medicine  Division of Hematology, Oncology and Transplantation    [facility] [address], Mail [address]  [place] [address]    Clinic Scheduling and Nurse Line: [phone]      RN Care Coordinator:   RAKESH Ferrer  [facility] [place]  (P) [phone]  (F) [phone]

## 2020-03-18 NOTE — LETTER
"  3/18/2020      RE: Staci Watt  [address] [facility] [address]       Staci Watt is a 84 year old female who is being evaluated via a billable telephone visit.      The patient has been notified of following:     \"This telephone visit will be conducted via a call between you and your physician/provider. We have found that certain health care needs can be provided without the need for a physical exam.  This service lets us provide the care you need with a short phone conversation.  If a prescription is necessary we can send it directly to your pharmacy.  If lab work is needed we can place an order for that and you can then stop by our lab to have the test done at a later time.    If during the course of the call the physician/provider feels a telephone visit is not appropriate, you will not be charged for this service.\"       Staci Watt complains of    Chief Complaint   Patient presents with     Telephone     Telephone visit; Anemia        I have reviewed and updated the patient's Past Medical History, Social History, Family History and Medication List.    ALLERGIES  Contrast dye and Seasonal allergies    Please call patient on Daughter's Mobile phone, [phone]    Allergies reviewed: Yes  Medications reviewed: Yes    Medications: Medication refills not needed today.  Pharmacy name entered into [employer]:    [facility] [place], [place] - [address] [place] [address] [facility] [place], [place] - [address]    Patient has questions for you.  Usha Kohler, [place] 2020  3:12 PM     Additional provider notes:   Patient: Staci Watt  MRN: [id number]  : 1935  Date of Initial Visit: 2019  Date of Last visit: 19  Date of Visit: 20       Reason for Consultation:  Staci Watt is a referred for evaluation and treatment of anemia and thrombocythemia     Assessment:  In summary, Staci Watt is a 83 year old " woman with PMHx of CKD3, gout, MDD on Prozac, asthma who first presented for anemia with thrombocythemia detected on krystin-operative evaluation.      1). 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 Elevated CRP  4). Elevated ferritin      Today I discussed with Staci that overall I am concerned that her ferritin remains elevated and she is having symptoms of joint pain and swelling. I will discuss with her PCP if evaluation for joint infection (recent knee replacement) or autoimmune workup is needed. Staci has a stable M-spike at 0.2 g/dL in her blood, however I am also concerned that her kappa and lambda light chains are increased and her urine has increased kappa and lambda. I discussed that overall, a majority of MGUS patients do not progress onto having overt myeloma, however given her protein and symptoms I do fee that a bone marrow biopsy would be warranted once the precautions for COVID-19 are over. Bone marrow would be able to assist in determining if patient has smoldering myeloma or if she has MDS. This will then assist in further surveillance and treatment.      Ms. Watt continues on oral iron. Overall, given her elevated ferritin and ESR that was elevated in the past, it is unlikely that she is absorbing. However, she would like to continue.     Assessment/Plan:  1. Bone marrow biopsy in 2-3 months (to be done at least 1 week prior to next visit with Dr. Alberto)  2. Continue oral iron  3. Discussion with PCP regarding additional workup  4. Follow up with Dr. Alberto in 3 months    I have reviewed the note as documented above.  This accurately captures the substance of my conversation with the patient.  Flora Alberto MD/PhD   of Medicine  Division of Hematology, Oncology and Transplantation  Pager [phone]    Subjective:  Patient is having weakness in her joints. Depression. Periods of weakness and dizziness. Is having  decreased appetite. Increase in hand tremors and some periods of memory loss. And swelling in her left ankles. Had a dizzy spell after going to bathroom and almost did not make into her room. This does not happen all the time. Patient denies any fevers. Temp was 96.9. Food tastes salty. No diarrhea or constipation- patient takes laxative every other day.   Sleep has been variable. Some nights she sleeps other nights she does not.       Phone call contact time  Call Started at 3:22 PM  Call Ended at 3:45 PM    Flora Alberto MD/PhD   of Medicine  Division of Hematology, Oncology and Transplantation    [facility]  [address] [place] [address], Mail [id number]  [place] [address]    Clinic Scheduling and Nurse Line: [phone]      RN Care Coordinator:   RAKESH Ferrer  [facility]  (P) [phone]  (F) [phone]          Flora Alberto MD

## 2020-03-24 NOTE — PROGRESS NOTES
"Subjective     Staci Watt is a 84 year old female who presents to clinic today for the following health issues:    HPI       Staci Watt is a 84 year old female who is being evaluated via a billable telephone visit.      The patient has been notified of following:     \"This telephone visit will be conducted via a call between you and your physician/provider. We have found that certain health care needs can be provided without the need for a physical exam.  This service lets us provide the care you need with a short phone conversation.  If a prescription is necessary we can send it directly to your pharmacy.  If lab work is needed we can place an order for that and you can then stop by our lab to have the test done at a later time.    If during the course of the call the physician/provider feels a telephone visit is not appropriate, you will not be charged for this service.\"     Staci Watt complains of  No chief complaint on file.    Physician has received verbal consent for a Telephone Visit from the patient? Yes     I have reviewed and updated the patient's Past Medical History, Social History, Family History and Medication List.    ALLERGIES  Contrast dye and Seasonal allergies  Nel Harris on 3/24/2020 at 12:37 PM    No teeth cavities or infection.   She has chronic left knee and neck pain. The neck pain radiates to the shoulders. Pain is not radiating the down the upper extremities.  She is also having chronic b/l hand pain.   Left foot and ankle swelling with has been present for around one month. No redness or pain.   No chest pain, shortness of breath, dyspnea, fever, chills, night sweats, appetite changes, unintentional weight loss,   No family history of autoimmune dx.       Assessment/Plan:    1. Arthralgia, unspecified joint  - discussed lab work  - pt will schedule an OV next week         Phone call duration:  8 minutes    Destin Wolf MD          "

## 2020-03-30 NOTE — ED PROVIDER NOTES
[place] EMERGENCY DEPARTMENT ([facility])  March 30, 2020  History     Chief Complaint   Patient presents with     Cardiac Arrest     Seizures     HPI  Staci Watt is a 84 year old female with history notable for asthma, monoclonal gammopathy of unknown significance, CKD, HTN, and HLD who was brought in by ambulance for cardiac arrest. Per EMS, patient was shocked once. Her blood pressure was 151/100 and heart rate 120s.  Per EMS after 1 shock she regained spontaneous circulation did not require any further medications.  They said they use a Tayo airway and transported to the ER for further care.  They said during transport she had 1 seizure-like activity but otherwise had no other issues.  Upon presentation to the ER patient had a normal sinus rhythm and had a strong right femoral pulse.  Patient had pupils that were 4 mm and reactive.  Patient had her airway changed upon presentation from a Tayo airway to an ET tube.      I later called and spoke to the daughter.  The daughter told me that the patient had been sick for the past 3 days with generalized fatigue and feeling unwell.  They said that she was having some mild shortness of breath since yesterday.  The daughter was found today with some gurgly sounds by her son.  When the son went into the room they found her to be not breathing and so they called an ambulance.  The patient lives with HER-2 older sons that are both been working.  Patient has no prior history of cardiac disease.  Patient is on medications for high cholesterol and blood pressure but otherwise is generally healthy.  Family denies the patient having a fever.    Home Meds per daughter:  Tylenol  Albuterol  Asa  Atorvastatin  Vitamin 6  famotodine  Iron  fluoxetime  Lisinopril  Multi-vitamin  Oxycodone  Vitamin D3    PAST MEDICAL HISTORY:   Past Medical History:   Diagnosis Date     Allergic rhinitis, cause unspecified      CKD (chronic kidney disease) stage 3, GFR 30-59  ml/min (H)      Gout, unspecified      Hyperlipidemia LDL goal <100 7/9/2004     Hypertension goal BP (blood pressure) < 140/90 7/9/2004     Iron deficiency anemia      Moderate major depression (H) 10/19/2006     Moderate persistent asthma 12/4/2005     OA (osteoarthritis) of knee 2/2/2012     Obese 2/2/2012     Retinal detachment with retinal defect, unspecified      Unspecified cataract     s/p cataract surgery       PAST SURGICAL HISTORY:   Past Surgical History:   Procedure Laterality Date     ARTHROPLASTY KNEE Right 5/7/2019    Procedure: Right Total Knee Arthroplasty;  Surgeon: Diego Hi MD;  Location: [facility] OR     BUNIONECTOMY JASWANT BILATERAL       C NONSPECIFIC PROCEDURE      (B) detatched retina     C NONSPECIFIC PROCEDURE      LASIK surgery     C NONSPECIFIC PROCEDURE      NISSA/BSO     C NONSPECIFIC PROCEDURE      Hernia     C NONSPECIFIC PROCEDURE      Tonsillectomy     C NONSPECIFIC PROCEDURE      Arthroscopic knee surgery       Past medical history, past surgical history, medications, and allergies were reviewed with the patient. Additional pertinent items: None    FAMILY HISTORY:   Family History   Problem Relation Age of Onset     Hypertension Mother      Respiratory Mother      Breast Cancer Maternal Aunt      Respiratory Maternal Aunt      Respiratory Maternal Aunt        SOCIAL HISTORY:   Social History     Tobacco Use     Smoking status: Never Smoker     Smokeless tobacco: Never Used     Tobacco comment: Once in awhile when goes to [employer].   Substance Use Topics     Alcohol use: Yes     Comment: has a drink every night before she goes to bed     Social history was reviewed with the patient. Additional pertinent items: None      Patient's Medications   New Prescriptions    No medications on file   Previous Medications    ACETAMINOPHEN (TYLENOL) 500 MG TABLET    Take 2 tablets (1,000 mg) by mouth 3 times daily AND 1000mg daily PRN    ADVAIR DISKUS 500-50 MCG/DOSE INHALER    INHALE ONE PUFF  BY MOUTH TWO TIMES A DAY    ALBUTEROL (PROAIR HFA/PROVENTIL HFA/VENTOLIN HFA) 108 (90 BASE) MCG/ACT INHALER    Inhale 2 puffs into the lungs every 4 hours as needed for shortness of breath / dyspnea or wheezing    ASPIRIN (ASA) 81 MG EC TABLET    Take 1 tablet (81 mg) by mouth 2 times daily    ATORVASTATIN (LIPITOR) 80 MG TABLET    TAKE ONE-HALF TABLET BY MOUTH DAILY FOR HIGH CHOLESTEROL    DULOXETINE (CYMBALTA) 30 MG CAPSULE    Take 2 capsules (60 mg) by mouth daily    FAMOTIDINE (PEPCID) 20 MG TABLET    Take 1 tablet (20 mg) by mouth 2 times daily    FERROUS GLUCONATE (FERGON) 324 (38 FE) MG TABLET    Take 324 mg by mouth daily (with breakfast)    FLUOXETINE (PROZAC) 20 MG CAPSULE    TAKE ONE CAPSULE BY MOUTH ONCE DAILY    LISINOPRIL (ZESTRIL) 20 MG TABLET    Take 1 tablet (20 mg) by mouth daily    MULTIVITAMIN W/MINERALS (MULTI-VITAMIN) TABLET    Take 1 tablet by mouth daily    VITAMIN B-12 (CYANOCOBALAMIN) 1000 MCG TABLET    Take 1,000 mcg by mouth daily    VITAMIN D3 (CHOLECALCIFEROL) 2000 UNITS TABLET    Take 1 tablet by mouth daily   Modified Medications    No medications on file   Discontinued Medications    No medications on file          Allergies   Allergen Reactions     Contrast Dye Itching     Seasonal Allergies         Review of Systems   Unable to perform ROS: Intubated     A complete review of systems was performed with pertinent positives and negatives noted in the HPI, and all other systems negative.    Physical Exam          Physical Exam  Constitutional:       Comments: Intubated; unresponsive; initial biting on tube and some facial movement and bucking of vent   HENT:      Head: Normocephalic and atraumatic.      Nose: Nose normal.   Eyes:      Comments: Pupils 5 mm and reactive   Neck:      Musculoskeletal: Neck supple.   Cardiovascular:      Comments: Strong femoral pulse;  Normal carotid pulse; mildly tachycardic; no murmur  Pulmonary:      Comments: Intubated;   Abdominal:      General:  "There is no distension.      Palpations: There is no mass.   Musculoskeletal:         General: No tenderness.      Right lower leg: No edema.      Left lower leg: No edema.   Neurological:      Comments: Some initial jerky movement with rapid movement of eyes         ED Course        Procedures             EKG Interpretation:      Interpreted by Nighat Liang MD  Time reviewed: 1956  Symptoms at time of EKG: post arrest   Rhythm: normal sinus   Rate: 110  Axis: normal  Ectopy: none  Conduction: normal  ST Segments/ T Waves: No ST-T wave changes  Q Waves: none  Comparison to prior: No old EKG available    Clinical Impression: normal EKG             Critical Care Addendum    My initial assessment, based on my discussion with EMS, established that Staci Watt has cardiac arrest, which requires immediate intervention, and therefore she is critically ill.     After the initial assessment, the care team initiated multiple lab tests, performed advanced airway management and consulted with cardiology to provide stabilization care. Due to the critical nature of this patient, I reassessed vital signs, physical exam and review of cardiac rhythm monitor multiple times prior to her disposition.     Time also spent performing reviewing test results, discussion with consultants and coordination of care.     Critical care time (excluding teaching time and procedures): 60 minutes.           [facility] Procedure Note          Intubation:     Date/Time: 11:04 PM  Performed by: Nighat Liang MD    The procedure was performed in an emergent situation.  Risks and benefits: risks, benefits and alternatives were discussed.  Patient identity confirmed: verbally with patient and arm band  Time out: Immediately prior to procedure a \"time out\" was called to verify the correct patient, procedure, equipment, support staff and site/side marked as required.    Indication: Acute respiratory failure.    Intubation " method: Cmac-video laryngoscopy  Patient status: Unconscious  Preoxygenation: Mask  Pretreatment medications: None  Sedatives: Midazolam (Versed)  Paralytic: none  Laryngoscope size: Mac 4  Tube size: 7.5 cuffed with cuff inflated after placement  Number of attempts: 2  Cricoid pressure: yes  Cords visualized: yes  Post-procedure assessment: Chest x-ray interpreted by me demonstrating endotracheal tube in appropriate position and CO2 detector.  ETT to teeth: 23 cm  Tube secured with: ETT spaulding    Patient tolerated the procedure well with no immediate complications.  Complications:  None        No results found for this or any previous visit (from the past 24 hour(s)).  Medications - No data to display          Assessments & Plan (with Medical Decision Making)   Patient is an 84-year-old female that came in a full arrest after being found at home unresponsive.  Upon patient's initial presentation she had regained normal spontaneous circulation.  Patient was intubated with an ET tube.  Patient was was under droplet and respiratory precautions and therefore was intubated with full PPE in place.  Due to wearing a PAPR,  I was unable to auscultate after intubation but was able to see color change strong oxygen saturations and confirmed ET tube placement with chest x-ray.  Patient initially had some seizure-like movements and was given a dose of Versed.  Afterwards we continued her on a Versed drip for this reason.  I initially discussed the case with cardiology intensive care team, Dr. Negrete.  They wanted the patient evaluated by the cardiology fellow to see if she would an appropriate candidate for Cath Lab.  Due to being above 75 she was not an appropriate candidate for the arrest trial.  Patient was seen by the cardiology fellow who recommended a echocardiogram but wanted the patient admitted to the MICU.  I spoke to the MICU staff who wanted the patient started on cold IV fluids.  We did put some cold ice  packs below the groin and in the armpits and started cold IV fluids.  Patient initially was acidotic on the pH but after being intubated her pH improved.  We also obtain a CT head which was negative.  Patient with no clear cause of her cardiac arrest.  I did call and speak to the patient's daughter on the phone.  Patient remains in critical condition.  Care for this patient was 60 minutes.  Will be transferred to the ICU for further care    I have reviewed the nursing notes.    I have reviewed the findings, diagnosis, plan and need for follow up with the patient.    New Prescriptions    No medications on file       Final diagnoses:   Cardiac arrest (H)   Respiratory arrest (H)       3/30/2020   [facility], [place], EMERGENCY DEPARTMENT     Nighat Liang MD  03/30/20 8075

## 2020-03-30 NOTE — ED TRIAGE NOTES
BIBA from home. Son found patient unresponsive in bed. Fire department gave 1 shock with AED. CPR for approximately 30 min. EMS reports 20 second seizure upon arrival to ED.

## 2020-03-30 NOTE — CONSULTS
Cardiology Consult     Date of Service (when I saw the patient): 03/30/20    ASSESSMENT:   Staci Watt is a 84 year old female with PMH of MGUS, HTN, HLD, CKD presents s/p cardiac arrest. Post arrest EKG non concerning for STEMI and troponin within normal limits. Patient does not appear to be in cardiogenic shock. Not high suspicion for acute myocardial infarction. There is no emergent need for coronary angiography.    RECOMMEND:  - Admission to MICU  - Post arrest care (including TTM)  - repeat EKG, troponins  - EEG monitoring   - will follow up TTE and give further recs    Raudel Pastor MD  Cardiology fellow PGY 4     REASON FOR CONSULT:     History of Present Illness   Staci Watt is a 84 year old female with PMH of MGUS, HTN, HLD, CKD presents s/p cardiac arrest. Per chart patient was found in agonal breathing by family, family called BLS and patient received 30 min of CPR before being shocked x1. Patient had 3 seizure episodes in the ambulance. Her EKG on arrival is sinus tachycardia with short NE no ST elevations or ST depressions. Troponin x1  Was 0.03. Her BP was 132/80 and she is tachycardic. Lactate is elevated at 3.6. Labs notable for hypokalemia, leucocytosis, lymphocytosis, anemia. Patient was started on versed for potential seizure activity in the ED. CTH was negative for ICH.     Past Medical History    I have reviewed this patient's medical history and updated it with pertinent information if needed.   Past Medical History:   Diagnosis Date     Allergic rhinitis, cause unspecified      CKD (chronic kidney disease) stage 3, GFR 30-59 ml/min (H)      Gout, unspecified      Hyperlipidemia LDL goal <100 7/9/2004     Hypertension goal BP (blood pressure) < 140/90 7/9/2004     Iron deficiency anemia      Moderate major depression (H) 10/19/2006     Moderate persistent asthma 12/4/2005     OA (osteoarthritis) of knee 2/2/2012     Obese 2/2/2012     Retinal detachment with retinal defect,  unspecified      Unspecified cataract     s/p cataract surgery       Past Surgical History   I have reviewed this patient's surgical history and updated it with pertinent information if needed.  Past Surgical History:   Procedure Laterality Date     ARTHROPLASTY KNEE Right 5/7/2019    Procedure: Right Total Knee Arthroplasty;  Surgeon: Diego Hi MD;  Location: [facility] OR     BUNIONECTOMY JASWANT BILATERAL       C NONSPECIFIC PROCEDURE      (B) detatched retina     C NONSPECIFIC PROCEDURE      LASIK surgery     C NONSPECIFIC PROCEDURE      NISSA/BSO     C NONSPECIFIC PROCEDURE      Hernia     C NONSPECIFIC PROCEDURE      Tonsillectomy     C NONSPECIFIC PROCEDURE      Arthroscopic knee surgery       Prior to Admission Medications   Prior to Admission Medications   Prescriptions Last Dose Informant Patient Reported? Taking?   ADVAIR DISKUS 500-50 MCG/DOSE inhaler   No No   Sig: INHALE ONE PUFF BY MOUTH TWO TIMES A DAY   DULoxetine (CYMBALTA) 30 MG capsule   No No   Sig: Take 2 capsules (60 mg) by mouth daily   FLUoxetine (PROZAC) 20 MG capsule   No No   Sig: TAKE ONE CAPSULE BY MOUTH ONCE DAILY   acetaminophen (TYLENOL) 500 MG tablet   No No   Sig: Take 2 tablets (1,000 mg) by mouth 3 times daily AND 1000mg daily PRN   albuterol (PROAIR HFA/PROVENTIL HFA/VENTOLIN HFA) 108 (90 Base) MCG/ACT inhaler   Yes No   Sig: Inhale 2 puffs into the lungs every 4 hours as needed for shortness of breath / dyspnea or wheezing   aspirin (ASA) 81 MG EC tablet   No No   Sig: Take 1 tablet (81 mg) by mouth 2 times daily   atorvastatin (LIPITOR) 80 MG tablet   No No   Sig: TAKE ONE-HALF TABLET BY MOUTH DAILY FOR HIGH CHOLESTEROL   famotidine (PEPCID) 20 MG tablet   No No   Sig: Take 1 tablet (20 mg) by mouth 2 times daily   ferrous gluconate (FERGON) 324 (38 Fe) MG tablet   Yes No   Sig: Take 324 mg by mouth daily (with breakfast)   lisinopril (ZESTRIL) 20 MG tablet   No No   Sig: Take 1 tablet (20 mg) by mouth daily   multivitamin  w/minerals (MULTI-VITAMIN) tablet   Yes No   Sig: Take 1 tablet by mouth daily   vitamin B-12 (CYANOCOBALAMIN) 1000 MCG tablet   Yes No   Sig: Take 1,000 mcg by mouth daily   vitamin D3 (CHOLECALCIFEROL) 2000 units tablet   No No   Sig: Take 1 tablet by mouth daily      Facility-Administered Medications: None     Allergies   Allergies   Allergen Reactions     Contrast Dye Itching     Seasonal Allergies        Social History   I have reviewed this patient's social history and updated it with pertinent information if needed. Staci Watt  reports that she has never smoked. She has never used smokeless tobacco. She reports current alcohol use. She reports that she does not use drugs.    Family History   I have reviewed this patient's family history and updated it with pertinent information if needed.   Family History   Problem Relation Age of Onset     Hypertension Mother      Respiratory Mother      Breast Cancer Maternal Aunt      Respiratory Maternal Aunt      Respiratory Maternal Aunt        Review of Systems   The 10 point Review of Systems is negative other than noted in the HPI or here.     Physical Exam   Temp: 96.9  F (36.1  C) Temp src: Axillary BP: 138/85 Pulse: 85 Heart Rate: 95 Resp: 14 SpO2: 100 % O2 Device: Mechanical Ventilator    Vital Signs with Ranges  Temp:  [96.7  F (35.9  C)-96.9  F (36.1  C)] 96.9  F (36.1  C)  Pulse:  [84-95] 85  Heart Rate:  [] 95  Resp:  [14-20] 14  BP: (112-163)/(67-89) 138/85  FiO2 (%):  [40 %] 40 %  SpO2:  [100 %] 100 %  176 lbs 5.89 oz    GEN: intubated  HEENT: no icterus  CV: tachycardic , normal s1/s2, 2/6 systolic murmur. JVP unable to estiamte.   CHEST: CTAB  ABD: soft, NT/ND, NABS  NEURO: intubated, sedated, pupils equal, reactive 3mm  EXTREMITIES: swelling on b/l ankles , warm     Data   Data reviewed today:  I personally reviewed the EKG tracing showing sinus tachycardia, short OH interval, qwi V1-V3, no ST elevation, or ST depression .  Recent Labs   Lab  03/30/20  1731 03/30/20  1607 03/30/20  1556 03/30/20  1550   WBC  --   --   --  11.7*   HGB 9.5*  --  9.9* 9.2*   MCV  --   --   --  97   PLT  --   --   --  249     --  145* 144   POTASSIUM 3.2*  --  2.9* 2.9*   CHLORIDE  --   --   --  112*   CO2  --   --   --  21   BUN  --   --   --  12   CR  --   --   --  0.79   ANIONGAP  --   --   --  11   ELIDA  --   --   --  7.8*   *  --  141* 140*   ALBUMIN  --   --   --  2.6*   PROTTOTAL  --   --   --  6.2*   BILITOTAL  --   --   --  0.5   ALKPHOS  --   --   --  75   ALT  --   --   --  86*   AST  --   --   --  127*   LIPASE  --   --   --  53*   TROPI  --   --   --  0.037   TROPONIN  --  0.03  --   --        Recent Results (from the past 24 hour(s))   XR Chest Port 1 View    Narrative    EXAM: XR CHEST PORT 1 VW  3/30/2020 4:01 PM      HISTORY: post intubation    COMPARISON: X-ray: June 2, 2010.     FINDINGS: Single view of the chest. Tip of the ET tube projects  roughly 4.5 cm above the sis. No pneumothorax. No pleural effusion.  Slightly enlarged cardiomediastinal. No acute airspace opacities.      Impression    IMPRESSION:  1. ET tube projects roughly 4.5 cm above the sis.  2. Cardiomegaly.  3. No acute airspace opacity.    I have personally reviewed the examination and initial interpretation  and I agree with the findings.    AIDAN PALUMBO MD   CT Head w/o Contrast    Narrative    CT HEAD W/O CONTRAST 3/30/2020 5:05 PM    Provided History: Seizure, new, nontraumatic, >40 yrs    Comparison: None.    Technique: Using multidetector thin collimation helical acquisition  technique, axial, coronal and sagittal CT images from the skull base  to the vertex were obtained without intravenous contrast.     Findings:      No intracranial hemorrhage. No mass effect. No midline shift. No  extra-axial fluid collection. Scattered periventricular white matter  hypoattenuation. No acute loss of gray-white differentiation.  Ventricles are proportionate to the sulci.. No  sulcal effacement..   The basal cisterns are patent.    The visualized paranasal sinuses are clear. The mastoid air cells are  clear. Orbits appear unremarkable. No acute fracture..      Impression    Impression:   1. No acute intracranial pathology.  2. Scattered periventricular white matter hypoattenuation consistent  with chronic small vessel ischemic disease.    I have personally reviewed the examination and initial interpretation  and I agree with the findings.    TOM LANGE MD       I have seen and examined the patient and agree with the finding and plan.       Phillip Watkins MD  Cardiology-CSI  [phone]

## 2020-03-30 NOTE — H&P
[facility]      CSI History and Physicial  Staci Watt MRN: [id number]  1935  Date of Admission:3/30/2020  Primary care provider: Destin Wolf      Assessment and Plan:       Staci Watt is a 84 year old female who was brought to the ED with cardiac arrest (PEA and VT/VF) requiring 1 shock and 30 minutes of CPR.     Neuro  Sedation - versed/fentanyl, propofol initially tried, patient became hypotensive.  Hypothermia protocol, 24 hours, TTM 34 degrees  Possible seizure - s/p keppra load and started on 1g BID, seen by neuro crit, on vEEG    Cardiac  Sudden cardiac arrest- PEA and VF  Undifferentiated shock.  Severely reduced EF Heart failure  Severe Mitral regurgitation - appears more chronic in nature, but may be ischemic MR  Post arrest EKG no STEMI, trending troponin (initially negative, second minimally elevated)   Repeat arrest on the floor, V fib, in setting of profound hypomagnesemia and hypokalemia, shock x1 amio bolus with ROSC.  Continue amiodarone infusion  Norepinephrine infusion, MAP goal 65  Post arrest care (TTM)  Received 1 dose of metoprolol prior to NE starting  Due to blood coming from ET tube, will hold therapeutic anticoagulation.  Replete electrolytes  Will need coronary angiography, but unless further decompensation tonight, can wait    Pulmonary  Acute hypercapnic respiratory failure  Ventilation Mode: CMV/AC  (Continuous Mandatory Ventilation/ Assist Control)  FiO2 (%): 40 %  Rate Set (breaths/minute): 18 breaths/min  Tidal Volume Set (mL): 500 mL  PEEP (cm H2O): 5 cmH2O  Oxygen Concentration (%): 40 %  Resp: 18  Pulmonary hemorrhage with ET tube secretions, secondary to CPR most likely culprit    ID  COVID rule out, Patient having SOB x 3 days prior to admission.  Leukocytosis, WBC mildly elevated to 11.7 on admission. Likely reactive in setting of cardiac arrest. Holding antimicrobials for now.    Floor Care  Fluids: NS, will stop and use pressors as needed to  maintain MAP  Food: NPO, needs OG tube and tube feeds, nutrition consult  Electrolyte repletion: replete, goal K>4 (careful with hypothermia), Mg > 2  Analgesia: fentanyl prn  Sedation: versed prn  DVT ppx: lovenox  Stress Ulcer ppx: famotidine  Glycemic Control: subcutaneous insulin  Catheters/tubes: gonzalez 3/30,   Lines: R internal jugular central line 3/30, R radial chriss 3/30  Consults: neuro  Code Status: Full  Discharge plan: pending stability  Family: updated today    Patient discussed with attending Dr. Roland Walters MD PGY6  Cardiology Fellow           Chief Complaint:     Worsening shortness of breath   Cardiac arrest         History of Present Illness:     Nicholas is an 84 year old female patient with PMH asthma, MGUS, CKD, HTN and HLD who brought to ED by EMS.     Patient was found down by son for an unknown time at home and EMS was called. Son reports noting clenched teeth, was unresponsive and thought to be seizing when EMS arrived. Patient had CPR for 30 min with shock x 1. Patient also reported to have a 20sec seizure upon arrival to ED. Patient intubated. S/P Versed.     Per ED report, patient was c/o about worsening shortness of breath over the last 3 days. /100, HR 120s upon arrival.  Per chart review, no prior history of cardiac disease other than HLD and HTN.    Per chart review and discussion with patient's daughter patient reported no cough or fever, noted to have gas pain in epigastric region x3 days. Patient thought that she may have had a temp, but noted to be T96.6 last night. Patient lives with son, no recent travel.     ED:  -labs: lactate 3.6, troponin 0.03, WBC 11.7, Hgb 9.2, K 2.9, Creatinine 0.79, ALT 86, , PH 7.07, PCO2 66  -COVID pending  -interventions: 1L NS, versed drip  -imaging: CXR-cardiomegaly, stable ET tube; CT head negative          Review of Systems:      Unable to perform due to patient condition.          Past Medical History:   Medical  History reviewed.   Past Medical History:   Diagnosis Date     Allergic rhinitis, cause unspecified      CKD (chronic kidney disease) stage 3, GFR 30-59 ml/min (H)      Gout, unspecified      Hyperlipidemia LDL goal <100 7/9/2004     Hypertension goal BP (blood pressure) < 140/90 7/9/2004     Iron deficiency anemia      Moderate major depression (H) 10/19/2006     Moderate persistent asthma 12/4/2005     OA (osteoarthritis) of knee 2/2/2012     Obese 2/2/2012     Retinal detachment with retinal defect, unspecified      Unspecified cataract     s/p cataract surgery             Past Surgical History:   Surgical History reviewed.   Past Surgical History:   Procedure Laterality Date     ARTHROPLASTY KNEE Right 5/7/2019    Procedure: Right Total Knee Arthroplasty;  Surgeon: Diego Hi MD;  Location: [facility] OR     BUNIONECTOMY JASWANT BILATERAL       C NONSPECIFIC PROCEDURE      (B) detatched retina     C NONSPECIFIC PROCEDURE      LASIK surgery     C NONSPECIFIC PROCEDURE      NISSA/BSO     C NONSPECIFIC PROCEDURE      Hernia     C NONSPECIFIC PROCEDURE      Tonsillectomy     C NONSPECIFIC PROCEDURE      Arthroscopic knee surgery             Social History:   Social History reviewed.  Social History     Tobacco Use     Smoking status: Never Smoker     Smokeless tobacco: Never Used     Tobacco comment: Once in awhile when goes to [employer].   Substance Use Topics     Alcohol use: Yes     Comment: has a drink every night before she goes to bed             Family History:   Family History reviewed.   Family History   Problem Relation Age of Onset     Hypertension Mother      Respiratory Mother      Breast Cancer Maternal Aunt      Respiratory Maternal Aunt      Respiratory Maternal Aunt             Allergies:     Allergies   Allergen Reactions     Contrast Dye Itching     Seasonal Allergies              Medications:   Medications Reviewed.   Current Facility-Administered Medications   Medication     0.9% sodium chloride  "BOLUS     amiodarone (NEXTERONE) 250 mg in D5W 250 mL infusion     artificial tears ophthalmic ointment     glucose gel 15-30 g    Or     dextrose 50 % injection 25-50 mL    Or     glucagon injection 1 mg     enoxaparin ANTICOAGULANT (LOVENOX) injection 40 mg     famotidine (PEPCID) infusion 20 mg     fentaNYL (PF) (SUBLIMAZE) injection 50 mcg     fentaNYL (SUBLIMAZE) infusion     insulin aspart (NovoLOG) injection (RAPID ACTING)     levETIRAcetam (KEPPRA) intermittent infusion 1,000 mg     magnesium sulfate 4 g in 100 mL sterile water (premade)     medication instruction     Medication Instructions     meperidine (DEMEROL) injection 25-50 mg     metoprolol (LOPRESSOR) injection 5 mg     midazolam (VERSED) drip - ADULT 100 mg/100 mL in NS PRE-MIX     naloxone (NARCAN) injection 0.1-0.4 mg     norepinephrine (LEVOPHED) 16 mg in  mL infusion     norepinephrine (LEVOPHED) 16-0.9 MG/250ML-% 16 mg in  mL infusion     polyethylene glycol (MIRALAX) Packet 17 g     potassium chloride 10 mEq in 100 mL sterile water intermittent infusion (premix)     potassium chloride 10 mEq in 100 mL sterile water intermittent infusion (premix)     senna-docusate (SENOKOT-S/PERICOLACE) 8.6-50 MG per tablet 1 tablet    Or     senna-docusate (SENOKOT-S/PERICOLACE) 8.6-50 MG per tablet 2 tablet     sodium chloride 0.9% infusion     vecuronium (NORCURON) injection 5 mg     vecuronium (NORCURON) injection 5 mg             Physical Exam:     Vital signs:  Temp: 96.9  F (36.1  C) Temp src: Axillary BP: (!) 148/94 Pulse: 85 Heart Rate: 109 Resp: 14 SpO2: 100 % O2 Device: Mechanical Ventilator   Height: 154.9 cm (5' 1\") Weight: 80 kg (176 lb 5.9 oz)  Estimated body mass index is 30.24 kg/m  as calculated from the following:    Height as of this encounter: 1.549 m (5' 1\").    Weight as of this encounter: 72.6 kg (160 lb 0.9 oz).    Vent Settings: Ventilation Mode: CMV/AC  (Continuous Mandatory Ventilation/ Assist Control)  FiO2 (%): 40 " %  Rate Set (breaths/minute): 18 breaths/min  Tidal Volume Set (mL): 500 mL  PEEP (cm H2O): 5 cmH2O  Oxygen Concentration (%): 40 %  Resp: 18    GEN: intubated, sedated  CV: RRR, no gallops, rubs, or mumurs  PULM: on mechanical ventilation, CTAB, symmetric chest rise  GI: normal bowel sounds, non-tender, no rebound tenderness or guarding, no masses  : gonzalez catheter in place  EXTREMITIES: no pedal edema, moving all extremities, peripheral pulses intact, cool  NEURO: sedated, withdraws to pain  SKIN: No rashes, sores or ulcerations         Data:     No intake/output data recorded.  Wt Readings from Last 5 Encounters:   03/30/20 80 kg (176 lb 5.9 oz)   09/18/19 66.7 kg (147 lb 1.6 oz)   07/01/19 68 kg (150 lb)   06/04/19 70.7 kg (155 lb 14.4 oz)   05/23/19 71.4 kg (157 lb 8 oz)     ROUTINE ICU LABS  ABG / VBG:   Recent Labs   Lab Test 03/30/20  1731 03/30/20  1556   PHV 7.38 7.07*   PCO2V 35* 66*     BMP:   Recent Labs   Lab Test 03/30/20  1731 03/30/20  1624 03/30/20  1556 03/30/20  1550 02/24/20  1049 09/18/19  1516 06/19/19  1723  04/12/19  1138  01/28/19  1200  01/03/19  1526     --  145* 144 140 136 139   < > 132*   < > 136   < > 127*   POTASSIUM 3.2*  --  2.9* 2.9* 3.8 3.5 4.1   < > 3.6   < > 3.7   < > 3.6   CHLORIDE  --   --   --  112* 111* 106 106   < > 98   < > 104   < > 90*   CO2  --   --   --  21 20 25 26   < > 25   < > 22   < > 27   ANIONGAP  --   --   --  11 9 5 7   < > 9   < > 10   < > 10   LACT  --  3.6*  --   --   --   --   --   --   --   --   --   --   --    BUN  --   --   --  12 13 14 16   < > 14   < > 11   < > 6*   CR  --   --   --  0.79 0.75 0.75 0.86   < > 0.84   < > 0.75   < > 0.69   *  --  141* 140* 72 79 97   < > 90   < > 76   < > 121*   ELIDA  --   --   --  7.8* 9.1 9.6 9.4   < > 10.5*   < > 9.9   < > 9.4   MAG  --   --   --   --   --   --   --   --  1.6  --  1.7   < > 3.0*   PHOS  --   --   --   --   --   --   --   --   --   --   --   --  2.9    < > = values in this interval not  displayed.     Hepatic Studies:   Recent Labs   Lab Test 03/30/20  1550 02/24/20  1049 09/18/19  1516   * 16 8   ALT 86* 22 16   ALKPHOS 75 68 71   BILITOTAL 0.5 0.4 0.4   ALBUMIN 2.6* 3.3* 3.5     Heme Studies:   Recent Labs   Lab Test 03/30/20  1731 03/30/20  1556 03/30/20  1550 03/11/20  1322 02/24/20  1049   WBC  --   --  11.7* 5.2 4.8   ANEU  --   --  4.1 2.3 2.6   ALYM  --   --  6.6* 2.2 1.6   AEOS  --   --  0.4 0.1 0.3   HGB 9.5* 9.9* 9.2* 9.8* 9.3*   MCV  --   --  97 89 91   PLT  --   --  249 330 376     Iron Studies:   Recent Labs   Lab Test 02/24/20  1049 09/18/19  1516 06/19/19  1723 04/17/19  1336 04/10/19  1437 05/02/17  1533 08/31/15  1434   IRON 20* 22* 30* 15*  --  50 48    212* 184* 261  --  336 340   IRONSAT 8* 10* 16 6*  --  15 14*   * 867* 942* 274* 307* 136  --      Urine Studies:   Recent Labs   Lab Test 01/03/19  1231   SG 1.007   URINEPH 7.0   NITRITE Negative   LEUKEST Moderate*   WBCU 2   RBCU <1   PROTEIN Negative     Microbiology:  No results found for: RS, FLUAG    IMAGING    Chest xray  IMPRESSION:  1. ET tube projects roughly 4.5 cm above the sis.  2. Cardiomegaly.  3. No acute airspace opacity.    CT head, non contrast  Impression:   1. No acute intracranial pathology.  2. Scattered periventricular white matter hypoattenuation consistent  with chronic small vessel ischemic disease.     No

## 2020-03-30 NOTE — Clinical Note
dry, intact, no bleeding and no hematoma. 8 fr sheath RFA sutured with 7.5 FR 40cc balloon secured and sutured with blue caps and clear dressing noted

## 2020-03-30 NOTE — ED NOTES
Bed: ED30  Expected date:   Expected time:   Means of arrival: Ambulance  Comments:  H449   84F cardiac arrest  Shocked x1, I-gel in place  /100, HR 120s, ETCO2 50  Triaged red

## 2020-03-30 NOTE — ED NOTES
[facility], [place]   ED Nurse to Floor Handoff     Staci Watt is a 84 year old female who speaks English and lives with family members,  in a home  They arrived in the ED by ambulance from home    ED Chief Complaint: Cardiac Arrest and Seizures    ED Dx;   Final diagnoses:   Cardiac arrest (H)   Respiratory arrest (H)         Needed?: No    Allergies:   Allergies   Allergen Reactions     Contrast Dye Itching     Seasonal Allergies    .  Past Medical Hx:   Past Medical History:   Diagnosis Date     Allergic rhinitis, cause unspecified      CKD (chronic kidney disease) stage 3, GFR 30-59 ml/min (H)      Gout, unspecified      Hyperlipidemia LDL goal <100 7/9/2004     Hypertension goal BP (blood pressure) < 140/90 7/9/2004     Iron deficiency anemia      Moderate major depression (H) 10/19/2006     Moderate persistent asthma 12/4/2005     OA (osteoarthritis) of knee 2/2/2012     Obese 2/2/2012     Retinal detachment with retinal defect, unspecified      Unspecified cataract     s/p cataract surgery      Baseline Mental status: WDL  Current Mental Status changes: other intubated and sedated    Infection present or suspected this encounter: no  Sepsis suspected: No  Isolation type: Contact, Droplet     Activity level - Baseline/Home:  Independent  Activity Level - Current:   Total Care    Bariatric equipment needed?: No    In the ED these meds were given:   Medications   midazolam (VERSED) drip - ADULT 100 mg/100 mL in NS PRE-MIX (3 mg/hr Intravenous Rate/Dose Change 3/30/20 1712)   0.9% sodium chloride BOLUS (has no administration in time range)     Followed by   sodium chloride 0.9% infusion (has no administration in time range)   potassium chloride 10 mEq in 100 mL sterile water intermittent infusion (premix) (0 mEq Intravenous Stopped 3/30/20 1714)   potassium chloride 10 mEq in 100 mL sterile water intermittent infusion (premix) (10 mEq Intravenous New Bag 3/30/20  1725)       Drips running?  Yes    Home pump  No    Current LDAs  Peripheral IV 03/30/20 Left (Active)   Number of days: 0       Peripheral IV 03/30/20 Right (Active)   Site Assessment WDL 03/30/20 1602   Line Status Saline locked 03/30/20 1602   Phlebitis Scale 0-->no symptoms 03/30/20 1602   Infiltration Scale 0 03/30/20 1602   Extravasation? No 03/30/20 1602   Number of days: 0       Incision/Surgical Site 05/07/19 Right Knee (Active)   Number of days: 328       Labs results:   Labs Ordered and Resulted from Time of ED Arrival Up to the Time of Departure from the ED   CBC WITH PLATELETS DIFFERENTIAL - Abnormal; Notable for the following components:       Result Value    WBC 11.7 (*)     RBC Count 3.33 (*)     Hemoglobin 9.2 (*)     Hematocrit 32.3 (*)     MCHC 28.5 (*)     RDW 15.9 (*)     Nucleated RBCs 2 (*)     Absolute Lymphocytes 6.6 (*)     Absolute Myelocytes 0.1 (*)     All other components within normal limits   COMPREHENSIVE METABOLIC PANEL - Abnormal; Notable for the following components:    Potassium 2.9 (*)     Chloride 112 (*)     Glucose 140 (*)     Calcium 7.8 (*)     Albumin 2.6 (*)     Protein Total 6.2 (*)     ALT 86 (*)      (*)     All other components within normal limits   LACTIC ACID WHOLE BLOOD - Abnormal; Notable for the following components:    Lactic Acid 3.6 (*)     All other components within normal limits   LIPASE - Abnormal; Notable for the following components:    Lipase 53 (*)     All other components within normal limits   ISTAT GASES ELEC ICA GLUC SUNNY POCT - Abnormal; Notable for the following components:    Ph Venous 7.07 (*)     PCO2 Venous 66 (*)     Bicarbonate Venous 19 (*)     Sodium 145 (*)     Potassium 2.9 (*)     Glucose 141 (*)     Hemoglobin 9.9 (*)     Hematocrit - POCT 29 (*)     All other components within normal limits   TROPONIN I   COVID-19 VIRUS (CORONAVIRUS) BY PCR   BLOOD GAS ARTERIAL   ISTAT TROPONIN NURSING POCT   ISTAT CG8 GAS ELEC ICA GLUC SUNNY  "NURSE POCT   TROPONIN POCT       Imaging Studies:   Recent Results (from the past 24 hour(s))   XR Chest Port 1 View    Narrative    EXAM: XR CHEST PORT 1 VW  3/30/2020 4:01 PM      HISTORY: post intubation    COMPARISON: X-ray: June 2, 2010.     FINDINGS: Single view of the chest. Tip of the ET tube projects  roughly 4.5 cm above the sis. No pneumothorax. No pleural effusion.  Slightly enlarged cardiomediastinal. No acute airspace opacities.      Impression    IMPRESSION:  1. ET tube projects roughly 4.5 cm above the sis.  2. Cardiomegaly.  3. No acute airspace opacity.    I have personally reviewed the examination and initial interpretation  and I agree with the findings.    AIDAN PALUMBO MD   CT Head w/o Contrast    Narrative    CT HEAD W/O CONTRAST 3/30/2020 5:05 PM    Provided History: Seizure, new, nontraumatic, >40 yrs    Comparison: None.    Technique: Using multidetector thin collimation helical acquisition  technique, axial, coronal and sagittal CT images from the skull base  to the vertex were obtained without intravenous contrast.     Findings:      No intracranial hemorrhage. No mass effect. No midline shift. No  extra-axial fluid collection. Scattered periventricular white matter  hypoattenuation. No acute loss of gray-white differentiation.  Ventricles are proportionate to the sulci.. No sulcal effacement..   The basal cisterns are patent.    The visualized paranasal sinuses are clear. The mastoid air cells are  clear. Orbits appear unremarkable. No acute fracture..      Impression    Impression:   1. No acute intracranial pathology.  2. Scattered periventricular white matter hypoattenuation consistent  with chronic small vessel ischemic disease.    I have personally reviewed the examination and initial interpretation  and I agree with the findings.    TOM LANGE MD       Recent vital signs:   /73   Pulse 84   Temp 96.7  F (35.9  C) (Axillary)   Resp 18   Ht 1.549 m (5' 1\")   " Wt 80 kg (176 lb 5.9 oz)   SpO2 100%   BMI 33.32 kg/m      [place] Coma Scale Score: 3 (03/30/20 1604)  Assessment Qualifiers: patient intubated;patient chemically sedated or paralyzed    Cardiac Rhythm: Normal Sinus  Pt needs tele? Yes  Skin/wound Issues: None    Code Status: Full Code    Pain control: pt had none    Nausea control: pt had none    Abnormal labs/tests/findings requiring intervention:     Family present during ED course? No   Family Comments/Social Situation comments: son found patient unresponsive, called 911    Tasks needing completion: None    Roly Quesada, RN  [phone] [facility]

## 2020-03-31 PROBLEM — I46.9 CARDIAC ARREST (H): Status: ACTIVE | Noted: 2020-01-01

## 2020-03-31 NOTE — PROCEDURES
[facility], [place]    Insert arterial line    Date/Time: 3/31/2020 12:04 AM  Performed by: Adrienne Walters MD  Authorized by: Adrienne Walters MD     UNIVERSAL PROTOCOL   Site Marked: No  Prior Images Obtained and Reviewed:  No  Required items: Required blood products, implants, devices and special equipment available    Patient identity confirmed:  Arm band  Patient was reevaluated immediately before administering moderate or deep sedation or anesthesia  Confirmation Checklist:  Patient's identity using two indicators, procedure was appropriate and matched the consent or emergent situation and correct equipment/implants were available  Time out: Immediately prior to the procedure a time out was called    Universal Protocol: the Joint Commission Universal Protocol was followed    Preparation: Patient was prepped and draped in usual sterile fashion    ESBL (mL):  5      Indication: multiple ABGs respiratory failure hemodynamic monitoring  Location:  Right radial      SEDATION    Patient Sedated: Yes    Sedation:  Propofol and midazolam  Vital signs: Vital signs monitored during sedation      PROCEDURE DETAILS    Needle Gauge:  20      Number of Attempts:  2  Post-procedure:  Line sutured and dressing applied    PROCEDURE   Patient Tolerance:  Patient tolerated the procedure well with no immediate complications    Length of time physician/provider present for 1:1 monitoring during sedation: 15      Indications  Shock and respiratory failure requiring hemodynamic monitoring and blood gas assessment.    No immediate complications.    Adrienne Walters MD PGY6  Cardiology Fellow

## 2020-03-31 NOTE — PROGRESS NOTES
Preliminary Video EEG impression on March 30, 2020 1st 90 minutes    Full report to follow. Please look in inpatient chart, under procedure section.     EEG is abnormal due to the presences of diffuse attenuated EEG. Electro-cerebral potential are less than 5uV. Patient coded at 22:41 on 3/30/2020. After resuscitation, the EEG continues to remain attenuated.   There is no  parieto-occipital rhythm or well formed sleep architecture. EEG is not reactive with stimulation.  No electrographic seizure or epileptiform discharges were seen prior to code blue. This EEG is consistent with a profound encephalopathy, patient is on anesthetic drip medications which may contribute encephalopathy. If events of interest are noted, please press the event button. Clinical correlation is advised.     Nan Tilley MD  Staff Epilepsy Neurologist    [phone]

## 2020-03-31 NOTE — PROGRESS NOTES
Pt arrived from ED intubated by 7.5 ETT secured 24 @lip and was placed on initial ventilator settings of CMV 18 500 40% +5

## 2020-03-31 NOTE — PROGRESS NOTES
Reviewed medication with daughter Preethi     As of February 24 pt was on   Advair  Acetaminophen  Albuterol  Aspirin  Atorvastatin  Cyanocobalamin  Duloxetine   Famotidine   Ferrous gluconate  Fluoxetine   Lisinopril  MVI  Oxycodin   Vitamin D3

## 2020-03-31 NOTE — PROGRESS NOTES
"CLINICAL NUTRITION SERVICES - ASSESSMENT NOTE     Nutrition Prescription    RECOMMENDATIONS FOR MDs/PROVIDERS TO ORDER:  1. Once pt rewarmed to at least 36 degrees Celsius, and if aligned with GOC, recommend initiate nutrition support within next 48 hours if remains intubated (recommend TF pending GI status and hemodynamic stability).   --> If/when nutrition support becomes POC, please consult RD (\"Registered Dietitian to Order TF per Medical Nutrition Therapy Guidelines\")    2. Given severely reduced EF Heart failure (TTE EF=25% on 3/30), recommend thiamine 100 mg daily.     Malnutrition Status:    Unable to determine due to unable to complete nutrition focused physical exam.    Recommendations already ordered by Registered Dietitian (RD):  None at this time.    Future/Additional Recommendations:  1. If EN becomes nutrition POC, recommend immunomodulating formula via OGT:  -->Impact Peptide @ goal 40 ml/hr (960 ml/day) to provide 1440 kcals (27 kcal/kg/day), 90 g PRO (1.7 g/kg/day), 739 ml free H2O, 61 g Fat (50% from MCTs), 134 g CHO and no Fiber daily.  - Initiate @ 15 mL/hr and advance by 10 mL q8hr as tolerated  - Do not start or advance unless lytes (Mg++/K+) >/= WNL and phos>1.9   - Baseline and weekly CRP inflammation lab with immunomodulating formula   - 30 mL q4hr fluid flushes for tube patency. Additional fluids and/or adjustments per MD.    - Multivitamin/mineral (15 mL/day via FT) to help ensure micronutrient needs being met with suspected hypermetabolic demands and potential interruptions to TF infusions.  - Aspiration precautions with gastric feeds    2. If standard formula more appropriate and need lower protein (ie, if BUN/Cr become highly elevated and no HD), recommend:  Nutren 1.5 @ 40 mL/hr to provide 1440 kcals (27 kcal/kg/day), 65 g PRO (1.2 g/kg/day), 730 mL H2O, 169 g CHO and no Fiber daily.       REASON FOR ASSESSMENT  Staci Watt is a/an 84 year old female assessed by the dietitian " "for Nutrition Risk Monitoring    Per H&P: PMH asthma, MGUS, CKD, HTN and HLD who brought to ED by EMS --> cardiac arrest (PEA and VT/VF) requiring 1 shock and 30 minutes of CPR.   -->On cooling protocol x 24 hrs (plan to rewarm tonight 11pm) - no [facility] ECMO.     NUTRITION HISTORY  -Per pt: Unable to obtain from pt d/t pt condition.  -Per chart: Previously known to clinical nutrition services in 1/2019 for poor appetite and electrolyte abnormality.     CURRENT NUTRITION ORDERS  Diet: NPO  Intake/Tolerance: Pt intubated.    LABS  Labs reviewed  -Na+ 140 (WNL)  -K+ 3.0 (L) -- on K+ replacements  -BUN 15 (WNL)  -Cr 0.82 (WNL)  -T bili 1.1 (WNL)  -ALT/AST elevated, alk phos WNL    MEDICATIONS  Medications reviewed  -Medium dose sliding scale insulin q4h  -Senna-docusate HS  -Norepi gtt  -Propofol gtt    ANTHROPOMETRICS  Height: 154.9 cm (5' 1\")  Most Recent Weight: 75.1 kg (165 lb 9.1 oz)(subtrated (2.07kg) for cooling pads)    IBW: 47.7 kg (152% IBW,  based on lowest wt this admit of 72.6 kg on 3/30)  BMI: 30.26 kg/m2; Obesity Grade I BMI 30-34.9, based on lowest wt this admit of 72.6 kg on 3/30  Weight History: Limited recent wt hx below. Admit wt is higher compared to wt ~6 months ago in September 2019, suggesting no wt loss but rather wt gain. No documents in Care Everywhere.  Wt Readings from Last 20 Encounters:   03/31/20 75.1 kg (165 lb 9.1 oz)   09/18/19 66.7 kg (147 lb 1.6 oz)   07/01/19 68 kg (150 lb)   06/04/19 70.7 kg (155 lb 14.4 oz)   05/23/19 71.4 kg (157 lb 8 oz)   05/20/19 71.8 kg (158 lb 6.4 oz)   05/07/19 71.2 kg (156 lb 15.5 oz)   04/29/19 72.3 kg (159 lb 8 oz)   04/17/19 73.9 kg (163 lb)   04/10/19 73.9 kg (163 lb)   01/04/19 79.5 kg (175 lb 3.2 oz)   08/01/18 79.4 kg (175 lb)   03/18/16 80.9 kg (178 lb 5 oz)   02/12/16 80.3 kg (177 lb)   01/07/16 81.2 kg (179 lb)   01/23/15 79.5 kg (175 lb 5 oz)   04/17/14 81.2 kg (179 lb)   09/30/13 77.2 kg (170 lb 1.9 oz)   06/14/13 78 kg (172 lb)   06/10/10 80.5 kg " (177 lb 8 oz)   Dosing Weight: 54 kg (adjusted, based on lowest wt this admit of 72.6 kg on 3/30 and IBW of 47.7 kg)    ASSESSED NUTRITION NEEDS  Estimated Energy Needs: 5964-6085 kcals/day (25 - 30 kcals/kg)  Justification: Maintenance  Estimated Protein Needs: 65-81 grams protein/day (1.2 - 1.5 grams of pro/kg)  Justification: Hypercatabolism with critical illness -- pt with CKD, monitor renal labs  Estimated Fluid Needs: 1 mL/kcal  Justification: Maintenance and Per provider pending fluid status    PHYSICAL FINDINGS  See malnutrition section below.  -Intubated  -OGT (AXR)    MALNUTRITION  % Intake: Unable to assess  % Weight Loss: None noted per limited wt hx   Subcutaneous Fat Loss: Unable to assess  Muscle Loss: Unable to assess  Fluid Accumulation/Edema: Mild-Moderate (2-3+ BLE edema per RN flowsheets)  Malnutrition Diagnosis: Unable to determine due to unable to complete nutrition focused physical exam. --> Patient is currently considered a person under investigation for COVID-19. Unable to obtain in-person nutrition history or nutrition focused physical assessment (NFPA) from patient as the number of staff going into rooms is restricted to limit exposure and to minimize use of PPE.    NUTRITION DIAGNOSIS  Inadequate oral intake related to NPO status in setting of intubation as evidenced by pt currently meeting 0% minimum assessed needs (not including kcal from dextrose/lipid-containing medications).    INTERVENTIONS  Implementation  -Nutrition Education: Unable to complete due to pt condition (intubated, COVID PUI)   -Enteral Nutrition - Recs if needed    Goals  Diet adv v nutrition support within 2-3 days.     Monitoring/Evaluation  Progress toward goals will be monitored and evaluated per protocol.    Chana Rodriguez RD, LD  Pager: [phone]

## 2020-03-31 NOTE — PLAN OF CARE
ICU End of Shift Summary. See flowsheets for vital signs and detailed assessment.    Changes this shift: Neuro unchanged, no seizures per EEG. Frequent PVCs, Mg and K replaced. Cultures sent. Urine output improving. Hydralazine 10mg given for hypertension, Levo restarted for resulting hypotension, please see flowsheet. Respiratory rate and tidal volume decreased to improve ABG. To cath lab-balloon pump placed, 60mg Lasix given.     Plan: Continue neuro checks q4 hrs. Monitor glucose/electrolytes and replete per protocol. Continue to monitor strict I/O. Wean Levo as patient tolerates to MAP goal > 65.     Plan of care reviewed with patient and daughter, will continue to monitor and assess.      Problem: Adult Inpatient Plan of Care  Goal: Plan of Care Review  Outcome: No Change  Flowsheets (Taken 3/31/2020 1704)  Plan of Care Reviewed With: patient  Progress: no change  Goal: Patient-Specific Goal (Individualization)  Outcome: No Change  Goal: Absence of Hospital-Acquired Illness or Injury  3/31/2020 1704 by Monique Monsalve, RN  Outcome: No Change  3/31/2020 0735 by Dez Donovan RN  Outcome: Declining  Goal: Optimal Comfort and Wellbeing  Outcome: No Change  Goal: Readiness for Transition of Care  Outcome: No Change  Goal: Rounds/Family Conference  Outcome: No Change

## 2020-03-31 NOTE — PROGRESS NOTES
[facility]      CSI History and Physicial  Staci Watt MRN: [id number]  1935  Date of Admission:3/30/2020  Primary care provider: Destin Wolf      Assessment and Plan:       Staci Watt is a 84 year old female who was brought to the ED with cardiac arrest (PEA and VT/VF) requiring 1 shock and 30 minutes of CPR.     24 h events/Plan:  Plan for coronary angiogram  and IABP  Afterload reduction as pt has severe MR  Will start rewarming at 11 pm   Zosyn empirically   Aspirin started     Neuro  Sedation - versed/fentanyl, propofol initially tried, patient became hypotensive.  Hypothermia protocol, 24 hours, TTM 34 degrees  Possible seizure - s/p keppra load and started on 1g BID, seen by neuro crit, on vEEG    Cardiac  Sudden cardiac arrest- PEA and VF  Undifferentiated shock.  Severely reduced EF Heart failure (TTE EF=25% on 3/30)  Severe Mitral regurgitation - appears more chronic in nature, but may be ischemic MR  QTc prolongation 510 at presentation, increased to >650 ms today will discontinue amiodarone and warm patient   Post arrest EKG no STEMI, trending troponin (initially negative, second minimally elevated)   Repeat arrest on the floor, V fib, in setting of profound hypomagnesemia and hypokalemia, shock x1 amio bolus with ROSC.    Will stop amiodarone infusion  Dobutamine at 5 mcg/kg/min  Will plan to wean off dobutamine and norepinephrine   MAP goal 65  Post arrest care (TTM)  Aspirin 81 mg PO daily --> will discontinue as normal coronaries   Replete electrolytes  Will need coronary angiography, IABP  EP consult for QTc prolongation in setting of cardiac arrest     Pulmonary  Acute hypercapnic respiratory failure  Ventilation Mode: CMV/AC  (Continuous Mandatory Ventilation/ Assist Control)  FiO2 (%): 30 %  Rate Set (breaths/minute): 20 breaths/min  Tidal Volume Set (mL): 500 mL  PEEP (cm H2O): 5 cmH2O  Oxygen Concentration (%): 30 %  Resp: 18  Pulmonary hemorrhage with ET tube  secretions, secondary to CPR most likely culprit  Zosyn empirically for PNA    ID  COVID rule out, Patient having SOB x 3 days prior to admission.  Leukocytosis, WBC mildly elevated to 11.7 on admission. Likely reactive in setting of cardiac arrest.   Zosyn empirically (3/31 -)     Renal  Metabolic acidosis likely due to lactate   Oliguric   Likely ATN from cardiac arrest   No plan for HD now   Will monitor UOP    GI  Shock liver   Trend LFTs   Pantoprazole once daily   NPO while cool and rewarming  Bowel regimen    Floor Care  Fluids: NS, will stop and use pressors as needed to maintain MAP  Food: NPO, needs OG tube and tube feeds, nutrition consult  Electrolyte repletion: replete, goal K>4 (careful with hypothermia), Mg > 2  Analgesia: fentanyl prn  Sedation: versed prn  DVT ppx: lovenox  Stress Ulcer ppx: famotidine  Glycemic Control: subcutaneous insulin  Catheters/tubes: gonzalez 3/30,   Lines: R internal jugular central line 3/30, R radial chriss 3/30  Consults: neuro, palliative care  Code Status: Full  Discharge plan: pending stability  Family: updated today    Patient discussed with attending Dr. Roland Pastor MD PGY4  Cardiology Fellow           Chief Complaint:     Worsening shortness of breath   Cardiac arrest         History of Present Illness:     Nicholas is an 84 year old female patient with PMH asthma, MGUS, CKD, HTN and HLD who brought to ED by EMS.     Patient was found down by son for an unknown time at home and EMS was called. Son reports noting clenched teeth, was unresponsive and thought to be seizing when EMS arrived. Patient had CPR for 30 min with shock x 1. Patient also reported to have a 20sec seizure upon arrival to ED. Patient intubated. S/P Versed.     Per ED report, patient was c/o about worsening shortness of breath over the last 3 days. /100, HR 120s upon arrival.  Per chart review, no prior history of cardiac disease other than HLD and HTN.    Per chart  review and discussion with patient's daughter patient reported no cough or fever, noted to have gas pain in epigastric region x3 days. Patient thought that she may have had a temp, but noted to be T96.6 last night. Patient lives with son, no recent travel.     ED:  -labs: lactate 3.6, troponin 0.03, WBC 11.7, Hgb 9.2, K 2.9, Creatinine 0.79, ALT 86, , PH 7.07, PCO2 66  -COVID pending  -interventions: 1L NS, versed drip  -imaging: CXR-cardiomegaly, stable ET tube; CT head negative          Review of Systems:      Unable to perform due to patient condition.          Past Medical History:   Medical History reviewed.   Past Medical History:   Diagnosis Date     Allergic rhinitis, cause unspecified      CKD (chronic kidney disease) stage 3, GFR 30-59 ml/min (H)      Gout, unspecified      Hyperlipidemia LDL goal <100 7/9/2004     Hypertension goal BP (blood pressure) < 140/90 7/9/2004     Iron deficiency anemia      Moderate major depression (H) 10/19/2006     Moderate persistent asthma 12/4/2005     OA (osteoarthritis) of knee 2/2/2012     Obese 2/2/2012     Retinal detachment with retinal defect, unspecified      Unspecified cataract     s/p cataract surgery             Past Surgical History:   Surgical History reviewed.   Past Surgical History:   Procedure Laterality Date     ARTHROPLASTY KNEE Right 5/7/2019    Procedure: Right Total Knee Arthroplasty;  Surgeon: Diego Hi MD;  Location: [facility]     BUNIONECTOMY JASWANT BILATERAL       C NONSPECIFIC PROCEDURE      (B) detatched retina     C NONSPECIFIC PROCEDURE      LASIK surgery     C NONSPECIFIC PROCEDURE      NISSA/BSO     C NONSPECIFIC PROCEDURE      Hernia     C NONSPECIFIC PROCEDURE      Tonsillectomy     C NONSPECIFIC PROCEDURE      Arthroscopic knee surgery             Social History:   Social History reviewed.  Social History     Tobacco Use     Smoking status: Never Smoker     Smokeless tobacco: Never Used     Tobacco comment: Once in awhile  when goes to [employer].   Substance Use Topics     Alcohol use: Yes     Comment: has a drink every night before she goes to bed             Family History:   Family History reviewed.   Family History   Problem Relation Age of Onset     Hypertension Mother      Respiratory Mother      Breast Cancer Maternal Aunt      Respiratory Maternal Aunt      Respiratory Maternal Aunt             Allergies:     Allergies   Allergen Reactions     Contrast Dye Itching     Seasonal Allergies              Medications:   Medications Reviewed.   Current Facility-Administered Medications   Medication     amiodarone (NEXTERONE) 250 mg in D5W 250 mL infusion     artificial tears ophthalmic ointment     aspirin (ASA) chewable tablet 81 mg     glucose gel 15-30 g    Or     dextrose 50 % injection 25-50 mL    Or     glucagon injection 1 mg     DOBUTamine (DOBUTREX) 1,000 mg in D5W 250 mL infusion (adult max conc)     enoxaparin ANTICOAGULANT (LOVENOX) injection 40 mg     famotidine (PEPCID) infusion 20 mg     fentaNYL (PF) (SUBLIMAZE) injection 50 mcg     fentaNYL (SUBLIMAZE) infusion     hydrALAZINE (APRESOLINE) injection 10 mg     insulin aspart (NovoLOG) injection (RAPID ACTING)     levETIRAcetam (KEPPRA) intermittent infusion 1,000 mg     magnesium sulfate 2 g in water intermittent infusion     magnesium sulfate 4 g in 100 mL sterile water (premade)     medication instruction     Medication Instructions     meperidine (DEMEROL) injection 25-50 mg     metoprolol (LOPRESSOR) injection 5 mg     midazolam (VERSED) drip - ADULT 100 mg/100 mL in NS PRE-MIX     naloxone (NARCAN) injection 0.1-0.4 mg     norepinephrine (LEVOPHED) 16 mg in  mL infusion     piperacillin-tazobactam (ZOSYN) 3.375 g vial to attach to  mL bag     polyethylene glycol (MIRALAX) Packet 17 g     potassium chloride 20 mEq in 50 mL intermittent infusion     potassium chloride 20 mEq in 50 mL intermittent infusion     propofol (DIPRIVAN) infusion    And      "propofol (DIPRIVAN) injection 10 mg/mL vial     senna-docusate (SENOKOT-S/PERICOLACE) 8.6-50 MG per tablet 1 tablet    Or     senna-docusate (SENOKOT-S/PERICOLACE) 8.6-50 MG per tablet 2 tablet     sodium phosphate 10 mmol in D5W intermittent infusion     sodium phosphate 15 mmol in D5W intermittent infusion     sodium phosphate 20 mmol in D5W intermittent infusion     sodium phosphate 25 mmol in D5W intermittent infusion     vecuronium (NORCURON) injection 5 mg     vecuronium (NORCURON) injection 5 mg             Physical Exam:     Vital signs:  Temp: 92.7  F (33.7  C) Temp src: Bladder BP: 95/48 Pulse: 65 Heart Rate: 66 Resp: 18 SpO2: 94 % O2 Device: Mechanical Ventilator   Height: 154.9 cm (5' 1\") Weight: 75.1 kg (165 lb 9.1 oz)(subtrated (2.07kg) for cooling pads)  Estimated body mass index is 31.28 kg/m  as calculated from the following:    Height as of this encounter: 1.549 m (5' 1\").    Weight as of this encounter: 75.1 kg (165 lb 9.1 oz).    Vent Settings: Ventilation Mode: CMV/AC  (Continuous Mandatory Ventilation/ Assist Control)  FiO2 (%): 30 %  Rate Set (breaths/minute): 20 breaths/min  Tidal Volume Set (mL): 500 mL  PEEP (cm H2O): 5 cmH2O  Oxygen Concentration (%): 30 %  Resp: 18    GEN: intubated, sedated  CV: RRR, no gallops, rubs, or mumurs  PULM: on mechanical ventilation, CTAB, symmetric chest rise  GI: normal bowel sounds, non-tender, no rebound tenderness or guarding, no masses  : gonzalez catheter in place  EXTREMITIES: no pedal edema, moving all extremities, peripheral pulses intact, cool  NEURO: sedated, withdraws to pain  SKIN: No rashes, sores or ulcerations         Data:     I/O last 3 completed shifts:  In: 4399.1 [I.V.:1959.1; Other:200; NG/GT:240; IV Piggyback:2000]  Out: 1173 [Urine:1173]  Wt Readings from Last 5 Encounters:   03/31/20 75.1 kg (165 lb 9.1 oz)   09/18/19 66.7 kg (147 lb 1.6 oz)   07/01/19 68 kg (150 lb)   06/04/19 70.7 kg (155 lb 14.4 oz)   05/23/19 71.4 kg (157 lb 8 oz) "     ROUTINE ICU LABS  ABG / VBG:   Recent Labs   Lab Test 03/31/20  1300 03/31/20  0824 03/31/20  0559 03/31/20  0558  03/31/20  0357   PH 7.57* 7.52* 7.43  --    < >  --    PCO2 19* 18* 19*  --    < >  --    PO2 159* 167* 167*  --    < >  --    O2PER 30 30.0 35.0 35.0   < > 50.0   PHV  --   --   --  7.35  --  7.18*   PCO2V  --   --   --  27*  --  37*    < > = values in this interval not displayed.     BMP:   Recent Labs   Lab Test 03/31/20  1300 03/31/20  1213 03/31/20  0824 03/31/20  0559 03/31/20  0358 03/31/20  0222  03/30/20  2111 03/30/20  1731  03/30/20  1550  01/03/19  1526   NA  --   --  140  140  --  141  --   --  142 144   < > 144   < > 127*   POTASSIUM 2.6*  --  3.0*  3.0*  --  3.7 3.8  --  3.3* 3.2*   < > 2.9*   < > 3.6   CHLORIDE  --   --  113*  114*  --  112*  --   --  111*  --   --  112*   < > 90*   CO2  --   --  15*  14*  --  12*  --   --  19*  --   --  21   < > 27   ANIONGAP  --   --  12  12  --  16*  --   --  12  --   --  11   < > 10   LACT  --  2.1* 2.6* 4.5* 7.2*  --    < > 3.2*  --    < >  --   --   --    BUN  --   --  15  14  --  13  --   --  12  --   --  12   < > 6*   CR  --   --  0.82  0.76  --  0.84  --   --  0.76  --   --  0.79   < > 0.69   GLC  --   --  184*  179*  --  160*  --   --  129* 121*   < > 140*   < > 121*   ELIDA  --   --  7.5*  7.4*  --  7.7*  --   --  8.6  --   --  7.8*   < > 9.4   MAG  --   --  2.0  --  2.5* 2.6*  --  1.4*  --   --   --    < > 3.0*   PHOS  --   --   --   --  3.9  --   --   --   --   --   --   --  2.9    < > = values in this interval not displayed.     Hepatic Studies:   Recent Labs   Lab Test 03/31/20  0824 03/31/20  0358 03/30/20  1550   * 312* 127*   * 164* 86*   ALKPHOS 79 82 75   BILITOTAL 1.1 1.3 0.5   ALBUMIN 2.4* 2.6* 2.6*     Heme Studies:   Recent Labs   Lab Test 03/31/20  1213 03/31/20  0358 03/30/20  2118 03/30/20  2111 03/30/20  1731  03/30/20  1550 03/11/20  1322   WBC  --  11.4* 10.5  --   --   --  11.7* 5.2   ANEU  --   --    --   --   --   --  4.1 2.3   ALYM  --   --   --   --   --   --  6.6* 2.2   AEOS  --   --   --   --   --   --  0.4 0.1   HGB  --  9.6* 9.9*  --  9.5*   < > 9.2* 9.8*   MCV  --  92 92  --   --   --  97 89   PLT  --  187 233 260  --   --  249 330   INR 1.80* 1.90* 1.48*  --   --   --   --   --     < > = values in this interval not displayed.     Iron Studies:   Recent Labs   Lab Test 02/24/20  1049 09/18/19  1516 06/19/19  1723 04/17/19  1336 04/10/19  1437 05/02/17  1533 08/31/15  1434   IRON 20* 22* 30* 15*  --  50 48    212* 184* 261  --  336 340   IRONSAT 8* 10* 16 6*  --  15 14*   * 867* 942* 274* 307* 136  --      Urine Studies:   Recent Labs   Lab Test 01/03/19  1231   SG 1.007   URINEPH 7.0   NITRITE Negative   LEUKEST Moderate*   WBCU 2   RBCU <1   PROTEIN Negative     Microbiology:  No results found for: RS, FLUAG    IMAGING    Chest xray  IMPRESSION:  1. ET tube projects roughly 4.5 cm above the sis.  2. Cardiomegaly.  3. No acute airspace opacity.    CT head, non contrast  Impression:   1. No acute intracranial pathology.  2. Scattered periventricular white matter hypoattenuation consistent  with chronic small vessel ischemic disease.      Critical Care Services Progress Note:     Staci Watt remains critically ill with shock and , Severe MR, Seizure disorder and Cardiomyopathy.     I personally examined and evaluated the patient today.   The patient s prognosis today is grave  I have evaluated all laboratory values and imaging studies from the past 24 hours.  Key findings and decisions made today included   Plan for coronary angiogram  and IABP  Afterload reduction as pt has severe MR  Will start rewarming at 11 pm   Zosyn empirically      I personally managed the ventilator, sedation, pain control and analgesia, metabolic abnormalities, antibiotic therapy, nutritional status and vasoactive medications.   Consults ongoing and ordered are Neurology  Procedures that will happen today  are: Angiogram and IABP placement   All treatments were placed at my direction.  I formulated today s plan with the house staff team or resident(s) and agree with the findings and plan in the associated note.       The above plans and care have been discussed with none and all questions and concerns were addressed.     I spent a total of 60 minutes (excluding procedure time) personally providing and directing critical care services at the bedside and on the critical care unit for Staci Watt.        Phillip Watkins MD

## 2020-03-31 NOTE — CONSULTS
[facility]  Palliative Care Consultation Note    Patient: Staci Watt  Date of Admission:  3/30/2020    Requesting Clinician / Team: Raudel Pastor MD  Reason for consult: Patient and family support    Recommendations:    I spoke with patient's daughter Preethi by phone.  I introduced the role of our team regarding symptom management, patient/family support, decisional support.    As per her comments, and documentation by primary team, family's goals are ongoing full supportive/interventional measures, with reassessment day by day.    At this time goals of care/plan of care are clear.  We will follow and assist if further needs are identified in the overall course of her care.    These recommendations have been discussed with primary team.      Thank you for the opportunity to participate in the care of this patient and family. Our team: will continue to follow.     During regular M-F work hours -- if you are not sure who specifically to contact -- please contact us by sending a text page to our team consult pager at [phone].    After regular work hours and on weekends/holidays, you can call our answering service at [phone]. Also, who's on call for us is available in [facility] [employer].   Adolfo Acevedo MD  Palliative Medicine Consult Team  Pager: [phone]   TT: 51 minutes, with > 50% spent in C/C/E patient/family/care teams re: GOC, POC, Sx management.   [id number]    Assessments:  Staci Watt is a 84 year old female who presented on 3/30 to the ED via EMS with cardiac arrest who also has MUGS, asthma, CKD, HTN, HLD. Currently undergoing cooling/warming protocol.      Today, the patient was seen for:  Cardiac arrest, GOC,     Prognosis, Goals, & Planning:      Functional Status just prior to hospitalization: 2 (Ambulatory and capable of all selfcare but unable to carry out any work activities; may need help with IADLs up and about > 50% of  waking hours) living at home with family members      Prognosis, Goals, and/or Advance Care Planning were addressed today: Yes        Summary/Comments: See comments above.      Patient's decision making preferences: unable to assess at this time relying on family decision makers          Patient has decision-making capacity today for complex decisions: No            I have concerns about the patient/family's health literacy today: No           Patient has a completed Health Care Directive: No.       Code status: full code    Coping, Meaning, & Spirituality:   Mood, coping, and/or meaning in the context of serious illness were addressed today: Yes  Summary/Comments: Family support.    Social:     Daughter- Preethi, also lives with two sons who are currently working, sons names are Gerald and Miah    History of Present Illness:    Staci Watt is a 84 year old female who was brought to the ED with cardiac arrest (PEA and VT/VF) that required 1 shock and 30 minutes of CPR. She was found down by her son who called EMS, when EMS arrived it was thought she was seizing and she was also unresponsive. She also has a past medical history of asthma, MGUS, CKD, HTN, HLD. She had a recent visit with her oncologist on 3/18 which showed concern that she had persistent elevated ferritin and other findings to which she was going to be scheduled for a bone marrow biopsy in the near future which has not occurred.     Daughter reported to ED staff that she had been sick for the last three days with fatigue, having some mild shortness of breath, and felt unwell. Family denied any known fevers.     She underwent post arrest EKG which was concerning for STEMI however had normal troponin and thus there was no emergent need for coronary angiography. She did have ROSC in the ED but then went into Vfib, she was then cooled and sedated. She has had persistent jerking motions while in the ICU.     Her family has been discussing her  wishes and seems that her daughter was noted to think she wouldn't want dialysis, however her other family present on 3/31 felt that she would want temporary dialysis if needed to get more information about what impact this had on her brain.     Palliative care consulted 3/31 for patient; now undergoing cooling/rewarming protoco.    Key Palliative Symptom Data:  We are not managing pain in this patient  We are not managing dyspnea in this patient  We are not managing nausea in this patient  We are not managing anxiety in this patient    Patient is on opioids: bowels not assessed today.    ROS:  Comprehensive ROS is reviewed and is negative except as here & per HPI:      Past Medical History:  Past Medical History:   Diagnosis Date     Allergic rhinitis, cause unspecified      CKD (chronic kidney disease) stage 3, GFR 30-59 ml/min (H)      Gout, unspecified      Hyperlipidemia LDL goal <100 7/9/2004     Hypertension goal BP (blood pressure) < 140/90 7/9/2004     Iron deficiency anemia      Moderate major depression (H) 10/19/2006     Moderate persistent asthma 12/4/2005     OA (osteoarthritis) of knee 2/2/2012     Obese 2/2/2012     Retinal detachment with retinal defect, unspecified      Unspecified cataract     s/p cataract surgery        Past Surgical History:  Past Surgical History:   Procedure Laterality Date     ARTHROPLASTY KNEE Right 5/7/2019    Procedure: Right Total Knee Arthroplasty;  Surgeon: Diego Hi MD;  Location: [facility] OR     BUNIONECTOMY JASWANT BILATERAL       C NONSPECIFIC PROCEDURE      (B) detatched retina     C NONSPECIFIC PROCEDURE      LASIK surgery     C NONSPECIFIC PROCEDURE      NISSA/BSO     C NONSPECIFIC PROCEDURE      Hernia     C NONSPECIFIC PROCEDURE      Tonsillectomy     C NONSPECIFIC PROCEDURE      Arthroscopic knee surgery         Family History:  Family History   Problem Relation Age of Onset     Hypertension Mother      Respiratory Mother      Breast Cancer Maternal Aunt       Respiratory Maternal Aunt      Respiratory Maternal Aunt          Allergies:  Allergies   Allergen Reactions     Contrast Dye Itching     Seasonal Allergies         Medications:  I have reviewed this patient's medication profile and medications from this hospitalization.     Physical Exam:  Vital Signs: Temp: 92.7  F (33.7  C) Temp src: Bladder BP: 95/48 Pulse: 65 Heart Rate: 66 Resp: 18 SpO2: 94 % O2 Device: Mechanical Ventilator    Weight: 165 lbs 9.05 oz  GEN: Entubated, sedated, not responsive to voice    Data reviewed:  ROUTINE ICU LABS (Last four results)  CMP  Recent Labs   Lab 04/01/20  0407 04/01/20  0130 03/31/20  2040 03/31/20  1741 03/31/20  1543  03/31/20  0824 03/31/20  0358   NA  --  141 140 140 140  --  140  140 141   POTASSIUM 4.0 4.2 4.3  4.3 4.3 4.2   < > 3.0*  3.0* 3.7   CHLORIDE  --  116* 113* 112*  --   --  113*  114* 112*   CO2  --  15* 16* 16*  --   --  15*  14* 12*   ANIONGAP  --  10 10 11  --   --  12  12 16*   GLC  --  99 142* 152* 162*  --  184*  179* 160*   BUN  --  18 18 16  --   --  15  14 13   CR  --  1.16* 1.00 0.88  --   --  0.82  0.76 0.84   GFRESTIMATED  --  43* 52* 60*  --   --  66  72 64   GFRESTBLACK  --  50* 60* 70  --   --  76  84 74   ELIDA  --  7.5* 7.5* 7.6*  --   --  7.5*  7.4* 7.7*   MAG 2.5* 2.5* 2.6* 2.6*  --   --  2.0 2.5*   PHOS  --  3.0 2.4* 2.2*  --   --   --  3.9   PROTTOTAL  --  5.4* 5.6* 5.5*  --   --  5.7* 5.9*   ALBUMIN  --  2.3* 2.3* 2.3*  --   --  2.4* 2.6*   BILITOTAL  --  1.2 1.1 1.0  --   --  1.1 1.3   ALKPHOS  --  73 74 75  --   --  79 82   AST  --  250* 296* 306*  --   --  371* 312*   ALT  --  189* 194* 188*  --   --  203* 164*    < > = values in this interval not displayed.     CBC  Recent Labs   Lab 04/01/20  0130 03/31/20  2040 03/31/20  1543 03/31/20  0358 03/30/20  2118   WBC 13.4* 13.0*  --  11.4* 10.5   RBC 3.37* 3.49*  --  3.49* 3.59*   HGB 9.3* 9.7* 10.2* 9.6* 9.9*   HCT 29.8* 30.6*  --  32.2* 32.9*   MCV 88 88  --  92 92   MCH  27.6 27.8  --  27.5 27.6   MCHC 31.2* 31.7  --  29.8* 30.1*   RDW 16.5* 16.3*  --  16.1* 16.0*    198  --  187 233     INR  Recent Labs   Lab 03/31/20  2040 03/31/20  1213 03/31/20  0358 03/30/20  2118   INR 1.83* 1.80* 1.90* 1.48*     Arterial Blood Gas  Recent Labs   Lab 04/01/20  0407 03/31/20  1543 03/31/20  1445 03/31/20  1300   PH 7.47* 7.48* 7.49* 7.57*   PCO2 21* 23* 23* 19*   PO2 125* 112* 150* 159*   HCO3 15* 17* 18* 17*   O2PER 30 30.0 30 30

## 2020-03-31 NOTE — PLAN OF CARE
At recommendation of medical team, physical therapy will be held while test pending for COVID-19. Will re-assess status daily.

## 2020-03-31 NOTE — PLAN OF CARE
OT: At recommendation of medical team, occupational therapy will be held while test pending for COVID-19. Will re-assess status daily.

## 2020-03-31 NOTE — PLAN OF CARE
Admitted/transferred from: ED  Reason for admission/transfer: Cardiac arrest, witnesses seizures in ED, intubated for airway protection  Patient status upon admission/transfer: Sedates and intubated  Interventions: EEG, hypothermia protocol, electrolyte replacement  Plan: Monitor electrolytes, monitor sedation, monitor hemodynamics and seizure activity  2 RN skin assessment: completed by Quiana Barrientos RN & Dez Donovan  Result of skin assessment and interventions: None  Height,weight, drug calc weight: done  Patient belongings (See flowsheets) [email or web address], drivers license in close [id number].  MDRO education: if applicable N/A

## 2020-03-31 NOTE — PROGRESS NOTES
Preliminary Video EEG impression on March 30, 2020 March 31, 2020 until 6 am   Full report to follow. Please look in inpatient chart, under procedure section.     EEG is abnormal due to the presences of diffuse attenuated EEG. Overnight, EEG had no changes. Electro-cerebral potential are less than 5uV. Patient coded at 22:41 on 3/30/2020. After resuscitation, the EEG continues to remain attenuated.   There is no  parieto-occipital rhythm or well formed sleep architecture. EEG is not reactive with stimulation.  No electrographic seizure or epileptiform discharges were seen prior to code blue. This EEG is consistent with a profound encephalopathy, patient is on anesthetic drip medications which may contribute encephalopathy. If events of interest are noted, please press the event button. Clinical correlation is advised.     Addendum: 3/31/2020 2:10pm. Patient has increased whole body jerks with no EEG correlate (except for movement artifact) to suggest these are seizures. This was reviewed with Dr. Pickett (Neuro critical care).     Nan Tilley MD  Staff Epilepsy Neurologist    [phone]

## 2020-03-31 NOTE — PROCEDURES
[facility], [place]    Central line    Date/Time: 3/31/2020 12:00 AM  Performed by: Adrienne Walters MD  Authorized by: Adrienne Walters MD   Indications: vascular access    UNIVERSAL PROTOCOL   Site Marked: NA  Prior Images Obtained and Reviewed:  NA  Required items: Required blood products, implants, devices and special equipment available    Patient identity confirmed:  Arm band  Patient was reevaluated immediately before administering moderate or deep sedation or anesthesia  Confirmation Checklist:  Patient's identity using two indicators, relevant allergies, procedure was appropriate and matched the consent or emergent situation and correct equipment/implants were available  Time out: Immediately prior to the procedure a time out was called    Universal Protocol: the Joint Commission Universal Protocol was followed    Preparation: Patient was prepped and draped in usual sterile fashion    ESBL (mL):  10        SEDATION    Patient Sedated: Yes    Sedation Type:  Deep  Sedation:  Propofol and midazolam  Vital signs: Vital signs monitored during sedation      Preparation: skin prepped with 2% chlorhexidine  Skin prep agent dried: skin prep agent completely dried prior to procedure  Sterile barriers: all five maximum sterile barriers used - cap, mask, sterile gown, sterile gloves, and large sterile sheet  Hand hygiene: hand hygiene performed prior to central venous catheter insertion  Location details: right internal jugular  Patient position: flat  Catheter type: triple lumen  Catheter size: 7 Fr  Pre-procedure: landmarks identified  Ultrasound guidance: yes  Sterile ultrasound techniques: sterile gel and sterile probe covers were used  Number of attempts: 2  Successful placement: yes  Post-procedure: line sutured and dressing applied  Assessment: blood return through all ports    PROCEDURE   Patient Tolerance:  Patient tolerated the procedure well with no immediate  complications    Length of time physician/provider present for 1:1 monitoring during sedation: 30      Indication  Sudden cardiac arrest requiring central venous access     No complications.  Post procedure Xray ordered.    Adrienne Walters MD PGY6  Cardiology Fellow

## 2020-03-31 NOTE — PROGRESS NOTES
After discussion with MICU resident/attending, patient was transferred to cardiology service as primary around 10pm.  Reviewed care with resident and nurse.  MICU resident had consented daughter for central line and arterial line.  While prepping patient for line placement, she went into course VF around 10:40 pm.  She required 1 shock and 2 rounds of CPR with ROSC.  No medications were given during code event, however she was bolused with amiodarone and started on infusion.  Her magnesium and potassium were quite low and these were both being repleted.      Lines placed and confirmed.  During placement, patient had lower blood pressures and was started on norepinephrine.  VBG drawn from central line showed venous oxyhemoglobin of 28, with estimated cardiac output 2.4 L/min (index 1.3).  Dobutamine 5 started with goal to titrate off NE, which did show some improvement on next check, oxyhemoglobin 50, estimated CO 3.3 L/min (index 1.9).  Amiodarone rate cut in half.      Despite improvement in her vitals, her lactate continued to rise.  I called her daughter Preethi to update her on how her mother is doing, and if her mother had ever expressed what her wishes would be.  I addressed that coronary angiogram, intra aortic balloon pump, and dialysis may be needed.  She and her brother Miah called back saying that Staci would not want dialysis.  They were on the fence on angiogram/IABP if offered, similarly with her code status.  I mentioned that the day time medical team can give an update later in the morning about how she is doing and further discussion can be had there.    Adrienne Walters MD PGY6  Cardiology Fellow

## 2020-03-31 NOTE — PROGRESS NOTES
Discuss with family (Staci and her brother)  and informed them of current clinical status of patient. They would like to pursue aggressive care for patient till we know more about her mental recovery and thus would be agreeable with initiation of HD, coronary angiogram, IABP placement if needed. Full code for now.

## 2020-03-31 NOTE — PLAN OF CARE
ICU End of Shift Summary.        Changes this shift. Pt went into V-fib/Torsades about 1040. CPR initiated and pt was shocked x1. Amiodarone bolus given and continuous infusion started. Mag was 1.4, Potassium 3.3 and both were being replaced at the time. ABG and Central line placed . Levo initiated for pressures 70/40's. Able to titrate off Levo with Dobutamine initiation. Hypothermia protocol initiated at 0105 and pt cooled appropriately. Vent changes made for metabolic acidosis. Rate changed from 22 to 20. EEG monitoring throughout the night. No notable visualized seizure activity. Intermittent body jerking throughout shift. Jerking improved with propofol initiation. Versed also infusing.          Plan: Follow hypothermia protocol. Monitor EEG, electrolytes and hemodynamics.

## 2020-03-31 NOTE — PLAN OF CARE
ICU End of Shift Summary. See flowsheets for vital signs and detailed assessment.    Changes this shift: arrived from cath lab at 1640    Neuro: sedated on propofol and versed, unresponsive to stimuli. Right pupil > left per baseline.    Cardiac: IABP 1:1, titrate norepi per IABP mean. CSI notified for right arterial line MAP 55-60, ok to refer to IABP mean given noncorrelating mean pressures. Started rewarming arctic sun at 1720 per order to increase temperature 1*C every two hours. Order verified with CSI. Arctic sun alarmed at 1840 for lack of temperature increase. Warming pads repositioned, connections verified and machine function verified with charge nurse. OK to continue to monitor.    Respiratory: CMV/30%/450/16/5.     : UOP 20cc since arrival back to unit. CSI notified. Lasix given in IR, will consider dialysis tomorrow if necessary    Skin: right IABP groin site initially bleeding, manual pressure applied, dressing changed and clean, dry and intact.     Plan:  Continue rewarming 0.5*C every hour. IABP 1:1, titrate norepi per IABP mean. Tentative dialysis tomorrow, HD catheter consent signed and in the chart.

## 2020-03-31 NOTE — PROCEDURES
Procedure Date: 03/30/2020      EEG #-2.  Video EEG day #1.       DATE OF RECORDING/SERVICE DATE:  03/30/2020       SOURCE FILE DURATION:  2 hours and 7 minutes.      PATIENT INFORMATION:  An 84-year-old female who was found down for unknown period of time at home.  The patient had CPR for 30 minutes with shock.  She was reported to have a 20 Second seizure upon arrival.  EEG is being done to evaluate for seizures.     TECHNICAL SUMMARY: This video EEG monitoring procedure was performed with 23 scalp electrodes in 10-20 system placements, and additional scalp, precordial and other surface electrodes used for electrical referencing and artifact detection. Video was reviewed intermittently by EEG technologist and physician for electroclinical seizures     MEDICATIONS:  Levetiracetam load at 2000 mg at 2119, midazolam 1 mg to 3 mg per hour, propofol 10 mg to 25 mcg per kilogram per minute.      EEG FINDINGS:  The patient has a diffusely attenuated EEG with electrocerebral potentials less than 10 microvolts.  There is no parietooccipital rhythm.  There is no sleep architecture.  There is no EEG reactivity with stimulation.  The patient had a cardiac arrest and CPR was completed at 2240.  Preceding the cardiac arrest, there was no electrographic seizures nor epileptiform discharges and after the patient was resuscitated, there were no electrographic seizures nor epileptiform discharges.        EPILEPTIFORM DISCHARGES:  None.      ICTAL:  None.      IMPRESSION:  Video EEG day 1 is abnormal due to presence of diffusely attenuated EEG consistent with a profound encephalopathy.  The patient is on anesthetic drips which may account for part of the encephalopathy.  The patient had a cardiac arrest at 2240, preceding the cardiac arrest and after the cardiac arrest, there were no electrographic seizures nor epileptiform discharges.  No electrographic seizures or epileptiform discharges were seen in the entire record.   Clinical correlation is advised.         LIZZETTE JETT MD             D: 2020   T: 2020   MT: GRAY      Name:     MAE GALLOWAY   MRN:      [id number]        Account:        [id number]   :      1935           Procedure Date: 2020      Document: [id number]

## 2020-03-31 NOTE — PRE-PROCEDURE
GENERAL PRE-PROCEDURE:   Procedure:  Coronary angiogram, possible intervention, intra-aortic balloon pump placement  Date/Time:  3/31/2020 3:13 PM    Verbal consent obtained?: Yes    Written consent obtained?: No    Risks and benefits: Risks, benefits and alternatives were discussed    Consent given by:  Power of   Appropriately NPO:  Yes  ASA Class:  Class 4- Severe systemic disease, acute unstable problems  Mallampati  :  Grade 3- soft palate visible, posterior pharyngeal wall not visible  Lungs:  Lungs clear with good breath sounds bilaterally  Heart:  Normal heart sounds and rate  History & Physical reviewed:  History and physical reviewed and no updates needed  Statement of review:  I have reviewed the lab findings, diagnostic data, medications, and the plan for sedation    Daughter, Preethi, provided verbal consent via phone.    Note patient has history of rash with contrast, confirmed with daughter, will monitor, administer IV benadryl as indicated.

## 2020-04-01 NOTE — PHARMACY-CONSULT NOTE
Pharmacy Tube Feeding Consult    Medication reviewed for administration by feeding tube and for potential food/drug interactions.    Recommendation: No changes are needed at this time.     Pharmacy will continue to follow as new medications are ordered.    John Cano, SusanD

## 2020-04-01 NOTE — PROGRESS NOTES
Pt remains intubated, sedated. 1:1 IABP remains in place, hemodynamics stable, pressors weaned off, Dobutamine running as ordered by MD (see MAR). Sedation weaned off @1315 today, @1330 EEG called to notify of pt having seizure like activity, pt was found to have clonic movements, eyes with bilateral gaze upward. MD subsequently notified; verbal orders to bolus 4mg cont. IV versed, Keppra, and Lacosamide, continue propofol (see MAR), pt symptoms resolved shortly after. No other events noted, handed off care to Homa STEPHENSON.

## 2020-04-01 NOTE — PROGRESS NOTES
[facility]      CSI History and Physicial  Staci Watt MRN: [id number]  1935  Date of Admission:3/30/2020  Primary care provider: Destin Wolf      Assessment and Plan:       Staci Watt is a 84 year old female who was brought to the ED with cardiac arrest (PEA and VT/VF) requiring 1 shock and 30 minutes of CPR.     24 h events/Plan:  Appreciate EP recs  Lacosamide for seizure   Unasyn 3 g q6h   Stopped pressors  Patient rewarmed     Neuro  Sedation - weaned off sedation patient had seizure , restarted propofol and bolus versed   s/p keppra load and started on 1g BID increased today  Loaded with lacosamide   seen by neuro crit, on vEEG  Hypothermia protocol - rewarmed earlier due to QTc prolongation     Cardiac  Sudden cardiac arrest- PEA and VF  Undifferentiated shock.  Severely reduced EF Heart failure (TTE EF=25% on 3/30)  Severe Mitral regurgitation - appears more chronic in nature, but may be ischemic MR  QTc prolongation 510 at presentation, increased to >650 ms today will discontinue amiodarone and warm patient   Post arrest EKG no STEMI, trending troponin (initially negative, second minimally elevated)   Repeat arrest on the floor, V fib, in setting of profound hypomagnesemia and hypokalemia, shock x1 amio bolus with ROSC.    Stopped amiodarone infusion  Dobutamine decreased at 4 mcg/kg/min  IABP 1:1   MAP goal 65  Replete electrolytes  EP consult for QTc prolongation in setting of cardiac arrest     Pulmonary  Acute hypercapnic respiratory failure  Ventilation Mode: CMV/AC  (Continuous Mandatory Ventilation/ Assist Control)  FiO2 (%): 30 %  Rate Set (breaths/minute): 14 breaths/min  Tidal Volume Set (mL): 450 mL  PEEP (cm H2O): 5 cmH2O  Oxygen Concentration (%): 30 %  Resp: 14  Pulmonary hemorrhage with ET tube secretions, secondary to CPR most likely culprit  Zosyn empirically for PNA switched to Unasyn 3 g q6h     ID  COVID ruled out  Leukocytosis, WBC mildly elevated to  11.7 on admission. Likely reactive in setting of cardiac arrest.   Zosyn empirically (3/31 -4/1)  Unasyn (4/1 - )      Renal  Metabolic acidosis likely due to lactate   Oliguric improving   Likely ATN from cardiac arrest   No plan for HD now   Will monitor UOP    GI  Shock liver   Trend LFTs   Pantoprazole once daily   Started trickle feeds - appreciate nutritionist recs   Bowel regimen    Floor Care  Fluids: NS, will stop and use pressors as needed to maintain MAP  Food:  tube feeds, nutrition consult  Electrolyte repletion: replete, goal K>4 (careful with hypothermia), Mg > 2  Analgesia: fentanyl prn  Sedation: versed prn  DVT ppx: heparin SQ  Stress Ulcer ppx: famotidine  Glycemic Control: subcutaneous insulin  Catheters/tubes: gonzalez 3/30,   Lines: R internal jugular central line 3/30, R radial chriss 3/30  Consults: neuro, palliative care  Code Status: Full  Discharge plan: pending stability  Family: updated today    Patient discussed with attending Dr. Roland Pastor MD PGY4  Cardiology Fellow           Chief Complaint:     Worsening shortness of breath   Cardiac arrest         History of Present Illness:     Nicholas is an 84 year old female patient with PMH asthma, MGUS, CKD, HTN and HLD who brought to ED by EMS.     Patient was found down by son for an unknown time at home and EMS was called. Son reports noting clenched teeth, was unresponsive and thought to be seizing when EMS arrived. Patient had CPR for 30 min with shock x 1. Patient also reported to have a 20sec seizure upon arrival to ED. Patient intubated. S/P Versed.     Per ED report, patient was c/o about worsening shortness of breath over the last 3 days. /100, HR 120s upon arrival.  Per chart review, no prior history of cardiac disease other than HLD and HTN.    Per chart review and discussion with patient's daughter patient reported no cough or fever, noted to have gas pain in epigastric region x3 days. Patient thought  that she may have had a temp, but noted to be T96.6 last night. Patient lives with son, no recent travel.     ED:  -labs: lactate 3.6, troponin 0.03, WBC 11.7, Hgb 9.2, K 2.9, Creatinine 0.79, ALT 86, , PH 7.07, PCO2 66  -COVID pending  -interventions: 1L NS, versed drip  -imaging: CXR-cardiomegaly, stable ET tube; CT head negative          Review of Systems:      Unable to perform due to patient condition.          Past Medical History:   Medical History reviewed.   Past Medical History:   Diagnosis Date     Allergic rhinitis, cause unspecified      CKD (chronic kidney disease) stage 3, GFR 30-59 ml/min (H)      Gout, unspecified      Hyperlipidemia LDL goal <100 7/9/2004     Hypertension goal BP (blood pressure) < 140/90 7/9/2004     Iron deficiency anemia      Moderate major depression (H) 10/19/2006     Moderate persistent asthma 12/4/2005     OA (osteoarthritis) of knee 2/2/2012     Obese 2/2/2012     Retinal detachment with retinal defect, unspecified      Unspecified cataract     s/p cataract surgery             Past Surgical History:   Surgical History reviewed.   Past Surgical History:   Procedure Laterality Date     ARTHROPLASTY KNEE Right 5/7/2019    Procedure: Right Total Knee Arthroplasty;  Surgeon: Diego Hi MD;  Location: [facility] OR     BUNIONECTOMY JASWANT BILATERAL       C NONSPECIFIC PROCEDURE      (B) detatched retina     C NONSPECIFIC PROCEDURE      LASIK surgery     C NONSPECIFIC PROCEDURE      NISSA/BSO     C NONSPECIFIC PROCEDURE      Hernia     C NONSPECIFIC PROCEDURE      Tonsillectomy     C NONSPECIFIC PROCEDURE      Arthroscopic knee surgery             Social History:   Social History reviewed.  Social History     Tobacco Use     Smoking status: Never Smoker     Smokeless tobacco: Never Used     Tobacco comment: Once in awhile when goes to [employer].   Substance Use Topics     Alcohol use: Yes     Comment: has a drink every night before she goes to bed             Family History:    Family History reviewed.   Family History   Problem Relation Age of Onset     Hypertension Mother      Respiratory Mother      Breast Cancer Maternal Aunt      Respiratory Maternal Aunt      Respiratory Maternal Aunt             Allergies:     Allergies   Allergen Reactions     Contrast Dye Itching     Seasonal Allergies              Medications:   Medications Reviewed.   Current Facility-Administered Medications   Medication     artificial tears ophthalmic ointment     dextrose 10% infusion     glucose gel 15-30 g    Or     dextrose 50 % injection 25-50 mL    Or     glucagon injection 1 mg     DOBUTamine (DOBUTREX) 1,000 mg in D5W 250 mL infusion (adult max conc)     famotidine (PEPCID) infusion 20 mg     fentaNYL (PF) (SUBLIMAZE) injection 50 mcg     fentaNYL (SUBLIMAZE) infusion     heparin ANTICOAGULANT injection 5,000 Units     hydrALAZINE (APRESOLINE) injection 10 mg     insulin aspart (NovoLOG) injection (RAPID ACTING)     lacosamide (VIMPAT) 100 mg in sodium chloride 0.9 % 100 mL intermittent infusion     levETIRAcetam (KEPPRA) 2,000 mg in sodium chloride 0.9 % 250 mL intermittent infusion     lidocaine (XYLOCAINE) 2 % solution 5 mL     magnesium sulfate 2 g in water intermittent infusion     magnesium sulfate 4 g in 100 mL sterile water (premade)     medication instruction     Medication Instructions     midazolam (VERSED) drip - ADULT 100 mg/100 mL in NS PRE-MIX     multivitamins w/minerals (CERTAVITE) liquid 15 mL     naloxone (NARCAN) injection 0.1-0.4 mg     norepinephrine (LEVOPHED) 16 mg in  mL infusion     piperacillin-tazobactam (ZOSYN) 3.375 g vial to attach to  mL bag     polyethylene glycol (MIRALAX) Packet 17 g     potassium chloride (KLOR-CON) Packet 20-40 mEq     potassium chloride 10 mEq in 100 mL intermittent infusion with 10 mg lidocaine     potassium chloride 10 mEq in 100 mL sterile water intermittent infusion (premix)     potassium chloride 20 mEq in 50 mL intermittent  "infusion     potassium chloride ER (KLOR-CON M) CR tablet 20-40 mEq     propofol (DIPRIVAN) infusion    And     propofol (DIPRIVAN) injection 10 mg/mL vial     senna-docusate (SENOKOT-S/PERICOLACE) 8.6-50 MG per tablet 1 tablet     senna-docusate (SENOKOT-S/PERICOLACE) 8.6-50 MG per tablet 1 tablet    Or     senna-docusate (SENOKOT-S/PERICOLACE) 8.6-50 MG per tablet 2 tablet     sodium phosphate 15 mmol in D5W intermittent infusion     sodium phosphate 20 mmol in D5W intermittent infusion     sodium phosphate 25 mmol in D5W intermittent infusion     vasopressin (VASOSTRICT) 40 Units in sodium chloride 0.9 % 40 mL infusion     vecuronium (NORCURON) injection 5 mg     vecuronium (NORCURON) injection 5 mg             Physical Exam:     Vital signs:  Temp: 97.7  F (36.5  C)(Simultaneous filing. User may not have seen previous data.) Temp src: Esophageal   Pulse: 69 Heart Rate: 81 Resp: 14 SpO2: 100 % O2 Device: Mechanical Ventilator   Height: 154.9 cm (5' 1\") Weight: 76.2 kg (167 lb 15.9 oz)  Estimated body mass index is 31.74 kg/m  as calculated from the following:    Height as of this encounter: 1.549 m (5' 1\").    Weight as of this encounter: 76.2 kg (167 lb 15.9 oz).    Vent Settings: Ventilation Mode: CMV/AC  (Continuous Mandatory Ventilation/ Assist Control)  FiO2 (%): 30 %  Rate Set (breaths/minute): 14 breaths/min  Tidal Volume Set (mL): 450 mL  PEEP (cm H2O): 5 cmH2O  Oxygen Concentration (%): 30 %  Resp: 14    GEN: intubated, sedated  CV: RRR, no gallops, rubs, or mumurs  PULM: on mechanical ventilation, CTAB, symmetric chest rise  GI: normal bowel sounds, non-tender, no rebound tenderness or guarding, no masses  : gonzalez catheter in place  EXTREMITIES: no pedal edema, moving all extremities, peripheral pulses intact, cool  NEURO: sedated, withdraws to pain  SKIN: No rashes, sores or ulcerations         Data:     I/O last 3 completed shifts:  In: 2039.07 [I.V.:1839.07; Other:200]  Out: 1299 [Urine:1119; " Emesis/NG output:180]  Wt Readings from Last 5 Encounters:   04/01/20 76.2 kg (167 lb 15.9 oz)   09/18/19 66.7 kg (147 lb 1.6 oz)   07/01/19 68 kg (150 lb)   06/04/19 70.7 kg (155 lb 14.4 oz)   05/23/19 71.4 kg (157 lb 8 oz)     ROUTINE ICU LABS  ABG / VBG:   Recent Labs   Lab Test 04/01/20  0407 03/31/20  1543 03/31/20  1445  03/31/20  0558  03/31/20  0357   PH 7.47* 7.48* 7.49*   < >  --    < >  --    PCO2 21* 23* 23*   < >  --    < >  --    PO2 125* 112* 150*   < >  --    < >  --    O2PER 30 30.0 30   < > 35.0   < > 50.0   PHV  --   --   --   --  7.35  --  7.18*   PCO2V  --   --   --   --  27*  --  37*    < > = values in this interval not displayed.     BMP:   Recent Labs   Lab Test 04/01/20  0407 04/01/20  0130 03/31/20  2200 03/31/20  2040 03/31/20  1741 03/31/20  1543  03/31/20  0824   NA  --  141  --  140 140 140  --  140  140   POTASSIUM 4.0 4.2  --  4.3  4.3 4.3 4.2   < > 3.0*  3.0*   CHLORIDE  --  116*  --  113* 112*  --   --  113*  114*   CO2  --  15*  --  16* 16*  --   --  15*  14*   ANIONGAP  --  10  --  10 11  --   --  12  12   LACT 1.9 2.5* 2.7*  --  1.9 1.8   < > 2.6*   BUN  --  18  --  18 16  --   --  15  14   CR  --  1.16*  --  1.00 0.88  --   --  0.82  0.76   GLC  --  99  --  142* 152* 162*  --  184*  179*   ELIDA  --  7.5*  --  7.5* 7.6*  --   --  7.5*  7.4*   MAG 2.5* 2.5*  --  2.6* 2.6*  --   --  2.0   PHOS  --  3.0  --  2.4* 2.2*  --   --   --     < > = values in this interval not displayed.     Hepatic Studies:   Recent Labs   Lab Test 04/01/20 0130 03/31/20 2040 03/31/20  1741   * 296* 306*   * 194* 188*   ALKPHOS 73 74 75   BILITOTAL 1.2 1.1 1.0   ALBUMIN 2.3* 2.3* 2.3*     Heme Studies:   Recent Labs   Lab Test 04/01/20 0130 03/31/20 2040 03/31/20  1543 03/31/20  1213 03/31/20  0358  03/30/20  1550 03/11/20  1322   WBC 13.4* 13.0*  --   --  11.4*   < > 11.7* 5.2   ANEU  --   --   --   --   --   --  4.1 2.3   ALYM  --   --   --   --   --   --  6.6* 2.2   AEOS  --    --   --   --   --   --  0.4 0.1   HGB 9.3* 9.7* 10.2*  --  9.6*   < > 9.2* 9.8*   MCV 88 88  --   --  92   < > 97 89    198  --   --  187   < > 249 330   INR  --  1.83*  --  1.80* 1.90*   < >  --   --     < > = values in this interval not displayed.     Iron Studies:   Recent Labs   Lab Test 02/24/20  1049 09/18/19  1516 06/19/19  1723 04/17/19  1336 04/10/19  1437 05/02/17  1533 08/31/15  1434   IRON 20* 22* 30* 15*  --  50 48    212* 184* 261  --  336 340   IRONSAT 8* 10* 16 6*  --  15 14*   * 867* 942* 274* 307* 136  --      Urine Studies:   Recent Labs   Lab Test 01/03/19  1231   SG 1.007   URINEPH 7.0   NITRITE Negative   LEUKEST Moderate*   WBCU 2   RBCU <1   PROTEIN Negative     Microbiology:  No results found for: RS, FLUAG    IMAGING    Chest xray  IMPRESSION:  1. ET tube projects roughly 4.5 cm above the sis.  2. Cardiomegaly.  3. No acute airspace opacity.    CT head, non contrast  Impression:   1. No acute intracranial pathology.  2. Scattered periventricular white matter hypoattenuation consistent  with chronic small vessel ischemic disease.      Critical Care Services Progress Note:     Staci Watt remains critically ill with Severe MR in Shock and with Seizures.      I personally examined and evaluated the patient today.   The patient s prognosis today is grave  I have evaluated all laboratory values and imaging studies from the past 24 hours.  Key findings and decisions made today included   Appreciate EP recs  Lacosamide for seizure   Unasyn 3 g q6h   Stopped pressors  Patient rewarmed   I personally managed the ventilator, sedation, pain control and analgesia, metabolic abnormalities, antibiotic therapy, nutritional status and vasoactive medications.   Consults ongoing and ordered are none  Procedures that will happen today are: none  All treatments were placed at my direction.  I formulated today s plan with the house staff team or resident(s) and agree with the  findings and plan in the associated note.       The above plans and care have been discussed with none and all questions and concerns were addressed.     I spent a total of 45 minutes (excluding procedure time) personally providing and directing critical care services at the bedside and on the critical care unit for Staci Watt.        Phillip Watkins MD

## 2020-04-01 NOTE — PROGRESS NOTES
CLINICAL NUTRITION SERVICES - BRIEF NOTE   (see RD note on 3/31 for full assessment)    Per discussion with team, plan to start TF via OGT today.     Interventions:  - Initiate Impact peptide 1.5 @ 10 ml/hr. Adv by 15 ml q8h to goal @ 40 ml/hr.   - FWF of 30 ml q4h for tube patency   - Certavite daily   - Ensure reverse trendelenburg position while feeding    Mayi Henley RD, LD  [id number]  Pgr: [phone]

## 2020-04-01 NOTE — PLAN OF CARE
Pt sedated overnight, no response to stimuli. Weaned versed to 6. Fentanyl @100 and propofol @20. R pupil +4, L pupil +3. Both round and sluggish. EEG intact, no reported seizure activity overnight. IABP 1:1 100%. MAP >65. On dobutamine, levo, and vaso to achieve. Rewarmed at 0100, temp 37 degrees Celsius. ETT 23 at teeth. LS coarse/dimihsed and expiratory wheeze. Scant/marta/thin secretions. On CMV settings rate changed to 14 per ABG. Felder output increased now 50-100cc/hr. BS hypoactive. BS WDL.   Plan: continue to monitor and notify MD of changes.

## 2020-04-01 NOTE — CONSULTS
Electrophysiology Consultation Note   EP Attending: .   Reason for consultation: VF arrest.   Provider requesting consultation: , I Service.  Date of Service: 4/1/2020      HPI:   Ms. Watt is an 84 year old female who has a past medical history significant for asthma, MGUS, HTN, HLD, CKD III, gout, and obesity.   She was brought to [facility] via EMS after being found down at home and down time is unknown. Son reported he found her unresponsive, clenched teeth, and possibly seizing. EMS was activated. She was externally defibrillated for VF and had CPR for 30 minutes. Upon presentation to [facility] she was in sinus with good pulses. She had K 2.9 on arrival. She was intubated for airway protection. She was also reported to have a possible 20 second seizure. She has no prior cardiac history. Per family report, she had been having worsening SOB over the last 3 days prior to arrival. No cough, fevers, chills, or known infections. She was admitted to MICU. She has been on EEG monitoring and hypothermia protocol. An echo showed LVEF 20-25%, hypokinesis of basal to mid anterior, anteroseptal, inferoseptal, and apical anterior and septal segments, mild RV dilation with mod/severely reduced RV function, severe MI, moderate TI, and moderate pulmonary HTN. On 3/31/20, around 1040am she had Torsades arrest. She had external defibrillation X1 and was given IV amiodarone bolus and started on infusion. Mag 1.4, K 3.3 and bother repleted. She required levo and this was titrated off with start of dobutamine. She underwent coronary angiogram on 3/31/20 which showed normal coronary arteries and mildly elevated LV filling pressures. She had IABP placed. Current ECG has showed prolonged QT interval in setting of hypothermia. PTA ECGs demonstrated normal or borderline QT interval. Amiodarone has been stopped. She is undergoing rewarming. She remains intubated and sedated. Electrolytes stable, LFTs elevated, Scr 1.16, GFR  43. Current cardiac medications include: dobutamine and vasso gtts.     Past Medical History:   Past Medical History:   Diagnosis Date     Allergic rhinitis, cause unspecified      CKD (chronic kidney disease) stage 3, GFR 30-59 ml/min (H)      Gout, unspecified      Hyperlipidemia LDL goal <100 7/9/2004     Hypertension goal BP (blood pressure) < 140/90 7/9/2004     Iron deficiency anemia      Moderate major depression (H) 10/19/2006     Moderate persistent asthma 12/4/2005     OA (osteoarthritis) of knee 2/2/2012     Obese 2/2/2012     Retinal detachment with retinal defect, unspecified      Unspecified cataract     s/p cataract surgery     Past Surgical History:   Past Surgical History:   Procedure Laterality Date     ARTHROPLASTY KNEE Right 5/7/2019    Procedure: Right Total Knee Arthroplasty;  Surgeon: Diego Hi MD;  Location: [facility] OR     BUNIONECTOMY JASWANT BILATERAL       C NONSPECIFIC PROCEDURE      (B) detatched retina     C NONSPECIFIC PROCEDURE      LASIK surgery     C NONSPECIFIC PROCEDURE      NISSA/BSO     C NONSPECIFIC PROCEDURE      Hernia     C NONSPECIFIC PROCEDURE      Tonsillectomy     C NONSPECIFIC PROCEDURE      Arthroscopic knee surgery     Allergies: Per MAR     Allergies   Allergen Reactions     Contrast Dye Itching     Seasonal Allergies      Medications:   Per MAR current outpatient cardiovascular medications include:   Medications Prior to Admission   Medication Sig Dispense Refill Last Dose     acetaminophen (TYLENOL) 500 MG tablet Take 2 tablets (1,000 mg) by mouth 3 times daily AND 1000mg daily PRN 90 tablet 1      ADVAIR DISKUS 500-50 MCG/DOSE inhaler INHALE ONE PUFF BY MOUTH TWO TIMES A DAY 14 Inhaler 3      albuterol (PROAIR HFA/PROVENTIL HFA/VENTOLIN HFA) 108 (90 Base) MCG/ACT inhaler Inhale 2 puffs into the lungs every 4 hours as needed for shortness of breath / dyspnea or wheezing        aspirin (ASA) 81 MG EC tablet Take 1 tablet (81 mg) by mouth 2 times daily 56 tablet 0       atorvastatin (LIPITOR) 80 MG tablet TAKE ONE-HALF TABLET BY MOUTH DAILY FOR HIGH CHOLESTEROL 45 tablet 2      DULoxetine (CYMBALTA) 30 MG capsule Take 2 capsules (60 mg) by mouth daily 180 capsule 0      famotidine (PEPCID) 20 MG tablet Take 1 tablet (20 mg) by mouth 2 times daily 180 tablet 1      ferrous gluconate (FERGON) 324 (38 Fe) MG tablet Take 324 mg by mouth daily (with breakfast)        FLUoxetine (PROZAC) 20 MG capsule TAKE ONE CAPSULE BY MOUTH ONCE DAILY 90 capsule 0      lisinopril (ZESTRIL) 20 MG tablet Take 1 tablet (20 mg) by mouth daily 90 tablet 0      multivitamin w/minerals (MULTI-VITAMIN) tablet Take 1 tablet by mouth daily 30 tablet 0      vitamin B-12 (CYANOCOBALAMIN) 1000 MCG tablet Take 1,000 mcg by mouth daily        vitamin D3 (CHOLECALCIFEROL) 2000 units tablet Take 1 tablet by mouth daily 90 tablet 0      No current outpatient medications on file.     Current Facility-Administered Medications   Medication Dose Route Frequency     artificial tears   Both Eyes Q8H     enoxaparin ANTICOAGULANT  40 mg Subcutaneous Q24H     famotidine  20 mg Intravenous At Bedtime     insulin aspart  1-6 Units Subcutaneous Q4H     levETIRAcetam  1,000 mg Intravenous Q12H     piperacillin-tazobactam  3.375 g Intravenous Q6H     senna-docusate  1 tablet Oral or Feeding Tube At Bedtime     Family History:   Family History   Problem Relation Age of Onset     Hypertension Mother      Respiratory Mother      Breast Cancer Maternal Aunt      Respiratory Maternal Aunt      Respiratory Maternal Aunt      Social History:   Social History     Tobacco Use     Smoking status: Never Smoker     Smokeless tobacco: Never Used     Tobacco comment: Once in awhile when goes to [employer].   Substance Use Topics     Alcohol use: Yes     Comment: has a drink every night before she goes to bed       ROS:   Unable to obtain as patient intubated and sedated.     Physical Examination:   VITALS: BP 93/50   Pulse 65   Temp 98.4  F  "(36.9  C)   Resp 14   Ht 1.549 m (5' 1\")   Wt 76.2 kg (167 lb 15.9 oz)   SpO2 95%   BMI 31.74 kg/m    GENERAL APPEARANCE: Intubated, sedated.    HEENT: MMM.   NECK: Supple.    CHEST: CTAB   CARDIOVASCULAR: S1S2, Reg, No m/r/g.   ABDOMEN: BS+, soft, ND.   EXTREMITIES:2+ BLE edema. Distal pulses intact.   NEURO: sedated.   PSYCH: sedated.   SKIN: Warm and dry.   Data:   Labs:  BMP  Recent Labs   Lab 04/01/20  0407 04/01/20  0130 03/31/20 2040 03/31/20  1741 03/31/20  1543  03/31/20  0824   NA  --  141 140 140 140  --  140  140   POTASSIUM 4.0 4.2 4.3  4.3 4.3 4.2   < > 3.0*  3.0*   CHLORIDE  --  116* 113* 112*  --   --  113*  114*   ELIDA  --  7.5* 7.5* 7.6*  --   --  7.5*  7.4*   CO2  --  15* 16* 16*  --   --  15*  14*   BUN  --  18 18 16  --   --  15  14   CR  --  1.16* 1.00 0.88  --   --  0.82  0.76   GLC  --  99 142* 152* 162*  --  184*  179*    < > = values in this interval not displayed.     CBC  Recent Labs   Lab 04/01/20  0130 03/31/20 2040 03/31/20  1543 03/31/20  0358 03/30/20  2118   WBC 13.4* 13.0*  --  11.4* 10.5   RBC 3.37* 3.49*  --  3.49* 3.59*   HGB 9.3* 9.7* 10.2* 9.6* 9.9*   HCT 29.8* 30.6*  --  32.2* 32.9*   MCV 88 88  --  92 92   MCH 27.6 27.8  --  27.5 27.6   MCHC 31.2* 31.7  --  29.8* 30.1*   RDW 16.5* 16.3*  --  16.1* 16.0*    198  --  187 233     INR  Recent Labs   Lab 03/31/20  2040 03/31/20  1213 03/31/20  0358 03/30/20  2118   INR 1.83* 1.80* 1.90* 1.48*     No results found for: CKTOTAL, CKMB, TROPN  Cholesterol (mg/dL)   Date Value   02/24/2020 148   12/24/2018 169   01/02/2018 192   05/02/2017 205 (H)     Cholesterol/HDL Ratio (no units)   Date Value   08/31/2015 1.9   01/23/2015 2.1   04/17/2014 2.5   09/30/2013 2.4     HDL Cholesterol (mg/dL)   Date Value   02/24/2020 83   12/24/2018 77   01/02/2018 101   05/02/2017 117     LDL Cholesterol Calculated (mg/dL)   Date Value   02/24/2020 53   12/24/2018 76   01/02/2018 72   05/02/2017 72     EKG:       Tele: "         3/3020 ECHO:   Interpretation Summary  Ischemic cardiomyopathy involving the LAD territory. Severely reduced left  ventricular function is present (LVEF 20-25%). Hypokinesis of the basal to mid  anterior, anteroseptal, inferoseptal and apical anterior and septal segments.  Mild right ventricular dilation with moderately to severely reduced global  right ventricular function.  Severe mitral insufficiency is present.  Moderate tricuspid insufficiency is present.  Moderate pulmonary hypertension. Estimated pulmonary artery systolic pressure  is 45 mmHg plus right atrial pressure.    3/31/20 CORONARY ANGIOGRAM:    Left ventricular filling pressures are mildly elevated .    Angiographically normal coronary arteries.    Successful placement of a right femoral intraaortic balloon pump with the tip of the balloon at the sis.    Assessment:   Ms. Watt is an 84 year old female who has a past medical history significant for asthma, MGUS, HTN, HLD, CKD III, gout, and obesity.   She was brought to [facility] via EMS after being found down at home and down time is unknown. Son reported he found her unresponsive, clenched teeth, and possibly seizing. EMS was activated. She was externally defibrillated for VF and had CPR for 30 minutes. Upon presentation to [facility] she was in sinus with good pulses. She had K 2.9 on arrival. She was intubated for airway protection. She was also reported to have a possible 20 second seizure. She has no prior cardiac history. Per family report, she had been having worsening SOB over the last 3 days prior to arrival. No cough, fevers, chills, or known infections. She was admitted to MICU. She has been on EEG monitoring and hypothermia protocol. An echo showed LVEF 20-25%, hypokinesis of basal to mid anterior, anteroseptal, inferoseptal, and apical anterior and septal segments, mild RV dilation with mod/severely reduced RV function, severe MI, moderate TI, and moderate pulmonary HTN. On 3/31/20,  around 1040am she had Torsades arrest. She had external defibrillation X1 and was given IV amiodarone bolus and started on infusion. Mag 1.4, K 3.3 and bother repleted. She required levo and this was titrated off with start of dobutamine. She underwent coronary angiogram on 3/31/20 which showed normal coronary arteries and mildly elevated LV filling pressures. She had IABP placed. Current ECG has showed prolonged QT interval in setting of hypothermia. PTA ECGs demonstrated normal or boarderline QT interval. Amiodarone has been stopped. She is undergoing rewarming. She remains intubated and sedated. Electrolytes stable, LFTs elevated, Scr 1.16, GFR 43. Current cardiac medications include: dobutamine and vasso gtts.     EP Recommendations:  She suffered an out of Hospital VF Cardiac Arrest and in hospital recurrent VF (torsades) arrest in setting of hypothermia and long QT. She would most likely qualify for secondary prevention ICD as there does not seem to be a clear reversible cause of her arrest. Her in hospital torsades can be explained by long QT (likely induced by hypothermia and/or medications) and mild metabolic derrangements. Her QTc on presentation was borderline long and an ECG a year ago showed normal QTc. She has been on a couple PTA meds that could contribute to a longer QTc interval as well, but no major culprits. Her echo shows reduced LVEF and story of worsening SOB for days prior to arrest seem to indicate she had developed some HF process prior . Given clean coronaries on angiogram, echo  WMA distribution could show stress induced CM, but considering other factors of functional MR, moderate pulmonary HTN, dilated IVC indicated more of an underlying NICM process. We would recommend aggressive diuresis and tune up from HF standpoint. Then CMRI when further compensated to also evaluate for nidus of arrhythmia and etiology of CM. Recommend avoiding bradycardia, limiting use of QT prolonging meds, follow  ECG after rewarmed to ensure QTc interval normalizes, and electrolyte repletion. Finally, if no reversible cause found and aligned with patient's goals of care, then we would purse an ICD prior to discharge.     The patient states understanding and is agreeable with plan.   Thank you for allowing us to participate in the care of this patient.     The patient was discussed w/ Dr. Vasquez.  The above note reflects our joint plan.    JAIRON Smith CNP  Electrophysiology Consult Service  Pager: [phone]    EP STAFF NOTE  I have seen and examined the patient as part of a shared visit with PATITO Smith NP.  I agree with the note above. I reviewed today's vital signs and medications. I have reviewed and discussed with the advanced practice provider their physical examination, assessment, and plan   Briefly, overall picture in favor of new diagnosis of NICM with decomepensated HF and potential arrest, recent torsades are more related to longer QT with hypithermia  My key history/exam findings are: intubated.   The key management decisions made by me: consider ICD for secondary prevention, st0p amio, monitor QT with rewarming.    David Vasquez MD [facility]  Cardiology - Electrophysiology

## 2020-04-01 NOTE — PLAN OF CARE
OT 4E: Cx, pt not medically appropriate. Intubated, sedated, with IABP. Will initiate once pt better able to actively participate and mobilize.

## 2020-04-01 NOTE — PROCEDURES
Procedure Date: 03/31/2020      EEG #-2       DATE OF RECORDING/SERVICE DATE:  Day 2 of video EEG monitoring on 03/31/2020.       SOURCE FILE DURATION:  23 hours 55 minutes.      PATIENT INFORMATION:  An 84-year-old female who was found down for unknown period of time.  EEG is being done to evaluate for seizures.      MEDICATIONS:  Levetiracetam 1000 mg twice a day, propofol 20 mcg/kg per minute, fentanyl  mcg per hour, and Versed 3-8 mg per hour.     TECHNICAL SUMMARY: This video EEG monitoring procedure was performed with 23 scalp electrodes in 10-20 system placements, and additional scalp, precordial and other surface electrodes used for electrical referencing and artifact detection. Video was reviewed intermittently by EEG technologist and physician for electroclinical seizures     EEG FINDINGS:  The patient has a diffusely attenuated EEG with electrocerebral potentials being less than 15 microvolts.  There are segments of higher voltage intermittent delta slowing up to 15 Hz, but the majority of the record is less than 10 microvolts in amplitude.  There are some segments of alpha activity noted in the midline and frontocentral region in less than 25% of the record.  A well-formed parieto-occipital rhythm or architecture is not seen.  There are segments where there is increased electromyogenic artifact, for instance at 03:04, but in the latter half of the file, no EMG is noted.      ACTIVATION PROCEDURES:  None.      EPILEPTIFORM DISCHARGES/ICTAL:  The patient does have rapid whole body jerks.  Earlier in the file, these were not clearly correlated with generalized epileptiform discharges, rather movement artifact.  Later half of the recording, the patient is noted to have whole body jerks correlated with diffuse generalized discharge.  An example of this is at 12:30:42.  Another example is at 12:33:44.      IMPRESSION:  Video EEG day 2 is abnormal due to the presence of diffusely attenuated EEG  with electrocerebral potentials being less than 15 microvolts consistent with a profound encephalopathy.  The patient does have repetitive myoclonic jerks correlated with a generalized spike wave complex suggestive that these are seizures.  The patient is on an anesthetic drip which may contribute to encephalopathic state, however, after cardiac arrest she may have post-anoxic brain injury.  Clinical correlation is advised.         LIZZETTE JETT MD             D: 2020   T: 2020   MT: JAMEL      Name:     MAE GALLOWAY   MRN:      [id number]        Account:        [id number]   :      1935           Procedure Date: 2020      Document: [id number]

## 2020-04-01 NOTE — PROGRESS NOTES
Antimicrobial Stewardship Team Note    Antimicrobial Stewardship Program - A joint venture between [facility] and  Physicians to optimize antibiotic management.  NOT a formal consult - Restricted Antimicrobial Review     Patient: Staci Watt  MRN: [id number]  Allergies: Contrast dye and Seasonal allergies    Brief Summary: Staci Watt is an 84 year old female admitted on 3/30/20 after being found to be in VF and PEA arrest at home. Her past medical history is significant for asthma, MGUS, CKD III (baseline SCr ~0.8), HTN, gout, and obesity.     History of Present Illness:  Patient was found down for an unknown amount of time by her son. Son reported he found her unresponsive, clenched teeth, and possibly seizing. EMS was called. She was externally defibrillated for VF and had CPR for 30 minutes. On arrival to the ER, she was in sinus rhythm with good pulses. She was intubated for airway protection. Per family report, she had been having worsening shortness of breath over the last 3 days prior to arrival. No cough, fevers, chills, or known infections. She completed the hypothermia protocol and is currently being rewarmed. An echo showed LVEF 20-25% along with hypokinesis of several segments. On 3/30pm, she had a Torsades arrest (Mag 1.4, K 3.3). She had external defibrillation x1 and IV amiodarone. Coronary angiogram from earlier yesterday showed normal coronary arteries and mildly elevated LV filling pressures. She had an IABP placed. Video EEG monitoring is also suggestive of seizures with spikes correlating with diffuse body jerks, though some jerks did not correlate. CXR from admission with no acute airspace opacity. Repeat CXR with increased bilateral mixed pulmonary opacities (R>L) with findings be more suggestive of developing pulmonary edema. Sputum culture with normal josefina to date. COVID-19 resulted negative. She was empirically started on Zosyn for presumed aspiration pneumonia.         Active Anti-infective Medications   (From admission, onward)                Start     Stop    03/31/20 0700  piperacillin-tazobactam  3.375 g,   Intravenous,   EVERY 6 HOURS     Sepsis        --          Assessment: Aspiration pneumonia vs chemical pneumonitis in the setting of cardiac arrest with CPR  Difficult to discern whether patient truly has an aspiration pneumonia in comparison to chemical pneumonitis with a fluid overload component. Given patient's critical status, think it is reasonable to empirically cover for likely aspiration organisms with reports of shortness of breath prior to arrest (though likely reflective of decreased heart function) with imaging not suggestive of an acute pneumonia. Leukocytosis is may be attributable to multiple arrests with one requiring extended duration of CPR. Patient does not have risk factors that would pose an increased risk for Pseudomonas aeruginosa. These absent factors include recent hospitalizations with receipt of IV antimicrobials and history of Pseudomonas aeruginosa infection. MGUS could be a potential immunosuppressive risk factor with the potential to progress to myeloma, but overall concerns for this appear to be low. Recommend to stop Zosyn and transition to Unasyn to complete a 5 day course (stop date 4/5).    Recommendations:  Stop piperacillin-tazobactam and transition to ampicillin-sulbactam 3g IV every 6 hours (may need to be renally dose adjusted pending creatinine trend)      Discussed with ID Staff MD Paige Vargas, PharmD  Pager: [phone]      Vital Signs/Clinical Features:  Vitals         03/30 0700  -  03/31 0659 03/31 0700  -  04/01 0659 04/01 0700  -  04/01 1226   Most Recent    Temp ( F) 91.4 -  99.1    92.1 -  99.3    97.3 -  98.1     97.3 (36.3)    Pulse 79 -  115      65      69     69    Heart Rate 60 -  118    62 -  87    66 -  75     68    Resp 14 -  22    14 -  20      14     14    BP 63/49 -  166/100    93/50 -   139/77       93/50    SpO2 (%) 73 -  100    90 -  100    97 -  100     99            Labs  Estimated Creatinine Clearance: 34.3 mL/min (A) (based on SCr of 1.16 mg/dL (H)).  Recent Labs   Lab Test 03/30/20 2111 03/31/20  0358 03/31/20  0824 03/31/20  1741 03/31/20 2040 04/01/20  0130   CR 0.76 0.84 0.82  0.76 0.88 1.00 1.16*       Recent Labs   Lab Test 05/23/19  1516 06/19/19  1723 09/18/19  1516 02/24/20  1049 03/11/20  1322 03/30/20  1550  03/30/20  1731 03/30/20 2118 03/31/20  0358 03/31/20  1543 03/31/20 2040 04/01/20  0130   WBC 7.6 7.3 5.7 4.8 5.2 11.7*  --   --   --  10.5 11.4*  --  13.0* 13.4*   ANEU 4.6 4.1 2.8 2.6 2.3 4.1  --   --   --   --   --   --   --   --    ALYM 2.3 2.2 2.3 1.6 2.2 6.6*  --   --   --   --   --   --   --   --    CARLOS 0.5 0.7 0.5 0.4 0.4 0.3  --   --   --   --   --   --   --   --    AEOS 0.2 0.2 0.1 0.3 0.1 0.4  --   --   --   --   --   --   --   --    HGB 10.5* 10.1* 9.6* 9.3* 9.8* 9.2*   < > 9.5*  --  9.9* 9.6* 10.2* 9.7* 9.3*   HCT 33.1* 31.7* 30.2* 30.1* 31.5* 32.3*  --   --   --  32.9* 32.2*  --  30.6* 29.8*   MCV 97 99 95 91 89 97  --   --   --  92 92  --  88 88   * 360 404 376 330 249  --   --    < > 233 187  --  198 189    < > = values in this interval not displayed.       Recent Labs   Lab Test 03/30/20  1550 03/31/20  0358 03/31/20  0824 03/31/20  1741 03/31/20  2040 04/01/20  0130   BILITOTAL 0.5 1.3 1.1 1.0 1.1 1.2   ALKPHOS 75 82 79 75 74 73   ALBUMIN 2.6* 2.6* 2.4* 2.3* 2.3* 2.3*   * 312* 371* 306* 296* 250*   ALT 86* 164* 203* 188* 194* 189*       Recent Labs   Lab Test 06/29/12  1433 05/14/18  1533 01/03/19  1526 04/17/19  1336 06/19/19  1723 09/18/19  1516  03/31/20  1213 03/31/20  1543 03/31/20  1741 03/31/20  2200 04/01/20  0130 04/01/20  0407   LACT  --   --   --   --   --   --    < > 2.1* 1.8 1.9 2.7* 2.5* 1.9   CRP  --  53.0* 56.9*  --   --  100.0*  --   --   --   --   --   --   --    SED 18 36*  --  89* 65* 92*  --   --   --   --   --   --    --     < > = values in this interval not displayed.             Culture Results:  7-Day Micro Results       Procedure Component Value Units Date/Time    Sputum Culture Aerobic Bacterial [[id number]] Collected:  20 1029    Order Status:  Completed Lab Status:  Preliminary result Updated:  20 0758    Specimen:  Sputum      Specimen Description Sputum Endotracheal     Culture Micro Light growth  Normal josefina to date        Culture in progress    Blood culture [[id number]] Collected:  20 0919    Order Status:  Completed Lab Status:  Preliminary result Updated:  20 0718    Specimen:  Blood      Specimen Description Blood Right Hand     Culture Micro No growth after 20 hours    Methicillin Resist/Sens S. aureus PCR [] Collected:  20 0410    Order Status:  Completed Lab Status:  Final result Updated:  20 0553    Specimen:  Nares      Specimen Description Nares     Methicillin Resist/Sens S. aureus PCR Negative     Comment: MRSA Negative: SA Negative  MRSA and Staphylococcus aureus target DNA not   detected, presumed negative for MRSA and SA colonization or the number of   bacteria present may be below the limit of detection for the assay. FDA   approved assay performed using CRAZE GeneXpert(R) real-time PCR.                 Recent Labs   Lab Test 19  1231   URINEPH 7.0   NITRITE Negative   LEUKEST Moderate*   WBCU 2                         Imaging: Cardiac Catheterization    Result Date: 3/31/2020    Left ventricular filling pressures are mildly elevated .   Angiographically normal coronary arteries.   Successful placement of a right femoral intraaortic balloon pump with the tip of the balloon at the sis.      Echocardiogram Complete    Result Date: 3/30/2020  [id number]^BON^HIGINIO^NAVEED    General acute hospital Echocardiography Laboratory [address] [place] [id number]  Name: MAE GALLOWAY MRN: [id number] :  1935 Study Date: 03/30/2020 06:21 PM Age: 84 yrs Gender: Female Patient Location: [facility] Reason For Study: Cardiac Arrest Ordering Physician: HIGINIO CROCKETT Performed By: JOSÉ MIGUEL Brandt  BSA: 1.8 m2 Height: 61 in Weight: 176 lb HR: 109 BP: 148/94 mmHg _____________________________________________________________________________ __   Procedure Echocardiogram with two-dimensional, color and spectral Doppler performed. Contrast Optison. Patient was given 7 ml mixture of 3 ml Optison and 6 ml saline. 2 ml wasted. The final echo results [facility]. _____________________________________________________________________________ __   Interpretation Summary Ischemic cardiomyopathy involving the LAD territory. Severely reduced left ventricular function is present (LVEF 20-25%). Hypokinesis of the basal to mid anterior, anteroseptal, inferoseptal and apical anterior and septal segments. Mild right ventricular dilation with moderately to severely reduced global right ventricular function. Severe mitral insufficiency is present. Moderate tricuspid insufficiency is present. Moderate pulmonary hypertension. Estimated pulmonary artery systolic pressure is 45 mmHg plus right atrial pressure.  There is no prior study for direct comparison. _____________________________________________________________________________ __   Left Ventricle Mild left ventricular dilation is present. Grade II or moderate diastolic dysfunction. Severely (EF <30%) reduced left ventricular function is present. Hypokinesis of the basal to mid anterior, anteroseptal, inferoseptal and apical anterior and septal segments.  Right Ventricle Mild right ventricular dilation is present. Global right ventricular function is moderately to severely reduced.  Atria Severe left atrial enlargement is present. Moderate right atrial enlargement is present.  Mitral Valve Mild mitral annular calcification is present. Severe mitral insufficiency is  present. The cause of the mitral insufficiency appears to be altered left atrial and ventricular size and geometry.   Aortic Valve Trileaflet aortic sclerosis without stenosis.  Tricuspid Valve Moderate tricuspid insufficiency is present. Moderate pulmonary hypertension is present. Estimated pulmonary artery systolic pressure is 45 mmHg plus right atrial pressure.  Pulmonic Valve The pulmonic valve is normal. Trace pulmonic insufficiency is present.  Vessels Normal diameter aortic root and proximal ascending aorta. Unable to assess mean RA pressure given the patient is on a ventilator.  Pericardium No pericardial effusion is present.   Compared to Previous Study There is no prior study for direct comparison. _____________________________________________________________________________ __ MMode/2D Measurements & Calculations  IVSd: 0.97 cm LVIDd: 5.7 cm LVIDs: 4.8 cm LVPWd: 1.0 cm FS: 14.2 % LV mass(C)d: 224.0 grams LV mass(C)dI: 125.2 grams/m2 Ao root diam: 2.4 cm asc Aorta Diam: 2.6 cm LVOT diam: 1.8 cm LVOT area: 2.5 cm2  EF(MOD-bp): 36.3 % LA Volume (BP): 106.0 ml LA Volume Index (BP): 59.2 ml/m2  RWT: 0.37   Doppler Measurements & Calculations MV E max meño: 102.0 cm/sec MV A max meño: 94.6 cm/sec MV E/A: 1.1 MV max P.2 mmHg MV mean P.5 mmHg MV V2 VTI: 15.9 cm MV dec slope: 723.0 cm/sec2 MR ERO: 0.20 cm2 MR volume: 34.0 ml PA acc time: 0.15 sec TR max meño: 373.0 cm/sec TR max P.7 mmHg E/E' av.2 Lateral E/e': 10.4 Medial E/e': 18.0   _____________________________________________________________________________ __   Report approved by: India Ayala 2020 08:50 PM      Xr Chest Port 1 View    Result Date: 2020  Exam: XR CHEST PORT 1 VW, 2020 1:10 AM Indication: Reassess endotracheal tube and balloon pump Comparison: Chest radiograph 3/31/2020 Findings: Supine AP view of the chest. Endotracheal tube tip projects 3.3 cm above the sis. Intra-aortic balloon pump   projects just above the level of the left mainstem bronchus. Right IJ central venous catheter projects with the tip over the high right atrium. Cardiomediastinal silhouette is stable. Calcified left hilar lymph nodes. Pulmonary vasculature is indistinct. Patchy opacities in the right lung and a dense retrocardiac opacity. Small left pleural effusion. No pneumothorax. NG/OG tube tip is outside the inferior field view and below the diaphragm.     Impression: 1. Endotracheal tube tip projects 3.3 cm above the sis, and the intra-aortic balloon pump is in unchanged position with the  at the level of the left mainstem bronchus. 2. Unchanged small left pleural effusion with overlying atelectasis/consolidation. 3. Slightly improved patchy airspace opacities in the right lung, aspiration, infection, or pulmonary edema. I have personally reviewed the examination and initial interpretation and I agree with the findings. BRADLEY ACE DO    Xr Chest Port 1 View    Result Date: 3/31/2020  EXAM: XR CHEST PORT 1 VW  3/31/2020 6:25 PM HISTORY:  IABP placement   COMPARISON:  3/30/2020 FINDINGS: Single view of the chest. Intra-aortic balloon pump at T6/7, level of sis. Right IJ central venous catheter at the low SVC. Endotracheal tube tip at the low thoracic trachea, 1.4 cm from the sis. Partially visualized enteric tube. Bibasilar opacities. Small to moderate left pleural effusion. No pneumothorax. Enlarged cardiac silhouette.     IMPRESSION: 1. Intra-aortic balloon pump tip at level of sis.  Endotracheal tube tip at the low thoracic trachea within 2 cm from the sis. Consider retracting.  2. Small to moderate left pleural effusion is new or increased. Increased bibasilar subsegmental atelectasis and/or consolidation. Endotracheal tube discussed with ICU nurse Giovanni STEPHENSON at 7:07PM on 3/31/2020 by Dr. Block. I have personally reviewed the examination and initial interpretation and I agree with the findings.  JAIME HANDY MD    Xr Chest Port 1 View    Result Date: 3/31/2020  Exam: XR CHEST PORT 1 VW, 3/31/2020 12:07 AM Indication: central line placement Comparison: Chest radiograph 3/30/2020, 6/2/2010 Findings: Supine AP view of the chest. Endotracheal tube tip projects over the lower thoracic trachea, 3.8 cm above the sis. Right IJ central venous catheter tip projects in the area of the cavoatrial junction. A defibrillator pad projects over the right heart. An NG/OG tube courses below level the diaphragm and outside the inferior field of view. Cardiomediastinal mediastinal silhouette is stable. Diffuse mixed bilateral pulmonary opacities, greater in the right lung than left. No pleural effusion or pneumothorax. Upper abdomen is unremarkable.     Impression: 1. Right IJ central venous catheter projects with the tip in the area of the cavoatrial junction. Endotracheal tube tip is approximately 3.8 cm above the sis. 2. Increased bilateral mixed pulmonary opacities, greater in the right lung than left. Findings are nonspecific, but likely represents developing pulmonary edema. Alternative considerations include infection, alveolar hemorrhage, or ARDS. I have personally reviewed the examination and initial interpretation and I agree with the findings. DAVID LOCKE MD    Xr Chest Port 1 View    Result Date: 3/30/2020  EXAM: XR CHEST PORT 1 VW  3/30/2020 4:01 PM  HISTORY: post intubation COMPARISON: X-ray: June 2, 2010. FINDINGS: Single view of the chest. Tip of the ET tube projects roughly 4.5 cm above the sis. No pneumothorax. No pleural effusion. Slightly enlarged cardiomediastinal. No acute airspace opacities.     IMPRESSION: 1. ET tube projects roughly 4.5 cm above the sis. 2. Cardiomegaly. 3. No acute airspace opacity. I have personally reviewed the examination and initial interpretation and I agree with the findings. AIDAN PALUMBO MD    Xr Abdomen Port 1 View    Result Date: 3/30/2020  EXAM:  TEMPORARY  3/30/2020 8:40 PM  HISTORY: Post orogastric tube placement COMPARISON: None FINDINGS: Partially visualized enteric tube sidehole projects over the expected location of the stomach. Majority of the abdomen is collimated outside the field of view. Nonobstructive bowel gas pattern. No pneumatosis. No portal venous gas. No abnormal calcifications. No visualized masses. Visualized portions of the lung demonstrate no focal airspace opacities. Soft tissues and osseous structures are unremarkable.     IMPRESSION: Partially visualized enteric tube sidehole projecting over the expected location of the stomach. I have personally reviewed the examination and initial interpretation and I agree with the findings. OLYA RODRIGUEZ MD    Ct Head W/o Contrast    Result Date: 3/30/2020  CT HEAD W/O CONTRAST 3/30/2020 5:05 PM Provided History: Seizure, new, nontraumatic, >40 yrs Comparison: None. Technique: Using multidetector thin collimation helical acquisition technique, axial, coronal and sagittal CT images from the skull base to the vertex were obtained without intravenous contrast. Findings:  No intracranial hemorrhage. No mass effect. No midline shift. No extra-axial fluid collection. Scattered periventricular white matter hypoattenuation. No acute loss of gray-white differentiation. Ventricles are proportionate to the sulci.. No sulcal effacement.. The basal cisterns are patent. The visualized paranasal sinuses are clear. The mastoid air cells are clear. Orbits appear unremarkable. No acute fracture..     Impression: 1. No acute intracranial pathology. 2. Scattered periventricular white matter hypoattenuation consistent with chronic small vessel ischemic disease. I have personally reviewed the examination and initial interpretation and I agree with the findings. TOM LANGE MD

## 2020-04-01 NOTE — PROGRESS NOTES
Preliminary Video EEG impression on March 31, 2020 April 1, 2020 until 6 am   Full report to follow. Please look in inpatient chart, under procedure section.     EEG is abnormal due to the presences of diffusely attenuated EEG. Electro-cerebral potential are less than 15uV.  Overnight patient's EEG appears more attenuated with electrocerebral potentials being less than 5  V and there are less myoclonic seizures or jerks.  There is no  parieto-occipital rhythm or well formed sleep architecture. EEG is not reactive with stimulation.  Patient has diffuse whole body jerks correlated with generalized spike-wave complex on the EEG suggesting these may be seizures, some jerks did not have clear EEG correlate.  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  Clinical correlation is advised.      Nan Tilley MD  Staff Epilepsy Neurologist    [phone]

## 2020-04-01 NOTE — PLAN OF CARE
PT-4E- Cancel, per chart review and communication with interdisciplinary care team, pt is intubated, sedated with IABP. Not appropriate for PT at this time. Will re-assess status daily.

## 2020-04-01 NOTE — PROGRESS NOTES
Preliminary Video EEG impression on April 1, 2020 until 1:30 PM   Full report to follow. Please look in inpatient chart, under procedure section.     EEG is abnormal due to the presences of diffusely attenuated EEG. Electro-cerebral potential are less than 15uV.  As versed and propofol are reduced or stopped patient has increased generalize epileptiform discharges correlated with myoclonic jerks. These are seizures and they are occurring every 1-4 seconds. GPEDS are 0.25-2 hz in frequency. This was communicated to NICU attending.     Nan Tilley MD  Staff Epilepsy Neurologist    [phone]

## 2020-04-01 NOTE — PROGRESS NOTES
"Neuroscience Intensive Care Progress Note  2020    REASON FOR CRITICAL CARE ADMISSION: Cardiac arrest     ADMITTING PHYSICIAN: CSI     Date of Service (when I saw the patient): 20    ASSESSMENT: Staci Watt is a 84 year old female admitted on 3/30/2020 with a PMH for asthma, MGUS, CKD, HTN and HLD who was found down by son for an unknown amount of time.    Per medicine note,  \"Son reports noting clenched teeth, was unresponsive and thought to be seizing when EMS arrived. Patient had CPR for 30 min with shock x 1. Patient also reported to have a 20sec seizure upon arrival to ED.\"    24 hour events:  IABP placed, consent obtained to start dialysis.    24 Hour Vital Signs Summary:  Temperatures:  Current - Temp: 97.5  F (36.4  C); Max - Temp  Av.9  F (36.1  C)  Min: 92.5  F (33.6  C)  Max: 99.3  F (37.4  C)  Respiration range: Resp  Av  Min: 14  Max: 20  Pulse range: Pulse  Av  Min: 65  Max: 69  Blood pressure range: Systolic (24hrs), Av , Min:93 , Max:139   ; Diastolic (24hrs), Av, Min:48, Max:77    Pulse oximetry range: SpO2  Av.1 %  Min: 93 %  Max: 100 %    Ventilator Settings  Ventilation Mode: CMV/AC  (Continuous Mandatory Ventilation/ Assist Control)  FiO2 (%): 30 %  Rate Set (breaths/minute): 14 breaths/min  Tidal Volume Set (mL): 450 mL  PEEP (cm H2O): 5 cmH2O  Oxygen Concentration (%): 30 %  Resp: 14    Intake/Output Summary (Last 24 hours) at 2020 1054  Last data filed at 2020 1000  Gross per 24 hour   Intake 2054.89 ml   Output 1444 ml   Net 610.89 ml       Arterial Line BP: (104-174)/(-3-76) 174/20  MAP:  [54 mmHg-107 mmHg] 94 mmHg  BP - Mean:  [67-91] 67    Current Medications:    artificial tears   Both Eyes Q8H     enoxaparin ANTICOAGULANT  40 mg Subcutaneous Q24H     famotidine  20 mg Intravenous At Bedtime     insulin aspart  1-6 Units Subcutaneous Q4H     levETIRAcetam  1,000 mg Intravenous Q12H     piperacillin-tazobactam  3.375 g Intravenous Q6H " "    senna-docusate  1 tablet Oral or Feeding Tube At Bedtime       PRN Medications:  glucose **OR** dextrose **OR** glucagon, fentaNYL, hydrALAZINE, magnesium sulfate, magnesium sulfate, - MEDICATION INSTRUCTIONS -, - MEDICATION INSTRUCTIONS -, naloxone, polyethylene glycol, potassium chloride, potassium chloride with lidocaine, potassium chloride, potassium chloride, potassium chloride, propofol (DIPRIVAN) infusion **AND** propofol **AND** CK total **AND** Triglycerides, senna-docusate **OR** senna-docusate, sodium phosphate, sodium phosphate, sodium phosphate, vecuronium, vecuronium    Infusions:    DOBUTamine 4 mcg/kg/min (04/01/20 0900)     fentaNYL 100 mcg/hr (04/01/20 0900)     - MEDICATION INSTRUCTIONS -       - MEDICATION INSTRUCTIONS -       midazolam 3 mg/hr (04/01/20 0900)     norepinephrine Stopped (04/01/20 0915)     propofol (DIPRIVAN) infusion 20 mcg/kg/min (04/01/20 0900)     vasopressin (PITRESSIN) infusion ADULT (40 mL) 1 Units/hr (04/01/20 1036)       Allergies   Allergen Reactions     Contrast Dye Itching     Seasonal Allergies        Physical Examination:  Vitals: BP 93/50   Pulse 69   Temp 97.5  F (36.4  C)   Resp 14   Ht 1.549 m (5' 1\")   Wt 76.2 kg (167 lb 15.9 oz)   SpO2 100%   BMI 31.74 kg/m    EXAM: Examined while heavily sedated   - No spontaneous eye opening or in response to verbal or tactile stimuli  - No eye movements  - Pupils symmetric, +1mm, and reactive bilaterally  - Corneal response intact  - No clear facial asymmetry  - No cough present with deep suctioning  - Withdraws to noxious stimuli in the lower bilateral extremities. Did not withdraw to noxious stimuli in the upper bilateral extremities.       Labs/Studies:  Recent Labs   Lab Test 04/01/20  0407 04/01/20  0130 03/31/20  2040 03/31/20  1741 03/31/20  1543  03/31/20  0358   NA  --  141 140 140 140   < > 141   POTASSIUM 4.0 4.2 4.3  4.3 4.3 4.2   < > 3.7   CHLORIDE  --  116* 113* 112*  --    < > 112*   CO2  --  15* " 16* 16*  --    < > 12*   ANIONGAP  --  10 10 11  --    < > 16*   GLC  --  99 142* 152* 162*   < > 160*   BUN  --  18 18 16  --    < > 13   CR  --  1.16* 1.00 0.88  --    < > 0.84   ELIDA  --  7.5* 7.5* 7.6*  --    < > 7.7*   WBC  --  13.4* 13.0*  --   --   --  11.4*   RBC  --  3.37* 3.49*  --   --   --  3.49*   HGB  --  9.3* 9.7*  --  10.2*  --  9.6*   PLT  --  189 198  --   --   --  187    < > = values in this interval not displayed.       Recent Labs   Lab Test 20  2040 20  1213 20  0358   INR 1.83* 1.80* 1.90*   PTT 42* 38* 35       Recent Labs   Lab 20  0407 20  1543 20  1445 20  1300   PH 7.47* 7.48* 7.49* 7.57*   PCO2 21* 23* 23* 19*   PO2 125* 112* 150* 159*   HCO3 15* 17* 18* 17*   O2PER 30 30.0 30 30       Imagin. CT Head w/o contrast on 3/30/20 at 1705  Impression:   1. No acute intracranial pathology.  2. Scattered periventricular white matter hypoattenuation consistent  with chronic small vessel ischemic disease.    Assessment/Plan  Staci Watt is a 84 year old admitted on 3/30/2020 with cardiac arrest s/p hypothermia protocol and IABP placement.     Plan  Neuro:  Patient remains deeply sedated. When sedation is able to be lifted, a more accurate neurological evaluation will be possible. After sedation is lifted, if she continues to be encephalopathic or has neurological abnormalities, advanced imaging with MRI may be indicated.     Neuro:  #Cardiac arrest  #High risk for hypoxic injury  - Continue vEEG while on sedation  - Continue Levetiracetam 1000 mg q12   - Advise slow correction of high sodiums if needed  - Avoid hypotonic solutions as they may worsen cerebral edema  - When safe to do so, limitation of CNS acting medications will permit a more accurate neurological assessment  - We will continue to follow and monitor her EEG and neurologic exam, please call #[phone] with any questions      The patient was seen and discussed with the attending,   Piyush.    Petrona DE LA O, CNP  NeuroCritical Care Nurse Practitioner  [phone]

## 2020-04-02 NOTE — PROCEDURES
Procedure Date: 04/01/2020      EEG #-3       DATE OF RECORDING/SERVICE DATE:  Day 3 of video EEG monitoring on 04/01/2020.       SOURCE FILE DURATION:  23 hours 44 minutes.      PATIENT INFORMATION:  An 84-year-old female who presented with respiratory failure.  EEG is being done to evaluate for seizures.      MEDICATIONS:  Vimpat, Keppra.  Midazolam drip was stopped at 12:00.  Propofol was decreased from 20 mcg/kg per minute to 10 mcg/kg per minute.     TECHNICAL SUMMARY: This video EEG monitoring procedure was performed with 23 scalp electrodes in 10-20 system placements, and additional scalp, precordial and other surface electrodes used for electrical referencing and artifact detection. Video was reviewed intermittently by EEG technologist and physician for electroclinical seizures     EEG FINDINGS:  The patient has diffusely attenuated EEG with superimposed pseudoperiodic generalized epileptiform discharges.  There is no parieto-occipital rhythm, no sleep architecture, and EEG is not reactive.  After Midazolam was stopped at 12:00, the patient had an increase in generalized epileptiform discharges up to 3 Hz in frequency, [id number]% to 75% of the record, maximum in the bifrontal region, and the voltage of discharges is up to 40 microvolts.  The patient early in the morning and in the afternoon, some of the generalized epileptiform discharges were correlated with twitches.  However, as the day progressed, after 13:00, the patient had more persistent generalized epileptiform discharges with no clinical correlation.  Propofol was stopped temporarily at 13:24 and again, generalized periodic pattern discharges did increase up to 3 Hz.      At 13:37, discharges were generalized polyspike discharges and increased in amplitude, suggesting worsening EEG as anesthetic drip medication stopped.      At 13:35, the nurse was called and they did confirm that the patient was clinically twitching, and we can see that  on the video at 13:35 as anesthetic drip medications were held.      IMPRESSION:  Video EEG day 3 is abnormal due to presence of diffusely attenuated EEG with periodic generalized epileptiform discharges of 0.25-3 Hz in frequency.  As propofol and midazolam were reduced or held, the patient had significantly worsening myoclonic jerks, and EEG generalized periodic patterns increased up to 4 Hz in frequency.  These findings are consistent with a profound encephalopathy.  When anesthetic drip medications were stopped at 13:35, the patient did go into myoclonic status epilepticus.  Subsequently, the patient was given midazolam load and her EEG again improved with a reduction of the generalized epileptiform discharges and, clinically on the video, the patient did not have noticeable jerks.  Clinical correlation is advised.         LIZZETTE JETT MD             D: 2020   T: 2020   MT: JAMEL      Name:     MAE GALLOWAY   MRN:      [id number]        Account:        [id number]   :      1935           Procedure Date: 2020      Document: [id number]

## 2020-04-02 NOTE — PLAN OF CARE
Pt remains hemodynamically stable on pressors (vaso/dobutamine), leave propofol on @20 (MD orders). Intubated, sedated, IABP functioning properly. No acute events, continue to monitor.

## 2020-04-02 NOTE — PLAN OF CARE
Hemodynamically stable on dubutamine and vaso. No acute events this shift. See flowsheets for further assessments/interventions. Continuing to monitor.    Problem: Adult Inpatient Plan of Care  Goal: Plan of Care Review  Outcome: No Change  Goal: Patient-Specific Goal (Individualization)  Outcome: No Change  Goal: Absence of Hospital-Acquired Illness or Injury  Outcome: No Change  Goal: Optimal Comfort and Wellbeing  Outcome: No Change  Goal: Readiness for Transition of Care  Outcome: No Change  Goal: Rounds/Family Conference  Outcome: No Change

## 2020-04-02 NOTE — PROGRESS NOTES
"Neuroscience Intensive Care Progress Note  2020    REASON FOR CRITICAL CARE ADMISSION: Cardiac arrest     ADMITTING PHYSICIAN: CSI     Date of Service (when I saw the patient): 20    ASSESSMENT: Staci Watt is a 84 year old female admitted on 3/30/2020 with a PMH for asthma, MGUS, CKD, HTN and HLD who was found down by son for an unknown amount of time.     Per medicine note,  \"Son reports noting clenched teeth, was unresponsive and thought to be seizing when EMS arrived. Patient had CPR for 30 min with shock x 1. Patient also reported to have a 20sec seizure upon arrival to ED.\"    24 hour events:  Active vEEG overnight.    24 Hour Vital Signs Summary:  Temperatures:  Current - Temp: 98.1  F (36.7  C); Max - Temp  Av.3  F (36.8  C)  Min: 97.3  F (36.3  C)  Max: 99.5  F (37.5  C)  Respiration range: Resp  Av  Min: 14  Max: 14  Pulse range: No data recorded  Blood pressure range: No data recorded.  ; No data recorded.    Pulse oximetry range: SpO2  Av.7 %  Min: 95 %  Max: 100 %    Ventilator Settings  Ventilation Mode: CMV/AC  (Continuous Mandatory Ventilation/ Assist Control)  FiO2 (%): 30 %  Rate Set (breaths/minute): 14 breaths/min  Tidal Volume Set (mL): 450 mL  PEEP (cm H2O): 5 cmH2O  Oxygen Concentration (%): 30 %  Resp: 14    Intake/Output Summary (Last 24 hours) at 2020 1055  Last data filed at 2020 1000  Gross per 24 hour   Intake 2358.26 ml   Output 1407 ml   Net 951.26 ml       Arterial Line BP: ()/(11-39) 134/12  MAP:  [50 mmHg-93 mmHg] 57 mmHg    Current Medications:    ampicillin-sulbactam (UNASYN) IV  3 g Intravenous Q6H     famotidine  20 mg Intravenous At Bedtime     heparin ANTICOAGULANT  5,000 Units Subcutaneous Q12H     insulin aspart  1-6 Units Subcutaneous Q4H     lacosamide (VIMPAT) intermittent infusion  200 mg Intravenous BID     levETIRAcetam  2,000 mg Intravenous Q12H     lidocaine  5 mL Topical Once     multivitamins w/minerals  15 mL Per " "Feeding Tube Daily     senna-docusate  1 tablet Oral or Feeding Tube At Bedtime       PRN Medications:  artificial tears, dextrose, glucose **OR** dextrose **OR** glucagon, fentaNYL, hydrALAZINE, magnesium sulfate, magnesium sulfate, naloxone, polyethylene glycol, potassium chloride, potassium chloride with lidocaine, potassium chloride, potassium chloride, potassium chloride, propofol (DIPRIVAN) infusion **AND** propofol **AND** CK total **AND** Triglycerides, senna-docusate **OR** senna-docusate, sodium phosphate, sodium phosphate, sodium phosphate    Infusions:    dextrose       DOBUTamine Stopped (04/02/20 0900)     fentaNYL 50 mcg/hr (04/02/20 1000)     midazolam Stopped (04/01/20 1200)     propofol (DIPRIVAN) infusion 20 mcg/kg/min (04/02/20 1024)     vasopressin (PITRESSIN) infusion ADULT (40 mL) 0.5 Units/hr (04/02/20 1000)       Allergies   Allergen Reactions     Contrast Dye Itching     Seasonal Allergies        Physical Examination:  Vitals: BP 93/50   Pulse 69   Temp 98.1  F (36.7  C)   Resp 14   Ht 1.549 m (5' 1\")   Wt 74.8 kg (164 lb 14.5 oz)   SpO2 100%   BMI 31.16 kg/m    EXAM: Examined while heavily sedated   - No spontaneous eye opening or in response to verbal or tactile stimuli  - No eye movements  - Pupils symmetric, +2mm, and reactive bilaterally  - Corneal response intact  - No clear facial asymmetry  - No cough or gag present with deep suctioning  - Occasional slight limb movements, stimulus induced (myoclonic jerks), does not withdraw to noxious stimuli x 4     Labs/Studies:  Recent Labs   Lab Test 04/02/20  0402 04/01/20  1818 04/01/20  0407 04/01/20  0130    143  --  141   POTASSIUM 3.6 3.7 4.0 4.2   CHLORIDE 115* 116*  --  116*   CO2 16* 16*  --  15*   ANIONGAP 11 11  --  10   * 88  --  99   BUN 21 20  --  18   CR 1.27* 1.29*  --  1.16*   ELIDA 7.7* 7.5*  --  7.5*   WBC 8.1 9.4  --  13.4*   RBC 3.10* 2.98*  --  3.37*   HGB 8.4* 8.2*  --  9.3*   * 140*  --  189 "       Recent Labs   Lab Test 20  2040 20  1213 20  0358   INR 1.83* 1.80* 1.90*   PTT 42* 38* 35       Recent Labs   Lab 20  0402 20  1818 20  0407 20  1543   PH 7.40 7.46* 7.47* 7.48*   PCO2 27* 24* 21* 23*   PO2 113* 126* 125* 112*   HCO3 17* 17* 15* 17*   O2PER 30.0 30 30 30.0       Imagin. CT Head w/o contrast on 3/30/20 at 1705  Impression:   1. No acute intracranial pathology.  2. Scattered periventricular white matter hypoattenuation consistent  with chronic small vessel ischemic disease.    Assessment/Plan  Staci Watt is a 84 year old admitted on 3/30/2020 with cardiac arrest s/p hypothermia protocol and IABP placement. Now with seizures.      Plan  Neuro:  Patient remains lightly sedated. Myoclonic jerks stimulus induced seen occasionally on exam. vEEG active overnight. Propofol increased back to 20 ml/hr (please do not decrease without letting our team know). We will increase her Lacosamide dose to 200 mg BID to be started this morning. It is still to early to give a prognostication at this time and without an MRI. We will continue to monitor and treat her seizures as needed.       Neuro:  #Cardiac arrest  #High risk for hypoxic injury  - Continue vEEG  - Continue Levetiracetam 2000 mg q12  - Increase Lacosamisde to 200 mg BID  - MRI when IABP removed    - Advise slow correction of high sodiums if needed  - Avoid hypotonic solutions as they may worsen cerebral edema  - When safe to do so, limitation of CNS acting medications will permit a more accurate neurological assessment  - We will continue to follow and monitor her EEG and neurologic exam, please call #[phone] with any questions       The patient was seen and discussed with the attending, Dr. Pickett.    Petrona D ELA O, CNP  NeuroCritical Care Nurse Practitioner  [phone]

## 2020-04-02 NOTE — PROGRESS NOTES
[facility]      CSI History and Physicial  Staci Watt MRN: [id number]  1935  Date of Admission:3/30/2020  Primary care provider: Destin Wolf      Assessment and Plan:       Staci Watt is a 84 year old female who was brought to the ED with cardiac arrest (PEA and VT/VF) requiring 1 shock and 30 minutes of CPR.     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  Severely reduced EF Heart failure (TTE EF=25% on 3/30)  Severe Mitral regurgitation - appears more chronic in nature, but may be ischemic MR  QTc prolongation 510 at presentation, increased to >650 ms today will discontinue amiodarone and warm patient   Post arrest EKG no STEMI, trending troponin (initially negative, second minimally elevated)   Repeat arrest on the floor, V fib, in setting of profound hypomagnesemia and hypokalemia, shock x1 amio bolus with ROSC.    Stopped amiodarone infusion on 4/1   Dobutamine decreased to 1 mcg/kg/min  IABP 1:1   MAP goal 65  Replete electrolytes  EP consult for QTc prolongation in setting of cardiac arrest --> will need CMR and likely ICD before discharge   No plan for MR repair and CARITO pending brain prognostication    Pulmonary  Acute hypercapnic respiratory failure  Ventilation Mode: CMV/AC  (Continuous Mandatory Ventilation/ Assist  Control)  FiO2 (%): 30 %  Rate Set (breaths/minute): 14 breaths/min  Tidal Volume Set (mL): 450 mL  PEEP (cm H2O): 5 cmH2O  Oxygen Concentration (%): 30 %  Resp: 14  Pulmonary hemorrhage with ET tube secretions, secondary to CPR most likely culprit  Zosyn empirically for PNA switched to Unasyn 3 g q6h (4/1 - )      ID  COVID ruled out  Leukocytosis, WBC mildly elevated to 11.7 on admission. Likely reactive in setting of cardiac arrest.   Zosyn empirically (3/31 -4/1)  Unasyn (4/1 - )    Monitor Bx     Renal  Acute kidney injury - likely ATN from cardiac arrest  Metabolic acidosis likely due to lactate   Non oliguric  No plan for HD now   Will monitor UOP    GI  Shock liver   Trend LFTs   Pantoprazole once daily   Started trickle feeds - appreciate nutritionist recs   Bowel regimen    Floor Care  Fluids:  pressors as needed to maintain MAP  Food:  tube feeds, nutrition consult  Electrolyte repletion: replete, goal K>4 (careful with hypothermia), Mg > 2  Analgesia: fentanyl prn  Sedation: propofol gtt  DVT ppx: heparin SQ  Stress Ulcer ppx: famotidine  Glycemic Control: subcutaneous insulin  Catheters/tubes: gonzalez 3/30,   Lines: R internal jugular central line 3/30, R radial chriss 3/30  Consults: neuro, palliative care  Code Status: Full  Discharge plan: pending stability  Family: updated today    Patient discussed with attending Dr. Roland Pastor MD PGY4  Cardiology Fellow           Chief Complaint:     Worsening shortness of breath   Cardiac arrest         History of Present Illness:     Nicholas is an 84 year old female patient with PMH asthma, MGUS, CKD, HTN and HLD who brought to ED by EMS.     Patient was found down by son for an unknown time at home and EMS was called. Son reports noting clenched teeth, was unresponsive and thought to be seizing when EMS arrived. Patient had CPR for 30 min with shock x 1. Patient also reported to have a 20sec seizure upon arrival to ED. Patient intubated.  S/P Versed.     Per ED report, patient was c/o about worsening shortness of breath over the last 3 days. /100, HR 120s upon arrival.  Per chart review, no prior history of cardiac disease other than HLD and HTN.    Per chart review and discussion with patient's daughter patient reported no cough or fever, noted to have gas pain in epigastric region x3 days. Patient thought that she may have had a temp, but noted to be T96.6 last night. Patient lives with son, no recent travel.     ED:  -labs: lactate 3.6, troponin 0.03, WBC 11.7, Hgb 9.2, K 2.9, Creatinine 0.79, ALT 86, , PH 7.07, PCO2 66  -COVID pending  -interventions: 1L NS, versed drip  -imaging: CXR-cardiomegaly, stable ET tube; CT head negative          Review of Systems:      Unable to perform due to patient condition.          Past Medical History:   Medical History reviewed.   Past Medical History:   Diagnosis Date     Allergic rhinitis, cause unspecified      CKD (chronic kidney disease) stage 3, GFR 30-59 ml/min (H)      Gout, unspecified      Hyperlipidemia LDL goal <100 7/9/2004     Hypertension goal BP (blood pressure) < 140/90 7/9/2004     Iron deficiency anemia      Moderate major depression (H) 10/19/2006     Moderate persistent asthma 12/4/2005     OA (osteoarthritis) of knee 2/2/2012     Obese 2/2/2012     Retinal detachment with retinal defect, unspecified      Unspecified cataract     s/p cataract surgery             Past Surgical History:   Surgical History reviewed.   Past Surgical History:   Procedure Laterality Date     ARTHROPLASTY KNEE Right 5/7/2019    Procedure: Right Total Knee Arthroplasty;  Surgeon: Diego Hi MD;  Location: [facility] OR     BUNIONECTOMY JASWANT BILATERAL       C NONSPECIFIC PROCEDURE      (B) detatched retina     C NONSPECIFIC PROCEDURE      LASIK surgery     C NONSPECIFIC PROCEDURE      NISSA/BSO     C NONSPECIFIC PROCEDURE      Hernia     C NONSPECIFIC PROCEDURE      Tonsillectomy     C NONSPECIFIC  PROCEDURE      Arthroscopic knee surgery             Social History:   Social History reviewed.  Social History     Tobacco Use     Smoking status: Never Smoker     Smokeless tobacco: Never Used     Tobacco comment: Once in awhile when goes to [employer].   Substance Use Topics     Alcohol use: Yes     Comment: has a drink every night before she goes to bed             Family History:   Family History reviewed.   Family History   Problem Relation Age of Onset     Hypertension Mother      Respiratory Mother      Breast Cancer Maternal Aunt      Respiratory Maternal Aunt      Respiratory Maternal Aunt             Allergies:     Allergies   Allergen Reactions     Contrast Dye Itching     Seasonal Allergies              Medications:   Medications Reviewed.   Current Facility-Administered Medications   Medication     ampicillin-sulbactam (UNASYN) 3 g vial to attach to  mL bag     artificial tears ophthalmic ointment     dextrose 10% infusion     glucose gel 15-30 g    Or     dextrose 50 % injection 25-50 mL    Or     glucagon injection 1 mg     DOBUTamine (DOBUTREX) 1,000 mg in D5W 250 mL infusion (adult max conc)     famotidine (PEPCID) infusion 20 mg     fentaNYL (PF) (SUBLIMAZE) injection 50 mcg     fentaNYL (SUBLIMAZE) infusion     heparin ANTICOAGULANT injection 5,000 Units     hydrALAZINE (APRESOLINE) injection 10 mg     insulin aspart (NovoLOG) injection (RAPID ACTING)     lacosamide (VIMPAT) 200 mg in sodium chloride 0.9 % 100 mL intermittent infusion     levETIRAcetam (KEPPRA) 2,000 mg in sodium chloride 0.9 % 250 mL intermittent infusion     lidocaine (XYLOCAINE) 2 % solution 5 mL     magnesium sulfate 2 g in water intermittent infusion     magnesium sulfate 4 g in 100 mL sterile water (premade)     midazolam (VERSED) drip - ADULT 100 mg/100 mL in NS PRE-MIX     multivitamins w/minerals (CERTAVITE) liquid 15 mL     naloxone (NARCAN) injection 0.1-0.4 mg     polyethylene glycol (MIRALAX) Packet 17 g      "potassium chloride (KLOR-CON) Packet 20-40 mEq     potassium chloride (KLOR-CON) Packet 40 mEq     potassium chloride 10 mEq in 100 mL intermittent infusion with 10 mg lidocaine     potassium chloride 10 mEq in 100 mL sterile water intermittent infusion (premix)     potassium chloride 20 mEq in 50 mL intermittent infusion     potassium chloride ER (KLOR-CON M) CR tablet 20-40 mEq     propofol (DIPRIVAN) infusion    And     propofol (DIPRIVAN) injection 10 mg/mL vial     senna-docusate (SENOKOT-S/PERICOLACE) 8.6-50 MG per tablet 1 tablet     senna-docusate (SENOKOT-S/PERICOLACE) 8.6-50 MG per tablet 1 tablet    Or     senna-docusate (SENOKOT-S/PERICOLACE) 8.6-50 MG per tablet 2 tablet     sodium phosphate 15 mmol in D5W intermittent infusion     sodium phosphate 20 mmol in D5W intermittent infusion     sodium phosphate 25 mmol in D5W intermittent infusion     vasopressin (VASOSTRICT) 40 Units in sodium chloride 0.9 % 40 mL infusion             Physical Exam:     Vital signs:  Temp: 98.4  F (36.9  C) Temp src: Esophageal     Heart Rate: 73 Resp: 14 SpO2: 100 % O2 Device: Mechanical Ventilator   Height: 154.9 cm (5' 1\") Weight: 74.8 kg (164 lb 14.5 oz)  Estimated body mass index is 31.16 kg/m  as calculated from the following:    Height as of this encounter: 1.549 m (5' 1\").    Weight as of this encounter: 74.8 kg (164 lb 14.5 oz).    Vent Settings: Ventilation Mode: CMV/AC  (Continuous Mandatory Ventilation/ Assist Control)  FiO2 (%): 30 %  Rate Set (breaths/minute): 14 breaths/min  Tidal Volume Set (mL): 450 mL  PEEP (cm H2O): 5 cmH2O  Oxygen Concentration (%): 30 %  Resp: 14    GEN: intubated, sedated  CV: RRR, no gallops, rubs, or mumurs  PULM: on mechanical ventilation, CTAB, symmetric chest rise  GI: normal bowel sounds, non-tender, no rebound tenderness or guarding, no masses  : gonzalez catheter in place  EXTREMITIES: no pedal edema, moving all extremities, peripheral pulses intact, cool  NEURO: sedated, " withdraws to pain  SKIN: No rashes, sores or ulcerations         Data:     I/O last 3 completed shifts:  In: 2143.49 [I.V.:1743.49; NG/GT:120]  Out: 1727 [Urine:1577; Emesis/NG output:150]  Wt Readings from Last 5 Encounters:   04/02/20 74.8 kg (164 lb 14.5 oz)   09/18/19 66.7 kg (147 lb 1.6 oz)   07/01/19 68 kg (150 lb)   06/04/19 70.7 kg (155 lb 14.4 oz)   05/23/19 71.4 kg (157 lb 8 oz)     ROUTINE ICU LABS  ABG / VBG:   Recent Labs   Lab Test 04/02/20 0402 04/01/20 1818 04/01/20 0407 03/31/20  0558  03/31/20  0357   PH 7.40 7.46* 7.47*   < >  --    < >  --    PCO2 27* 24* 21*   < >  --    < >  --    PO2 113* 126* 125*   < >  --    < >  --    O2PER 30.0 30 30   < > 35.0   < > 50.0   PHV  --   --   --   --  7.35  --  7.18*   PCO2V  --   --   --   --  27*  --  37*    < > = values in this interval not displayed.     BMP:   Recent Labs   Lab Test 04/02/20 0402 04/01/20 1818 04/01/20 0407 04/01/20  0130  03/31/20  2040    143  --  141  --  140   POTASSIUM 3.6 3.7 4.0 4.2  --  4.3  4.3   CHLORIDE 115* 116*  --  116*  --  113*   CO2 16* 16*  --  15*  --  16*   ANIONGAP 11 11  --  10  --  10   LACT 0.9 1.1 1.9 2.5*   < >  --    BUN 21 20  --  18  --  18   CR 1.27* 1.29*  --  1.16*  --  1.00   * 88  --  99  --  142*   ELIDA 7.7* 7.5*  --  7.5*  --  7.5*   MAG 2.2 2.1 2.5* 2.5*  --  2.6*   PHOS 3.4 3.2  --  3.0  --  2.4*    < > = values in this interval not displayed.     Hepatic Studies:   Recent Labs   Lab Test 04/02/20 0402 04/01/20 1818 04/01/20 0130   * 219* 250*   * 201* 189*   ALKPHOS 75 69 73   BILITOTAL 0.7 1.0 1.2   ALBUMIN 1.9* 1.9* 2.3*     Heme Studies:   Recent Labs   Lab Test 04/02/20 0402 04/01/20 1818 04/01/20  0130 03/31/20  2040  03/31/20  1213 03/31/20  0358  03/30/20  1550 03/11/20  1322   WBC 8.1 9.4 13.4* 13.0*  --   --  11.4*   < > 11.7* 5.2   ANEU  --   --   --   --   --   --   --   --  4.1 2.3   ALYM  --   --   --   --   --   --   --   --  6.6* 2.2   AEOS  --    --   --   --   --   --   --   --  0.4 0.1   HGB 8.4* 8.2* 9.3* 9.7*   < >  --  9.6*   < > 9.2* 9.8*   MCV 90 89 88 88  --   --  92   < > 97 89   * 140* 189 198  --   --  187   < > 249 330   INR  --   --   --  1.83*  --  1.80* 1.90*   < >  --   --     < > = values in this interval not displayed.     Iron Studies:   Recent Labs   Lab Test 02/24/20  1049 09/18/19  1516 06/19/19  1723 04/17/19  1336 04/10/19  1437 05/02/17  1533 08/31/15  1434   IRON 20* 22* 30* 15*  --  50 48    212* 184* 261  --  336 340   IRONSAT 8* 10* 16 6*  --  15 14*   * 867* 942* 274* 307* 136  --      Urine Studies:   Recent Labs   Lab Test 01/03/19  1231   SG 1.007   URINEPH 7.0   NITRITE Negative   LEUKEST Moderate*   WBCU 2   RBCU <1   PROTEIN Negative     Microbiology:  No results found for: RS, FLUAG    IMAGING    Chest xray  IMPRESSION:  1. ET tube projects roughly 4.5 cm above the sis.  2. Cardiomegaly.  3. No acute airspace opacity.    CT head, non contrast  Impression:   1. No acute intracranial pathology.  2. Scattered periventricular white matter hypoattenuation consistent  with chronic small vessel ischemic disease.      Critical Care Services Progress Note:     Staci Watt remains critically ill with Seizure activity and severe MR     I personally examined and evaluated the patient today.   The patient s prognosis today is grave  I have evaluated all laboratory values and imaging studies from the past 24 hours.  Key findings and decisions made today included   Lacosamide for seizure increased   Will need and brain MRI to prognosticate   I personally managed the ventilator, sedation, pain control and analgesia, metabolic abnormalities, antibiotic therapy, nutritional status and vasoactive medications.   Consults ongoing and ordered are Neurology  Procedures that will happen today are: none  All treatments were placed at my direction.  I formulated today s plan with the house staff team or resident(s)  and agree with the findings and plan in the associated note.       The above plans and care have been discussed with none and all questions and concerns were addressed.     I spent a total of 30 minutes (excluding procedure time) personally providing and directing critical care services at the bedside and on the critical care unit for Staci Watt.        Phillip Watkins MD

## 2020-04-02 NOTE — PROGRESS NOTES
04/02/20 1200   Quick Adds   Type of Visit Initial PT Evaluation   Living Environment   Lives With child(lupe), adult   Living Environment Comment Per RN, pt lives with adult children. Pt unable to give more detail.     Self-Care   Activity/Exercise/Self-Care Comment unknown   Functional Level Prior   Prior Functional Level Comment unknown   General Information   Onset of Illness/Injury or Date of Surgery - Date 03/31/20   Referring Physician Mariela Wong MD    Pertinent History of Current Problem (include personal factors and/or comorbidities that impact the POC) 84 year old female admitted on 3/30/2020 with a PMH for asthma, MGUS, CKD, HTN and HLD who was found down by son for an unknown amount of time.   Cognitive Status Examination   Orientation not oriented to person, place or time   Level of Consciousness sedated;intubated   Follows Commands and Answers Questions unresponsive   Range of Motion (ROM)   ROM Comment limted R shoulder ER, otherwise WFL   Strength   Strength Comments unable to assess   Bed Mobility   Bed Mobility Comments unable to assess   Transfer Skills   Transfer Comments unable to assess   Gait   Gait Comments unable to assess   Balance   Balance Comments unable to assess   General Therapy Interventions   Planned Therapy Interventions bed mobility training;balance training;gait training;strengthening;stretching;ROM;risk factor education;home program guidelines;progressive activity/exercise   Clinical Impression   Criteria for Skilled Therapeutic Intervention yes, treatment indicated   PT Diagnosis impaired functional mobility   Influenced by the following impairments intubated and sedated   Functional limitations due to impairments impaired functional mobility   Clinical Presentation Unstable/Unpredictable   Clinical Presentation Rationale clinical judgement   Clinical Decision Making (Complexity) Moderate complexity   Therapy Frequency 2x/week   Predicted Duration of Therapy  "Intervention (days/wks) 2 wks   Anticipated Discharge Disposition Transitional Care Facility   Risk & Benefits of therapy have been explained Yes   Patient, Family & other staff in agreement with plan of care Yes   [facility] TM \"6 Clicks\"   2016, Trustees of [facility], under license to [employer].  All rights reserved.   6 Clicks Short Forms Basic Mobility Inpatient Short Form   [facility] AM-PAC  \"6 Clicks\" V.2 Basic Mobility Inpatient Short Form   1. Turning from your back to your side while in a flat bed without using bedrails? 1 - Total   2. Moving from lying on your back to sitting on the side of a flat bed without using bedrails? 1 - Total   3. Moving to and from a bed to a chair (including a wheelchair)? 1 - Total   4. Standing up from a chair using your arms (e.g., wheelchair, or bedside chair)? 1 - Total   5. To walk in hospital room? 1 - Total   6. Climbing 3-5 steps with a railing? 1 - Total   Basic Mobility Raw Score (Score out of 24.Lower scores equate to lower levels of function) 6   Total Evaluation Time   Total Evaluation Time (Minutes) 10     "

## 2020-04-02 NOTE — PROGRESS NOTES
Preliminary Video EEG impression on April 1, 2020 - April 2, 2020 until 6am    Full report to follow. Please look in inpatient chart, under procedure section.     EEG is abnormal due to the presences of diffusely attenuated EEG. Electro-cerebral potential are less than 15uV.  In the last 24 hours patient's EEG has worsened with increased generalized epileptiform discharges up to 3 Hz in frequency as anesthetic drip medications were lowered.  The generalized epileptiform discharges occupying nearly 50 to 75% of the record.  Yesterday April 1, 2020 in the early part of the morning patient did have myoclonic jerks correlated with the generalized epileptiform discharge.  However, late yesterday and overnight patient no longer has obvious clinical full body jerks noted on the video correlated with generalized epileptiform discharges.     At 13:35 patient's propofol was transiently stopped and midazolam was stopped.  During this time patient had an increase in generalized epileptiform discharges up to 4 Hz.  Clinically on the video and per nurse she had repetitive twitching when anesthetic medications were stopped.  During that time patient was in myoclonic status epilepticus.  She was promptly given midazolam 4 mg which showed electroclinical improvement.    EEG consistent with a profound encephalopathy with diffuse cortical irritability. The GPEDS with correlated twitches are seizures.  Generalized periodic epileptiform discharges have increased overnight, EEG is concerning for post anoxic brain injury after cardiac arrest and poor prognosis.  Clinical correlation is advised.      Nan Tilley MD  Staff Epilepsy Neurologist    [phone]

## 2020-04-02 NOTE — PLAN OF CARE
Discharge Planner PT   Patient plan for discharge: not discussed   Current status: Intubated and sedated on VC-AC, 30% FiO2, PEEP of 5 with R femoral balloon pump.  No command following.  Promoted functional ROM and joint integrity by providing PROM to BUE/BLE in all planes of motion.      Barriers to return to prior living situation: intubated, sedated, medical status  Recommendations for discharge: likely rehab  Rationale for recommendations: Needing max A for all mobility        Entered by: Yoseph Jeong 04/02/2020 1:11 PM

## 2020-04-02 NOTE — PROGRESS NOTES
B/Ps/MAPS low on monitored chriss and IABP, Cards fellow at bedside, readded Dobutamine at 5 mcg and increased vaso gtt from 2-3units/hour, will monitor closely

## 2020-04-03 NOTE — PROCEDURES
Procedure Date: 04/02/2020      EEG #-4.      DATE OF RECORDING/SERVICE DATE:  04/02/2020.      SOURCE FILE DURATION:  23 hours, 52 minutes.      PATIENT INFORMATION:  This 84-year-old female was found down for an unknown time at home.  The patient subsequently had a cardiac arrest.  EEG is being done to evaluate for seizures.      MEDICATIONS:  Vimpat, levetiracetam, propofol 20 mcg/kg per minute, fentanyl 50 mcg per hour.     TECHNICAL SUMMARY: This video EEG monitoring procedure was performed with 23 scalp electrodes in 10-20 system placements, and additional scalp, precordial and other surface electrodes used for electrical referencing and artifact detection. Video was reviewed intermittently by EEG technologist and physician for electroclinical seizures     EEG FINDINGS:  The patient has a diffusely attenuated EEG with bursts of delta slowing up to 30 microvolts noted in less than 25% of the record.  At times these delta bursts are sharply contoured and the patient does have associated shoulder jerk with these bursts.  There is no parietooccipital rhythm, no sleep architecture.  A good example of a right shoulder jerk with a burst of delta slowing is at 23:53:45.      ACTIVATION PROCEDURES:  Photic stimulation and hyperventilation were not done.  Sensory and auditory stimuli given and the patient had no EEG reactivity.  During stimulation, the patient did have right shoulder jerks correlated with low voltage delta discharge that was 5 microvolts in amplitude.      EPILEPTIFORM DISCHARGES:  The patient has low amplitude generalized epileptiform discharges.  These discharges now appear as low voltage delta slowing.  Some of the delta bursts are correlated with a right shoulder jerk.      IMPRESSION:  Video EEG day 4 is abnormal due to the presence of diffusely attenuated EEG with superimposed low voltage delta bursts that are correlated with right shoulder jerks in some instances.  EEG is consistent with  profound encephalopathy.  On this day, the patient is not in status epilepticus; the shoulder twitches correlated with a burst of delta activity, most likely representing myoclonic seizures.  Clinical correlation is advised.         LIZZETTE JETT MD             D: 2020   T: 2020   MT: LISS      Name:     MAE GALLOWAY   MRN:      [id number]        Account:        [id number]   :      1935           Procedure Date: 2020      Document: [id number]

## 2020-04-03 NOTE — PROGRESS NOTES
Preliminary Video EEG impression on April 2, 2020 - April 3, 2020 until 6am    Full report to follow. Please look in inpatient chart, under procedure section.     EEG is abnormal due to the presences of diffusely attenuated EEG with superimposed low voltage delta activity, electrocerebral potentials are less than 15  V, delta activity is noted in less than 25% of the record.  Patient rarely has right shoulder jerk correlated with burst of delta activity.  I suspect these are myoclonic seizures with a modified electroclinical presentation due to higher anesthetic medications and underlying anoxic brain injury.  Patient continues to have no parieto-occipital rhythm or sleep architecture.  EEG is not reactive.  EEG is consistent with profound encephalopathy with diffuse cortical irritability.  Burst of low voltage delta activity is correlated with right shoulder twitch which most likely represents seizures in the setting of high dose of anesthetic medications and underlying anoxic brain injury.  Clinical correlation is advised.      Nan Tilley MD  Staff Epilepsy Neurologist    [phone]

## 2020-04-03 NOTE — PROGRESS NOTES
"Neuroscience Intensive Care Progress Note  2020    REASON FOR CRITICAL CARE ADMISSION: Cardiac arrest     ADMITTING PHYSICIAN: CSI     Date of Service (when I saw the patient): 4/3/20    ASSESSMENT: Staci Watt is a 84 year old female admitted on 3/30/2020 with a PMH for asthma, MGUS, CKD, HTN and HLD who was found down by son for an unknown amount of time.     Per medicine note,  \"Son reports noting clenched teeth, was unresponsive and thought to be seizing when EMS arrived. Patient had CPR for 30 min with shock x 1. Patient also reported to have a 20sec seizure upon arrival to ED.\"    24 hour events:  No acute events overnight, plans for possibly weaning IABP    24 Hour Vital Signs Summary:  Temperatures:  Current - Temp: 99  F (37.2  C); Max - Temp  Av.2  F (37.3  C)  Min: 98.1  F (36.7  C)  Max: 99.9  F (37.7  C)  Respiration range: Resp  Av  Min: 14  Max: 14  Pulse range: No data recorded  Blood pressure range: No data recorded.  ; No data recorded.    Pulse oximetry range: SpO2  Av.4 %  Min: 93 %  Max: 100 %    Ventilator Settings  Ventilation Mode: CMV/AC  (Continuous Mandatory Ventilation/ Assist Control)  FiO2 (%): 30 %  Rate Set (breaths/minute): 14 breaths/min  Tidal Volume Set (mL): 450 mL  PEEP (cm H2O): 5 cmH2O  Oxygen Concentration (%): 30 %  Resp: 14    Intake/Output Summary (Last 24 hours) at 4/3/2020 1034  Last data filed at 4/3/2020 1000  Gross per 24 hour   Intake 2905.85 ml   Output 2175 ml   Net 730.85 ml       Arterial Line BP: (106-189)/(10-27) 140/17  MAP:  [52 mmHg-90 mmHg] 74 mmHg    Current Medications:    ampicillin-sulbactam (UNASYN) IV  3 g Intravenous Q12H     clobazam  10 mg Oral or Feeding Tube BID     famotidine  20 mg Intravenous At Bedtime     heparin ANTICOAGULANT  5,000 Units Subcutaneous Q12H     insulin aspart  1-6 Units Subcutaneous Q4H     lacosamide (VIMPAT) intermittent infusion  200 mg Intravenous BID     levETIRAcetam  2,000 mg Intravenous " "Q12H     lidocaine  5 mL Topical Once     multivitamins w/minerals  15 mL Per Feeding Tube Daily     senna-docusate  1 tablet Oral or Feeding Tube At Bedtime       PRN Medications:  artificial tears, dextrose, glucose **OR** dextrose **OR** glucagon, fentaNYL, hydrALAZINE, magnesium sulfate, magnesium sulfate, naloxone, polyethylene glycol, potassium chloride, potassium chloride with lidocaine, potassium chloride, potassium chloride, potassium chloride, propofol (DIPRIVAN) infusion **AND** propofol **AND** CK total **AND** Triglycerides, senna-docusate **OR** senna-docusate, sodium phosphate, sodium phosphate, sodium phosphate    Infusions:    dextrose       DOBUTamine Stopped (04/02/20 1800)     fentaNYL 50 mcg/hr (04/03/20 1000)     midazolam Stopped (04/01/20 1200)     norepinephrine 0.03 mcg/kg/min (04/03/20 1000)     propofol (DIPRIVAN) infusion 20 mcg/kg/min (04/03/20 1000)     vasopressin (PITRESSIN) infusion ADULT (40 mL) Stopped (04/02/20 2100)       Allergies   Allergen Reactions     Contrast Dye Itching     Seasonal Allergies        Physical Examination:  Vitals: BP 93/50   Pulse 69   Temp 99  F (37.2  C)   Resp 14   Ht 1.549 m (5' 1\")   Wt (P) 78.2 kg (172 lb 6.4 oz)   SpO2 99%   BMI (P) 32.57 kg/m    EXAM: Examined while sedated   - No spontaneous eye opening or in response to verbal or tactile stimuli  - No eye movements  - Pupils symmetric, +2mm, and reactive bilaterally  - Corneal response intact  - No clear facial asymmetry  - No cough or gag present with deep suctioning  - No llimb movements, does not withdraw to noxious stimuli x 4     Labs/Studies:  Recent Labs   Lab Test 04/03/20  0329 04/02/20  1604 04/02/20  0402    142 142   POTASSIUM 3.7 3.9 3.6   CHLORIDE 118* 118* 115*   CO2 18* 17* 16*   ANIONGAP 8 7 11   * 139* 104*   BUN 29 24 21   CR 1.21* 1.22* 1.27*   ELIDA 7.6* 7.5* 7.7*   WBC 9.0 7.4 8.1   RBC 3.17* 2.85* 3.10*   HGB 8.6* 8.0* 8.4*   * 116* 135* "       Recent Labs   Lab Test 20  2040 20  1213 20  0358   INR 1.83* 1.80* 1.90*   PTT 42* 38* 35       Recent Labs   Lab 20  0329 20  1409 20  0402 20  1818 20  0407   PH 7.40  --  7.40 7.46* 7.47*   PCO2 30*  --  27* 24* 21*   PO2 133*  --  113* 126* 125*   HCO3 19*  --  17* 17* 15*   O2PER 30 30 30.0 30 30       Imagin. CT Head w/o contrast on 3/30/20 at 1705  Impression:   1. No acute intracranial pathology.  2. Scattered periventricular white matter hypoattenuation consistent  with chronic small vessel ischemic disease.    Assessment/Plan  Staci Watt is a 84 year old admitted on 3/30/2020 with cardiac arrest s/p hypothermia protocol and IABP placement. Now with seizures.      Plan  Neuro:  Patient remains sedated. Clobazam 10 mg BID started this morning with hopes of decreasing Propofol gtt this afternoon. vEEG is not reactive and consistent with profound encephalopathy with diffuse cortical irritability suggestive of underlying anoxic brain injury. Will need MRI to help with prognostication.        Neuro:  #Cardiac arrest  #High risk for hypoxic injury  - Continue vEEG  - Levetiracetam 2000 mg q12  - Lacosamisde 200 mg BID  - Start Clobazam 10 mg BID  - Decrease Propofol to straight rate of 10 mg this afternoon, will call beside RN  - MRI when IABP removed    - Advise slow correction of high sodiums if needed  - Avoid hypotonic solutions as they may worsen cerebral edema  - We will continue to follow and monitor her EEG and neurologic exam, please call #[phone] with any questions    The patient was seen and discussed with the attending, Dr. Pickett.    Petrona DE LA O, CNP  NeuroCritical Care Nurse Practitioner  [phone]

## 2020-04-03 NOTE — PLAN OF CARE
Major Shift Events:  Pt continued on norepinephrine, blood pressure very labile. Propofol decreased per neuro crit, but they do not want propofol turned off. Pt continues to be unresponsive. EEG continued.  Plan: Continue to monitor neuro status. MD to talk with family about a plan. Possible MRI if pt stable off IABP.  For vital signs and complete assessments, please see documentation flowsheets.

## 2020-04-03 NOTE — PLAN OF CARE
Major Shift Events:  Vaso weaned off. Levo @ 0.03. K 3.7, 20mEq replaced. No observed seizure activity. IABP continues @ 1:1, 100% augmentation. Gastric residual 305mL. No change in neuro status.   Plan: Wean IABP settings. Repeat ECHO?   For vital signs and complete assessments, please see documentation flowsheets.

## 2020-04-04 NOTE — PROGRESS NOTES
"Brief Neurocritical Care Update:    Called patient's daughter Preethi.  Discussed her overall neurologic course and current EEG findings.  I told her that I was very concerned for significant neurologic injury based on the EEG findings and that I am concerned that her prognosis is poor.  We discussed that the extent of injury is not clear without a MRI but at this time she is not stable enough for a MRI due to her balloon pump.  She did not have any questions but did report that she was planning on meeting with her siblings either tomorrow or Monday on the phone with the providers.  Discussed that a member of our team will be available if they have any questions.     Madeleine Pickett MD  Vascular Neurology/Neurocritical Care  To page stroke neurology after hours or on a subsequent day, click here: [facility]  Choose \"On Call\" tab at top, then search dropdown box for \"Neurology Adult\" & press Enter, look for Neuro ICU/Stroke      "

## 2020-04-04 NOTE — PROGRESS NOTES
Preliminary Video EEG impression on April 3, 2020 to April 4, 2020 until 6 am       Full report to follow. Please look in inpatient chart, under procedure section.     EEG is abnormal due to the presences of diffusely attenuated EEG with superimposed low voltage delta activity, electrocerebral potentials are less than 15  V, delta activity is noted in less than 25% of the record.  Patient rarely has right shoulder jerk correlated with burst of delta activity.  I suspect these are myoclonic jerks with a modified electroclinical presentation due to higher anesthetic medications and underlying anoxic brain injury, per strict EEG criteria she has no epileptiform discharges or clear EEG seizures.  Patient continues to have no parieto-occipital rhythm or sleep architecture.  EEG is not reactive.  EEG is consistent with profound encephalopathy with diffuse cortical irritability. Clinical correlation is advised.      Nan Tilley MD  Staff Epilepsy Neurologist    [phone]

## 2020-04-04 NOTE — PLAN OF CARE
Major Shift Events, unresponsive, Balloon pupm in place 1:1 Augmentation, NRS, Maps >65, wide pulse pressure, propofol turned off fentanyl 50  Plan: care conference  Monday   For vital signs and complete assessments, please see documentation flowsheets.

## 2020-04-04 NOTE — PROGRESS NOTES
"Neurocritical Care Note:  S: Remains intubated and sedated.  O:  BP 93/50   Pulse 69   Temp 99.1  F (37.3  C)   Resp 14   Ht 1.549 m (5' 1\")   Wt 79.1 kg (174 lb 6.1 oz)   SpO2 99%   BMI 32.95 kg/m      Examined on 10mcg/kg/min propofol. On exam, Ms Watt is sedated and does not open her eyes to verbal, tactile, or noxious stimuli. She makes no attempts to interact with examiners or the environment. Her pupils are asymmetric, R 4mm vs L 3mm and sluggish. No eye movements are observed. No obvious facial asymmetry. No limb response to noxious stimulus. No spontaneous limb movements. Near continuous low amplitude shaking is noted involving the RUE and right shoulder. At times, abdominal muscles are activated along with the shoulder movements. These movements are exacerbated by noxious stimulus.     Imaging and labs personally reviewed in EMR. EEG remains mostly attenuated.    A/P: Ms Watt is an 83yo woman currently admitted for post-cardiac arrest cares. She had both out of hospital and in hospital PEA/VT/VF arrests on 3/30. Since that time she has been cooled and rewarmed (4/1). She has IABP in place which precludes MRI for better assessment of anoxic brain injury. Unfortunately, her exam has not improved since admission and her EEG remains quite attenuated despite being on low dose propofol. The EEG has remained stable without current evidence of status epilepticus. Her myoclonic movements have persisted despite tripe antiepileptic therapy. 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 Formal neurological prognostication is not possible at this time as she remains sedated and advanced brain imaging is not possible.    Recommendations:  - continue EEG  - increase clobazam to 20mg q12h (already ordered)  - continue lacosamide 200mg q12h  - continue levetiracetam 2000mg q12h  - we will discuss EEG findings with epileptologist " and make further recommendations regarding propofol directly with the primary team today    This patient was seen and discussed with attending neurointensivist, Dr Pickett. We will be available for family phone call or will call family independently if desired by primary team.    Timmy Schmitz DO  Neurocritical Care Fellow  Team [facility] *[id number]  04/04/2020

## 2020-04-04 NOTE — PLAN OF CARE
Major Shift Events:  No response to stimuli. Balloon pump present with 1:1 augmentation. NSR. Urine output 30-40 mL/hr. EEG present. Shaking to right arm present at times.   SonJoaquin called for update, wondering what the next step was based on what his sister had heard from the doctors yesterday. Sounded agreeable to family conference, all family can be in one place on Sunday or Monday morning.   Plan: Coordinate family conference for Sunday or Monday morning. Will continue to monitor.   For vital signs and complete assessments, please see documentation flowsheets.

## 2020-04-04 NOTE — PROGRESS NOTES
[facility]      CSI History and Physicial  Staci Watt MRN: [id number]  1935  Date of Admission:3/30/2020  Primary care provider: Destin Wolf      Assessment and Plan:       Staci Watt is a 84 year old female who was brought to the ED with cardiac arrest (PEA and VT/VF) requiring 1 shock and 30 minutes of CPR.     Plan Today (4/4/20):  On low dose inotrope and pressor - will try to wean off   Seizure management per Neurology consult - greatly appreciate assistance  Weaning of sedation per Neurology given improve seizure control      Neuro  # Encephalopathy   # Seizure  #  Concern for post anoxic brain injury   EEG consistent with profound encephalopathy with diffuse cortical irritability   Sedation - weaned off sedation patient had seizure   Weaning of sedation propofol per neurology  C/w keppra  Increased lacosamide dose per neurocritical care    seen by neuro crit, on vEEG  Hypothermia protocol - rewarmed earlier due to QTc prolongation on 3/30   Unable to perform MRI brain as patient on IABP    Cardiac  Sudden cardiac arrest- PEA and VF  Undifferentiated shock.  Severely reduced EF Heart failure (TTE EF=25% on 3/30)  Severe Mitral regurgitation - appears more chronic in nature, but may be ischemic MR  QTc prolongation 510 at presentation, increased to >650 ms today will discontinue amiodarone and warm patient   Post arrest EKG no STEMI, trending troponin (initially negative, second minimally elevated)   Repeat arrest on the floor, V fib, in setting of profound hypomagnesemia and hypokalemia, shock x1 amio bolus with ROSC.    Stopped amiodarone infusion on 4/1   Dobutamine decreased to 1 mcg/kg/min  IABP 1:1   MAP goal 65  Replete electrolytes  EP consult for QTc prolongation in setting of cardiac arrest --> will need CMR and likely ICD before discharge   No plan for MR repair and CARITO pending brain prognostication    Pulmonary  Acute hypercapnic respiratory failure  Ventilation  Mode: CMV/AC  (Continuous Mandatory Ventilation/ Assist Control)  FiO2 (%): 30 %  Rate Set (breaths/minute): 14 breaths/min  Tidal Volume Set (mL): 450 mL  PEEP (cm H2O): 5 cmH2O  Oxygen Concentration (%): 30 %  Resp: 14  Pulmonary hemorrhage with ET tube secretions, secondary to CPR most likely culprit  Zosyn empirically for PNA switched to Unasyn 3 g q6h (4/1 - )      ID  COVID ruled out  Leukocytosis, WBC mildly elevated to 11.7 on admission. Likely reactive in setting of cardiac arrest.   Zosyn empirically (3/31 -4/1)  Unasyn (4/1 - )    Monitor Bx     Renal  Acute kidney injury - likely ATN from cardiac arrest  Metabolic acidosis likely due to lactate   Non oliguric  No plan for HD now   Will monitor UOP    GI  Shock liver   Trend LFTs   Pantoprazole once daily   Started trickle feeds - appreciate nutritionist recs   Bowel regimen    Floor Care  Fluids:  pressors as needed to maintain MAP  Food:  tube feeds, nutrition consult  Electrolyte repletion: replete, goal K>4 (careful with hypothermia), Mg > 2  Analgesia: fentanyl prn  Sedation: propofol gtt  DVT ppx: heparin SQ  Stress Ulcer ppx: famotidine  Glycemic Control: subcutaneous insulin  Catheters/tubes: gonzalez 3/30,   Lines: R internal jugular central line 3/30, R radial chriss 3/30  Consults: neuro, palliative care  Code Status: Full  Discharge plan: pending stability  Family: updated today    Patient discussed with attending Dr. Roland Grijalva M.D.  Cardiology Fellow           Chief Complaint:     Worsening shortness of breath   Cardiac arrest        Subjective:     Overnight, no acute overnight events. No responding to commands.    ED:  -labs: lactate 3.6, troponin 0.03, WBC 11.7, Hgb 9.2, K 2.9, Creatinine 0.79, ALT 86, , PH 7.07, PCO2 66  -COVID pending  -interventions: 1L NS, versed drip  -imaging: CXR-cardiomegaly, stable ET tube; CT head negative          Review of Systems:      Unable to perform due to patient condition.           Past Medical History:   Medical History reviewed.   Past Medical History:   Diagnosis Date     Allergic rhinitis, cause unspecified      CKD (chronic kidney disease) stage 3, GFR 30-59 ml/min (H)      Gout, unspecified      Hyperlipidemia LDL goal <100 7/9/2004     Hypertension goal BP (blood pressure) < 140/90 7/9/2004     Iron deficiency anemia      Moderate major depression (H) 10/19/2006     Moderate persistent asthma 12/4/2005     OA (osteoarthritis) of knee 2/2/2012     Obese 2/2/2012     Retinal detachment with retinal defect, unspecified      Unspecified cataract     s/p cataract surgery             Past Surgical History:   Surgical History reviewed.   Past Surgical History:   Procedure Laterality Date     ARTHROPLASTY KNEE Right 5/7/2019    Procedure: Right Total Knee Arthroplasty;  Surgeon: Diego Hi MD;  Location: [facility] OR     BUNIONECTOMY JASWANT BILATERAL       C NONSPECIFIC PROCEDURE      (B) detatched retina     C NONSPECIFIC PROCEDURE      LASIK surgery     C NONSPECIFIC PROCEDURE      NISSA/BSO     C NONSPECIFIC PROCEDURE      Hernia     C NONSPECIFIC PROCEDURE      Tonsillectomy     C NONSPECIFIC PROCEDURE      Arthroscopic knee surgery     CV CORONARY ANGIOGRAM  3/31/2020    Procedure: CV CORONARY ANGIOGRAM;  Surgeon: Phillip Watkins MD;  Location:  HEART CARDIAC CATH LAB     CV INTRA-AORTIC BALLOON PUMP INSERTION N/A 3/31/2020    Procedure: Intra-Aortic Balloon Pump Insertion;  Surgeon: Phillip Watkins MD;  Location:  HEART CARDIAC CATH LAB     CV LEFT HEART CATH N/A 3/31/2020    Procedure: Left Heart Cath;  Surgeon: Phillip Watkins MD;  Location: [facility]             Social History:   Social History reviewed.  Social History     Tobacco Use     Smoking status: Never Smoker     Smokeless tobacco: Never Used     Tobacco comment: Once in awhile when goes to "[employer].   Substance Use Topics     Alcohol use: Yes     Comment: has a drink every night before she goes  to bed             Family History:   Family History reviewed.   Family History   Problem Relation Age of Onset     Hypertension Mother      Respiratory Mother      Breast Cancer Maternal Aunt      Respiratory Maternal Aunt      Respiratory Maternal Aunt             Allergies:     Allergies   Allergen Reactions     Contrast Dye Itching     Seasonal Allergies              Medications:   Medications Reviewed.   Current Facility-Administered Medications   Medication     ampicillin-sulbactam (UNASYN) 3 g vial to attach to  mL bag     artificial tears ophthalmic ointment     clobazam ((ONFI)) suspension 20 mg     dextrose 10% infusion     glucose gel 15-30 g    Or     dextrose 50 % injection 25-50 mL    Or     glucagon injection 1 mg     famotidine (PEPCID) infusion 20 mg     fentaNYL (PF) (SUBLIMAZE) injection 50 mcg     fentaNYL (SUBLIMAZE) infusion     heparin ANTICOAGULANT injection 5,000 Units     hydrALAZINE (APRESOLINE) injection 10 mg     insulin aspart (NovoLOG) injection (RAPID ACTING)     lacosamide (VIMPAT) 200 mg in sodium chloride 0.9 % 100 mL intermittent infusion     levETIRAcetam (KEPPRA) 2,000 mg in sodium chloride 0.9 % 250 mL intermittent infusion     lidocaine (XYLOCAINE) 2 % solution 5 mL     magnesium sulfate 2 g in water intermittent infusion     magnesium sulfate 4 g in 100 mL sterile water (premade)     multivitamins w/minerals (CERTAVITE) liquid 15 mL     naloxone (NARCAN) injection 0.1-0.4 mg     norepinephrine (LEVOPHED) 16 mg in  mL infusion     polyethylene glycol (MIRALAX) Packet 17 g     potassium chloride (KLOR-CON) Packet 20-40 mEq     potassium chloride 10 mEq in 100 mL intermittent infusion with 10 mg lidocaine     potassium chloride 10 mEq in 100 mL sterile water intermittent infusion (premix)     potassium chloride 20 mEq in 50 mL intermittent infusion     potassium chloride ER (KLOR-CON M) CR tablet 20-40 mEq     propofol (DIPRIVAN) infusion    And     propofol  "(DIPRIVAN) injection 10 mg/mL vial     senna-docusate (SENOKOT-S/PERICOLACE) 8.6-50 MG per tablet 1 tablet     senna-docusate (SENOKOT-S/PERICOLACE) 8.6-50 MG per tablet 1 tablet    Or     senna-docusate (SENOKOT-S/PERICOLACE) 8.6-50 MG per tablet 2 tablet     sodium phosphate 15 mmol in D5W intermittent infusion     sodium phosphate 20 mmol in D5W intermittent infusion     sodium phosphate 25 mmol in D5W intermittent infusion             Physical Exam:     Vital signs:  Temp: 99.1  F (37.3  C) Temp src: Esophageal     Heart Rate: 75 Resp: 14 SpO2: 99 % O2 Device: Mechanical Ventilator   Height: 154.9 cm (5' 1\") Weight: 79.1 kg (174 lb 6.1 oz)  Estimated body mass index is 32.95 kg/m  as calculated from the following:    Height as of this encounter: 1.549 m (5' 1\").    Weight as of this encounter: 79.1 kg (174 lb 6.1 oz).    Vent Settings: Ventilation Mode: CMV/AC  (Continuous Mandatory Ventilation/ Assist Control)  FiO2 (%): 30 %  Rate Set (breaths/minute): 14 breaths/min  Tidal Volume Set (mL): 450 mL  PEEP (cm H2O): 5 cmH2O  Oxygen Concentration (%): 30 %  Resp: 14    GEN: intubated, sedated  CV: RRR, no gallops, rubs, or mumurs  PULM: on mechanical ventilation, CTAB, symmetric chest rise  GI: normal bowel sounds, non-tender, no rebound tenderness or guarding, no masses  : gonzalez catheter in place  EXTREMITIES: no pedal edema, moving all extremities, peripheral pulses intact, cool  NEURO: sedated, withdraws to pain  SKIN: No rashes, sores or ulcerations         Data:     I/O last 3 completed shifts:  In: 2754.94 [I.V.:1524.94; NG/GT:310]  Out: 1125 [Urine:1125]  Wt Readings from Last 5 Encounters:   04/04/20 79.1 kg (174 lb 6.1 oz)   09/18/19 66.7 kg (147 lb 1.6 oz)   07/01/19 68 kg (150 lb)   06/04/19 70.7 kg (155 lb 14.4 oz)   05/23/19 71.4 kg (157 lb 8 oz)     ROUTINE ICU LABS  ABG / VBG:   Recent Labs   Lab Test 04/04/20  0328 04/03/20  0329 04/02/20  1409 04/02/20  0402  03/31/20  0558   PH 7.37 7.40  --  " 7.40   < >  --    PCO2 30* 30*  --  27*   < >  --    PO2 120* 133*  --  113*   < >  --    O2PER 30 30 30 30.0   < > 35.0   PHV  --   --  7.35  --   --  7.35   PCO2V  --   --  34*  --   --  27*    < > = values in this interval not displayed.     BMP:   Recent Labs   Lab Test 04/04/20 0328 04/03/20 1511 04/03/20 0329 04/02/20  1604   * 145* 144 142   POTASSIUM 4.1 3.8 3.7 3.9   CHLORIDE 122* 121* 118* 118*   CO2 19* 18* 18* 17*   ANIONGAP 5 6 8 7   LACT 0.7 0.7 1.0 1.0   BUN 38* 33* 29 24   CR 1.06* 1.16* 1.21* 1.22*   * 127* 124* 139*   ELIDA 7.8* 7.7* 7.6* 7.5*   MAG 2.1  --  1.8  --    PHOS 3.2  --  2.9  --      Hepatic Studies:   Recent Labs   Lab Test 04/04/20 0328 04/03/20 1511 04/03/20  0329   AST 80* 110* 150*   * 173* 202*   ALKPHOS 87 85 87   BILITOTAL 0.5 0.5 0.6   ALBUMIN 1.8* 1.9* 2.0*     Heme Studies:   Recent Labs   Lab Test 04/04/20 0328 04/03/20  1511 04/03/20 0329 03/31/20  2040  03/31/20  1213 03/31/20  0358  03/30/20  1550 03/11/20  1322   WBC 9.7 8.9 9.0   < > 13.0*  --   --  11.4*   < > 11.7* 5.2   ANEU  --   --   --   --   --   --   --   --   --  4.1 2.3   ALYM  --   --   --   --   --   --   --   --   --  6.6* 2.2   AEOS  --   --   --   --   --   --   --   --   --  0.4 0.1   HGB 8.1* 8.4* 8.6*   < > 9.7*   < >  --  9.6*   < > 9.2* 9.8*   MCV 93 92 91   < > 88  --   --  92   < > 97 89   * 117* 128*   < > 198  --   --  187   < > 249 330   INR  --   --   --   --  1.83*  --  1.80* 1.90*   < >  --   --     < > = values in this interval not displayed.     Iron Studies:   Recent Labs   Lab Test 02/24/20  1049 09/18/19  1516 06/19/19  1723 04/17/19  1336 04/10/19  1437 05/02/17  1533 08/31/15  1434   IRON 20* 22* 30* 15*  --  50 48    212* 184* 261  --  336 340   IRONSAT 8* 10* 16 6*  --  15 14*   * 867* 942* 274* 307* 136  --      Urine Studies:   Recent Labs   Lab Test 01/03/19  1231   SG 1.007   URINEPH 7.0   NITRITE Negative   LEUKEST Moderate*   WBCU  2   RBCU <1   PROTEIN Negative     Microbiology:  No results found for: RS, FLUAG    IMAGING    Chest xray  IMPRESSION:  1. ET tube projects roughly 4.5 cm above the sis.  2. Cardiomegaly.  3. No acute airspace opacity.    CT head, non contrast  Impression:   1. No acute intracranial pathology.  2. Scattered periventricular white matter hypoattenuation consistent  with chronic small vessel ischemic disease.      Critical Care Services Progress Note:     Staci Watt remains critically ill with Cardiomyopathy, Severe MR and Seizures.      I personally examined and evaluated the patient today.   The patient s prognosis today is grave  I have evaluated all laboratory values and imaging studies from the past 24 hours.  Key findings and decisions made today included .  On low dose inotrope and pressor - will try to wean off   Seizure management per Neurology consult   Weaning of sedation   I personally managed the ventilator, sedation, pain control and analgesia, metabolic abnormalities, antibiotic therapy, nutritional status and vasoactive medications.   Consults ongoing and ordered are Neurology  Procedures that will happen today are: none  All treatments were placed at my direction.  I formulated today s plan with the house staff team or resident(s) and agree with the findings and plan in the associated note.       The above plans and care have been discussed with none and all questions and concerns were addressed.     I spent a total of 60 minutes (excluding procedure time) personally providing and directing critical care services at the bedside and on the critical care unit for Staci Watt.        Phillip Watkins MD

## 2020-04-04 NOTE — PROCEDURES
Procedure Date: 04/03/2020      EEG #-5      Video EEG day 5      DATE OF RECORDING/SERVICE DATE:  04/03/2020      SOURCE FILE DURATION:  23 hours and 44 minutes.      PATIENT INFORMATION:  An 84-year-old female who was found down for unknown period of time and had CPR for 30 minutes with 1 shock.  The patient was noted to have a seizure upon arrival.  EEG is being done to evaluate for seizures.      MEDICATIONS:  Onfi 10 mg twice a day, started at 0957, Vimpat 200 mg twice a day, Keppra 2000 mg twice a day, fentanyl 50 mcg.  The patient is on fentanyl drip 50 mcg per hour and propofol 10 to 20 mcg per kilogram per minute.  Propofol was decreased to 10 mcg per kilogram per minute at 1400 on 04/03/2020.     TECHNICAL SUMMARY: This video EEG monitoring procedure was performed with 23 scalp electrodes in 10-20 system placements, and additional scalp, precordial and other surface electrodes used for electrical referencing and artifact detection. Video was reviewed intermittently by EEG technologist and physician for electroclinical seizures.        EEG FINDINGS:  The patient has diffusely attenuated EEG with electrocerebral potentials up to less than 20 microvolts.  Burst of delta activity is seen in less than 30% of the record.  In between the patient's EEG is attenuated with electrocerebral potentials less than 5 microvolts.  Some of the delta bursts are correlated with right shoulder twitching.  A good example of this is at 1833.  As propofol was decreased, the patient had more visible shoulder twitches correlated with low voltage delta slowing.  Clear generalized epileptiform discharges or electrographic seizures are not seen in the record on this day.      ACTIVATION PROCEDURES:  Auditory and sensory stimuli are applied.  EEG is not reactive.      IMPRESSION:  Video EEG day 5 is abnormal due to the presence of a diffusely attenuated EEG with burst of low voltage delta activity.  As midazolam was decreased,  the patient had increased motor twitching and right upper extremity twitching and tremoring in the jaw.  Some of these shoulder jerks are correlated with delta activity.  Clear electrographic seizures are not noted on the EEG. Clinical correlation is advised.         LIZZETTE JETT MD             D: 2020   T: 2020   MT: GLENN      Name:     MAE GALLOWAY   MRN:      [id number]        Account:        [id number]   :      1935           Procedure Date: 2020      Document: [id number]

## 2020-04-05 NOTE — PLAN OF CARE
Major Shift Events:  No change in neuo checks. See FS. Stopped propofol per Neurocrit to assess neuro status but increase in myoclonic jerking on Right side w/subsequent increase in SBP into 170s. Propofol restarted. IABP 1:1. Paused w/fellow and MAPs dropped down to 50s. Continue to monitor. Turned q2hr. UO good, BS+, BM-.     Plan: Continue to monitor. Contact [facility] w/changes. Possible care conference tomorrow.   For vital signs and complete assessments, please see documentation flowsheets.

## 2020-04-05 NOTE — PROGRESS NOTES
[facility]      CSI History and Physicial  Staci Watt MRN: [id number]  1935  Date of Admission:3/30/2020  Primary care provider: Destin Wolf      Assessment and Plan:       Staci Watt is a 84 year old female who was brought to the ED with cardiac arrest (PEA and VT/VF) requiring 1 shock and 30 minutes of CPR.     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  Severely reduced EF Heart failure (TTE EF=25% on 3/30)  Severe Mitral regurgitation - appears more chronic in nature, but may be ischemic MR  QTc prolongation 510 at presentation, increased to >650 ms today will discontinue amiodarone and warm patient   Post arrest EKG no STEMI, trending troponin (initially negative, second minimally elevated)   Repeat arrest on the floor, V fib, in setting of profound hypomagnesemia and hypokalemia, shock x1 amio bolus with ROSC.    Stopped amiodarone infusion on 4/1   Dobutamine decreased to 1 mcg/kg/min  IABP 1:1   MAP goal 65  Replete electrolytes  EP consult for QTc prolongation in setting of cardiac arrest --> will need CMR and likely ICD before discharge   No plan for MR repair and CARITO pending brain prognostication    Pulmonary  Acute hypercapnic respiratory failure  Ventilation Mode: CMV/AC  (Continuous Mandatory Ventilation/  Assist Control)  FiO2 (%): 30 %  Rate Set (breaths/minute): 14 breaths/min  Tidal Volume Set (mL): 450 mL  PEEP (cm H2O): 5 cmH2O  Oxygen Concentration (%): 30 %  Resp: 14  Pulmonary hemorrhage with ET tube secretions, secondary to CPR most likely culprit  Zosyn empirically for PNA switched to Unasyn 3 g q6h (4/1 - )      ID  COVID ruled out  Leukocytosis, WBC mildly elevated to 11.7 on admission. Likely reactive in setting of cardiac arrest.   Zosyn empirically (3/31 -4/1)  Unasyn (4/1 - )    Monitor Bx     Renal  Acute kidney injury - likely ATN from cardiac arrest  Metabolic acidosis likely due to lactate   Non oliguric  No plan for HD now   Will monitor UOP    GI  Shock liver   Trend LFTs   Pantoprazole once daily   Started trickle feeds - appreciate nutritionist recs   Bowel regimen    Floor Care  Fluids:  pressors as needed to maintain MAP  Food:  tube feeds, nutrition consult  Electrolyte repletion: replete, goal K>4 (careful with hypothermia), Mg > 2  Analgesia: fentanyl prn  Sedation: propofol gtt  DVT ppx: heparin SQ  Stress Ulcer ppx: famotidine  Glycemic Control: subcutaneous insulin  Catheters/tubes: gonzalez 3/30,   Lines: R internal jugular central line 3/30, R radial chriss 3/30  Consults: neuro, palliative care  Code Status: Full  Discharge plan: pending stability  Family: updated today    Patient discussed with attending Dr. Roland Grijalva M.D.  Cardiology Fellow           Chief Complaint:     Worsening shortness of breath   Cardiac arrest        Subjective:     Overnight, patient had recurrence of seizures    ED:  -labs: lactate 3.6, troponin 0.03, WBC 11.7, Hgb 9.2, K 2.9, Creatinine 0.79, ALT 86, , PH 7.07, PCO2 66  -COVID pending  -interventions: 1L NS, versed drip  -imaging: CXR-cardiomegaly, stable ET tube; CT head negative          Review of Systems:      Unable to perform due to patient condition.          Past Medical History:   Medical History reviewed.   Past Medical  History:   Diagnosis Date     Allergic rhinitis, cause unspecified      CKD (chronic kidney disease) stage 3, GFR 30-59 ml/min (H)      Gout, unspecified      Hyperlipidemia LDL goal <100 7/9/2004     Hypertension goal BP (blood pressure) < 140/90 7/9/2004     Iron deficiency anemia      Moderate major depression (H) 10/19/2006     Moderate persistent asthma 12/4/2005     OA (osteoarthritis) of knee 2/2/2012     Obese 2/2/2012     Retinal detachment with retinal defect, unspecified      Unspecified cataract     s/p cataract surgery             Past Surgical History:   Surgical History reviewed.   Past Surgical History:   Procedure Laterality Date     ARTHROPLASTY KNEE Right 5/7/2019    Procedure: Right Total Knee Arthroplasty;  Surgeon: Diego Hi MD;  Location: [facility] OR     BUNIONECTOMY JASWANT BILATERAL       C NONSPECIFIC PROCEDURE      (B) detatched retina     C NONSPECIFIC PROCEDURE      LASIK surgery     C NONSPECIFIC PROCEDURE      NISSA/BSO     C NONSPECIFIC PROCEDURE      Hernia     C NONSPECIFIC PROCEDURE      Tonsillectomy     C NONSPECIFIC PROCEDURE      Arthroscopic knee surgery     CV CORONARY ANGIOGRAM  3/31/2020    Procedure: CV CORONARY ANGIOGRAM;  Surgeon: Phillip Watkins MD;  Location:  HEART CARDIAC CATH LAB     CV INTRA-AORTIC BALLOON PUMP INSERTION N/A 3/31/2020    Procedure: Intra-Aortic Balloon Pump Insertion;  Surgeon: Phillip Watkins MD;  [facility]: [facility]     CV LEFT HEART CATH N/A 3/31/2020    Procedure: Left Heart Cath;  Surgeon: Phillip Watkins MD;  Location:  HEART CARDIAC CATH LAB             Social History:   Social History reviewed.  Social History     Tobacco Use     Smoking status: Never Smoker     Smokeless tobacco: Never Used     Tobacco comment: Once in awhile when goes to [employer].   Substance Use Topics     Alcohol use: Yes     Comment: has a drink every night before she goes to bed             Family History:   Family History reviewed.    Family History   Problem Relation Age of Onset     Hypertension Mother      Respiratory Mother      Breast Cancer Maternal Aunt      Respiratory Maternal Aunt      Respiratory Maternal Aunt             Allergies:     Allergies   Allergen Reactions     Contrast Dye Itching     Seasonal Allergies              Medications:   Medications Reviewed.   Current Facility-Administered Medications   Medication     artificial tears ophthalmic ointment     clobazam ((ONFI)) suspension 20 mg     dextrose 10% infusion     glucose gel 15-30 g    Or     dextrose 50 % injection 25-50 mL    Or     glucagon injection 1 mg     famotidine (PEPCID) infusion 20 mg     fentaNYL (PF) (SUBLIMAZE) injection 50 mcg     fentaNYL (SUBLIMAZE) infusion     heparin ANTICOAGULANT injection 5,000 Units     hydrALAZINE (APRESOLINE) injection 10 mg     insulin aspart (NovoLOG) injection (RAPID ACTING)     lacosamide (VIMPAT) 200 mg in sodium chloride 0.9 % 100 mL intermittent infusion     levETIRAcetam (KEPPRA) 2,000 mg in sodium chloride 0.9 % 250 mL intermittent infusion     lidocaine (XYLOCAINE) 2 % solution 5 mL     magnesium sulfate 2 g in water intermittent infusion     magnesium sulfate 4 g in 100 mL sterile water (premade)     multivitamins w/minerals (CERTAVITE) liquid 15 mL     naloxone (NARCAN) injection 0.1-0.4 mg     norepinephrine (LEVOPHED) 16 mg in  mL infusion     polyethylene glycol (MIRALAX) Packet 17 g     potassium chloride (KLOR-CON) Packet 20-40 mEq     potassium chloride 10 mEq in 100 mL intermittent infusion with 10 mg lidocaine     potassium chloride 10 mEq in 100 mL sterile water intermittent infusion (premix)     potassium chloride 20 mEq in 50 mL intermittent infusion     potassium chloride ER (KLOR-CON M) CR tablet 20-40 mEq     propofol (DIPRIVAN) infusion    And     propofol (DIPRIVAN) injection 10 mg/mL vial     senna-docusate (SENOKOT-S/PERICOLACE) 8.6-50 MG per tablet 1 tablet     senna-docusate  "(SENOKOT-S/PERICOLACE) 8.6-50 MG per tablet 1 tablet    Or     senna-docusate (SENOKOT-S/PERICOLACE) 8.6-50 MG per tablet 2 tablet     sodium phosphate 15 mmol in D5W intermittent infusion     sodium phosphate 20 mmol in D5W intermittent infusion     sodium phosphate 25 mmol in D5W intermittent infusion             Physical Exam:     Vital signs:  Temp: 98.2  F (36.8  C) Temp src: Esophageal     Heart Rate: 83 Resp: 14 SpO2: 99 % O2 Device: Mechanical Ventilator   Height: 154.9 cm (5' 1\") Weight: 79.6 kg (175 lb 7.8 oz)  Estimated body mass index is 33.16 kg/m  as calculated from the following:    Height as of this encounter: 1.549 m (5' 1\").    Weight as of this encounter: 79.6 kg (175 lb 7.8 oz).    Vent Settings: Ventilation Mode: CMV/AC  (Continuous Mandatory Ventilation/ Assist Control)  FiO2 (%): 30 %  Rate Set (breaths/minute): 14 breaths/min  Tidal Volume Set (mL): 450 mL  PEEP (cm H2O): 5 cmH2O  Oxygen Concentration (%): 30 %  Resp: 14    GEN: intubated, sedated  CV: RRR, no gallops, rubs, or mumurs  PULM: on mechanical ventilation, CTAB, symmetric chest rise  GI: normal bowel sounds, non-tender, no rebound tenderness or guarding, no masses  : gonzalez catheter in place  EXTREMITIES: no pedal edema, moving all extremities, peripheral pulses intact, cool  NEURO: sedated, withdraws to pain  SKIN: No rashes, sores or ulcerations         Data:     I/O last 3 completed shifts:  In: 2332.36 [I.V.:1032.36; Other:50; NG/GT:330]  Out: 1110 [Urine:1110]  Wt Readings from Last 5 Encounters:   04/05/20 79.6 kg (175 lb 7.8 oz)   09/18/19 66.7 kg (147 lb 1.6 oz)   07/01/19 68 kg (150 lb)   06/04/19 70.7 kg (155 lb 14.4 oz)   05/23/19 71.4 kg (157 lb 8 oz)     ROUTINE ICU LABS  ABG / VBG:   Recent Labs   Lab Test 04/05/20  0339 04/04/20  0328 04/03/20  0329 04/02/20  1409  03/31/20  0558   PH 7.39 7.37 7.40  --    < >  --    PCO2 32* 30* 30*  --    < >  --    PO2 116* 120* 133*  --    < >  --    O2PER 30.0 30 30 30   < > " 35.0   PHV  --   --   --  7.35  --  7.35   PCO2V  --   --   --  34*  --  27*    < > = values in this interval not displayed.     BMP:   Recent Labs   Lab Test 04/05/20 0339 04/04/20  1540 04/04/20 0328 04/03/20  1511 04/03/20  0329   * 146* 146* 145* 144   POTASSIUM 4.0 3.9 4.1 3.8 3.7   CHLORIDE 123* 121* 122* 121* 118*   CO2 20 19* 19* 18* 18*   ANIONGAP 5 6 5 6 8   LACT 0.7 0.6* 0.7 0.7 1.0   BUN 37* 37* 38* 33* 29   CR 0.91 1.00 1.06* 1.16* 1.21*   * 124* 122* 127* 124*   ELIDA 7.9* 7.8* 7.8* 7.7* 7.6*   MAG 1.9  --  2.1  --  1.8   PHOS  --   --  3.2  --  2.9     Hepatic Studies:   Recent Labs   Lab Test 04/05/20 0339 04/04/20  1540 04/04/20  0328   AST 43 62* 80*   * 129* 150*   ALKPHOS 89 88 87   BILITOTAL 0.5 0.5 0.5   ALBUMIN 1.6* 1.8* 1.8*     Heme Studies:   Recent Labs   Lab Test 04/05/20 0339 04/04/20  1540 04/04/20 0328 03/31/20  2040  03/31/20  1213 03/31/20  0358  03/30/20  1550 03/11/20  1322   WBC 8.8 9.4 9.7   < > 13.0*  --   --  11.4*   < > 11.7* 5.2   ANEU  --   --   --   --   --   --   --   --   --  4.1 2.3   ALYM  --   --   --   --   --   --   --   --   --  6.6* 2.2   AEOS  --   --   --   --   --   --   --   --   --  0.4 0.1   HGB 7.5* 8.0* 8.1*   < > 9.7*   < >  --  9.6*   < > 9.2* 9.8*   MCV 94 93 93   < > 88  --   --  92   < > 97 89   * 121* 130*   < > 198  --   --  187   < > 249 330   INR  --   --   --   --  1.83*  --  1.80* 1.90*   < >  --   --     < > = values in this interval not displayed.     Iron Studies:   Recent Labs   Lab Test 02/24/20  1049 09/18/19  1516 06/19/19  1723 04/17/19  1336 04/10/19  1437 05/02/17  1533 08/31/15  1434   IRON 20* 22* 30* 15*  --  50 48    212* 184* 261  --  336 340   IRONSAT 8* 10* 16 6*  --  15 14*   * 867* 942* 274* 307* 136  --      Urine Studies:   Recent Labs   Lab Test 01/03/19  1231   SG 1.007   URINEPH 7.0   NITRITE Negative   LEUKEST Moderate*   WBCU 2   RBCU <1   PROTEIN Negative      Microbiology:  No results found for: RS, FLUAG    IMAGING    Chest xray  IMPRESSION:  1. ET tube projects roughly 4.5 cm above the sis.  2. Cardiomegaly.  3. No acute airspace opacity.    CT head, non contrast  Impression:   1. No acute intracranial pathology.  2. Scattered periventricular white matter hypoattenuation consistent  with chronic small vessel ischemic disease.    Critical Care Services Progress Note:     Staci Watt remains critically ill with mental status changes, shock and   Seizures     I personally examined and evaluated the patient today.   The patient s prognosis today is grave  I have evaluated all laboratory values and imaging studies from the past 24 hours.  Key findings and decisions made today included   Seizure management   Back on propofol given recurrence of seizure activity overnight  Planning care conference   I personally managed the ventilator, sedation, pain control and analgesia, metabolic abnormalities, antibiotic therapy, nutritional status and vasoactive medications.   Consults ongoing and ordered are Neurology  Procedures that will happen today are: none  All treatments were placed at my direction.  I formulated today s plan with the house staff team or resident(s) and agree with the findings and plan in the associated note.       The above plans and care have been discussed with none and all questions and concerns were addressed.     I spent a total of 45 minutes (excluding procedure time) personally providing and directing critical care services at the bedside and on the critical care unit for Staci Watt.        Phillip Watkins MD

## 2020-04-05 NOTE — PROCEDURES
Procedure Date: 04/04/2020      EEG #-6, video EEG day 6       DATE OF RECORDING/SERVICE DATE:  04/04/2020       SOURCE FILE DURATION:  23 hours and 34 minutes.       PATIENT INFORMATION:  An 84-year-old female who was found down for unknown period of time and had cardiac arrest in the hospital.  The patient was reported to have a seizure on arrival to the ED.  EEG is being done to evaluate for seizures.      MEDICATIONS:  Onfi, Vimpat, Keppra, fentanyl and propofol 10 mcg per kilogram per minute.       TECHNICAL SUMMARY: This video EEG monitoring procedure was performed with 23 scalp electrodes in 10-20 system placements, and additional scalp, precordial and other surface electrodes used for electrical referencing and artifact detection. Video was reviewed intermittently by EEG technologist and physician for electroclinical seizures.      EEG FINDINGS:  The patient has a diffusely attenuated EEG with burst of low voltage delta activity less than 20 microvolts.  Bursts of delta activity is seen in less than 30% of the record.  In some instances, the burst of delta activity is correlated with a right shoulder twitch.  This is rarely seen. Of note, the patient does not have overt epileptiform discharges or clear electrographic seizures on the EEG.  No parietooccipital rhythm, no sleep architecture, no EEG reactivity is seen.      EPILEPTIFORM/ICTAL:  A good example of twitching is at 08:22 during which time we see movement artifact on the EEG from repetitive twitches which are up to 4-5 per second.  During this time, the EEG has low-voltage delta activity correlated with the twitches, but well-formed electrographic seizures or epileptiform discharges are not seen.      IMPRESSION:  Video EEG day 6 is abnormal due to the presence of diffusely attenuated EEG with superimposed low voltage delta activity (less than 15 microvolts), delta activity is noted in less than 25% of the record.  The patient has segments of  repetitive shoulder twitching correlated with bursts of delta activity.  I suspect these are myoclonic jerks and possible with a modified electroclinical presentation due to anesthetic medications and underlying anoxic brain injury.  Per strict EEG criteria, there are no epileptiform discharges or clear electrographic seizures.  EEG is consistent with a profound encephalopathy and most likely due to post-anoxic brain injury.  Clinical correlation is advised.         LIZZETTE JETT MD             D: 2020   T: 2020   MT: GRAY      Name:     MAE GALLOWAY   MRN:      [id number]        Account:        [id number]   :      1935           Procedure Date: 2020      Document: [id number]

## 2020-04-05 NOTE — PROGRESS NOTES
"Neurocritical Care Note:  S: Intubated and sedated. Propofol back on overnight due to increased jerking movements.  O:  BP 93/50   Pulse 69   Temp 98.4  F (36.9  C)   Resp 14   Ht 1.549 m (5' 1\")   Wt 79.6 kg (175 lb 7.8 oz)   SpO2 100%   BMI 33.16 kg/m      Examined on 10mcg/kg/min propofol. On exam, Ms Watt is sedated and intubated. She does not open her eyes to verbal, noxious, or tactile stimuli. She does not attempt to interact with examiners or her environment. She does not follow commands. Her pupils are asymmetric, R>L 3vs 2mm and react briskly to light. No facial asymmetry. No eye movements. No cough reflex. No limb response to noxious stimuli. Frequent low amplitude myoclonic movements R>L are noted to increase in severity with stimuli.    Imaging and labs personally reviewed in EMR.    A/P:   Ms Watt is an 85yo woman currently admitted for post-cardiac arrest cares. She had both out of hospital and in hospital PEA/VT/VF arrests on 3/30. Since that time she has been cooled and rewarmed (4/1). She has IABP in place which precludes MRI for better assessment of anoxic brain injury. Since arrival, persistent myoclonic activity has occurred with electrographic correlation on EEG but not meeting strict electrographic criteria to be considered seizures or epileptiform in nature. She is currently on three maximally dosed antiepileptic medications with minimal improvement in her EEG or neurologic status. Propofol was stopped yesterday, resulting in increased frequency of movements, but was restarted overnight. We again recommend stopping propofol today to permit better characterization of these movements and to possibly clarify the EEG. The benefit of starting additional antiepileptic drugs will likely be minimal at this time as she is already on three.    Recommendations  - continue EEG  - stop propofol if able to   - continue levetiracetam, lacosamide, and clobazam at current doses  - seizure " precautions  - family meeting via phone tomorrow, we will be available    This patient was seen and discussed with attending neurointensivist, Dr Piyush Schmitz DO  Neurocritical Care Fellow  Team [facility] *[id number]  04/05/2020

## 2020-04-05 NOTE — PROGRESS NOTES
Major Shift Events:  No acute events overnight. When RN first assessed pt she was twitching and showing seizure activity that was relatively continuous. MD notified and propofol restarted with improvement. Unresponsive neurologically, IABP remains 1:1, SR.    Plan: Care conference with family today or Monday    For vital signs and complete assessments, please see documentation flowsheets.

## 2020-04-06 NOTE — PROCEDURES
Procedure Date: 04/05/2020      EEG -7  Day 7 of 24-hour video EEG.      DATE OF RECORDING/SERVICE DATE:  04/05/2020.      SOURCE FILE DURATION:  23 hours 55 minutes 35 seconds.      CLINICAL SUMMARY:  The patient is an 84-year-old female with history of hypertension, CKD, hyperlipidemia, MGUS, asthma, who was brought to ED.  The patient was found down for an unknown period.  She was resuscitated and was reported to have a 20-second seizure upon arrival to ED.  The patient was intubated and sedated. EEG was performed to evaluate for seizures.      MEDICATIONS:  Fentanyl drip 50 mcg per hour, propofol drip 10 mcg per kg per minute, Onfi 20 mg b.i.d., Vimpat 200 mg b.i.d., Levetiracetam 2000 mg q.12.     TECHNICAL SUMMARY: This continuous video- EEG monitoring procedure was performed with 23 scalp electrodes in 10-20 electrode system placement, and additional scalp, precordial and other surface electrodes used for electrical referencing and artifact detection.  Video monitoring was utilized and periodically reviewed by EEG technologists and the physician for electroclinical correlations.     INTERICTAL ACTIVITY:  No well-organized posterior dominant rhythm was appreciated.  There was a burst suppression pattern at various degrees throughout the recording.  At the beginning of the recording until approximately 4:30 a.m., there was a burst suppression pattern with periods of generalized severe suppression of the background lasting 2-10 seconds and bursts of generalized theta-delta slowing, at times sharply contoured, lasting 1-2 seconds.  After 4:30 a.m. the duration of generalized attenuation decreased to 1-2 seconds with bursts of generalized sharply contoured delta activity lasting 0.5-1 second.  No sleep-wake cycling was appreciated.  Stimulation did not change the EEG background.      CLINICAL/ICTAL EVENTS:  There were 2 push button events.  One at 1408 for increased body jerking.  There were no changes in  the EEG background during these.  The second one was at 1622 for right arm twitches.  There were no changes on the EEG background and no epileptiform discharges or ictal activity was seen during this event.      IMPRESSION:  This is an abnormal video EEG due to the presence of burst suppression pattern which changed from approximately 80% suppression to 50%.  Sedative medications, would account for EEG changes to some degree;  However, cannot rule out an underlying cerebral dysfunction, most probably due to hypoxic ischemic injury.  No epileptiform discharges were present.  No electrographic or clinical seizures were recorded.  Clinical correlation advised.         BRIANNA GREGG MD             D: 2020   T: 2020   MT: MALINA      Name:     MAE GALLOWAY   MRN:      [id number]        Account:        [id number]   :      1935           Procedure Date: 2020      Document: [id number]

## 2020-04-06 NOTE — PLAN OF CARE
Shift Summary [id number]:    Neuro: Pt remains unresponsive to painful stimuli. Pupils equal and sluggishly reactive. Seizure activity of RUE jerking and BLE jerking at ~[id number], SBP<160 and HR 100s - MD notified, continue to monitor. Max temperature 100.2.     Cardiac: SR/ST. HR 80s-090s. MAPs>65. HR 100s and SBP<160s with seizure activity. IABP 1:1 augmenting 100%.    Respiratory: On CMV settings (RR14, Peep 5, ) at 30%. Lung sounds clear/coarse. Moderate amount of creamy, thick secretions present in ETT.    GI/: OG infusing with tube feeding. LBM 4/2. Urine output 60-100ml/hr.    Lines: R artieral line. PIVx3. R IJ x3.    No family called RN overnight  Continue to monitor and update MD as needed. Continue plan of care.       Problem: Adult Inpatient Plan of Care  Goal: Plan of Care Review  4/6/2020 0641 by Marialuisa Colby, RN  Outcome: No Change

## 2020-04-06 NOTE — PROGRESS NOTES
[facility]  Palliative Care Daily Progress Note       Recommendations & Counseling       Continue to treat patient as to our for now while allowing family time to gather and visit patient    Placed DNR order    Per discussion with family today, would make no major escalations in care if patient should significantly deteriorate overnight.  For example, would not add antibiotics or major new cares.  Minor changes such as titration of pressors does seem reasonable to continue doing while allowing family time to gather, coordinate, and visit    Agree with tentative plan to transition fully to focus on comfort after family has had time to see    When it is time for extubation, consider transition to a benzodiazepine drip to continue to treat myoclonic jerks, anxious transition away from propofol presuming neuro critical care thinks this will control to patient's uncomfortable movements            Assessments          84-year-old female with history of MGUS, asthma, chronic kidney disease who appears to have had an anoxic arrest prior to being brought to the emergency department on 3/30.  Patient has received aggressive care since that time including the placement of a balloon pump.  Unfortunately she has shown many signs suggestive of poor neurologic prognosis.  She continues to require 3 different medications to try to control his movements which do appear to multiple examiners to be painful when uncontrolled and she is even remained on a low-dose of propofol.  Overall picture per our neuro critical consultant is consistent with a very diffuse neurologic injury with very poor expectation of recovery.  Family members were able to join in a care conference on 4/6/2020 including her daughter Preethi and 2 sons.  Joint decision was to plan for family to visit patient with expectation that focus will shift to comfort in the next several days.  They voiced agreement with only  minor escalations at this time.  Her CODE STATUS is changed to DNR    Today, the patient was seen for:  Goals of care    Prognosis, Goals, or Advance Care Planning was addressed today with: Yes.  Mood, coping, and/or meaning in the context of serious illness were addressed today: No.  Summary/Comments:            Interval History:     Care conference today with SEEMA Leone cc,  (cardiology fellow), Dr Schmitz (neuro critical care fellow), Dr. Otto, myself.  Via telephone the following family members were able to join: Daughter Preethi, son Gerald, second son (Meldon?)),,  Patient's Sister Samantha, Claudette, Chyna, at least 1 of the patient's grandchildren.    Patient's medical problems and the best notable history leading to his current event were reviewed by the cardiology and neuro critical care team.  In brief the patient does have severe cardiac issues including a weak heart and valvular issues.      Most importantly however the patient has had continued to have abnormal movements which are believed to be myoclonic jerks which have not been able to be suppressed in spite of very aggressive medication.  This is very strongly so that suggestive of a severe and diffuse neurologic logic injury of which patient has a very low likelihood of recovery.  It is unfortunately not felt that the patient's sedating medications could be lifted without worsening these movements which appear uncomfortable.  Some of the tests, including EEG were reviewed.  Family was offered support while giving what is obviously very distressing notes for them to hear.  They asked numerous thoughtful questions including opinions about possibility of recovery.    Family was asked, given this unfortunate situation, with a feel the patient would have wished for the situation.  Family voiced that they did not think she would wish to be persisting long like this, supported only by very aggressive medical technology.  They did think that she  would wish to have the machines removed.    It was discussed with family that we will work the best that is possible within her current protective precautions during the COVID-19 pandemic to allow them to visit her if they wish.  We did review that there may be increased risk of exposure while visiting in the hospital, in spite of our best attempts and that it may not be advisable for any sick or elderly family members to visit her.  It was agreed that the family would call the nurse and try to coordinate visiting, likely within a 24-hour period.  They can visit one at a time.      Lastly, family voiced agreement with the recommendation that should her heart stop, felt we would not perform resuscitation.        Key Palliative Symptoms:  We are not helping to manage these symptoms currently in this patient.    Patient is on opioids: assessed and bowels ok/no needed changes to plan of care today.           Review of Systems:     Unable to obtain          Medications:     I have reviewed this patient's medication profile and medications during this hospitalization.    Noted meds:  Propofol, Vimpat, Keppra             Physical Exam:   Vitals were reviewed  General: Patient intubated.  Spontaneous jerking movements  Cardiovascular: Pump in place.  Appears pink, well-perfused  Pulmonary: Intubated.  Symmetric chest rise  Neuro: Pupils 3 mm.  Equal.  Reactive to light.  Obvious myoclonic jerks.  Clonus on bilateral extremities.  Question hyperactive patellar reflexes.  Skin: No obvious rashes             Data Reviewed:     Reviewed recent pertinent imaging, comments:   CT HEAD W/O CONTRAST 3/30/2020 5:05 PM     Provided History: Seizure, new, nontraumatic, >40 yrs     Comparison: None.     Technique: Using multidetector thin collimation helical acquisition  technique, axial, coronal and sagittal CT images from the skull base  to the vertex were obtained without intravenous contrast.      Findings:       No intracranial  hemorrhage. No mass effect. No midline shift. No  extra-axial fluid collection. Scattered periventricular white matter  hypoattenuation. No acute loss of gray-white differentiation.  Ventricles are proportionate to the sulci.. No sulcal effacement..   The basal cisterns are patent.     The visualized paranasal sinuses are clear. The mastoid air cells are  clear. Orbits appear unremarkable. No acute fracture..                                                                      Impression:   1. No acute intracranial pathology.  2. Scattered periventricular white matter hypoattenuation consistent  with chronic small vessel ischemic disease.     I have personally reviewed the examination and initial interpretation    Reviewed recent labs, comments:   Na 148 (slight uptrend)  BUN: 38  Albumin 1.8  Glucose 111      Seen with Dr Fam Armas  Palliative care fellow    Patient seen and evaluated with Dr. Armas.   Agree with assessment and recommendations.    Total time spent was 55 minutes,  >50% of time was spent counseling and/or coordination of care regarding family support and goals of care discussion for patient with cardiac arrest, anoxic brain injury and post anoxic injury myoclonus.    Corina Otto  Palliative Care   Pager [phone]

## 2020-04-06 NOTE — PLAN OF CARE
Shift durration: [id number]    LOC: generally unresponsive, propofol used for seizure like jerking of R UE.  Neuro: generally unresponsive, Pupils sluggish 3 mm each, does not withdraw from painful stimuli.  Cards: IABP pump, 1:1, 100%. When pt has increased muscle jerking pt htn up to 160's systolic. SR 80-90's.  Pulm: LS coarse, thick creamy secretions via inline suction. CMV 14/450/5/30%.  GI/: adequate UOP via gonzalez, no bm today. Tube feeding via OG with minimal residual.  Skin: no change to skin  Mobility: bedrest, IABP groin access. L/R repositioning today q2H.  Vasc access: PIV R internal jugular, art line removed.  Drains/Devices:IABP, vent, gonzalez, EEG.  Special/Event: family care conference today at 1600, pt is now DNR per family request, continue to monitor.

## 2020-04-06 NOTE — PROGRESS NOTES
Care Coordinator Progress Note    Admission Date/Time:  3/30/2020  Attending MD:  Kitty att. providers found    Data  Chart reviewed, discussed with interdisciplinary team.   Patient was admitted for:    Cardiac arrest (H)  Respiratory arrest (H)  Cardiac arrest (H).     Assessment   Pt is with cardiac arrest (PEA and VT/VF) requiring 1 shock and 30 minutes of CPR.   Pt is vented and with cont EEG monitoring.    Coordination of Care  The team reported phone conf. have been arranged for today at 4:00 with pt family and all the providers.  Multiple family will be calling in.  RNCC called pt ashrPreethi Oropeza and provided conference call in phone number.  Palliative team, Dr. Frank and Neuro ICU, Dr. Ng have been updated about the time.  RNCC will cont to follow plan of care.     Plan  Anticipated Discharge Date:  TBD.  Anticipated Discharge Plan:   TBD.  Care conf. Today, 4/6 at 4:00 in 4E conf. Room.  RNCC will cont to follow plan of care.      Vasu Knox RN, PHN, BSN  4A and 4E/ ICU  Care Coordinator  Phone: [phone]  Pager: [phone]

## 2020-04-06 NOTE — PROGRESS NOTES
"Neuroscience Intensive Care Progress Note  2020    REASON FOR CRITICAL CARE ADMISSION: Cardiac arrest     ADMITTING PHYSICIAN: CSI     Date of Service (when I saw the patient): 20    ASSESSMENT: Staci Watt is a 84 year old female admitted on 3/30/2020 with a PMH for asthma, MGUS, CKD, HTN and HLD who was found down by son for an unknown amount of time.     Per medicine note,  \"Son reports noting clenched teeth, was unresponsive and thought to be seizing when EMS arrived. Patient had CPR for 30 min with shock x 1. Patient also reported to have a 20sec seizure upon arrival to ED.\"      24 Hour Vital Signs Summary:  Temperatures:  Current - Temp: 99  F (37.2  C); Max - Temp  Av.1  F (37.3  C)  Min: 98.2  F (36.8  C)  Max: 100.2  F (37.9  C)  Respiration range: Resp  Av.4  Min: 0  Max: 26  Pulse range: No data recorded  Blood pressure range: Systolic (24hrs), Av , Min:140 , Max:140   ; Diastolic (24hrs), Av, Min:80, Max:80    Pulse oximetry range: SpO2  Av.8 %  Min: 92 %  Max: 100 %    Ventilator Settings  Ventilation Mode: CMV/AC  (Continuous Mandatory Ventilation/ Assist Control)  FiO2 (%): 30 %  Rate Set (breaths/minute): 14 breaths/min  Tidal Volume Set (mL): 450 mL  PEEP (cm H2O): 5 cmH2O  Oxygen Concentration (%): 30 %  Resp: (!) 0    Intake/Output Summary (Last 24 hours) at 2020 1244  Last data filed at 2020 1200  Gross per 24 hour   Intake 2522.14 ml   Output 2080 ml   Net 442.14 ml       Arterial Line BP: (111-170)/(16-99) 111/32  MAP:  [68 mmHg-143 mmHg] 74 mmHg  BP - Mean:  [109] 109    Current Medications:    clobazam  20 mg Oral or Feeding Tube BID     famotidine  20 mg Oral or Feeding Tube Daily     heparin ANTICOAGULANT  5,000 Units Subcutaneous Q12H     insulin aspart  1-6 Units Subcutaneous Q4H     lacosamide (VIMPAT) intermittent infusion  200 mg Intravenous BID     levETIRAcetam  2,000 mg Intravenous Q12H     multivitamins w/minerals  15 mL Per Feeding " "Tube Daily     senna-docusate  1 tablet Oral or Feeding Tube At Bedtime       PRN Medications:  artificial tears, dextrose, glucose **OR** dextrose **OR** glucagon, fentaNYL, hydrALAZINE, magnesium sulfate, magnesium sulfate, naloxone, polyethylene glycol, potassium chloride, potassium chloride with lidocaine, potassium chloride, potassium chloride, potassium chloride, propofol (DIPRIVAN) infusion **AND** propofol **AND** CK total **AND** Triglycerides, senna-docusate **OR** senna-docusate, sodium phosphate, sodium phosphate, sodium phosphate    Infusions:    dextrose       fentaNYL 50 mcg/hr (04/06/20 1200)     propofol (DIPRIVAN) infusion 10 mcg/kg/min (04/06/20 1200)       Allergies   Allergen Reactions     Contrast Dye Itching     Seasonal Allergies        Physical Examination:  Vitals: BP (!) 140/80 (BP Location: Left arm)   Pulse 69   Temp 99  F (37.2  C)   Resp (!) 0   Ht 1.549 m (5' 1\")   Wt 80.5 kg (177 lb 7.5 oz)   SpO2 100%   BMI 33.53 kg/m    EXAM: Examined while lightly sedated   - No spontaneous eye opening or in response to verbal or tactile stimuli  - No eye movements, conjugate gaze  - Pupils asymmetric, R>L 3 vs 2mm, and reactive bilaterally  - Corneal response intact  - No clear facial asymmetry  - No cough or gag present with deep suctioning  - Frequent low amplitude myoclonic movements, noted to increase in severity with stimuli, no limb response to noxious stimuli x 4      Labs/Studies:  Recent Labs   Lab Test 04/06/20  0408 04/05/20  2316 04/05/20  0339   * 147* 148*   POTASSIUM 4.1 4.2 4.0   CHLORIDE 120* 121* 123*   CO2 22 21 20   ANIONGAP 6 5 5   * 118* 135*   BUN 38* 40* 37*   CR 0.80 0.85 0.91   ELIDA 8.5 8.2* 7.9*   WBC 10.2 8.0 8.8   RBC 2.98* 2.73* 2.72*   HGB 8.1* 7.5* 7.5*    137* 128*       Recent Labs   Lab Test 03/31/20  2040 03/31/20  1213 03/31/20  0358   INR 1.83* 1.80* 1.90*   PTT 42* 38* 35       Recent Labs   Lab 04/06/20  0407 04/05/20  0339 " 20  0328 20  0329   PH 7.39 7.39 7.37 7.40   PCO2 35 32* 30* 30*   PO2 111* 116* 120* 133*   HCO3 21 19* 17* 19*   O2PER 30 30.0 30 30       Imagin. CT Head w/o contrast on 3/30/20 at 1705  Impression:   1. No acute intracranial pathology.  2. Scattered periventricular white matter hypoattenuation consistent with chronic small vessel ischemic disease.    Assessment/Plan  Staci Watt is a 84 year old admitted on 3/30/2020 with cardiac arrest s/p hypothermia protocol and IABP placement. Now with seizures.      Plan  Neuro:  No new recommendations for today. Patient remains sedated. vEEG is consistent with profound encephalopathy most likely due to post-anoxic brain injury.        Neuro:  #Cardiac arrest  #High risk for hypoxic injury  - Continue vEEG  - Levetiracetam 2000 mg q12  - Lacosamisde 200 mg BID  - Clobazam 20 mg BID  - Ok to keep Propofol at 10 mg/hr to minimize myoclonus jerking  - Family meeting today, available if needed.    - Advise slow correction of high sodiums if needed  - Avoid hypotonic solutions as they may worsen cerebral edema  - We will continue to follow and monitor her EEG and neurologic exam, please call #[phone] with any questions    The patient was seen and discussed with the attending, Dr. Ng.    Petrona DE LA O, CNP  NeuroCritical Care Nurse Practitioner  [phone]

## 2020-04-07 NOTE — PROGRESS NOTES
[facility]  Palliative Care Daily Progress Note       Recommendations & Counseling     -Agree with allowing time for desired family members to visit today as you are  -Agree with plan to start benzodiazepine drip and then stop propofol infusion prior to discontinuation of IABP   -Would have IV narcotics and IV benzo boluses readily available prior to discontinuation  -We are happy to offer more specific or concrete recommendations if useful.  Please contact me at pager [phone] if we can be helpful    Attending Note:  Patient seen and evaluated with Dr Armas and I agree with/confirm their findings/recs in this note. At the time that I attest this note at 1630 she has .  Family did get to visit.     Thank you for involving us in the patient's care.   Percy Amaya MD / Palliative Medicine / [facility] [phone] - texts, without PHI, preferred / My clinic is in the [facility]: [phone] (scheduling); [phone] (triage). [facility] Inpatient Team Consult Pager [phone] (answered 8am-430pm M-F) - prefer text pages via [employer] / Palliative After-Hours Answering Service [phone].             Assessments          84-year-old female with history of MGUS, asthma, chronic kidney disease who appears to have had an anoxic arrest prior to being brought to the emergency department on 3/30.  Patient has received aggressive care since that time including the placement of a balloon pump.  Unfortunately she has shown many signs suggestive of poor neurologic prognosis.  During family meeting on 2020 family voiced a plan to have family members visit her and then to switch to comfort cares today.  2 family members planning to visit, and then this transition will occur    Today, the patient was seen for:  -need for symptom management  -For advice if desired for palliative extubation and transition to comfort cares      Prognosis, Goals, or Advance Care Planning was addressed  today with: No.  Mood, coping, and/or meaning in the context of serious illness were addressed today: No.  Summary/Comments:            Interval History:     Chart review/discussion with unit or clinical team members:   Case was reviewed with Dr. Grijalva.  He voiced that 2 family members were visiting today and then plan was to transition to comfort cares later today.  We discussed plans to transition to a benzodiazepine drip and then DC propofol prior to this.  He voiced comfort and familiarity with this process, and that he would reach out for questions or if we could be further useful    Per patient or family/caregivers today:  Did not contact    Key Palliative Symptoms:  We are not managing pain in this patient  We are not managing dyspnea in this patient  We are not managing nausea in this patient  We are not managing anxiety in this patient    Patient is on opioids: assessed and bowels ok/no needed changes to plan of care today.           Review of Systems:     Palliative review of systems not obtainable          Medications:     I have reviewed this patient's medication profile and medications during this hospitalization.               Physical Exam:   Vitals were reviewed  General: Patient intubated.  Spontaneous jerking movements  Cardiovascular: Pump in place.  Appears pink, well-perfused  Pulmonary: Intubated.  Symmetric chest rise  Neuro: Pupils 3 mm.  Equal.  Reactive to light.  Obvious myoclonic jerks.  Clonus on bilateral extremities.  Question hyperactive patellar reflexes.  Skin: No obvious rashes        Seen and discussed with Dr Amaya.    Domingo Armas MD  Palliative Medicine Fellow, pager [phone]

## 2020-04-07 NOTE — DISCHARGE SUMMARY
[address] [address]  p: [phone]    Discharge Summary: Cardiology Service    Staci Watt MRN# [id number]   YOB: 1935 Age: 84 year old       Admission Date: 3/30/2020  Discharge Date: 04/07/20      Discharge Diagnoses:    1. Refractory Seizures/Diffuse cortical irritability  2. Post anoxic brain injury  3. PEA/VF arrest  4. Acute systolic heart failure 2/2 non-ischemic cardiomyopathy  5. Cardiogenic shock  6. Severe mitral regurgitation    Pertinent Procedures:  1. Coronary angriogram  2. Placement of IABP    Consults:  -Neurocritial care  -Palliative care    Imaging with results:  Echocardiogram 4/3/20:  Interpretation Summary  The patient has an IABPat 1:1  Ischemic cardiomyopathy, LVEF=29%. Regional WMAs as described below, unchanged  from the prior study.  Mild to moderate right ventricular dilation is present.Global right  ventricular function is moderately to severely reduced.  Moderate to severe secondary/functional MR.  Right ventricular systolic pressure is 39mmHg above the right atrial pressure.     This study was compared with the study from 3/30/20: PA pressure has decreased  and MR has decreased slightly in severity, both of which may reflect a  decrease in left-sided filling pressures.    Coronary Angiogram 3/31/20:  Coronary Findings     Diagnostic   Dominance: Right   Left Main    The vessel was visualized by selective angiography and is moderate in size. Angiographically without disease.    Ost LM to Mid LM lesion is 10% stenosed.    Left Anterior Descending    The vessel was visualized by selective angiography and is large. The LAD and associated diagonal branches are angiographically without disease. The LAD is a type 3 vessel supplying the apex.    Left Circumflex    The vessel was visualized by selective angiography and is moderate in size. The LCx and associated OM branches are angiographically without  disease.    Right Coronary Artery    The vessel was visualized by selective angiography and is moderate in size. The right coronary artery supplies the PL and PDA branches. The RCA and associated branch vessel are angiographically without disease.    Intervention     No interventions have been documented.         Brief HPI:  Nicholas is an 84 year old female patient with PMH asthma, MGUS, CKD, HTN and HLD who brought to ED by EMS.      Patient was found down by son for an unknown time at home and EMS was called. Son reports noting clenched teeth, was unresponsive and thought to be seizing when EMS arrived. Patient had CPR for 30 min with shock x 1. Patient also reported to have a 20sec seizure upon arrival to ED. Patient intubated. S/P Versed.      Per ED report, patient was c/o about worsening shortness of breath over the last 3 days. /100, HR 120s upon arrival.  Per chart review, no prior history of cardiac disease other than HLD and HTN.    Hospital Course by Diagnosis:      #Refractory Seizures/Diffuse cortical irritability:  #Post anoxic brain injury:  After cardiac arrest, patient had EEG consistent with profound encephalopathy with diffuse cortical irritability. Neurology critical care was consulted and recommended trial of weaning off sedation, but patient had recurrent seizures. In the end, patient was on high doses of 3 anti-epileptic drugs (clobazam, Vimpat, keppra) and propofol with recurrent seizure activity. Impression from consulting neurology critical care team was that his as associated with a very poor prognosis and very unlikely to have recovery of brain function. The hypoxic injury was irreversible with low likelihood for recovery. This was communicated to patient's family throughout her ICU stay and on 4/6/20 a care conference over the phone with several family members, palliative care, cardiology, and neurology critical care was done. Family stated that patient would not want to be  maintained on this life support given this condition and prognosis. Care was withdrawn and patient  on 20.     #PEA/VF arrest:  #Acute systolic heart failure 2/2 non-ischemic cardiomyopathy:  #Cardiogenic shock:  #Severe mitral regurgitation:  Patient was found down by son for an unknown time at home and EMS was called. Son reports noting clenched teeth, was unresponsive and thought to be seizing when EMS arrived. Patient had CPR for 30 min with shock x 1 for VF. Patient also reported to have a 20 sec seizure upon arrival to ED. Patient intubated prior to arrival to [facility]. Coronary angiogram was without obstructive disease, so there was no PCI. Patient was placed on IABP due to cardiogenic shock with new diagnosis of systolic LV cardiomyopathy and severe MR. Given the above refractory seizures, discussion of additional cardiac procedures was not pursued.    Condition on discharge  No cardiac or breath sounds for 1 minute of ascultation    Pt was seen by, and discharge plan discussed with Dr. Robina Grijalva MD  Cardiovascular Disease Fellow  [phone]    Patient Care Team:  Destin Wolf MD as PCP - General (Family Practice)  Diego Gupta MD as MD (Family Practice)  Destin Wolf MD as Referring Physician ([facility])  Diego Hi MD as MD (Orthopedics)  Destin Wolf MD as Assigned PCP

## 2020-04-07 NOTE — PROGRESS NOTES
[facility], [place]  Procedure Note          Extubation:       Staci Watt  MRN# [id number]   April 7, 2020, 1:32 PM         Patient extubated at: 1:32 PM, April 7, 2020   Supplemental Oxygen:  none    Cough: none   Secretion Mode: Suction as needed   Secretion Amount: none   Respiratory Exam:: Breath sounds: absent        Skin Exam:: Patient color: pale   Patient Status: Currently unresponsiv   Arterial Blood Gasses:     Extubated to comfort care.     Recorded by Abe Concepcion

## 2020-04-07 NOTE — PROGRESS NOTES
"SPIRITUAL HEALTH SERVICES  [facility] ([place] [address]  ON-CALL VISIT     Responded to request to provide compassionate accompaniment for family members to patient's bedside. Provided spiritual and emotional support in transit. Granddaughter and son shared memories of Staci including her \"love for her family\" and her \"stubbornness\" which they \"all inherited.\"     PLAN: No follow up planned unless requested by patient or family.      Paige Gottlieb     * [facility] remains available 24/7 for emergent requests/referrals, either by having the switchboard page the on-call  or by entering an ASAP/STAT consult in Epic (this will also page the on-call )*     "

## 2020-04-07 NOTE — PROGRESS NOTES
[facility]      CSI History and Physicial  Staci Watt MRN: [id number]  1935  Date of Admission:3/30/2020  Primary care provider: Destin Wolf      Assessment and Plan:       Staci Watt is a 84 year old female who was brought to the ED with cardiac arrest (PEA and VT/VF) requiring 1 shock and 30 minutes of CPR.     Plan Today (4/6/20):    Family conference today      Neuro  # Encephalopathy   # Seizure  #  Concern for post anoxic brain injury   EEG consistent with profound encephalopathy with diffuse cortical irritability   Sedation - weaned off sedation patient had seizure   Weaning of sedation propofol per neurology  C/w keppra  Increased lacosamide dose per neurocritical care    seen by neuro crit, on vEEG  Hypothermia protocol - rewarmed earlier due to QTc prolongation on 3/30   Unable to perform MRI brain as patient on IABP    Cardiac  Sudden cardiac arrest- PEA and VF  Undifferentiated shock.  Severely reduced EF Heart failure (TTE EF=25% on 3/30)  Severe Mitral regurgitation - appears more chronic in nature, but may be ischemic MR  QTc prolongation 510 at presentation, increased to >650 ms today will discontinue amiodarone and warm patient   Post arrest EKG no STEMI, trending troponin (initially negative, second minimally elevated)   Repeat arrest on the floor, V fib, in setting of profound hypomagnesemia and hypokalemia, shock x1 amio bolus with ROSC.    Stopped amiodarone infusion on 4/1   Dobutamine decreased to 1 mcg/kg/min  IABP 1:1   MAP goal 65  Replete electrolytes  EP consult for QTc prolongation in setting of cardiac arrest --> will need CMR and likely ICD before discharge   No plan for MR repair and CARITO pending brain prognostication    Pulmonary  Acute hypercapnic respiratory failure  Ventilation Mode: CMV/AC  (Continuous Mandatory Ventilation/ Assist Control)  FiO2 (%): 30 %  Rate Set (breaths/minute): 14 breaths/min  Tidal Volume Set (mL): 450 mL  PEEP (cm H2O): 5  cmH2O  Oxygen Concentration (%): 30 %  Resp: 14  Pulmonary hemorrhage with ET tube secretions, secondary to CPR most likely culprit  Zosyn empirically for PNA switched to Unasyn 3 g q6h (4/1 - )      ID  COVID ruled out  Leukocytosis, WBC mildly elevated to 11.7 on admission. Likely reactive in setting of cardiac arrest.   Zosyn empirically (3/31 -4/1)  Unasyn (4/1 - )    Monitor Bx     Renal  Acute kidney injury - likely ATN from cardiac arrest  Metabolic acidosis likely due to lactate   Non oliguric  No plan for HD now   Will monitor UOP    GI  Shock liver   Trend LFTs   Pantoprazole once daily   Started trickle feeds - appreciate nutritionist recs   Bowel regimen    Floor Care  Fluids:  pressors as needed to maintain MAP  Food:  tube feeds, nutrition consult  Electrolyte repletion: replete, goal K>4 (careful with hypothermia), Mg > 2  Analgesia: fentanyl prn  Sedation: propofol gtt  DVT ppx: heparin SQ  Stress Ulcer ppx: famotidine  Glycemic Control: subcutaneous insulin  Catheters/tubes: gonzalez 3/30,   Lines: R internal jugular central line 3/30, R radial chriss 3/30  Consults: neuro, palliative care  Code Status: Full  Discharge plan: pending stability  Family: updated today    Patient discussed with attending Dr. Roland Grijalva M.D.  Cardiology Fellow           Chief Complaint:     Worsening shortness of breath   Cardiac arrest        Subjective:     Overnight, no acute overnight events. No responding to commands.    ED:  -labs: lactate 3.6, troponin 0.03, WBC 11.7, Hgb 9.2, K 2.9, Creatinine 0.79, ALT 86, , PH 7.07, PCO2 66  -COVID pending  -interventions: 1L NS, versed drip  -imaging: CXR-cardiomegaly, stable ET tube; CT head negative          Review of Systems:      Unable to perform due to patient condition.          Past Medical History:   Medical History reviewed.   Past Medical History:   Diagnosis Date     Allergic rhinitis, cause unspecified      CKD (chronic kidney disease) stage 3,  GFR 30-59 ml/min (H)      Gout, unspecified      Hyperlipidemia LDL goal <100 7/9/2004     Hypertension goal BP (blood pressure) < 140/90 7/9/2004     Iron deficiency anemia      Moderate major depression (H) 10/19/2006     Moderate persistent asthma 12/4/2005     OA (osteoarthritis) of knee 2/2/2012     Obese 2/2/2012     Retinal detachment with retinal defect, unspecified      Unspecified cataract     s/p cataract surgery             Past Surgical History:   Surgical History reviewed.   Past Surgical History:   Procedure Laterality Date     ARTHROPLASTY KNEE Right 5/7/2019    Procedure: Right Total Knee Arthroplasty;  Surgeon: Diego Hi MD;  Location: [facility] OR     BUNIONECTOMY JASWANT BILATERAL       C NONSPECIFIC PROCEDURE      (B) detatched retina     C NONSPECIFIC PROCEDURE      LASIK surgery     C NONSPECIFIC PROCEDURE      NISSA/BSO     C NONSPECIFIC PROCEDURE      Hernia     C NONSPECIFIC PROCEDURE      Tonsillectomy     C NONSPECIFIC PROCEDURE      Arthroscopic knee surgery     CV CORONARY ANGIOGRAM  3/31/2020    Procedure: CV CORONARY ANGIOGRAM;  Surgeon: Phillip Watkins MD;  [facility]     CV INTRA-AORTIC BALLOON PUMP INSERTION N/A 3/31/2020    Procedure: Intra-Aortic Balloon Pump Insertion;  Surgeon: Phillip Watkins MD;  [facility]     CV LEFT HEART CATH N/A 3/31/2020    Procedure: Left Heart Cath;  Surgeon: Phillip Watkins MD;  [facility]             Social History:   Social History reviewed.  Social History     Tobacco Use     Smoking status: Never Smoker     Smokeless tobacco: Never Used     Tobacco comment: Once in awhile when goes to [employer].   Substance Use Topics     Alcohol use: Yes     Comment: has a drink every night before she goes to bed             Family History:   Family History reviewed.   Family History   Problem Relation Age of Onset     Hypertension Mother      Respiratory Mother      Breast  Cancer Maternal Aunt      Respiratory Maternal Aunt      Respiratory Maternal Aunt             Allergies:     Allergies   Allergen Reactions     Contrast Dye Itching     Seasonal Allergies              Medications:   Medications Reviewed.   Current Facility-Administered Medications   Medication     artificial tears ophthalmic ointment     clobazam ((ONFI)) suspension 20 mg     dextrose 10% infusion     glucose gel 15-30 g    Or     dextrose 50 % injection 25-50 mL    Or     glucagon injection 1 mg     famotidine (PEPCID) suspension 20 mg     fentaNYL (PF) (SUBLIMAZE) injection 50 mcg     fentaNYL (PF) (SUBLIMAZE) injection  mcg    And     fentaNYL (PF) (SUBLIMAZE) injection  mcg     fentaNYL (SUBLIMAZE) infusion     heparin ANTICOAGULANT injection 5,000 Units     hydrALAZINE (APRESOLINE) injection 10 mg     HYDROmorphone (PF) (DILAUDID) injection 0.3-0.5 mg    And     HYDROmorphone (PF) (DILAUDID) injection 0.3-0.5 mg     insulin aspart (NovoLOG) injection (RAPID ACTING)     lacosamide (VIMPAT) 100 mg in sodium chloride 0.9 % 100 mL intermittent infusion     levETIRAcetam (KEPPRA) 2,000 mg in sodium chloride 0.9 % 250 mL intermittent infusion     magnesium sulfate 2 g in water intermittent infusion     magnesium sulfate 4 g in 100 mL sterile water (premade)     midazolam (VERSED) injection 2-4 mg     Morphine Sulfate (PF) injection 5-10 mg    And     Morphine Sulfate (PF) injection 5-10 mg     multivitamins w/minerals (CERTAVITE) liquid 15 mL     naloxone (NARCAN) injection 0.1-0.4 mg     polyethylene glycol (MIRALAX) Packet 17 g     propofol (DIPRIVAN) infusion    And     propofol (DIPRIVAN) injection 10 mg/mL vial     senna-docusate (SENOKOT-S/PERICOLACE) 8.6-50 MG per tablet 1 tablet     senna-docusate (SENOKOT-S/PERICOLACE) 8.6-50 MG per tablet 1 tablet    Or     senna-docusate (SENOKOT-S/PERICOLACE) 8.6-50 MG per tablet 2 tablet     sodium phosphate 15 mmol in D5W intermittent infusion     sodium  "phosphate 20 mmol in D5W intermittent infusion     sodium phosphate 25 mmol in D5W intermittent infusion             Physical Exam:     Vital signs:  Temp: 99.3  F (37.4  C) Temp src: Bladder BP: 116/72   Heart Rate: 89 Resp: 14 SpO2: 100 % O2 Device: Mechanical Ventilator   Height: 154.9 cm (5' 1\") Weight: 81.6 kg (179 lb 14.3 oz)  Estimated body mass index is 33.99 kg/m  as calculated from the following:    Height as of this encounter: 1.549 m (5' 1\").    Weight as of this encounter: 81.6 kg (179 lb 14.3 oz).    Vent Settings: Ventilation Mode: CMV/AC  (Continuous Mandatory Ventilation/ Assist Control)  FiO2 (%): 30 %  Rate Set (breaths/minute): 14 breaths/min  Tidal Volume Set (mL): 450 mL  PEEP (cm H2O): 5 cmH2O  Oxygen Concentration (%): 30 %  Resp: 14    GEN: intubated, sedated  CV: RRR, no gallops, rubs, or mumurs  PULM: on mechanical ventilation, CTAB, symmetric chest rise  GI: normal bowel sounds, non-tender, no rebound tenderness or guarding, no masses  : gonzalez catheter in place  EXTREMITIES: no pedal edema, moving all extremities, peripheral pulses intact, cool  NEURO: sedated, withdraws to pain  SKIN: No rashes, sores or ulcerations         Data:     I/O last 3 completed shifts:  In: 2780.22 [I.V.:1385.22; NG/GT:435]  Out: 2745 [Urine:2745]  Wt Readings from Last 5 Encounters:   04/07/20 81.6 kg (179 lb 14.3 oz)   09/18/19 66.7 kg (147 lb 1.6 oz)   07/01/19 68 kg (150 lb)   06/04/19 70.7 kg (155 lb 14.4 oz)   05/23/19 71.4 kg (157 lb 8 oz)     ROUTINE ICU LABS  ABG / VBG:   Recent Labs   Lab Test 04/06/20  0407 04/05/20  0339 04/04/20  0328  04/02/20  1409  03/31/20  0558   PH 7.39 7.39 7.37   < >  --    < >  --    PCO2 35 32* 30*   < >  --    < >  --    PO2 111* 116* 120*   < >  --    < >  --    O2PER 30 30.0 30   < > 30   < > 35.0   PHV  --   --   --   --  7.35  --  7.35   PCO2V  --   --   --   --  34*  --  27*    < > = values in this interval not displayed.     BMP:   Recent Labs   Lab Test " 04/07/20 0428 04/06/20  1610 04/06/20  0408 04/06/20  0407 04/05/20  2316   * 147* 148*  --  147*   POTASSIUM 3.8 4.1 4.1  --  4.2   CHLORIDE 120* 119* 120*  --  121*   CO2 22 22 22  --  21   ANIONGAP 5 6 6  --  5   LACT 0.5* 0.5*  --  0.6* 0.5*   BUN 37* 38* 38*  --  40*   CR 0.69 0.75 0.80  --  0.85   * 99 110*  --  118*   ELIDA 8.6 8.4* 8.5  --  8.2*   MAG 1.8  --  2.1  --   --    PHOS 2.6  --  2.6  --   --      Hepatic Studies:   Recent Labs   Lab Test 04/07/20 0428 04/06/20  1610 04/06/20  0408   AST 33 38 40   ALT 64* 74* 84*   ALKPHOS 103 108 115   BILITOTAL 0.4 0.3 0.4   ALBUMIN 1.6* 1.7* 1.8*     Heme Studies:   Recent Labs   Lab Test 04/07/20 0428 04/06/20  1610 04/06/20  0408  03/31/20  2040  03/31/20  1213 03/31/20  0358  03/30/20  1550 03/11/20  1322   WBC 7.9 9.0 10.2   < > 13.0*  --   --  11.4*   < > 11.7* 5.2   ANEU  --   --   --   --   --   --   --   --   --  4.1 2.3   ALYM  --   --   --   --   --   --   --   --   --  6.6* 2.2   AEOS  --   --   --   --   --   --   --   --   --  0.4 0.1   HGB 7.4* 7.6* 8.1*   < > 9.7*   < >  --  9.6*   < > 9.2* 9.8*   MCV 94 95 94   < > 88  --   --  92   < > 97 89    156 153   < > 198  --   --  187   < > 249 330   INR  --   --   --   --  1.83*  --  1.80* 1.90*   < >  --   --     < > = values in this interval not displayed.     Iron Studies:   Recent Labs   Lab Test 02/24/20  1049 09/18/19  1516 06/19/19  1723 04/17/19  1336 04/10/19  1437 05/02/17  1533 08/31/15  1434   IRON 20* 22* 30* 15*  --  50 48    212* 184* 261  --  336 340   IRONSAT 8* 10* 16 6*  --  15 14*   * 867* 942* 274* 307* 136  --      Urine Studies:   Recent Labs   Lab Test 01/03/19  1231   SG 1.007   URINEPH 7.0   NITRITE Negative   LEUKEST Moderate*   WBCU 2   RBCU <1   PROTEIN Negative     Microbiology:  No results found for: RS, FLUAG    IMAGING    Chest xray  IMPRESSION:  1. ET tube projects roughly 4.5 cm above the sis.  2. Cardiomegaly.  3. No acute  airspace opacity.    CT head, non contrast  Impression:   1. No acute intracranial pathology.  2. Scattered periventricular white matter hypoattenuation consistent  with chronic small vessel ischemic disease.

## 2020-04-07 NOTE — PROCEDURES
Procedure Date: 04/06/2020      A 24-HOUR VIDEO ELECTROENCEPHALOGRAM      EEG -8       DATE OF RECORDING/SERVICE DATE:  Day 8 of a 24-hour video EEG monitoring on 04/06/2020.       SOURCE FILE DURATION:  23 hours 56 minutes 20 seconds.     CLINICAL SUMMARY:  The patient is an 84-year-old female with history of hypertension, CKD, hyperlipidemia, MGUS, asthma, who was brought to ED.  The patient was found down for an unknown period.  She was resuscitated and was reported to have a 20-second seizure upon arrival to ED.  The patient was intubated and sedated. EEG was performed to evaluate for seizures.      MEDICATIONS:  Lacosamide 100 mg b.i.d., levetiracetam 2000 mg b.i.d., Onfi 20 mg b.i.d., fentanyl drip 50 mcg per hour, propofol drip 10-30 mcg/kg per minute.     TECHNICAL SUMMARY: This continuous video- EEG monitoring procedure was performed with 23 scalp electrodes in 10-20 electrode system placement, and additional scalp, precordial and other surface electrodes used for electrical referencing and artifact detection.  Video monitoring was utilized and periodically reviewed by EEG technologists and the physician for electroclinical correlations.     INTERICTAL ACTIVITY:  No well-organized posterior dominant rhythm or sleep-wake cycling was appreciated.  Burst suppression pattern was seen throughout the recording, with bursts of low-amplitude generalized theta-delta activity lasting 0.5-2 seconds with interburst intervals of 2-10 seconds of generalized suppression of the background.  No epileptiform discharges were present.      The patient was stimulated at approximately 9:42.  This did not change the EEG background.      CLINICAL/ICTAL EVENTS:  No electrographic or clinical seizures were recorded.  The patient had 2 events of right arm twitching at 4:02 and 8:36. There were no EEG changes, epileptiform or ictal discharges during these events.      IMPRESSION:  This is an abnormal video EEG due to the  presence of burst suppression pattern.  Sedative medications may in part contribute to this finding.  However, an underlying global cerebral dysfunction is high likely.  No epileptiform discharges or electrographic seizures were recorded.  The patient had 2 events of right upper extremity and upper body shaking with no abnormal EEG correlate.  Clinical correlation advised.         BRIANNA GREGG MD             D: 2020   T: 2020   MT: JAMEL      Name:     MAE GALLOWAY   MRN:      [id number]        Account:        [id number]   :      1935           Procedure Date: 2020      Document: [id number]

## 2020-04-07 NOTE — PROGRESS NOTES
CLINICAL NUTRITION SERVICES - REASSESSMENT NOTE     Nutrition Prescription    Malnutrition Status:    Unable to determine as unable to assess all nutrition parameters at this time.     Interventions by Registered Dietitian (RD):  Continue TF regimen via OGT as ordered        EVALUATION OF THE PROGRESS TOWARD GOALS   Diet: NPO  Nutrition Support: Impact peptide 1.5 @ 40 ml/hr via OGT to provide 1440 kcal (27 kcal/kg), 90 g protein (1.7 g/kg) and certavite daily.   Intake: Overall, received 75% minimum energy and 100% minimum protein needs from TF over past week.      NEW FINDINGS   Per palliative meeting with family (4/6), no major escalations in care. Tentative plan to transition fully to focus on comfort after family has time to see.     Meds: Propofol. Providing addtl ~150 kcal/day.     MALNUTRITION  % Intake: Decreased intake does not meet criteria  % Weight Loss: None noted  Subcutaneous Fat Loss: Unable to assess. Will assess per MD request   Muscle Loss: Unable to assess. Will assess per MD request.   Fluid Accumulation/Edema: Mild  Malnutrition Diagnosis: Unable to determine as unable to assess all nutrition parameters at this time to limit exposure and to minimize use of PPE. Will obtain nutrition focused physical exam per MD request.     Previous Goals   Diet adv v nutrition support within 2-3 days.  Evaluation: Met    Previous Nutrition Diagnosis  Inadequate oral intake  Evaluation: No change, updated dx     CURRENT NUTRITION DIAGNOSIS  Predicted inadequate nutrient intake (kcal-protein) related to NPO dependent on enteral nutrition support with potential for infusion interruptions vs intolerances.       INTERVENTIONS  Implementation  Enteral Nutrition - Continue as ordered     Goals  Total avg nutritional intake to meet a minimum of 25 kcal/kg and 1.2 g PRO/kg daily (per dosing wt 54 kg).    Monitoring/Evaluation  Progress toward goals will be monitored and evaluated per protocol.    Mayi Henley RD,  MARY  [id number]  Pgr: [phone]

## 2020-04-07 NOTE — PROGRESS NOTES
Pt's son and grand-daughter in to visit pt this afternoon.  Per family wishes as discussed at yesterdays care conference, after family left, advanced care measures were withdrawn. Pt was extubated at 1327.  IABP turned of at 1328.  Pt was given PRN medication as well as continuous gtt to maintain comfort.  No s/sx distress.  Pt declared dead at 1341.  Family notified.  [facility] notified.  Personal belongings given to son Gerald before he left the room.  Questions from family answered.   available for family.  Post mortem cares completed.  Pt transported off unit via security.

## 2020-04-07 NOTE — PLAN OF CARE
OT 4E: Cancel and DEFER.  Acknowledge orders were placed for OT eval and treat.  Per chart review and discussion with team, pt likely to transition to comfort cares over the next couple of days once family is able to visit.  No OT needs identified.  Will discontinue order.

## 2020-04-08 NOTE — PROCEDURES
Procedure Date: 04/07/2020      EEG#:  -9.        This is day #9 of a 24-hour video EEG.      DATE OF RECORDING/SERVICE DATE:  04/07/2020.      SOURCE FILE DURATION:  8 hours, 6 minutes, 6 seconds.     CLINICAL SUMMARY:  The patient is an 84-year-old female with history of hypertension, CKD, hyperlipidemia, MGUS, asthma, who was brought to ED.  The patient was found down for an unknown period.  She was resuscitated and was reported to have a 20-second seizure upon arrival to ED.  The patient was intubated and sedated. EEG was performed to evaluate for seizures.      MEDICATIONS:  Onfi 20 mg b.i.d., lacosamide 100 mg b.i.d., levetiracetam 2000 mg b.i.d., fentanyl drip 50 mcg per hour, propofol drip 30 mcg per kg per minute.     TECHNICAL SUMMARY: This continuous video- EEG monitoring procedure was performed with 23 scalp electrodes in 10-20 electrode system placement, and additional scalp, precordial and other surface electrodes used for electrical referencing and artifact detection.  Video monitoring was utilized and periodically reviewed by EEG technologists and the physician for electroclinical correlations.     INTERICTAL ACTIVITY:  No well-organized posterior dominant rhythm was appreciated.  There was burst suppression pattern with approximately 95-99% suppression with bursts of low-amplitude polymorphic delta slowing with occasional superimposed faster activities, lasting 1-3 seconds.  The amplitude of activity was typically less than 15 microvolts.  No epileptiform discharges were present.  No sleep-wake cycling was appreciated.      CLINICAL/ICTAL EVENTS:  No electrographic or clinical seizures were recorded.      IMPRESSION:  This is an abnormal video EEG due to the presence of burst suppression pattern, consistent with diffuse severe nonspecific encephalopathy.  No epileptiform discharges were present.  No electrographic or clinical seizures were recorded.  Sedative medications can in part  contribute to these findings, and clinical correlation advised.      SUMMARY OF 9 DAYS OF VIDEO EEG MONITORING:  The video EEG was abnormal throughout the recording.  On day 1 of the monitoring, the EEG was diffusely attenuated with electrocerebral activity less than 10 microvolts.  On day 2, there was diffuse attenuation and repetitive myoclonic jerk.  On day 3, there were generalized periodic discharges and worsening of myoclonic jerks with reduction of midazolam and propofol and improvement with restarting the sedation.  On day 4, there was generalized attenuation with bursts of delta activity, and the patient had shoulder twitches likely myoclonic seizures, which increased on day 5 with decreasing the midazolam, and continued on day #6.  On day 7 to the end of the study, there was burst suppression pattern.  Findings are consistent with diffuse severe encephalopathy.  Sedative medications could in part contribute these findings.  No epileptiform discharges were present during this monitoring.  Clinical correlation is advised.         BRIANNA GREGG MD             D: 2020   T: 2020   MT:       Name:     MAE GALLOWAY   MRN:      [id number]        Account:        [id number]   :      1935           Procedure Date: 2020      Document: [id number]

## 2022-07-06 NOTE — PATIENT INSTRUCTIONS
07/06/2022 to reschedule colonoscopy procedure with Dr Danial Jeffers please transfer call to [facility] Blood pressure -   Looks great, continue current medication     Constipation -   Continue to increase your vegetable intake  You can use miralax as needed      Follow up for pre-op after April 24, 2019.

## 2023-03-27 NOTE — CARE CONFERENCE
At goal  Continue Lisinopril-HCTZ 20-25 mg po daily  DASH   Care Conference    Care conference was held on April 6, 2020 and 4:00 in 4E conf. room    Attending the conference were: Team members- CSI (Dr. Grijalva), Palliative ( Dr. Otto and Dr. Armas) Neuro ICU ( Dr. Schmitz), Bed side RN (Bhumika Mccoy) and RNCC ( Vasu Knox).  Family Members on the speaker phone- Pt dtr (Preethi), Sons (Gerald and Miah), 2 sisters and grand kids    Purpose of the conference was to update family and discuss the goal of care    Discussion/Outcomes/Follow-Up: After introduction was done, CSI team summarized pt hospital course and current medical status.  Neuro ICU team provided update about pt neuro status and stated pt continue requiring 3 different kinds of antiepileptic medication and sedation to control her jerks. The team stated pt is with anoxic brain injury and her prognosis is poor.  Family expressed that pt would not want to live if no good recovery and all agreed to transition the focus of the care to comfort care.  Family agreed to call the unit after they discuss among themselves the detail of visitation and the timing of transition to comfort care.  Palliative team discussed about comfort care and the process.  Pt family agreed to change the code status to DNR.  All pt family questions were addressed by all the providers.         Vasu Knox, RN, PHN, BSN  4A and 4E/ ICU  Care Coordinator  Phone: [phone]  Pager: [phone]

## 2024-04-09 NOTE — LETTER
8/21/2019       RE: Staci Watt  [address]  [facility] [address]     Dear Colleague,    Thank you for referring your patient, Staci Watt, to the [facility]. Please see a copy of my visit note below.           Interval History:   Staci Watt is a 83 year old female who is here follow up for:   Right TKA - 5/7/2019    Staci returns today following a right knee replacement.  She is seen today roughly 1 month after the last visit for a wound check.  Since her last visit the scab has totally fallen off.  She has no scab on the knee and the area is well-healed.  Her pain is steadily improving.  She is using a walker when outside of the house but around the house not using anything and is very happy with the progress she has made.         Physical Exam:     NAD  AOx3  Interactive and cooperative with the exam.  The scab has totally fallen off and the knee incision is well-healed.  There is no surrounding redness.  No drainage.  Her range of motion is from 0-1 20.  She has no extensor lag.         Imaging:      X-rays demonstrate well fixed and well-positioned cemented knee replacement implants.       Assessment and Plan:      She is doing very well following the knee replacement.  The knee incision is well-healed at this point.  She will continue to progress with activities as tolerated.  I will see her back in clinic for a one-year postoperative visit.  She will let me know if she has any problems or concerns in the meantime.    Diego Hi M.D.     Arthritis and Joint Replacement  Department of Orthopaedic Surgery, [facility]  [email or web address]  [phone] (pager)  
No
Positive

## 2025-04-10 NOTE — PROGRESS NOTES
SUBJECTIVE:   Staci Watt is a 83 year old female who presents to clinic today for the following health issues:      Follow up  -     Left knee pain - worse then the right knee.  She is taking tylenol 1, 000 mg BID. She also notes that the left hip is also bothering her. She had left knee injection on 01.28/19. She notes that left knee injection lasted for one month.   She is scheduled for right knee arthroplasty on 05/2019.   She took class on the computer prior to surgery.   At home she has Aspercreme, icy hot and another OTC medication. All OTC medications aren't working. She is alternating ice with heat.  She notes that tramadol 25 mg didn't work and 50 mg made her too drowsy.   She is drinking fluids.   She notes that Ibuprofen 800 mg BID helped with the pain.       Problem list and histories reviewed & adjusted, as indicated.  Additional history: as documented    Labs reviewed in EPIC    Reviewed and updated as needed this visit by clinical staff       Reviewed and updated as needed this visit by Provider         ROS:  Constitutional, HEENT, cardiovascular, pulmonary, gi and gu systems are negative, except as otherwise noted.    OBJECTIVE:     /60   Pulse 100   Temp 98.3  F (36.8  C) (Oral)   Resp 12   SpO2 99%   There is no height or weight on file to calculate BMI.  GENERAL: healthy, alert and no distress  EYES: Eyes grossly normal to inspection  HENT: nose and mouth without ulcers or lesions  PSYCH: mentation appears normal, affect normal    Diagnostic Test Results:  none     ASSESSMENT/PLAN:       ## Primary osteoarthritis of left knee  - H/o CKD III with worsening left knee pain 2/2 osteoarthritis. She is not due for cortisone injection. Previously I recommended that patient stop daily Ibuprofen 800 mg BID PRN due to CKD. Tramadol didn't help relieve symptoms and higher dose caused her to be drowsy. I restarted Ibuprofen 800 mg BID PRN. Recommended to stay hydrated and BMP in one month.    - ibuprofen (ADVIL/MOTRIN) 800 MG tablet; Take 1 tablet (800 mg) by mouth 2 times daily as needed for moderate pain  Dispense: 60 tablet; Refill: 3  - Basic metabolic panel; Future      Deqa Guilherme Wolf MD  [facility]   [facility] email [email or web address] - no pain, no limited range of motion

## (undated) DEVICE — Device

## (undated) DEVICE — TOURNIQUET CUFF 30" REPRO BLUE 60-7070-105

## (undated) DEVICE — CATH ANGIO INFINITI PIGTAIL 145 6 SH 6FRX110CM  534-652S

## (undated) DEVICE — SUCTION MANIFOLD DORNOCH ULTRA CART UL-CL500

## (undated) DEVICE — ESU PENCIL W/SMOKE EVAC NEPTUNE STRYKER 0703-046-000

## (undated) DEVICE — BONE CLEANING TIP INTERPULSE  0210-010-000

## (undated) DEVICE — KIT HAND CONTROL ACIST 014644 AR-P54

## (undated) DEVICE — DRSG TEGADERM 4X4 3/4" 1626W

## (undated) DEVICE — SU MONOCRYL 3-0 PS-1 27" Y936H

## (undated) DEVICE — SU STRATAFIX PDS PLUS 1 CT-1 18" SXPP1A404

## (undated) DEVICE — GLOVE PROTEXIS POWDER FREE 8.0 ORTHOPEDIC 2D73ET80

## (undated) DEVICE — SPONGE LAP 18X18" X8435

## (undated) DEVICE — DRSG AQUACEL AG 3.5X9.75" HYDROFIBER 412011

## (undated) DEVICE — LINEN BACK PACK 5440

## (undated) DEVICE — CATH ANGIO INFINITI 3DRC 6FRX100CM 534676T

## (undated) DEVICE — BLADE SAW SAGITTAL STRK 19.5X90X1.27MM 2108-109-000S11

## (undated) DEVICE — SU DERMABOND ADVANCED .7ML DNX12

## (undated) DEVICE — BLADE SAW RECIP STRK 70X12.5X1.2MM 0277-096-281

## (undated) DEVICE — NDL SPINAL 20GA 3.5" 405182

## (undated) DEVICE — BASIN SET MAJOR

## (undated) DEVICE — STRAP KNEE/BODY 31143004

## (undated) DEVICE — SOL NACL 0.9% IRRIG 1000ML BOTTLE 2F7124

## (undated) DEVICE — SOL WATER IRRIG 1000ML BOTTLE 2F7114

## (undated) DEVICE — HOOD FLYTE W/PEELAWAY 408-800-100

## (undated) DEVICE — PEN MARKING SKIN W/LABELS 31145918

## (undated) DEVICE — INTRO SHEATH 8FRX10CM PINNACLE RSS802

## (undated) DEVICE — GOWN XLG DISP 9545

## (undated) DEVICE — CATH ANGIO SUPERTORQUE PLUS JL4 6FRX100CM 533620

## (undated) DEVICE — GLOVE PROTEXIS BLUE W/NEU-THERA 8.5  2D73EB85

## (undated) DEVICE — DRAIN HEMOVAC RESERVOIR KIT 10FR 1/8" MED 00-2550-002-10

## (undated) DEVICE — PREP CHLORAPREP 26ML TINTED ORANGE  260815

## (undated) DEVICE — MANIFOLD KIT ANGIO AUTOMATED 014613

## (undated) DEVICE — SOL NACL 0.9% IRRIG 3000ML BAG 2B7477

## (undated) DEVICE — INTRO SHEATH 6FRX25CM PINNACLE RSS606

## (undated) DEVICE — BONE CEMENT MIXEVAC III HI VAC KIT  0206-015-000

## (undated) DEVICE — SUCTION IRR SYSTEM W/O TIP INTERPULSE HANDPIECE 0210-100-000

## (undated) DEVICE — EYE PREP BETADINE 5% SOLUTION 30ML 0065-0411-30

## (undated) DEVICE — CATH ANGIO INFINITI 3DRC 4FRX100CM 538476

## (undated) DEVICE — LINEN TOWEL PACK X5 5464

## (undated) DEVICE — CATH ANGIO INFINITI JL4 4FRX100CM 538420

## (undated) DEVICE — DRAPE U-DRAPE 1015NSD NON-STERILE

## (undated) DEVICE — PACK HEART LEFT CUSTOM

## (undated) DEVICE — DRSG DRAIN 4X4" 7086

## (undated) DEVICE — SU STRATAFIXÂ PDO 2-0 24CMX24CM MH DA SXPD2B408

## (undated) RX ORDER — TRIAMCINOLONE ACETONIDE 40 MG/ML
INJECTION, SUSPENSION INTRA-ARTICULAR; INTRAMUSCULAR
Status: DISPENSED
Start: 2017-03-02

## (undated) RX ORDER — FENTANYL CITRATE 50 UG/ML
INJECTION, SOLUTION INTRAMUSCULAR; INTRAVENOUS
Status: DISPENSED
Start: 2019-01-01

## (undated) RX ORDER — PHENYLEPHRINE HCL IN 0.9% NACL 1 MG/10 ML
SYRINGE (ML) INTRAVENOUS
Status: DISPENSED
Start: 2019-01-01

## (undated) RX ORDER — LIDOCAINE HYDROCHLORIDE 10 MG/ML
INJECTION, SOLUTION INFILTRATION; PERINEURAL
Status: DISPENSED
Start: 2017-03-02

## (undated) RX ORDER — GABAPENTIN 100 MG/1
CAPSULE ORAL
Status: DISPENSED
Start: 2019-01-01

## (undated) RX ORDER — CEFAZOLIN SODIUM 2 G/100ML
INJECTION, SOLUTION INTRAVENOUS
Status: DISPENSED
Start: 2019-01-01

## (undated) RX ORDER — SODIUM CHLORIDE, SODIUM LACTATE, POTASSIUM CHLORIDE, CALCIUM CHLORIDE 600; 310; 30; 20 MG/100ML; MG/100ML; MG/100ML; MG/100ML
INJECTION, SOLUTION INTRAVENOUS
Status: DISPENSED
Start: 2019-01-01

## (undated) RX ORDER — PROPOFOL 10 MG/ML
INJECTION, EMULSION INTRAVENOUS
Status: DISPENSED
Start: 2019-01-01

## (undated) RX ORDER — ACETAMINOPHEN 325 MG/1
TABLET ORAL
Status: DISPENSED
Start: 2019-01-01

## (undated) RX ORDER — CELECOXIB 200 MG/1
CAPSULE ORAL
Status: DISPENSED
Start: 2019-01-01

## (undated) RX ORDER — KETOROLAC TROMETHAMINE 15 MG/ML
INJECTION, SOLUTION INTRAMUSCULAR; INTRAVENOUS
Status: DISPENSED
Start: 2019-01-01

## (undated) RX ORDER — FUROSEMIDE 10 MG/ML
INJECTION INTRAMUSCULAR; INTRAVENOUS
Status: DISPENSED
Start: 2020-01-01